# Patient Record
Sex: FEMALE | Race: WHITE | Employment: OTHER | URBAN - METROPOLITAN AREA
[De-identification: names, ages, dates, MRNs, and addresses within clinical notes are randomized per-mention and may not be internally consistent; named-entity substitution may affect disease eponyms.]

---

## 2017-11-30 PROBLEM — Z78.0 MENOPAUSE: Status: ACTIVE | Noted: 2017-11-30

## 2017-11-30 PROBLEM — E66.09 CLASS 1 OBESITY DUE TO EXCESS CALORIES WITH SERIOUS COMORBIDITY AND BODY MASS INDEX (BMI) OF 32.0 TO 32.9 IN ADULT: Status: ACTIVE | Noted: 2017-11-30

## 2017-11-30 PROBLEM — E66.811 CLASS 1 OBESITY DUE TO EXCESS CALORIES WITH SERIOUS COMORBIDITY AND BODY MASS INDEX (BMI) OF 32.0 TO 32.9 IN ADULT: Status: ACTIVE | Noted: 2017-11-30

## 2017-11-30 PROBLEM — E11.42 TYPE 2 DIABETES MELLITUS WITH DIABETIC POLYNEUROPATHY, WITHOUT LONG-TERM CURRENT USE OF INSULIN (HCC): Status: ACTIVE | Noted: 2017-11-30

## 2017-11-30 PROCEDURE — 82043 UR ALBUMIN QUANTITATIVE: CPT | Performed by: FAMILY MEDICINE

## 2017-11-30 PROCEDURE — 82607 VITAMIN B-12: CPT | Performed by: FAMILY MEDICINE

## 2017-11-30 PROCEDURE — 82570 ASSAY OF URINE CREATININE: CPT | Performed by: FAMILY MEDICINE

## 2018-03-30 PROBLEM — E11.65 UNCONTROLLED TYPE 2 DIABETES MELLITUS WITH DIABETIC POLYNEUROPATHY, WITHOUT LONG-TERM CURRENT USE OF INSULIN (HCC): Status: ACTIVE | Noted: 2017-11-30

## 2018-03-30 PROBLEM — E66.09 CLASS 1 OBESITY DUE TO EXCESS CALORIES WITH SERIOUS COMORBIDITY AND BODY MASS INDEX (BMI) OF 34.0 TO 34.9 IN ADULT: Status: ACTIVE | Noted: 2017-11-30

## 2018-03-30 PROBLEM — E11.42 UNCONTROLLED TYPE 2 DIABETES MELLITUS WITH DIABETIC POLYNEUROPATHY, WITHOUT LONG-TERM CURRENT USE OF INSULIN (HCC): Status: ACTIVE | Noted: 2017-11-30

## 2018-03-30 PROBLEM — E66.811 CLASS 1 OBESITY DUE TO EXCESS CALORIES WITH SERIOUS COMORBIDITY AND BODY MASS INDEX (BMI) OF 34.0 TO 34.9 IN ADULT: Status: ACTIVE | Noted: 2017-11-30

## 2018-03-30 PROBLEM — IMO0002 UNCONTROLLED TYPE 2 DIABETES MELLITUS WITH DIABETIC POLYNEUROPATHY, WITHOUT LONG-TERM CURRENT USE OF INSULIN: Status: ACTIVE | Noted: 2017-11-30

## 2018-03-30 PROBLEM — E11.65 UNCONTROLLED TYPE 2 DIABETES MELLITUS WITH HYPERGLYCEMIA, WITHOUT LONG-TERM CURRENT USE OF INSULIN (HCC): Status: ACTIVE | Noted: 2018-03-30

## 2018-04-06 PROBLEM — E78.00 PURE HYPERCHOLESTEROLEMIA: Status: ACTIVE | Noted: 2018-04-06

## 2018-04-30 PROBLEM — H35.3122 INTERMEDIATE STAGE NONEXUDATIVE AGE-RELATED MACULAR DEGENERATION OF LEFT EYE: Status: ACTIVE | Noted: 2018-04-30

## 2018-04-30 PROBLEM — E11.9 DIABETES MELLITUS TYPE 2 WITHOUT RETINOPATHY (HCC): Status: ACTIVE | Noted: 2018-04-30

## 2018-04-30 PROBLEM — H35.3212 EXUDATIVE AGE-RELATED MACULAR DEGENERATION OF RIGHT EYE WITH INACTIVE CHOROIDAL NEOVASCULARIZATION (HCC): Status: ACTIVE | Noted: 2018-04-30

## 2018-04-30 PROBLEM — H53.002 AMBLYOPIA OF LEFT EYE: Status: ACTIVE | Noted: 2018-04-30

## 2018-04-30 PROBLEM — Z96.1 PSEUDOPHAKIA: Status: ACTIVE | Noted: 2018-04-30

## 2018-07-27 PROBLEM — H35.3211 EXUDATIVE AGE-RELATED MACULAR DEGENERATION OF RIGHT EYE WITH ACTIVE CHOROIDAL NEOVASCULARIZATION (HCC): Status: ACTIVE | Noted: 2018-04-30

## 2018-09-12 PROBLEM — Z12.31 VISIT FOR SCREENING MAMMOGRAM: Status: ACTIVE | Noted: 2018-09-12

## 2018-09-12 PROBLEM — R60.0 BILATERAL LEG EDEMA: Status: ACTIVE | Noted: 2018-09-12

## 2018-09-25 PROBLEM — D56.3 THALASSEMIA MINOR: Status: ACTIVE | Noted: 2018-09-25

## 2018-12-04 PROCEDURE — 82570 ASSAY OF URINE CREATININE: CPT | Performed by: FAMILY MEDICINE

## 2018-12-04 PROCEDURE — 82043 UR ALBUMIN QUANTITATIVE: CPT | Performed by: FAMILY MEDICINE

## 2019-04-08 PROBLEM — R53.83 FATIGUE, UNSPECIFIED TYPE: Status: ACTIVE | Noted: 2019-04-08

## 2019-04-08 PROBLEM — M25.561 CHRONIC PAIN OF RIGHT KNEE: Status: ACTIVE | Noted: 2019-04-08

## 2019-04-08 PROBLEM — G89.29 CHRONIC PAIN OF RIGHT KNEE: Status: ACTIVE | Noted: 2019-04-08

## 2019-05-07 PROBLEM — D56.3 THALASSEMIA MINOR: Status: RESOLVED | Noted: 2018-09-25 | Resolved: 2019-05-07

## 2019-05-07 PROCEDURE — 83020 HEMOGLOBIN ELECTROPHORESIS: CPT | Performed by: INTERNAL MEDICINE

## 2019-05-07 PROCEDURE — 81364 HBB FULL GENE SEQUENCE: CPT | Performed by: INTERNAL MEDICINE

## 2019-05-07 PROCEDURE — 83021 HEMOGLOBIN CHROMOTOGRAPHY: CPT | Performed by: INTERNAL MEDICINE

## 2019-05-07 PROCEDURE — 83883 ASSAY NEPHELOMETRY NOT SPEC: CPT | Performed by: INTERNAL MEDICINE

## 2019-05-07 PROCEDURE — 84165 PROTEIN E-PHORESIS SERUM: CPT | Performed by: INTERNAL MEDICINE

## 2019-05-07 PROCEDURE — 86334 IMMUNOFIX E-PHORESIS SERUM: CPT | Performed by: INTERNAL MEDICINE

## 2019-05-07 PROCEDURE — 82784 ASSAY IGA/IGD/IGG/IGM EACH: CPT | Performed by: INTERNAL MEDICINE

## 2019-10-04 PROBLEM — Z23 NEED FOR 23-POLYVALENT PNEUMOCOCCAL POLYSACCHARIDE VACCINE: Status: ACTIVE | Noted: 2019-10-04

## 2019-10-04 PROBLEM — Z23 NEED FOR INFLUENZA VACCINATION: Status: ACTIVE | Noted: 2019-10-04

## 2019-10-04 PROBLEM — Z23 INFLUENZA VACCINE NEEDED: Status: ACTIVE | Noted: 2019-10-04

## 2019-11-07 PROBLEM — D56.3 BETA THALASSEMIA TRAIT: Status: ACTIVE | Noted: 2019-11-07

## 2020-03-13 PROBLEM — E11.65 UNCONTROLLED TYPE 2 DIABETES MELLITUS WITH HYPERGLYCEMIA (HCC): Status: ACTIVE | Noted: 2020-03-13

## 2020-03-13 PROBLEM — Z12.31 VISIT FOR SCREENING MAMMOGRAM: Status: RESOLVED | Noted: 2018-09-12 | Resolved: 2020-03-13

## 2020-03-13 PROBLEM — E11.65 UNCONTROLLED TYPE 2 DIABETES MELLITUS WITH HYPERGLYCEMIA, WITH LONG-TERM CURRENT USE OF INSULIN (HCC): Status: ACTIVE | Noted: 2020-03-13

## 2020-03-13 PROBLEM — Z79.4 UNCONTROLLED TYPE 2 DIABETES MELLITUS WITH HYPERGLYCEMIA, WITH LONG-TERM CURRENT USE OF INSULIN (HCC): Status: ACTIVE | Noted: 2020-03-13

## 2020-03-13 PROBLEM — Z23 NEED FOR INFLUENZA VACCINATION: Status: RESOLVED | Noted: 2019-10-04 | Resolved: 2020-03-13

## 2020-03-31 PROBLEM — Z79.4 UNCONTROLLED TYPE 2 DIABETES MELLITUS WITH HYPERGLYCEMIA, WITH LONG-TERM CURRENT USE OF INSULIN (HCC): Status: RESOLVED | Noted: 2020-03-13 | Resolved: 2020-03-31

## 2020-03-31 PROBLEM — E11.42 TYPE 2 DIABETES MELLITUS WITH DIABETIC POLYNEUROPATHY, WITH LONG-TERM CURRENT USE OF INSULIN (HCC): Status: ACTIVE | Noted: 2020-03-31

## 2020-03-31 PROBLEM — Z79.4 TYPE 2 DIABETES MELLITUS WITH DIABETIC POLYNEUROPATHY, WITH LONG-TERM CURRENT USE OF INSULIN (HCC): Status: ACTIVE | Noted: 2020-03-31

## 2020-03-31 PROBLEM — E11.65 UNCONTROLLED TYPE 2 DIABETES MELLITUS WITH DIABETIC POLYNEUROPATHY, WITHOUT LONG-TERM CURRENT USE OF INSULIN (HCC): Status: RESOLVED | Noted: 2017-11-30 | Resolved: 2020-03-31

## 2020-03-31 PROBLEM — E11.42 UNCONTROLLED TYPE 2 DIABETES MELLITUS WITH DIABETIC POLYNEUROPATHY, WITHOUT LONG-TERM CURRENT USE OF INSULIN (HCC): Status: RESOLVED | Noted: 2017-11-30 | Resolved: 2020-03-31

## 2020-03-31 PROBLEM — E11.65 UNCONTROLLED TYPE 2 DIABETES MELLITUS WITH HYPERGLYCEMIA, WITH LONG-TERM CURRENT USE OF INSULIN (HCC): Status: RESOLVED | Noted: 2020-03-13 | Resolved: 2020-03-31

## 2020-03-31 PROBLEM — IMO0002 UNCONTROLLED TYPE 2 DIABETES MELLITUS WITH DIABETIC POLYNEUROPATHY, WITHOUT LONG-TERM CURRENT USE OF INSULIN: Status: RESOLVED | Noted: 2017-11-30 | Resolved: 2020-03-31

## 2020-04-07 PROBLEM — E11.42 UNCONTROLLED TYPE 2 DIABETES MELLITUS WITH DIABETIC POLYNEUROPATHY, WITHOUT LONG-TERM CURRENT USE OF INSULIN (HCC): Status: ACTIVE | Noted: 2020-04-07

## 2020-04-07 PROBLEM — D56.8 BETA 0 THALASSEMIA (HCC): Status: ACTIVE | Noted: 2020-04-07

## 2020-04-07 PROBLEM — E11.65 UNCONTROLLED TYPE 2 DIABETES MELLITUS WITH DIABETIC POLYNEUROPATHY, WITHOUT LONG-TERM CURRENT USE OF INSULIN (HCC): Status: ACTIVE | Noted: 2020-04-07

## 2020-04-07 PROBLEM — IMO0002 UNCONTROLLED TYPE 2 DIABETES MELLITUS WITH DIABETIC POLYNEUROPATHY, WITHOUT LONG-TERM CURRENT USE OF INSULIN: Status: ACTIVE | Noted: 2020-04-07

## 2020-04-07 PROBLEM — D56.8: Status: ACTIVE | Noted: 2020-04-07

## 2020-06-30 PROBLEM — J02.9 SORE THROAT: Status: ACTIVE | Noted: 2020-06-30

## 2020-07-01 PROBLEM — E11.42 TYPE 2 DIABETES MELLITUS WITH DIABETIC POLYNEUROPATHY, WITH LONG-TERM CURRENT USE OF INSULIN (HCC): Status: RESOLVED | Noted: 2020-03-31 | Resolved: 2020-07-01

## 2020-07-01 PROBLEM — Z79.4 TYPE 2 DIABETES MELLITUS WITH DIABETIC POLYNEUROPATHY, WITH LONG-TERM CURRENT USE OF INSULIN (HCC): Status: RESOLVED | Noted: 2020-03-31 | Resolved: 2020-07-01

## 2020-10-29 PROBLEM — E11.42 UNCONTROLLED TYPE 2 DIABETES MELLITUS WITH DIABETIC POLYNEUROPATHY, WITHOUT LONG-TERM CURRENT USE OF INSULIN (HCC): Status: RESOLVED | Noted: 2020-04-07 | Resolved: 2020-10-29

## 2020-10-29 PROBLEM — E11.65 UNCONTROLLED TYPE 2 DIABETES MELLITUS WITH DIABETIC POLYNEUROPATHY, WITHOUT LONG-TERM CURRENT USE OF INSULIN (HCC): Status: RESOLVED | Noted: 2020-04-07 | Resolved: 2020-10-29

## 2020-10-29 PROBLEM — IMO0002 UNCONTROLLED TYPE 2 DIABETES MELLITUS WITH DIABETIC POLYNEUROPATHY, WITHOUT LONG-TERM CURRENT USE OF INSULIN: Status: RESOLVED | Noted: 2020-04-07 | Resolved: 2020-10-29

## 2020-11-02 PROBLEM — F33.0 MILD EPISODE OF RECURRENT MAJOR DEPRESSIVE DISORDER: Status: ACTIVE | Noted: 2020-11-02

## 2020-11-02 PROBLEM — F33.0 MILD EPISODE OF RECURRENT MAJOR DEPRESSIVE DISORDER (HCC): Status: ACTIVE | Noted: 2020-11-02

## 2020-12-17 PROBLEM — G31.9 BRAIN ATROPHY: Status: ACTIVE | Noted: 2020-12-17

## 2020-12-17 PROBLEM — G31.9 BRAIN ATROPHY (HCC): Status: ACTIVE | Noted: 2020-12-17

## 2020-12-18 PROBLEM — E11.42 TYPE 2 DIABETES MELLITUS WITH DIABETIC POLYNEUROPATHY (HCC): Status: ACTIVE | Noted: 2020-10-01

## 2020-12-18 PROBLEM — E66.09 CLASS 1 OBESITY DUE TO EXCESS CALORIES WITH SERIOUS COMORBIDITY AND BODY MASS INDEX (BMI) OF 34.0 TO 34.9 IN ADULT: Status: ACTIVE | Noted: 2017-05-02

## 2020-12-18 PROBLEM — R53.83 FATIGUE: Status: ACTIVE | Noted: 2019-04-08

## 2020-12-18 PROBLEM — H35.3190 NONEXUDATIVE AGE-RELATED MACULAR DEGENERATION: Status: ACTIVE | Noted: 2018-04-30

## 2020-12-18 PROBLEM — E11.42 TYPE 2 DIABETES MELLITUS WITH DIABETIC POLYNEUROPATHY (HCC): Status: RESOLVED | Noted: 2020-10-01 | Resolved: 2020-12-18

## 2020-12-18 PROBLEM — E66.811 CLASS 1 OBESITY DUE TO EXCESS CALORIES WITH SERIOUS COMORBIDITY AND BODY MASS INDEX (BMI) OF 34.0 TO 34.9 IN ADULT: Status: ACTIVE | Noted: 2017-05-02

## 2021-01-04 PROBLEM — G89.29 CHRONIC PAIN OF RIGHT KNEE: Status: RESOLVED | Noted: 2019-04-08 | Resolved: 2021-01-04

## 2021-01-04 PROBLEM — J02.9 SORE THROAT: Status: RESOLVED | Noted: 2020-06-30 | Resolved: 2021-01-04

## 2021-01-04 PROBLEM — R60.0 BILATERAL LEG EDEMA: Status: RESOLVED | Noted: 2018-09-12 | Resolved: 2021-01-04

## 2021-01-04 PROBLEM — R53.83 FATIGUE: Status: RESOLVED | Noted: 2019-04-08 | Resolved: 2021-01-04

## 2021-01-04 PROBLEM — Z23 INFLUENZA VACCINE NEEDED: Status: RESOLVED | Noted: 2019-10-04 | Resolved: 2021-01-04

## 2021-01-04 PROBLEM — E11.65 UNCONTROLLED TYPE 2 DIABETES MELLITUS WITH HYPERGLYCEMIA, WITH LONG-TERM CURRENT USE OF INSULIN (HCC): Status: RESOLVED | Noted: 2020-03-13 | Resolved: 2021-01-04

## 2021-01-04 PROBLEM — D56.8 BETA 0 THALASSEMIA (HCC): Status: RESOLVED | Noted: 2020-04-07 | Resolved: 2021-01-04

## 2021-01-04 PROBLEM — Z23 NEED FOR 23-POLYVALENT PNEUMOCOCCAL POLYSACCHARIDE VACCINE: Status: RESOLVED | Noted: 2019-10-04 | Resolved: 2021-01-04

## 2021-01-04 PROBLEM — M25.561 CHRONIC PAIN OF RIGHT KNEE: Status: RESOLVED | Noted: 2019-04-08 | Resolved: 2021-01-04

## 2021-01-04 PROBLEM — H35.3211 EXUDATIVE AGE-RELATED MACULAR DEGENERATION OF RIGHT EYE WITH ACTIVE CHOROIDAL NEOVASCULARIZATION (HCC): Status: ACTIVE | Noted: 2021-01-04

## 2021-01-04 PROBLEM — Z79.4 UNCONTROLLED TYPE 2 DIABETES MELLITUS WITH HYPERGLYCEMIA, WITH LONG-TERM CURRENT USE OF INSULIN (HCC): Status: RESOLVED | Noted: 2020-03-13 | Resolved: 2021-01-04

## 2021-01-04 PROBLEM — E78.00 PURE HYPERCHOLESTEROLEMIA: Status: RESOLVED | Noted: 2018-04-06 | Resolved: 2021-01-04

## 2021-01-04 PROBLEM — D56.8: Status: RESOLVED | Noted: 2020-04-07 | Resolved: 2021-01-04

## 2021-02-04 PROBLEM — H35.3190 NONEXUDATIVE AGE-RELATED MACULAR DEGENERATION: Status: RESOLVED | Noted: 2018-04-30 | Resolved: 2021-02-04

## 2021-02-04 PROBLEM — E11.65 UNCONTROLLED TYPE 2 DIABETES MELLITUS WITH HYPERGLYCEMIA (HCC): Status: ACTIVE | Noted: 2021-02-04

## 2021-02-04 PROBLEM — I70.0 THORACIC AORTA ATHEROSCLEROSIS (HCC): Status: ACTIVE | Noted: 2021-02-04

## 2021-02-04 PROBLEM — I70.0 THORACIC AORTA ATHEROSCLEROSIS: Status: ACTIVE | Noted: 2021-02-04

## 2021-03-15 PROBLEM — R41.3 MEMORY DIFFICULTIES: Status: ACTIVE | Noted: 2021-03-15

## 2021-09-21 PROBLEM — G47.33 OSA ON CPAP: Status: ACTIVE | Noted: 2021-09-21

## 2021-09-21 PROBLEM — Z99.89 OSA ON CPAP: Status: ACTIVE | Noted: 2021-09-21

## 2021-11-09 ENCOUNTER — HOSPITAL ENCOUNTER (EMERGENCY)
Age: 76
Discharge: HOME | End: 2021-11-09
Attending: EMERGENCY MEDICINE
Payer: MEDICARE

## 2021-11-09 ENCOUNTER — APPOINTMENT (OUTPATIENT)
Dept: MRI IMAGING | Age: 76
End: 2021-11-09
Attending: EMERGENCY MEDICINE
Payer: MEDICARE

## 2021-11-09 ENCOUNTER — OFFICE VISIT (OUTPATIENT)
Dept: FAMILY MEDICINE CLINIC | Facility: CLINIC | Age: 76
End: 2021-11-09

## 2021-11-09 VITALS
OXYGEN SATURATION: 97 % | HEART RATE: 67 BPM | TEMPERATURE: 98 F | DIASTOLIC BLOOD PRESSURE: 62 MMHG | RESPIRATION RATE: 18 BRPM | WEIGHT: 200 LBS | SYSTOLIC BLOOD PRESSURE: 154 MMHG | HEIGHT: 62 IN | BODY MASS INDEX: 36.8 KG/M2

## 2021-11-09 DIAGNOSIS — Z02.9 ENCOUNTER FOR ADMINISTRATIVE EXAMINATIONS: ICD-10-CM

## 2021-11-09 DIAGNOSIS — R42 DIZZINESS, NONSPECIFIC: Primary | ICD-10-CM

## 2021-11-09 DIAGNOSIS — R42 DIZZINESS: Primary | ICD-10-CM

## 2021-11-09 PROCEDURE — 85025 COMPLETE CBC W/AUTO DIFF WBC: CPT | Performed by: EMERGENCY MEDICINE

## 2021-11-09 PROCEDURE — 99285 EMERGENCY DEPT VISIT HI MDM: CPT

## 2021-11-09 PROCEDURE — 80053 COMPREHEN METABOLIC PANEL: CPT | Performed by: EMERGENCY MEDICINE

## 2021-11-09 PROCEDURE — 96374 THER/PROPH/DIAG INJ IV PUSH: CPT

## 2021-11-09 PROCEDURE — 99284 EMERGENCY DEPT VISIT MOD MDM: CPT

## 2021-11-09 PROCEDURE — 93010 ELECTROCARDIOGRAM REPORT: CPT

## 2021-11-09 PROCEDURE — 81003 URINALYSIS AUTO W/O SCOPE: CPT | Performed by: EMERGENCY MEDICINE

## 2021-11-09 PROCEDURE — 93005 ELECTROCARDIOGRAM TRACING: CPT

## 2021-11-09 PROCEDURE — 82962 GLUCOSE BLOOD TEST: CPT

## 2021-11-09 PROCEDURE — 70551 MRI BRAIN STEM W/O DYE: CPT | Performed by: EMERGENCY MEDICINE

## 2021-11-09 RX ORDER — MECLIZINE HYDROCHLORIDE 25 MG/1
25 TABLET ORAL ONCE
Status: COMPLETED | OUTPATIENT
Start: 2021-11-09 | End: 2021-11-09

## 2021-11-09 RX ORDER — MECLIZINE HYDROCHLORIDE 25 MG/1
25 TABLET ORAL 3 TIMES DAILY PRN
Qty: 15 TABLET | Refills: 0 | Status: SHIPPED | OUTPATIENT
Start: 2021-11-09 | End: 2022-01-07

## 2021-11-09 RX ORDER — LORAZEPAM 2 MG/ML
0.5 INJECTION INTRAMUSCULAR ONCE
Status: COMPLETED | OUTPATIENT
Start: 2021-11-09 | End: 2021-11-09

## 2021-11-09 NOTE — ED PROVIDER NOTES
Patient Seen in: THE [facility] Emergency Department In [place]      History   Patient presents with:  Dizziness  Nausea/Vomiting/Diarrhea    Stated Complaint: room spinning dizziness and nausea    Subjective:   HPI    This is a 62-year-old female who was sen systems reviewed and negative except as noted above.     Physical Exam     ED Triage Vitals   BP 11/09/21 1112 157/62   Pulse 11/09/21 1110 62   Resp 11/09/21 1110 18   Temp 11/09/21 1110 97.6 °F (36.4 °C)   Temp src --    SpO2 11/09/21 1110 94 %   O2 Devic Platelet.   Procedure                               Abnormality         Status                     ---------                               -----------         ------                     CBC W/ DIFFERENTIAL[[id number]]          Abnormal            Final resul on 11/09/2021 at 2:07 PM       MDM       Patient placed on cardiac monitor, continuous pulse oximetry and IV line was established of normal saline. Basic labs were obtained. CBC: White blood cell count 7.1. Hemoglobin 9.8. Platelet 150. CMP: BUN 31.

## 2021-11-09 NOTE — PROGRESS NOTES
Patient presents to walk in clinic with onset of dizziness and nausea yesterday. Patient states all day yesterday upon awakening she has had vertigo, dizziness and some nausea.  Symptoms improved slightly after 5 pm but patient had symptoms again upon awake 0  Vitamin B-12 1000 MCG Oral Tab, Take 1,000 mcg by mouth daily. , Disp: , Rfl:   aspirin 81 MG Oral Tab, Take 81 mg by mouth daily. , Disp: , Rfl:   Calcium Carbonate-Vitamin D 600-125 MG-UNIT Oral Tab, Take by mouth., Disp: , Rfl:     No current facility-

## 2022-01-07 PROBLEM — J96.01 ACUTE RESPIRATORY FAILURE WITH HYPOXIA (HCC): Status: ACTIVE | Noted: 2022-01-07

## 2022-03-21 PROBLEM — N18.31 STAGE 3A CHRONIC KIDNEY DISEASE (HCC): Status: ACTIVE | Noted: 2022-03-21

## 2022-03-21 PROBLEM — H35.3212 EXUDATIVE AGE-RELATED MACULAR DEGENERATION OF RIGHT EYE WITH INACTIVE CHOROIDAL NEOVASCULARIZATION (HCC): Status: ACTIVE | Noted: 2021-01-04

## 2022-03-21 PROBLEM — R41.3 MEMORY DIFFICULTIES: Status: RESOLVED | Noted: 2021-03-15 | Resolved: 2022-03-21

## 2022-03-21 PROBLEM — J96.01 ACUTE RESPIRATORY FAILURE WITH HYPOXIA (HCC): Status: RESOLVED | Noted: 2022-01-07 | Resolved: 2022-03-21

## 2022-03-21 PROBLEM — J96.11 CHRONIC RESPIRATORY FAILURE WITH HYPOXIA (HCC): Status: ACTIVE | Noted: 2022-03-21

## 2024-04-21 ENCOUNTER — APPOINTMENT (OUTPATIENT)
Dept: GENERAL RADIOLOGY | Facility: HOSPITAL | Age: 79
End: 2024-04-21
Attending: EMERGENCY MEDICINE
Payer: MEDICARE

## 2024-04-21 ENCOUNTER — HOSPITAL ENCOUNTER (INPATIENT)
Facility: HOSPITAL | Age: 79
LOS: 9 days | Discharge: HOME HEALTH | End: 2024-04-30
Attending: EMERGENCY MEDICINE | Admitting: INTERNAL MEDICINE
Payer: MEDICARE

## 2024-04-21 DIAGNOSIS — J18.9 COMMUNITY ACQUIRED PNEUMONIA, UNSPECIFIED LATERALITY: Primary | ICD-10-CM

## 2024-04-21 DIAGNOSIS — I50.9 ACUTE ON CHRONIC CONGESTIVE HEART FAILURE, UNSPECIFIED HEART FAILURE TYPE (HCC): ICD-10-CM

## 2024-04-21 DIAGNOSIS — R09.02 HYPOXIA: ICD-10-CM

## 2024-04-21 LAB
ALBUMIN SERPL-MCNC: 3.2 G/DL (ref 3.4–5)
ALBUMIN/GLOB SERPL: 0.9 {RATIO} (ref 1–2)
ALP LIVER SERPL-CCNC: 110 U/L
ALT SERPL-CCNC: 15 U/L
ANION GAP SERPL CALC-SCNC: 6 MMOL/L (ref 0–18)
APTT PPP: 30.1 SECONDS (ref 23.3–35.6)
AST SERPL-CCNC: 18 U/L (ref 15–37)
BASOPHILS # BLD AUTO: 0.03 X10(3) UL (ref 0–0.2)
BASOPHILS NFR BLD AUTO: 0.3 %
BILIRUB SERPL-MCNC: 0.6 MG/DL (ref 0.1–2)
BUN BLD-MCNC: 26 MG/DL (ref 9–23)
CALCIUM BLD-MCNC: 8.9 MG/DL (ref 8.5–10.1)
CHLORIDE SERPL-SCNC: 108 MMOL/L (ref 98–112)
CO2 SERPL-SCNC: 28 MMOL/L (ref 21–32)
CREAT BLD-MCNC: 1.68 MG/DL
EGFRCR SERPLBLD CKD-EPI 2021: 31 ML/MIN/1.73M2 (ref 60–?)
EOSINOPHIL # BLD AUTO: 0.4 X10(3) UL (ref 0–0.7)
EOSINOPHIL NFR BLD AUTO: 4.6 %
ERYTHROCYTE [DISTWIDTH] IN BLOOD BY AUTOMATED COUNT: 21.4 %
FLUAV + FLUBV RNA SPEC NAA+PROBE: NEGATIVE
FLUAV + FLUBV RNA SPEC NAA+PROBE: NEGATIVE
GLOBULIN PLAS-MCNC: 3.6 G/DL (ref 2.8–4.4)
GLUCOSE BLD-MCNC: 237 MG/DL (ref 70–99)
GLUCOSE BLD-MCNC: 320 MG/DL (ref 70–99)
HCT VFR BLD AUTO: 34.3 %
HGB BLD-MCNC: 9.9 G/DL
IMM GRANULOCYTES # BLD AUTO: 0.04 X10(3) UL (ref 0–1)
IMM GRANULOCYTES NFR BLD: 0.5 %
INR BLD: 1.08 (ref 0.8–1.2)
LYMPHOCYTES # BLD AUTO: 1.82 X10(3) UL (ref 1–4)
LYMPHOCYTES NFR BLD AUTO: 20.8 %
MCH RBC QN AUTO: 16.9 PG (ref 26–34)
MCHC RBC AUTO-ENTMCNC: 28.9 G/DL (ref 31–37)
MCV RBC AUTO: 58.5 FL
MONOCYTES # BLD AUTO: 0.58 X10(3) UL (ref 0.1–1)
MONOCYTES NFR BLD AUTO: 6.6 %
NEUTROPHILS # BLD AUTO: 5.88 X10 (3) UL (ref 1.5–7.7)
NEUTROPHILS # BLD AUTO: 5.88 X10(3) UL (ref 1.5–7.7)
NEUTROPHILS NFR BLD AUTO: 67.2 %
NT-PROBNP SERPL-MCNC: 695 PG/ML (ref ?–450)
OSMOLALITY SERPL CALC.SUM OF ELEC: 311 MOSM/KG (ref 275–295)
PLATELET # BLD AUTO: 199 10(3)UL (ref 150–450)
PLATELETS.RETICULATED NFR BLD AUTO: 3.8 % (ref 0–7)
POTASSIUM SERPL-SCNC: 4.1 MMOL/L (ref 3.5–5.1)
PROT SERPL-MCNC: 6.8 G/DL (ref 6.4–8.2)
PROTHROMBIN TIME: 14 SECONDS (ref 11.6–14.8)
RBC # BLD AUTO: 5.86 X10(6)UL
RSV RNA SPEC NAA+PROBE: NEGATIVE
SARS-COV-2 RNA RESP QL NAA+PROBE: NOT DETECTED
SODIUM SERPL-SCNC: 142 MMOL/L (ref 136–145)
TROPONIN I SERPL HS-MCNC: 33 NG/L
WBC # BLD AUTO: 8.8 X10(3) UL (ref 4–11)

## 2024-04-21 PROCEDURE — 71045 X-RAY EXAM CHEST 1 VIEW: CPT | Performed by: EMERGENCY MEDICINE

## 2024-04-21 PROCEDURE — 93010 ELECTROCARDIOGRAM REPORT: CPT

## 2024-04-21 PROCEDURE — 93005 ELECTROCARDIOGRAM TRACING: CPT

## 2024-04-21 PROCEDURE — 96365 THER/PROPH/DIAG IV INF INIT: CPT

## 2024-04-21 PROCEDURE — 83880 ASSAY OF NATRIURETIC PEPTIDE: CPT | Performed by: EMERGENCY MEDICINE

## 2024-04-21 PROCEDURE — 82962 GLUCOSE BLOOD TEST: CPT

## 2024-04-21 PROCEDURE — 84484 ASSAY OF TROPONIN QUANT: CPT | Performed by: EMERGENCY MEDICINE

## 2024-04-21 PROCEDURE — 96375 TX/PRO/DX INJ NEW DRUG ADDON: CPT

## 2024-04-21 PROCEDURE — 85730 THROMBOPLASTIN TIME PARTIAL: CPT | Performed by: EMERGENCY MEDICINE

## 2024-04-21 PROCEDURE — 0241U SARS-COV-2/FLU A AND B/RSV BY PCR (GENEXPERT): CPT | Performed by: EMERGENCY MEDICINE

## 2024-04-21 PROCEDURE — 99285 EMERGENCY DEPT VISIT HI MDM: CPT

## 2024-04-21 PROCEDURE — 85610 PROTHROMBIN TIME: CPT | Performed by: EMERGENCY MEDICINE

## 2024-04-21 PROCEDURE — 85025 COMPLETE CBC W/AUTO DIFF WBC: CPT | Performed by: EMERGENCY MEDICINE

## 2024-04-21 PROCEDURE — 80053 COMPREHEN METABOLIC PANEL: CPT | Performed by: EMERGENCY MEDICINE

## 2024-04-21 RX ORDER — TIZANIDINE 4 MG/1
4 TABLET ORAL EVERY 6 HOURS PRN
Status: ON HOLD | COMMUNITY
End: 2024-04-21

## 2024-04-21 RX ORDER — ACETAMINOPHEN 500 MG
500 TABLET ORAL EVERY 4 HOURS PRN
Status: DISCONTINUED | OUTPATIENT
Start: 2024-04-21 | End: 2024-04-24

## 2024-04-21 RX ORDER — TRAMADOL HYDROCHLORIDE 50 MG/1
50 TABLET ORAL 2 TIMES DAILY PRN
Status: DISCONTINUED | OUTPATIENT
Start: 2024-04-21 | End: 2024-04-28

## 2024-04-21 RX ORDER — FLUTICASONE PROPIONATE 50 MCG
2 SPRAY, SUSPENSION (ML) NASAL DAILY
COMMUNITY

## 2024-04-21 RX ORDER — ENOXAPARIN SODIUM 100 MG/ML
30 INJECTION SUBCUTANEOUS DAILY
Status: DISCONTINUED | OUTPATIENT
Start: 2024-04-22 | End: 2024-04-30

## 2024-04-21 RX ORDER — FUROSEMIDE 10 MG/ML
40 INJECTION INTRAMUSCULAR; INTRAVENOUS ONCE
Status: COMPLETED | OUTPATIENT
Start: 2024-04-21 | End: 2024-04-21

## 2024-04-21 RX ORDER — MONTELUKAST SODIUM 10 MG/1
10 TABLET ORAL NIGHTLY
COMMUNITY

## 2024-04-21 RX ORDER — DEXAMETHASONE SODIUM PHOSPHATE 4 MG/ML
1 INJECTION, SOLUTION INTRAMUSCULAR; INTRAVENOUS DAILY
COMMUNITY

## 2024-04-21 RX ORDER — DONEPEZIL HYDROCHLORIDE 10 MG/1
10 TABLET, FILM COATED ORAL NIGHTLY
Status: DISCONTINUED | OUTPATIENT
Start: 2024-04-21 | End: 2024-04-30

## 2024-04-21 RX ORDER — DULOXETIN HYDROCHLORIDE 30 MG/1
60 CAPSULE, DELAYED RELEASE ORAL DAILY
Status: DISCONTINUED | OUTPATIENT
Start: 2024-04-21 | End: 2024-04-30

## 2024-04-21 RX ORDER — FLUTICASONE PROPIONATE 50 MCG
1 SPRAY, SUSPENSION (ML) NASAL DAILY
Status: DISCONTINUED | OUTPATIENT
Start: 2024-04-22 | End: 2024-04-30

## 2024-04-21 RX ORDER — DEXTROSE MONOHYDRATE 25 G/50ML
50 INJECTION, SOLUTION INTRAVENOUS
Status: DISCONTINUED | OUTPATIENT
Start: 2024-04-21 | End: 2024-04-30

## 2024-04-21 RX ORDER — NICOTINE POLACRILEX 4 MG
30 LOZENGE BUCCAL
Status: DISCONTINUED | OUTPATIENT
Start: 2024-04-21 | End: 2024-04-30

## 2024-04-21 RX ORDER — FUROSEMIDE 20 MG/1
20 TABLET ORAL 2 TIMES DAILY
Status: DISCONTINUED | OUTPATIENT
Start: 2024-04-21 | End: 2024-04-21

## 2024-04-21 RX ORDER — PREGABALIN 300 MG/1
600 CAPSULE ORAL NIGHTLY
COMMUNITY

## 2024-04-21 RX ORDER — ATORVASTATIN CALCIUM 10 MG/1
10 TABLET, FILM COATED ORAL NIGHTLY
Status: DISCONTINUED | OUTPATIENT
Start: 2024-04-22 | End: 2024-04-30

## 2024-04-21 RX ORDER — FUROSEMIDE 10 MG/ML
40 INJECTION INTRAMUSCULAR; INTRAVENOUS DAILY
Status: DISCONTINUED | OUTPATIENT
Start: 2024-04-22 | End: 2024-04-25

## 2024-04-21 RX ORDER — NICOTINE POLACRILEX 4 MG
15 LOZENGE BUCCAL
Status: DISCONTINUED | OUTPATIENT
Start: 2024-04-21 | End: 2024-04-30

## 2024-04-21 RX ORDER — MONTELUKAST SODIUM 10 MG/1
10 TABLET ORAL NIGHTLY
Status: DISCONTINUED | OUTPATIENT
Start: 2024-04-21 | End: 2024-04-30

## 2024-04-21 RX ORDER — ASPIRIN 81 MG/1
81 TABLET, CHEWABLE ORAL DAILY
Status: DISCONTINUED | OUTPATIENT
Start: 2024-04-22 | End: 2024-04-30

## 2024-04-21 RX ORDER — PREGABALIN 75 MG/1
300 CAPSULE ORAL DAILY
Status: DISCONTINUED | OUTPATIENT
Start: 2024-04-22 | End: 2024-04-22

## 2024-04-21 RX ORDER — TIZANIDINE 4 MG/1
4 TABLET ORAL EVERY 6 HOURS PRN
Status: DISCONTINUED | OUTPATIENT
Start: 2024-04-21 | End: 2024-04-22

## 2024-04-21 RX ORDER — INSULIN DEGLUDEC 100 U/ML
30 INJECTION, SOLUTION SUBCUTANEOUS DAILY
Status: DISCONTINUED | OUTPATIENT
Start: 2024-04-21 | End: 2024-04-22

## 2024-04-21 RX ORDER — AZITHROMYCIN 250 MG/1
500 TABLET, FILM COATED ORAL
Qty: 6 TABLET | Refills: 0 | Status: COMPLETED | OUTPATIENT
Start: 2024-04-21 | End: 2024-04-23

## 2024-04-21 RX ORDER — DONEPEZIL HYDROCHLORIDE 10 MG/1
10 TABLET, FILM COATED ORAL NIGHTLY
COMMUNITY

## 2024-04-22 ENCOUNTER — APPOINTMENT (OUTPATIENT)
Dept: CV DIAGNOSTICS | Facility: HOSPITAL | Age: 79
End: 2024-04-22
Attending: INTERNAL MEDICINE
Payer: MEDICARE

## 2024-04-22 LAB
ADENOVIRUS PCR:: NOT DETECTED
ANION GAP SERPL CALC-SCNC: 3 MMOL/L (ref 0–18)
ATRIAL RATE: 65 BPM
B PARAPERT DNA SPEC QL NAA+PROBE: NOT DETECTED
B PERT DNA SPEC QL NAA+PROBE: NOT DETECTED
BASOPHILS # BLD AUTO: 0.06 X10(3) UL (ref 0–0.2)
BASOPHILS NFR BLD AUTO: 0.7 %
BUN BLD-MCNC: 22 MG/DL (ref 9–23)
C PNEUM DNA SPEC QL NAA+PROBE: NOT DETECTED
CALCIUM BLD-MCNC: 8.7 MG/DL (ref 8.5–10.1)
CHLORIDE SERPL-SCNC: 110 MMOL/L (ref 98–112)
CO2 SERPL-SCNC: 31 MMOL/L (ref 21–32)
CORONAVIRUS 229E PCR:: NOT DETECTED
CORONAVIRUS HKU1 PCR:: NOT DETECTED
CORONAVIRUS NL63 PCR:: NOT DETECTED
CORONAVIRUS OC43 PCR:: NOT DETECTED
CREAT BLD-MCNC: 1.55 MG/DL
EGFRCR SERPLBLD CKD-EPI 2021: 35 ML/MIN/1.73M2 (ref 60–?)
EOSINOPHIL # BLD AUTO: 0.5 X10(3) UL (ref 0–0.7)
EOSINOPHIL NFR BLD AUTO: 5.8 %
ERYTHROCYTE [DISTWIDTH] IN BLOOD BY AUTOMATED COUNT: 21.2 %
FLUAV RNA SPEC QL NAA+PROBE: NOT DETECTED
FLUBV RNA SPEC QL NAA+PROBE: NOT DETECTED
GLUCOSE BLD-MCNC: 144 MG/DL (ref 70–99)
GLUCOSE BLD-MCNC: 158 MG/DL (ref 70–99)
GLUCOSE BLD-MCNC: 175 MG/DL (ref 70–99)
GLUCOSE BLD-MCNC: 187 MG/DL (ref 70–99)
GLUCOSE BLD-MCNC: 232 MG/DL (ref 70–99)
GLUCOSE BLD-MCNC: 61 MG/DL (ref 70–99)
GLUCOSE BLD-MCNC: 69 MG/DL (ref 70–99)
GLUCOSE BLD-MCNC: 89 MG/DL (ref 70–99)
HCT VFR BLD AUTO: 35 %
HGB BLD-MCNC: 10 G/DL
IMM GRANULOCYTES # BLD AUTO: 0.04 X10(3) UL (ref 0–1)
IMM GRANULOCYTES NFR BLD: 0.5 %
LYMPHOCYTES # BLD AUTO: 2.25 X10(3) UL (ref 1–4)
LYMPHOCYTES NFR BLD AUTO: 26.2 %
MCH RBC QN AUTO: 16.9 PG (ref 26–34)
MCHC RBC AUTO-ENTMCNC: 28.6 G/DL (ref 31–37)
MCV RBC AUTO: 59.3 FL
METAPNEUMOVIRUS PCR:: NOT DETECTED
MONOCYTES # BLD AUTO: 0.87 X10(3) UL (ref 0.1–1)
MONOCYTES NFR BLD AUTO: 10.1 %
MYCOPLASMA PNEUMONIA PCR:: NOT DETECTED
NEUTROPHILS # BLD AUTO: 4.88 X10 (3) UL (ref 1.5–7.7)
NEUTROPHILS # BLD AUTO: 4.88 X10(3) UL (ref 1.5–7.7)
NEUTROPHILS NFR BLD AUTO: 56.7 %
OSMOLALITY SERPL CALC.SUM OF ELEC: 299 MOSM/KG (ref 275–295)
P AXIS: 49 DEGREES
P-R INTERVAL: 146 MS
PARAINFLUENZA 1 PCR:: NOT DETECTED
PARAINFLUENZA 2 PCR:: NOT DETECTED
PARAINFLUENZA 3 PCR:: NOT DETECTED
PARAINFLUENZA 4 PCR:: NOT DETECTED
PLATELET # BLD AUTO: 202 10(3)UL (ref 150–450)
PLATELETS.RETICULATED NFR BLD AUTO: 5 % (ref 0–7)
POTASSIUM SERPL-SCNC: 3.9 MMOL/L (ref 3.5–5.1)
PROCALCITONIN SERPL-MCNC: 0.06 NG/ML (ref ?–0.16)
Q-T INTERVAL: 446 MS
QRS DURATION: 84 MS
QTC CALCULATION (BEZET): 463 MS
R AXIS: -15 DEGREES
RBC # BLD AUTO: 5.9 X10(6)UL
RHINOVIRUS/ENTERO PCR:: NOT DETECTED
RSV RNA SPEC QL NAA+PROBE: NOT DETECTED
SARS-COV-2 RNA NPH QL NAA+NON-PROBE: NOT DETECTED
SODIUM SERPL-SCNC: 144 MMOL/L (ref 136–145)
T AXIS: 64 DEGREES
VENTRICULAR RATE: 65 BPM
WBC # BLD AUTO: 8.6 X10(3) UL (ref 4–11)

## 2024-04-22 PROCEDURE — 84145 PROCALCITONIN (PCT): CPT | Performed by: STUDENT IN AN ORGANIZED HEALTH CARE EDUCATION/TRAINING PROGRAM

## 2024-04-22 PROCEDURE — 0202U NFCT DS 22 TRGT SARS-COV-2: CPT | Performed by: INTERNAL MEDICINE

## 2024-04-22 PROCEDURE — 93306 TTE W/DOPPLER COMPLETE: CPT | Performed by: INTERNAL MEDICINE

## 2024-04-22 PROCEDURE — 82962 GLUCOSE BLOOD TEST: CPT

## 2024-04-22 PROCEDURE — 85025 COMPLETE CBC W/AUTO DIFF WBC: CPT | Performed by: INTERNAL MEDICINE

## 2024-04-22 PROCEDURE — 94660 CPAP INITIATION&MGMT: CPT

## 2024-04-22 PROCEDURE — 80048 BASIC METABOLIC PNL TOTAL CA: CPT | Performed by: INTERNAL MEDICINE

## 2024-04-22 RX ORDER — PREGABALIN 100 MG/1
600 CAPSULE ORAL NIGHTLY
Status: DISCONTINUED | OUTPATIENT
Start: 2024-04-22 | End: 2024-04-28

## 2024-04-22 RX ORDER — INSULIN DEGLUDEC 100 U/ML
20 INJECTION, SOLUTION SUBCUTANEOUS DAILY
Status: DISCONTINUED | OUTPATIENT
Start: 2024-04-22 | End: 2024-04-22

## 2024-04-22 RX ORDER — TROLAMINE SALICYLATE 10 G/100G
CREAM TOPICAL 3 TIMES DAILY PRN
Status: DISCONTINUED | OUTPATIENT
Start: 2024-04-22 | End: 2024-04-30

## 2024-04-22 RX ORDER — INSULIN DEGLUDEC 100 U/ML
20 INJECTION, SOLUTION SUBCUTANEOUS DAILY
Status: DISCONTINUED | OUTPATIENT
Start: 2024-04-22 | End: 2024-04-23

## 2024-04-22 RX ORDER — INSULIN DEGLUDEC 100 U/ML
20 INJECTION, SOLUTION SUBCUTANEOUS NIGHTLY
Status: DISCONTINUED | OUTPATIENT
Start: 2024-04-22 | End: 2024-04-25

## 2024-04-22 NOTE — CONSULTS
[facility]    Nina Donnelly Patient Status:  Inpatient    10/9/1945 MRN [id number]   Location [facility] 2NE-A Attending Jamar Arora MD   Hosp Day # 1 PCP Chiki Caldwell MD     Date of Admission: 2024  8:19 PM  Admission Diagnosis: Hypoxia [R09.02]  Community acquired pneumonia, unspecified laterality [J18.9]  Acute on chronic congestive heart failure, unspecified heart failure type (HCC) [I50.9]  Reason for Consult: dyspnea     History of Present Illness: 79 y/o with h/o COPD, WYATT on CPAP with O2 entrained, presented to ED with c/o worsening cough and SOB/TAM for two weeks.  Only wears home O2 prn but recently requiring more of it.    Also notes ongoing LE swelling.  Had recent COVID exposure.  Denies sputum production. No f/c/n/v/d/dysuria/hemoptysis.  - currently feels better after diuresis but not quite back to baseline.     Past Medical History:    Hyperlipidemia    Mild episode of recurrent major depressive disorder (HCC)    Neuropathy    Obstructive apnea    DMG PSG AHI 11    WYATT on CPAP      Past Surgical History:   Procedure Laterality Date          Cabg      Foot surgery      Hysterectomy      Knee surgery      Tonsillectomy        Allergies   Allergen Reactions    Pioglitazone UNKNOWN     Weight Gain        Social History:   reports that she quit smoking about 9 years ago. Her smoking use included cigarettes. She started smoking about 64 years ago. She has a 55 pack-year smoking history. She has never used smokeless tobacco. She reports that she does not drink alcohol and does not use drugs.      Family History:  Family History   Problem Relation Age of Onset    Breast Cancer Paternal Aunt         dx age unknown          Home Medications:  Outpatient Medications Marked as Taking for the 24 encounter (Hospital Encounter)   Medication Sig Dispense Refill    insulin glargine 100 UNIT/ML Subcutaneous Solution Pen-injector Inject 20 Units into the skin 2 (two) times  daily.      donepezil 10 MG Oral Tab Take 1 tablet (10 mg total) by mouth nightly.      montelukast 10 MG Oral Tab Take 1 tablet (10 mg total) by mouth nightly.      fluticasone propionate 50 MCG/ACT Nasal Suspension 2 sprays by Each Nare route daily.      insulin aspart 100 Units/mL Subcutaneous Solution Pen-injector Inject 6 Units into the skin 2 (two) times daily before meals. With breakfast and lunch      pregabalin 300 MG Oral Cap Take 2 capsules (600 mg total) by mouth nightly.      insulin aspart 100 Units/mL Subcutaneous Solution Pen-injector Inject 8 Units into the skin daily with dinner.      insulin aspart 100 Units/mL Subcutaneous Solution Pen-injector Inject 1-10 Units into the skin 3 (three) times daily before meals. Sliding scale      calcium carb-cholecalciferol 500-10 MG-MCG Oral Chew Tab Chew 1 tablet by mouth daily.      FUROSEMIDE 20 MG Oral Tab TAKE 1 TABLET TWICE DAILY 180 tablet 0    TURMERIC OR Take by mouth.      Cholecalciferol (VITAMIN D-3 OR) Take by mouth.      traMADol 50 MG Oral Tab Take 1 tablet (50 mg total) by mouth 2 (two) times daily as needed for Pain. 180 tablet 2    Glucose Blood (TRUE METRIX BLOOD GLUCOSE TEST) In Vitro Strip Check sugars  strip 3    Blood Glucose Monitoring Suppl (TRUE METRIX METER) w/Device Does not apply Kit Check sugars TID1 1 kit 0    TRUEplus Lancets 33G Does not apply Misc Check sugars  each 3    Blood Glucose Calibration (TRUE METRIX LEVEL 1) Low In Vitro Solution 1 Application by In Vitro route daily. 3 each 3    atorvastatin 10 MG Oral Tab Take 1 tablet (10 mg total) by mouth nightly. 90 tablet 1    Insulin Pen Needle (EASY COMFORT PEN NEEDLES) 31G X 8 MM Does not apply Misc Use daily for victoza shots subcut. 90 each 3    DULoxetine 60 MG Oral Cap DR Particles TAKE 1 CAPSULE EVERY DAY 90 capsule 1    Glucose Blood (ACCU-CHEK SMARTVIEW) In Vitro Strip 3 (three) times daily.      Vitamin B-12 1000 MCG Oral Tab Take 1 tablet (1,000 mcg  total) by mouth daily.      aspirin 81 MG Oral Tab Take 1 tablet (81 mg total) by mouth daily.          Current Medications:    Current Facility-Administered Medications:     pregabalin (Lyrica) cap 600 mg, 600 mg, Oral, Nightly    insulin degludec 100 units/mL flextouch 20 Units, 20 Units, Subcutaneous, Nightly    insulin aspart (NovoLOG) 100 Units/mL FlexPen 6 Units, 6 Units, Subcutaneous, BID AC    insulin aspart (NovoLOG) 100 Units/mL FlexPen 8 Units, 8 Units, Subcutaneous, Daily with dinner    insulin degludec 100 units/mL flextouch 20 Units, 20 Units, Subcutaneous, Daily    trolamine salicyliate 10 % cream, , Topical, TID PRN    aspirin chewable tab 81 mg, 81 mg, Oral, Daily    atorvastatin (Lipitor) tab 10 mg, 10 mg, Oral, Nightly    donepezil (Aricept) tab 10 mg, 10 mg, Oral, Nightly    DULoxetine (Cymbalta) DR cap 60 mg, 60 mg, Oral, Daily    fluticasone propionate (Flonase) 50 MCG/ACT nasal suspension 1 spray, 1 spray, Each Nare, Daily    montelukast (Singulair) tab 10 mg, 10 mg, Oral, Nightly    traMADol (Ultram) tab 50 mg, 50 mg, Oral, BID PRN    cefTRIAXone (Rocephin) 1 g in D5W 100 mL IVPB-ADD, 1 g, Intravenous, Q24H    glucose (Dex4) 15 GM/59ML oral liquid 15 g, 15 g, Oral, Q15 Min PRN **OR** glucose (Glutose) 40% oral gel 15 g, 15 g, Oral, Q15 Min PRN **OR** glucose-vitamin C (Dex-4) chewable tab 4 tablet, 4 tablet, Oral, Q15 Min PRN **OR** dextrose 50% injection 50 mL, 50 mL, Intravenous, Q15 Min PRN **OR** glucose (Dex4) 15 GM/59ML oral liquid 30 g, 30 g, Oral, Q15 Min PRN **OR** glucose (Glutose) 40% oral gel 30 g, 30 g, Oral, Q15 Min PRN **OR** glucose-vitamin C (Dex-4) chewable tab 8 tablet, 8 tablet, Oral, Q15 Min PRN    enoxaparin (Lovenox) 30 MG/0.3ML SUBQ injection 30 mg, 30 mg, Subcutaneous, Daily    acetaminophen (Tylenol Extra Strength) tab 500 mg, 500 mg, Oral, Q4H PRN    insulin aspart (NovoLOG) 100 Units/mL FlexPen 2-10 Units, 2-10 Units, Subcutaneous, TID AC and HS    azithromycin  (Zithromax) tab 500 mg, 500 mg, Oral, Daily    furosemide (Lasix) 10 mg/mL injection 40 mg, 40 mg, Intravenous, Daily     REVIEW OF SYSTEMS:   As listed in HPI, otherwise ten point ROS is negative.     OBJECTIVE:  Patient Vitals for the past 24 hrs:   BP Temp Temp src Pulse Resp SpO2 Height Weight   04/22/24 1001 -- -- -- -- 16 -- -- --   04/22/24 0819 136/55 98.1 °F (36.7 °C) Oral 54 15 91 % -- --   04/22/24 0612 -- -- -- 65 -- (!) 88 % -- 200 lb 13.4 oz (91.1 kg)   04/22/24 0310 (!) 163/58 98.7 °F (37.1 °C) Oral 61 16 92 % -- --   04/21/24 2330 -- -- -- 64 -- 96 % -- --   04/21/24 2315 (!) 171/61 -- -- 61 18 95 % 154.9 cm (5' 0.98\") --   04/21/24 2313 (!) 168/59 -- -- 59 18 96 % -- --   04/21/24 2309 (!) 164/65 98.9 °F (37.2 °C) Oral 62 18 97 % -- --   04/21/24 2300 (!) 176/60 -- -- 78 -- 95 % -- 201 lb 15.1 oz (91.6 kg)   04/21/24 2200 -- -- -- 64 13 100 % -- --   04/21/24 2045 -- -- -- 63 15 99 % -- --   04/21/24 2010 (!) 173/56 98.9 °F (37.2 °C) Oral 70 24 95 % -- --   04/21/24 2006 -- -- -- -- -- (!) 88 % -- --     O2 requirement: on room air at rest    Wt Readings from Last 3 Encounters:   04/22/24 200 lb 13.4 oz (91.1 kg)   03/30/22 199 lb (90.3 kg)   03/21/22 193 lb (87.5 kg)        I/O last 3 completed shifts:  In: 480 [P.O.:480]  Out: 550 [Urine:550]  I/O this shift:  In: 240 [P.O.:240]  Out: -     General appearance: alert, appears stated age, cooperative, and moderately obese  Head: Normocephalic, without obvious abnormality, atraumatic  Neck: no adenopathy, no carotid bruit, and supple, symmetrical, trachea midline  Back: symmetric, no curvature. ROM normal. No CVA tenderness.  Lungs:  faint insp crackles, no wheezing  Heart: regular rate and rhythm  Abdomen: soft, non-tender; bowel sounds normal; no masses,  no organomegaly  Extremities: edema trace  Pulses: 2+ and symmetric  Skin: Skin color, texture, turgor normal. No rashes or lesions     Lab Results   Component Value Date    WBC 8.6 04/22/2024     RBC 5.90 04/22/2024    HGB 10.0 04/22/2024    HCT 35.0 04/22/2024    MCV 59.3 04/22/2024    MCH 16.9 04/22/2024    MCHC 28.6 04/22/2024    RDW 21.2 04/22/2024    .0 04/22/2024     Lab Results   Component Value Date     04/22/2024    K 3.9 04/22/2024     04/22/2024    CO2 31.0 04/22/2024    BUN 22 04/22/2024    CREATSERUM 1.55 04/22/2024    GLU 61 04/22/2024    CA 8.7 04/22/2024     Lab Results   Component Value Date     04/22/2024    K 3.9 04/22/2024     04/22/2024    CO2 31.0 04/22/2024    BUN 22 04/22/2024    CREATSERUM 1.55 04/22/2024    GLU 61 04/22/2024    CA 8.7 04/22/2024    ALKPHO 110 04/21/2024    ALT 15 04/21/2024    AST 18 04/21/2024    BILT 0.6 04/21/2024    ALB 3.2 04/21/2024    TP 6.8 04/21/2024     Lab Results   Component Value Date    INR 1.08 04/21/2024        Imaging: CXR: small B effusion, pulm edema, CMG     ASSESSMENT/PLAN:  Dyspnea/hypoxia- due to CHF exacerbation.  +/- viral bronchitis or early PNA  - weaned to room air after diuresis  - IS/BDs prn. Not actively wheezing on exam  ID - COVID negative  - CXR more c/w pulmonary edema, PCT negative.    - check RVP  - repeat CXR after diuresis and if better consider stopping abx  WYATT- cont with CPAP with O2 entrained as OP  H/o COPD- not on inhalers as OP  - BDs prn  Proph- LMWH  Dispo- Full code  - will follow    Jose Luis Fan MD  4/22/2024  12:02 PM

## 2024-04-22 NOTE — ED PROVIDER NOTES
Patient Seen in: [facility] Emergency Department      History     Chief Complaint   Patient presents with    Difficulty Breathing     Stated Complaint: SOB w/ cough x 2 wks. O2 2LNC from home = 88% on ED arival    Subjective:   HPI    Patient is 78-year-old female presents emergency room with a history of increasing coughing which has been ongoing for the last couple of weeks she has felt short of breath with exertion at home.  The patient typically is supposed to be wearing oxygen as per patient's family at the bedside but typically only wears oxygen \"when she feels like it\".  The patient reportedly had low oxygen saturations in the 80s rate in the 70s today as per patient's family.  The patient has had some increasing ankle swelling which has been ongoing recently.  Patient had some similar symptoms once before when she had pneumonia as per patient.  Patient was also exposed to a family member who had COVID recently.  Patient denies history of any chest pain.  The patient denies abdominal pain.  The patient denies vomiting or diarrhea.  The patient denies history of any other somatic complaints or discomfort at this time.    Objective:   Past Medical History:    Hyperlipidemia    Mild episode of recurrent major depressive disorder (HCC)    Neuropathy    Obstructive apnea    DMG PSG AHI 11    WYATT on CPAP              Past Surgical History:   Procedure Laterality Date          Cabg      Foot surgery      Hysterectomy      Knee surgery      Tonsillectomy                  No pertinent social history.            Review of Systems    Positive for stated complaint: SOB w/ cough x 2 wks. O2 2LNC from home = 88% on ED arival  Other systems are as noted in HPI.  Constitutional and vital signs reviewed.      All other systems reviewed and negative except as noted above.    Physical Exam     ED Triage Vitals   BP 24 (!) 173/56   Pulse 24 70   Resp 24 24   Temp 24  98.9 °F (37.2 °C)   Temp src 04/21/24 2010 Oral   SpO2 04/21/24 2006 (!) 88 %   O2 Device 04/21/24 2006 Nasal cannula       Current:BP (!) 173/56   Pulse 64   Temp 98.9 °F (37.2 °C) (Oral)   Resp 13   SpO2 100%         Physical Exam  GENERAL: Well-developed, well-nourished female sitting up breathing easily in no apparent distress.  Patient is nontoxic in appearance.  HEENT: Head is normocephalic, atraumatic. Pupils are 4 mm equally round and reactive to light. Oropharynx is clear. Mucous membranes are moist.  NECK: No stridor.  LUNGS: Clear at the apices with diminished breath sounds at both bases. There is good equal air entry bilaterally.  HEART: Regular rate and rhythm. Normal S1, S2 no S3, or S4. No murmur.  ABDOMEN: There is no focal tenderness to palpation appreciated anywhere throughout the abdomen. There is no guarding, no rebound, no mass, and no organomegaly appreciated. There is normoactive bowel sounds. There is no hernia.  EXTREMITIES: There is no cyanosis, clubbing, however there is 1+ edema appreciated in both lower extremities. Pulses are 2+ and equal in all 4 extremities.  NEURO: Patient is awake, alert and oriented to time place and person. Motor strength is 5 over 5 in all 4 extremities. There are no gross motor or sensory deficits appreciated.  Patient answering all questions appropriately            ED Course     Labs Reviewed   COMP METABOLIC PANEL (14) - Abnormal; Notable for the following components:       Result Value    Glucose 320 (*)     BUN 26 (*)     Creatinine 1.68 (*)     Calculated Osmolality 311 (*)     eGFR-Cr 31 (*)     Albumin 3.2 (*)     A/G Ratio 0.9 (*)     All other components within normal limits   PRO BETA NATRIURETIC PEPTIDE - Abnormal; Notable for the following components:    Pro-Beta Natriuretic Peptide 695 (*)     All other components within normal limits   CBC W/ DIFFERENTIAL - Abnormal; Notable for the following components:    RBC 5.86 (*)     HGB 9.9 (*)     HCT  34.3 (*)     MCV 58.5 (*)     MCH 16.9 (*)     MCHC 28.9 (*)     All other components within normal limits   TROPONIN I HIGH SENSITIVITY - Normal   PROTHROMBIN TIME (PT) - Normal   PTT, ACTIVATED - Normal   SARS-COV-2/FLU A AND B/RSV BY PCR (GENEXPERT) - Normal    Narrative:     This test is intended for the qualitative detection and differentiation of SARS-CoV-2, influenza A, influenza B, and respiratory syncytial virus (RSV) viral RNA in nasopharyngeal or nares swabs from individuals suspected of respiratory viral infection consistent with COVID-19 by their healthcare provider. Signs and symptoms of respiratory viral infection due to SARS-CoV-2, influenza, and RSV can be similar.    Test performed using the Xpert Xpress SARS-CoV-2/FLU/RSV (real time RT-PCR)  assay on the [employer] instrument, [employer], [place] [address].   This test is being used under the Food and Drug Administration's Emergency Use Authorization.    The authorized Fact Sheet for Healthcare Providers for this assay is available upon request from the laboratory.   CBC WITH DIFFERENTIAL WITH PLATELET    Narrative:     The following orders were created for panel order CBC With Differential With Platelet.  Procedure                               Abnormality         Status                     ---------                               -----------         ------                     CBC W/ DIFFERENTIAL[[id number]]          Abnormal            Final result                 Please view results for these tests on the individual orders.   PATH COMMENT CBC   RAINBOW DRAW LAVENDER   RAINBOW DRAW LIGHT GREEN   RAINBOW DRAW BLUE     EKG    Rate, intervals and axes as noted on EKG Report.  Rate: 65  Rhythm: Sinus Rhythm  Reading: No acute ischemic change noted         I personally reviewed the patient's chest x-ray images and my individual interpretation shows no evidence of any acute pneumothorax there is evidence of increasing markings in both lower lung fields  and evidence of cardiomegaly.  I also reviewed the official radiology report which showed results as noted below.        XR CHEST AP PORTABLE  (CPT=71045)    Result Date: 4/21/2024  CONCLUSION:  Bilateral lower lobe predominant interstitial opacities and suspected small left pleural effusion and cardiomegaly.  Findings may be related to edema, though infectious/inflammatory process is not excluded.   LOCATION:  [facility]      Dictated by (CST): Jonathon Larson MD on 4/21/2024 at 8:59 PM     Finalized by (CST): Jonathon Larson MD on 4/21/2024 at 9:00 PM               MDM      Patient an IV line established blood work drawn including a CBC, chemistries, BUN/creatinine, and blood sugar did show evidence of some baseline anemia and also elevated blood sugar.  Liver function test and troponin found be negative.  BNP is elevated at 695.  Coags are unremarkable.  RSV, COVID, and flu swabs are all negative.  Differential diagnosis considered by myself includes acute pneumonia, acute CHF, acute COVID infection.  There is evidence of bilateral lower lobe interstitial opacities which may be suspicious for pneumonia but also cardiomegaly and possibly superimposed edema.  Patient was given IV antibiotics and IV diuretics here in the ER.  The patient has been saturating well on oxygen via nasal cannula here in the ER.  Patient will be admitted to the hospital for further treatment at this time.  Patient's case discussed with duly hospitalist as well as desi cardiology.  The patient was admitted for further care at this time  Admission disposition: 4/21/2024  9:47 PM                                        Medical Decision Making      Disposition and Plan     Clinical Impression:  1. Community acquired pneumonia, unspecified laterality    2. Acute on chronic congestive heart failure, unspecified heart failure type (HCC)    3. Hypoxia         Disposition:  Admit  4/21/2024  9:47 pm    Follow-up:  No follow-up provider  specified.        Medications Prescribed:  Current Discharge Medication List                            Hospital Problems       Present on Admission  Date Reviewed: 3/30/2022            ICD-10-CM Noted POA    * (Principal) Community acquired pneumonia, unspecified laterality J18.9 4/21/2024 Unknown

## 2024-04-22 NOTE — PLAN OF CARE
NURSING ADMISSION NOTE      Patient admitted via Cart  Oriented to room.  Safety precautions initiated.  Bed in low position.  Call light in reach.    Assumed care at 2300. Pt is A&Ox4. Pt is on 4L O2 via NC, O2 sats WNL. NSR on tele, VSS. Continent of B&B, purewick in place for urinary frequency/strict I/o due to lasix. C/o pain in LLE relived w/ PRN medications. Up w/ SBA, tolerating well. Plan of care reviewed with patient, verbalizes understanding, all needs addressed at this time, pt seems to be resting comfortably. Call light within reach.    Skin check completed with CARLOS Pina.    POC: IV lasix daily, cards to see    Addendum: in addition to POC IV rocephin, PO azithromycin    Problem: Diabetes/Glucose Control  Goal: Glucose maintained within prescribed range  Description: INTERVENTIONS:  - Monitor Blood Glucose as ordered  - Assess for signs and symptoms of hyperglycemia and hypoglycemia  - Administer ordered medications to maintain glucose within target range  - Assess barriers to adequate nutritional intake and initiate nutrition consult as needed  - Instruct patient on self management of diabetes  4/22/2024 0101 by Jenifer Pitts, RN  Outcome: Progressing  4/21/2024 2322 by Jenifer Pitts RN  Outcome: Progressing     Problem: Patient/Family Goals  Goal: Patient/Family Long Term Goal  Description: Patient's Long Term Goal: to go home    Interventions:  - comply to care plan  - Mds to see  - See additional Care Plan goals for specific interventions  4/22/2024 0101 by Jenifer Pitts, RN  Outcome: Progressing  4/21/2024 2322 by Jenifer Pitts, RN  Outcome: Progressing  Goal: Patient/Family Short Term Goal  Description: Patient's Short Term Goal: to feel better    Interventions:   - IV lasix  - IV atbx  - comply to care plan  - Mds to see  - See additional Care Plan goals for specific interventions  4/22/2024 0101 by Jenifer Pitts, RN  Outcome: Progressing  4/21/2024 2322 by Hong  STEPH Burgess  Outcome: Progressing     Problem: CARDIOVASCULAR - ADULT  Goal: Maintains optimal cardiac output and hemodynamic stability  Description: INTERVENTIONS:  - Monitor vital signs, rhythm, and trends  - Monitor for bleeding, hypotension and signs of decreased cardiac output  - Evaluate effectiveness of vasoactive medications to optimize hemodynamic stability  - Monitor arterial and/or venous puncture sites for bleeding and/or hematoma  - Assess quality of pulses, skin color and temperature  - Assess for signs of decreased coronary artery perfusion - ex. Angina  - Evaluate fluid balance, assess for edema, trend weights  4/22/2024 0101 by Jenifer Pitts RN  Outcome: Progressing  4/21/2024 2322 by Jenifer Pitts RN  Outcome: Progressing     Problem: RESPIRATORY - ADULT  Goal: Achieves optimal ventilation and oxygenation  Description: INTERVENTIONS:  - Assess for changes in respiratory status  - Assess for changes in mentation and behavior  - Position to facilitate oxygenation and minimize respiratory effort  - Oxygen supplementation based on oxygen saturation or ABGs  - Provide Smoking Cessation handout, if applicable  - Encourage broncho-pulmonary hygiene including cough, deep breathe, Incentive Spirometry  - Assess the need for suctioning and perform as needed  - Assess and instruct to report SOB or any respiratory difficulty  - Respiratory Therapy support as indicated  - Manage/alleviate anxiety  - Monitor for signs/symptoms of CO2 retention  4/22/2024 0101 by Jenifer Pitts RN  Outcome: Progressing  4/21/2024 2322 by Jenifer Pitts RN  Outcome: Progressing     Problem: METABOLIC/FLUID AND ELECTROLYTES - ADULT  Goal: Glucose maintained within prescribed range  Description: INTERVENTIONS:  - Monitor Blood Glucose as ordered  - Assess for signs and symptoms of hyperglycemia and hypoglycemia  - Administer ordered medications to maintain glucose within target range  - Assess barriers to adequate  nutritional intake and initiate nutrition consult as needed  - Instruct patient on self management of diabetes  4/22/2024 0101 by Jenifer Pitts RN  Outcome: Progressing  4/21/2024 2322 by Jenifer Pitts RN  Outcome: Progressing  Goal: Electrolytes maintained within normal limits  Description: INTERVENTIONS:  - Monitor labs and rhythm and assess patient for signs and symptoms of electrolyte imbalances  - Administer electrolyte replacement as ordered  - Monitor response to electrolyte replacements, including rhythm and repeat lab results as appropriate  - Fluid restriction as ordered  - Instruct patient on fluid and nutrition restrictions as appropriate  4/22/2024 0101 by Jenifer Pitts RN  Outcome: Progressing  4/21/2024 2322 by Jenifer Pitts RN  Outcome: Progressing  Goal: Hemodynamic stability and optimal renal function maintained  Description: INTERVENTIONS:  - Monitor labs and assess for signs and symptoms of volume excess or deficit  - Monitor intake, output and patient weight  - Monitor urine specific gravity, serum osmolarity and serum sodium as indicated or ordered  - Monitor response to interventions for patient's volume status, including labs, urine output, blood pressure (other measures as available)  - Encourage oral intake as appropriate  - Instruct patient on fluid and nutrition restrictions as appropriate  4/22/2024 0101 by Jenifer Pitts RN  Outcome: Progressing  4/21/2024 2322 by Jenifer Pitts RN  Outcome: Progressing

## 2024-04-22 NOTE — SPIRITUAL CARE NOTE
Spiritual Care Visit Note    Patient Name: Nina Donnelly Date of Spiritual Care Visit: 24   : 10/9/1945 Primary Dx: Community acquired pneumonia, unspecified laterality       Referred By: Referral From: Nurse    Spiritual Care Taxonomy:         Methods: Collaborate with care team member         Visit Type/Summary:     - PoA: New PoAH Created:  remains available for follow up.    Spiritual Care support can be requested via an [facility] consult. For urgent/immediate needs, please contact the On Call  at: [facility]: ext [phone]    Nadine Cat

## 2024-04-22 NOTE — RESPIRATORY THERAPY NOTE
PATIENT HAS HISTORY OF WYATT. PATIENT REFUSES TO USE CPAP DURING HOSPITAL STAY. WYATT PROTOCOL IN CHART.

## 2024-04-22 NOTE — H&P
[facility]   Hospitalist Team  [facility]   part of [facility]     History and Physical    Nina Donnelly Patient Status:  Inpatient    10/9/1945 MRN [id number]   Location [facility] 2NE-A Attending Jamar Arora MD   Hosp Day # 1 PCP Chiki Caldwell MD     Admit Date:  2024    Is this a shared or split note between Advanced Practice Provider and Physician? Yes    ASSESSMENT / PLAN:   78 year old female with a PMHx sig for DM2, GERD, HLD, HTN, MDD, CKD3, neuropathy, WYATT and COPD and dementia presents to the hospital with shortness of breath and bilateral ankle swelling.     CAP  Chronic respiratory failure w hypoxia  COPD  Acute on chronic CHF  -short of breath x 2 weeks w hypoxia, uses 02 at home PRN  -CXR, bilateral lower lobe opacities, edema vs PNA  -no leukocytosis, afebrile  -RVP negative  -IV rocephin and po zithromax started  -on 4 L 02, uses 2L at home PRN  -consult pulmonary  -proBNP elevated 695  -cardiology consulted, plan for ECHO  -IV lasix    BIBIANA on CKD3  -cr 1.68 >> 1.55, follow  -po lasix at home    OAS  Chronic RF w hypoxia  -cpap  -singulair    DM2  -hold orals  -ADA diet  -accucheck ac/hs  -ssi, cont home regime as able    MDD  -cymbalta    Neurapathy  -lyrica    Dementia  -donepezil    GOC: Full code    MA/ACO Reach  -Re- Entry: no  -Consults:cards  -Discharge Needs: TBD  -Appointments: PCP      CARLOS fam in am  -diet-cc    Prophy  -SCD  -lovenox    Dispo  -pending clinical course  PCP: Chiki Caldwell MD       Outpatient records or previous hospital records reviewed.   Further recommendations pending patient's clinical course.  [facility] hospitalist  team to continue to follow patient while in house  Concerns regarding plan of care were discussed with patient. Patient agrees with plan as detailed above. Discussed plan of care with Dr. Schneider    Note: This chart was prepared using voice recognition software and may contain unintended word substitution  errors.     Lakhwinder CARPIO  [facility]   Available via Perfect Serve or Bubble Chat (check Availability)    2024               HISTORY:   CC:   Chief Complaint   Patient presents with    Difficulty Breathing        PCP: Chiki Caldwell MD    History of Present Illness:78 year old female with a PMHx sig for DM2, GERD, HLD, HTN, MDD, CKD3, neuropathy, WYATT and dementia presents to the hospital with shortness of breath and bilateral ankle swelling. She uses PRN 02 at home.  Her family noticed increased sob and bilateral ankle swelling.  She had a recent exposure to COVID and has a hx of PNA when her symptoms were similar to this event.  She was brought to ED for evaluation and cardiology was consulted.      Review of Systems  12 point systems reviewed, please see HPI for pertinent positives, otherwise negative    OBJECTIVE:  /55 (BP Location: Right arm)   Pulse 54   Temp 98.1 °F (36.7 °C) (Oral)   Resp 15   Ht 5' 0.98\" (1.549 m)   Wt 200 lb 13.4 oz (91.1 kg)   SpO2 91%   BMI 37.97 kg/m²     GENERAL: no apparent distress  NEUROLOGIC: A/A; Ox3: strength normal; sensations intact  RESPIRATORY: normal expansion; non labored, diminished at bases   CARDIOVASCULAR: regular,nl S1 S2  ABDOMEN:  Soft, BS+; non distended, non tender    EXTREMITIES: + mild LE edema, all pulses palpable    PMH  Past Medical History:    Hyperlipidemia    Mild episode of recurrent major depressive disorder (HCC)    Neuropathy    Obstructive apnea    [facility] PSG AHI 11    WYATT on CPAP        PSH  Past Surgical History:   Procedure Laterality Date          Cabg      Foot surgery      Hysterectomy      Knee surgery      Tonsillectomy          ALL:  Allergies   Allergen Reactions    Pioglitazone UNKNOWN     Weight Gain        Home Medications:  Outpatient Medications Marked as Taking for the 24 encounter (Hospital Encounter)   Medication Sig Dispense Refill    insulin glargine 100 UNIT/ML  Subcutaneous Solution Pen-injector Inject 20 Units into the skin 2 (two) times daily.      donepezil 10 MG Oral Tab Take 1 tablet (10 mg total) by mouth nightly.      montelukast 10 MG Oral Tab Take 1 tablet (10 mg total) by mouth nightly.      fluticasone propionate 50 MCG/ACT Nasal Suspension 2 sprays by Each Nare route daily.      insulin aspart 100 Units/mL Subcutaneous Solution Pen-injector Inject 6 Units into the skin 2 (two) times daily before meals. With breakfast and lunch      pregabalin 300 MG Oral Cap Take 2 capsules (600 mg total) by mouth nightly.      insulin aspart 100 Units/mL Subcutaneous Solution Pen-injector Inject 8 Units into the skin daily with dinner.      insulin aspart 100 Units/mL Subcutaneous Solution Pen-injector Inject 1-10 Units into the skin 3 (three) times daily before meals. Sliding scale      calcium carb-cholecalciferol 500-10 MG-MCG Oral Chew Tab Chew 1 tablet by mouth daily.      FUROSEMIDE 20 MG Oral Tab TAKE 1 TABLET TWICE DAILY 180 tablet 0    TURMERIC OR Take by mouth.      Cholecalciferol (VITAMIN D-3 OR) Take by mouth.      traMADol 50 MG Oral Tab Take 1 tablet (50 mg total) by mouth 2 (two) times daily as needed for Pain. 180 tablet 2    Glucose Blood (TRUE METRIX BLOOD GLUCOSE TEST) In Vitro Strip Check sugars  strip 3    Blood Glucose Monitoring Suppl (TRUE METRIX METER) w/Device Does not apply Kit Check sugars TID1 1 kit 0    TRUEplus Lancets 33G Does not apply Misc Check sugars  each 3    Blood Glucose Calibration (TRUE METRIX LEVEL 1) Low In Vitro Solution 1 Application by In Vitro route daily. 3 each 3    atorvastatin 10 MG Oral Tab Take 1 tablet (10 mg total) by mouth nightly. 90 tablet 1    Insulin Pen Needle (EASY COMFORT PEN NEEDLES) 31G X 8 MM Does not apply Misc Use daily for victoza shots subcut. 90 each 3    DULoxetine 60 MG Oral Cap DR Particles TAKE 1 CAPSULE EVERY DAY 90 capsule 1    Glucose Blood (ACCU-CHEK SMARTVIEW) In Vitro Strip 3  (three) times daily.      Vitamin B-12 1000 MCG Oral Tab Take 1 tablet (1,000 mcg total) by mouth daily.      aspirin 81 MG Oral Tab Take 1 tablet (81 mg total) by mouth daily.         Soc Hx  Social History     Tobacco Use    Smoking status: Former     Current packs/day: 0.00     Average packs/day: 1 pack/day for 55.0 years (55.0 ttl pk-yrs)     Types: Cigarettes     Start date: 1960     Quit date: 2015     Years since quittin.2    Smokeless tobacco: Never   Substance Use Topics    Alcohol use: No        Fam Hx  Family History   Problem Relation Age of Onset    Breast Cancer Paternal Aunt         dx age unknown                   DIAGNOSTIC DATA:   CBC/Chem  Recent Labs   Lab 24  0514   WBC 8.8 8.6   HGB 9.9* 10.0*   MCV 58.5* 59.3*   .0 202.0   INR 1.08  --        Recent Labs   Lab 24  0514    144   K 4.1 3.9    110   CO2 28.0 31.0   BUN 26* 22   CREATSERUM 1.68* 1.55*   * 61*   CA 8.9 8.7       Recent Labs   Lab 24   ALT 15   AST 18   ALB 3.2*       No results for input(s): \"TROP\" in the last 168 hours.    Additional Diagnostics: ECG: normal sinus rhythm, no blocks or conduction defects, no ischemic changes    CXR: image personally reviewed     Radiology: XR CHEST AP PORTABLE  (CPT=71045)    Result Date: 2024  CONCLUSION:  Bilateral lower lobe predominant interstitial opacities and suspected small left pleural effusion and cardiomegaly.  Findings may be related to edema, though infectious/inflammatory process is not excluded.   LOCATION:  [facility]      Dictated by (CST): Jonathon Larson MD on 2024 at 8:59 PM     Finalized by (CST): Jonathon Larson MD on 2024 at 9:00 PM          SEE ATTENDING NOTE BELOW      Patient seen and examined independently.  Discussed with APN and agree with note above.      S: Seen and examined at bedside.  Sitting at the edge of the bed stating that she has \"cramps in her legs\".  She  has had cramps for many years.  Daughter at bedside.    objective  BP (!) 173/57 (BP Location: Right arm)   Pulse 70   Temp 97.9 °F (36.6 °C) (Oral)   Resp 22   Ht 5' 0.98\" (1.549 m)   Wt 200 lb 13.4 oz (91.1 kg)   SpO2 94%   BMI 37.97 kg/m²     Gen: No acute distress, alert and oriented x3  Neck Supple, no JVD  Pulm: Lungs clear, normal respiratory effort, No wheezing or crackles  CV: Heart with regular rate and rhythm, No murmurs, rubs, gallops  Abd: Abdomen soft, nontender, nondistended, no organomegaly, bowel sounds present  MSK:  no clubbing, no cyanosis. LLE edema, around ankle, no calf tenderness b/l  Skin: no rashes or lesions, well perfused  Psych: mood stable, cooperative  Neuro: no focal deficit      Assessment and Plan    78 year old female with a PMHx sig for DM2, GERD, HLD, HTN, MDD, CKD3, neuropathy, WYATT and COPD and dementia presents to the hospital with shortness of breath and ankle swelling.     Acute on chronic respiratory failure with hypoxia  In the setting of COPD  Acute on chronic diastolic heart failure?  BIBIANA  Type 2 diabetes with hyperglycemia  MDD  Neuropathy    Plan:  - proBNP 695, procalcitonin negative  - Empirically started on antibiotics however can possibly stop tomorrow if workup negative  - RVP negative  - Pulmonology on consult, recommendations reviewed  - Cardiology on consult recommendations reviewed  - 2D echo  -Venous Dopplers of left lower extremity  - IV Lasix  - Follow BMP    Rest as above    Татьяна Schneider DO  Hospitalist  [facility]

## 2024-04-22 NOTE — ED QUICK NOTES
Orders for admission, patient is aware of plan and ready to go upstairs. Any questions, please call ED RN Megan at extension [phone].     Patient Covid vaccination status: Fully vaccinated     COVID Test Ordered in ED: SARS-CoV-2/Flu A and B/RSV by PCR (GeneXpert)    COVID Suspicion at Admission: N/A    Running Infusions:  None    Mental Status/LOC at time of transport: A&Ox4    Other pertinent information:   CIWA score: N/A   NIH score:  N/A

## 2024-04-22 NOTE — CONSULTS
[facility] Cardiology Consultation    Nina Donnelly Patient Status:  Inpatient    10/9/1945 MRN [id number]   [facility] 2NE-A Attending Jamar Arora MD   Hosp Day # 1 PCP Chiki Caldwell MD     Reason for Consultation:  CHF      History of Present Illness:  Nina Donnelly is a a(n) 78 year old female. She has COPD, WYATT, Diabetes, Dyslipidemia .  Uses CPAP and O2 at 2 l/min at home.  Still able to do some walking.  Reports 2 weeks of dry cough, worsened dyspnea.  Daughter notes worse hypoxia (O2 sats down to 70's instead of high 80's ).  LE edema was reported as well. Feels weak and tired.  No fever. Chills.  Previous bouts of pneumonia.      History:  Past Medical History:    Hyperlipidemia    Mild episode of recurrent major depressive disorder (HCC)    Neuropathy    Obstructive apnea    DMG PSG AHI 11    WYATT on CPAP     Past Surgical History:   Procedure Laterality Date          Cabg      Foot surgery      Hysterectomy      Knee surgery      Tonsillectomy       Family History   Problem Relation Age of Onset    Breast Cancer Paternal Aunt         dx age unknown          Allergies:  Allergies   Allergen Reactions    Pioglitazone UNKNOWN     Weight Gain       Medications:   pregabalin  600 mg Oral Nightly    insulin degludec  20 Units Subcutaneous Nightly    insulin aspart  6 Units Subcutaneous BID AC    insulin aspart  8 Units Subcutaneous Daily with dinner    insulin degludec  20 Units Subcutaneous Daily    aspirin  81 mg Oral Daily    atorvastatin  10 mg Oral Nightly    donepezil  10 mg Oral Nightly    DULoxetine  60 mg Oral Daily    fluticasone propionate  1 spray Each Nare Daily    montelukast  10 mg Oral Nightly    cefTRIAXone  1 g Intravenous Q24H    enoxaparin  30 mg Subcutaneous Daily    insulin aspart  2-10 Units Subcutaneous TID AC and HS    azithromycin  500 mg Oral Daily    furosemide  40 mg Intravenous Daily       Continuous Infusions:      Social History:   reports that she  quit smoking about 9 years ago. Her smoking use included cigarettes. She started smoking about 64 years ago. She has a 55 pack-year smoking history. She has never used smokeless tobacco. She reports that she does not drink alcohol and does not use drugs.    Review of Systems:  All systems were reviewed and are negative except as described above in HPI.    Physical Exam:      Wt Readings from Last 3 Encounters:   04/22/24 200 lb 13.4 oz (91.1 kg)   03/30/22 199 lb (90.3 kg)   03/21/22 193 lb (87.5 kg)       Vitals:    04/21/24 2330 04/22/24 0310 04/22/24 0612 04/22/24 0819   BP:  (!) 163/58  136/55   BP Location:  Right arm  Right arm   Pulse: 64 61 65 54   Resp:  16  15   Temp:  98.7 °F (37.1 °C)  98.1 °F (36.7 °C)   TempSrc:  Oral  Oral   SpO2: 96% 92% (!) 88% 91%   Weight:   200 lb 13.4 oz (91.1 kg)    Height:           Temp:  [98.1 °F (36.7 °C)-98.9 °F (37.2 °C)] 98.1 °F (36.7 °C)  Pulse:  [54-78] 54  Resp:  [13-24] 15  BP: (136-176)/(55-65) 136/55  SpO2:  [88 %-100 %] 91 %    Temp: 98.1 °F (36.7 °C)  Pulse: 54  Resp: 15  BP: 136/55      General:  Appears comfortable  HEENT: No focal deficits.  Neck: No JVD, carotids 2+ no bruits.  Cardiac: Regular S1S2.  No S3, S4, rub, click.  No murmur.  Lungs: diminished   Abdomen: Soft, non-tender.   Extremities: trace bilateral  LE edema.  No clubbing or cyanosis  Neurologic: Alert and oriented, normal affect.  Skin: Warm and dry.     Labs:      HEM:  Recent Labs   Lab 04/21/24 2033 04/22/24 0514   WBC 8.8 8.6   HGB 9.9* 10.0*   HCT 34.3* 35.0   .0 202.0       Chem:  Recent Labs   Lab 04/21/24 2033 04/22/24  0514    144   K 4.1 3.9    110   CO2 28.0 31.0   BUN 26* 22   CREATSERUM 1.68* 1.55*   ALT 15  --    AST 18  --    ALB 3.2*  --        Recent Labs   Lab 04/21/24 2033   INR 1.08                     No results found for: \"TROP\", \"CKMB\"      Invalid input(s): \"PBNPML\"                 Telemetry:     Laboratories and Data:  Diagnostics:    EKG,  4/21/2024:  NSR, NSSTTW changes    CXR, 4/22/2024:      CONCLUSION:  Bilateral lower lobe predominant interstitial opacities and suspected small left pleural effusion and cardiomegaly.  Findings may be related to edema, though infectious/inflammatory process is not excluded.       Echo, 10/2023:        Summary:     1. Left ventricle: The cavity size is normal. There is mild concentric      hypertrophy. Systolic function is normal. The estimated ejection fraction      is 55-60%. Grade I diastolic dysfunction.   2. Aortic valve: Trileaflet. The leaflets are mildly thickened and mildly      calcified. The peak systolic velocity is 1.5m/sec. The mean systolic      gradient is 4mm Hg. The peak systolic gradient is 9mm Hg. The valve area      is 2.2cm^2. The valve area index is 1.15cm^2/m^2.   3. Mitral valve: The annulus is calcified, fibrotic, and thickened. The      leaflets are mildly thickened and mildly calcified. There is mild      regurgitation.   4. Left atrium: The atrium is mildly to moderately dilated.   5. Right ventricle: The cavity size is normal. Wall thickness is normal.      Systolic function is normal. Estimation of the right ventricular systolic      pressure is within the normal range. The RV pressure during systole is      31mm Hg.   6. Tricuspid valve: There is mild regurgitation.   7. Pericardium, extracardiac: A probable, trace pericardial effusion is      identified. There is no evidence of hemodynamic compromise.       Impression:    1.  Dry cough, worsened dyspnea and hypoxia.  COPD exacerbation, pneumonia.  Some element of fluid overload is evident as well    2. C OPD    3. WYATT    4. Diabetes    5. Dyslipidemia     6. CKD, Cr = 1.4-1.6    7. Microcytic anemia, chronic      Recommend:    1.  Telemetry  2. Echo  3. Trial of diuresis  4. Treatment of COPD/PNA per IM/pulm          Dago Beach MD  4/22/2024  9:56 AM

## 2024-04-22 NOTE — ED INITIAL ASSESSMENT (HPI)
Coughing x 2 weeks, allan worse today, patient wears prn o2 at home; patient on 2L NC sating 95%. Family also reports bilateral ankle swelling. Patient in non resp distress.

## 2024-04-23 ENCOUNTER — APPOINTMENT (OUTPATIENT)
Dept: ULTRASOUND IMAGING | Facility: HOSPITAL | Age: 79
End: 2024-04-23
Attending: STUDENT IN AN ORGANIZED HEALTH CARE EDUCATION/TRAINING PROGRAM
Payer: MEDICARE

## 2024-04-23 LAB
ANION GAP SERPL CALC-SCNC: 3 MMOL/L (ref 0–18)
BASOPHILS # BLD AUTO: 0.04 X10(3) UL (ref 0–0.2)
BASOPHILS NFR BLD AUTO: 0.6 %
BUN BLD-MCNC: 26 MG/DL (ref 9–23)
CALCIUM BLD-MCNC: 9 MG/DL (ref 8.5–10.1)
CHLORIDE SERPL-SCNC: 105 MMOL/L (ref 98–112)
CO2 SERPL-SCNC: 33 MMOL/L (ref 21–32)
CREAT BLD-MCNC: 1.68 MG/DL
EGFRCR SERPLBLD CKD-EPI 2021: 31 ML/MIN/1.73M2 (ref 60–?)
EOSINOPHIL # BLD AUTO: 0.43 X10(3) UL (ref 0–0.7)
EOSINOPHIL NFR BLD AUTO: 6.6 %
ERYTHROCYTE [DISTWIDTH] IN BLOOD BY AUTOMATED COUNT: 21.3 %
GLUCOSE BLD-MCNC: 106 MG/DL (ref 70–99)
GLUCOSE BLD-MCNC: 111 MG/DL (ref 70–99)
GLUCOSE BLD-MCNC: 115 MG/DL (ref 70–99)
GLUCOSE BLD-MCNC: 136 MG/DL (ref 70–99)
GLUCOSE BLD-MCNC: 72 MG/DL (ref 70–99)
HCT VFR BLD AUTO: 35.8 %
HGB BLD-MCNC: 9.9 G/DL
IMM GRANULOCYTES # BLD AUTO: 0.03 X10(3) UL (ref 0–1)
IMM GRANULOCYTES NFR BLD: 0.5 %
LYMPHOCYTES # BLD AUTO: 1.6 X10(3) UL (ref 1–4)
LYMPHOCYTES NFR BLD AUTO: 24.7 %
MAGNESIUM SERPL-MCNC: 2 MG/DL (ref 1.6–2.6)
MCH RBC QN AUTO: 16.7 PG (ref 26–34)
MCHC RBC AUTO-ENTMCNC: 27.7 G/DL (ref 31–37)
MCV RBC AUTO: 60.3 FL
MONOCYTES # BLD AUTO: 0.58 X10(3) UL (ref 0.1–1)
MONOCYTES NFR BLD AUTO: 9 %
NEUTROPHILS # BLD AUTO: 3.8 X10 (3) UL (ref 1.5–7.7)
NEUTROPHILS # BLD AUTO: 3.8 X10(3) UL (ref 1.5–7.7)
NEUTROPHILS NFR BLD AUTO: 58.6 %
OSMOLALITY SERPL CALC.SUM OF ELEC: 297 MOSM/KG (ref 275–295)
PLATELET # BLD AUTO: 197 10(3)UL (ref 150–450)
PLATELETS.RETICULATED NFR BLD AUTO: 5.6 % (ref 0–7)
POTASSIUM SERPL-SCNC: 4.5 MMOL/L (ref 3.5–5.1)
RBC # BLD AUTO: 5.94 X10(6)UL
SODIUM SERPL-SCNC: 141 MMOL/L (ref 136–145)
WBC # BLD AUTO: 6.5 X10(3) UL (ref 4–11)

## 2024-04-23 PROCEDURE — 85025 COMPLETE CBC W/AUTO DIFF WBC: CPT

## 2024-04-23 PROCEDURE — 80048 BASIC METABOLIC PNL TOTAL CA: CPT

## 2024-04-23 PROCEDURE — 94799 UNLISTED PULMONARY SVC/PX: CPT

## 2024-04-23 PROCEDURE — 83735 ASSAY OF MAGNESIUM: CPT

## 2024-04-23 PROCEDURE — 97116 GAIT TRAINING THERAPY: CPT

## 2024-04-23 PROCEDURE — 97165 OT EVAL LOW COMPLEX 30 MIN: CPT

## 2024-04-23 PROCEDURE — 97161 PT EVAL LOW COMPLEX 20 MIN: CPT

## 2024-04-23 PROCEDURE — 97535 SELF CARE MNGMENT TRAINING: CPT

## 2024-04-23 PROCEDURE — 82962 GLUCOSE BLOOD TEST: CPT

## 2024-04-23 PROCEDURE — 93971 EXTREMITY STUDY: CPT | Performed by: STUDENT IN AN ORGANIZED HEALTH CARE EDUCATION/TRAINING PROGRAM

## 2024-04-23 RX ORDER — HYDRALAZINE HYDROCHLORIDE 25 MG/1
25 TABLET, FILM COATED ORAL EVERY 8 HOURS SCHEDULED
Status: DISCONTINUED | OUTPATIENT
Start: 2024-04-23 | End: 2024-04-24

## 2024-04-23 RX ORDER — HYDRALAZINE HYDROCHLORIDE 20 MG/ML
10 INJECTION INTRAMUSCULAR; INTRAVENOUS EVERY 4 HOURS PRN
Status: DISCONTINUED | OUTPATIENT
Start: 2024-04-23 | End: 2024-04-25

## 2024-04-23 NOTE — PROGRESS NOTES
[facility]    Nina Donnelly Patient Status:  Inpatient    10/9/1945 MRN [id number]   Location [facility] 2NE-A Attending Татьяна Schneider,    Hosp Day # 2 PCP Chiki Caldwell MD     SUBJECTIVE:overall feels much better. Cough pretty much resolved.     OBJECTIVE:  /64 (BP Location: Left arm)   Pulse 55   Temp 98 °F (36.7 °C) (Oral)   Resp 18   Ht 154.9 cm (5' 0.98\")   Wt 203 lb 4.2 oz (92.2 kg)   SpO2 96%   BMI 38.43 kg/m²   O2 requirement: on 2-3L nc     I/O last 3 completed shifts:  In: 1200 [P.O.:1200]  Out: 2300 [Urine:2300]  I/O this shift:  In: 1390 [P.O.:1390]  Out: -      Current Medications:   Current Facility-Administered Medications:     hydrALAzine (Apresoline) 20 mg/mL injection 10 mg, 10 mg, Intravenous, Q4H PRN    hydrALAZINE (Apresoline) tab 25 mg, 25 mg, Oral, Q8H CHICO    pregabalin (Lyrica) cap 600 mg, 600 mg, Oral, Nightly    insulin degludec 100 units/mL flextouch 20 Units, 20 Units, Subcutaneous, Nightly    insulin aspart (NovoLOG) 100 Units/mL FlexPen 6 Units, 6 Units, Subcutaneous, BID AC    insulin aspart (NovoLOG) 100 Units/mL FlexPen 8 Units, 8 Units, Subcutaneous, Daily with dinner    trolamine salicyliate 10 % cream, , Topical, TID PRN    aspirin chewable tab 81 mg, 81 mg, Oral, Daily    atorvastatin (Lipitor) tab 10 mg, 10 mg, Oral, Nightly    donepezil (Aricept) tab 10 mg, 10 mg, Oral, Nightly    DULoxetine (Cymbalta) DR cap 60 mg, 60 mg, Oral, Daily    fluticasone propionate (Flonase) 50 MCG/ACT nasal suspension 1 spray, 1 spray, Each Nare, Daily    montelukast (Singulair) tab 10 mg, 10 mg, Oral, Nightly    traMADol (Ultram) tab 50 mg, 50 mg, Oral, BID PRN    cefTRIAXone (Rocephin) 1 g in D5W 100 mL IVPB-ADD, 1 g, Intravenous, Q24H    glucose (Dex4) 15 GM/59ML oral liquid 15 g, 15 g, Oral, Q15 Min PRN **OR** glucose (Glutose) 40% oral gel 15 g, 15 g, Oral, Q15 Min PRN **OR** glucose-vitamin C (Dex-4) chewable tab 4 tablet, 4 tablet, Oral, Q15 Min PRN **OR**  dextrose 50% injection 50 mL, 50 mL, Intravenous, Q15 Min PRN **OR** glucose (Dex4) 15 GM/59ML oral liquid 30 g, 30 g, Oral, Q15 Min PRN **OR** glucose (Glutose) 40% oral gel 30 g, 30 g, Oral, Q15 Min PRN **OR** glucose-vitamin C (Dex-4) chewable tab 8 tablet, 8 tablet, Oral, Q15 Min PRN    enoxaparin (Lovenox) 30 MG/0.3ML SUBQ injection 30 mg, 30 mg, Subcutaneous, Daily    acetaminophen (Tylenol Extra Strength) tab 500 mg, 500 mg, Oral, Q4H PRN    insulin aspart (NovoLOG) 100 Units/mL FlexPen 2-10 Units, 2-10 Units, Subcutaneous, TID AC and HS    azithromycin (Zithromax) tab 500 mg, 500 mg, Oral, Daily    furosemide (Lasix) 10 mg/mL injection 40 mg, 40 mg, Intravenous, Daily     General appearance: alert, appears stated age, and cooperative  Lungs:  faint bibasilar crackles, no wheezing, good air mvt  Heart: S1, S2 normal, no murmur, click, rub or gallop, regular rate and rhythm  Abdomen: soft, non-tender; bowel sounds normal; no masses,  no organomegaly  Extremities: edema trace     Lab Results   Component Value Date    WBC 6.5 04/23/2024    RBC 5.94 04/23/2024    HGB 9.9 04/23/2024    HCT 35.8 04/23/2024    MCV 60.3 04/23/2024    MCH 16.7 04/23/2024    MCHC 27.7 04/23/2024    RDW 21.3 04/23/2024    .0 04/23/2024     Lab Results   Component Value Date     04/23/2024    K 4.5 04/23/2024     04/23/2024    CO2 33.0 04/23/2024    BUN 26 04/23/2024    CREATSERUM 1.68 04/23/2024     04/23/2024    CA 9.0 04/23/2024     Lab Results   Component Value Date    INR 1.08 04/21/2024           Imaging: CXR: small B effusion, pulm edema, CMG     ASSESSMENT/PLAN:  Dyspnea/hypoxia- due to CHF exacerbation.  PNA less likely  - wean O2 as tolerated. Down to room air while awake, needs O2 with exertion and sleep  - already has home O2; follows with Dr. Amador.  - IS/BDs prn. Not actively wheezing on exam  - diuresis per cards  ID - COVID negative  - CXR more c/w pulmonary edema, PCT negative.    - RVP  negative, PCT negative, no infectious symptoms other than cough which  resolved with diuresis.  - repeat CXR to demonstrate interval improvement in pulm edema  - stop abx after five days  WYATT- cont with CPAP with O2 entrained as OP  H/o COPD- not on inhalers as OP- no signs of exac  - BDs prn  Proph- LMWH  Dispo- Full code  - will follow  - dc planning  - d/w daughter    Jose Luis Fan MD  4/23/2024  1:05 PM

## 2024-04-23 NOTE — PLAN OF CARE
[facility] Consultation    Nina Donnelly Patient Status:  Inpatient    10/9/1945 MRN [id number]   [facility] 2NE-A Attending Jamar Arora MD   Hosp Day # 2 PCP Chiki Caldwell MD     Reason for Consultation:  CHF      History of Present Illness:  Nina Donnelly is a a(n) 78 year old female. She has COPD, WYATT, Diabetes, Dyslipidemia .  Uses CPAP and O2 at 2 l/min at home.  Still able to do some walking.  Reports 2 weeks of dry cough, worsened dyspnea.  Daughter notes worse hypoxia (O2 sats down to 70's instead of high 80's ).  LE edema was reported as well. Feels weak and tired.  No fever. Chills.  Previous bouts of pneumonia.      History:  Past Medical History:    Hyperlipidemia    Mild episode of recurrent major depressive disorder (HCC)    Neuropathy    Obstructive apnea    [facility] PSG AHI 11    WYATT on CPAP     Past Surgical History:   Procedure Laterality Date          Cabg      Foot surgery      Hysterectomy      Knee surgery      Tonsillectomy       Family History   Problem Relation Age of Onset    Breast Cancer Paternal Aunt         dx age unknown          Allergies:  Allergies   Allergen Reactions    Pioglitazone UNKNOWN     Weight Gain       Medications:   pregabalin  600 mg Oral Nightly    insulin degludec  20 Units Subcutaneous Nightly    insulin aspart  6 Units Subcutaneous BID AC    insulin aspart  8 Units Subcutaneous Daily with dinner    aspirin  81 mg Oral Daily    atorvastatin  10 mg Oral Nightly    donepezil  10 mg Oral Nightly    DULoxetine  60 mg Oral Daily    fluticasone propionate  1 spray Each Nare Daily    montelukast  10 mg Oral Nightly    cefTRIAXone  1 g Intravenous Q24H    enoxaparin  30 mg Subcutaneous Daily    insulin aspart  2-10 Units Subcutaneous TID AC and HS    azithromycin  500 mg Oral Daily    furosemide  40 mg Intravenous Daily       Continuous Infusions:      Social History:   reports that she quit smoking about 9 years ago. Her smoking use  included cigarettes. She started smoking about 64 years ago. She has a 55 pack-year smoking history. She has never used smokeless tobacco. She reports that she does not drink alcohol and does not use drugs.    Review of Systems:  All systems were reviewed and are negative except as described above in HPI.    Physical Exam:      Wt Readings from Last 3 Encounters:   04/23/24 203 lb 4.2 oz (92.2 kg)   03/30/22 199 lb (90.3 kg)   03/21/22 193 lb (87.5 kg)       Vitals:    04/22/24 2340 04/22/24 2345 04/22/24 2350 04/23/24 0505   BP:  (!) 169/61 (!) 173/57    BP Location:  Left arm Right arm    Pulse: 67 72 70    Resp:  22  24   Temp:  97.9 °F (36.6 °C)  98.2 °F (36.8 °C)   TempSrc:  Oral  Oral   SpO2: 95% 96% 94%    Weight:    203 lb 4.2 oz (92.2 kg)   Height:           Temp:  [97.9 °F (36.6 °C)-98.7 °F (37.1 °C)] 98.2 °F (36.8 °C)  Pulse:  [53-82] 70  Resp:  [16-24] 24  BP: (148-173)/(49-61) 173/57  SpO2:  [85 %-98 %] 94 %    Temp: 98.2 °F (36.8 °C)  Pulse: 70  Resp: 24  BP: 173/57      General:  Appears comfortable  HEENT: No focal deficits.  Neck: No JVD, carotids 2+ no bruits.  Cardiac: Regular S1S2.  No S3, S4, rub, click.  No murmur.  Lungs: diminished   Abdomen: Soft, non-tender.   Extremities: +1 LLE edema.  No clubbing or cyanosis  Neurologic: Alert and oriented, normal affect.  Skin: Warm and dry.     Labs:      HEM:  Recent Labs   Lab 04/21/24 2033 04/22/24  0514 04/23/24  0534   WBC 8.8 8.6 6.5   HGB 9.9* 10.0* 9.9*   HCT 34.3* 35.0 35.8   .0 202.0 197.0       Chem:  Recent Labs   Lab 04/21/24 2033 04/22/24 0514 04/23/24  0534    144 141   K 4.1 3.9 4.5    110 105   CO2 28.0 31.0 33.0*   BUN 26* 22 26*   CREATSERUM 1.68* 1.55* 1.68*   MG  --   --  2.0   ALT 15  --   --    AST 18  --   --    ALB 3.2*  --   --        Recent Labs   Lab 04/21/24 2033   INR 1.08       No results found for: \"TROP\", \"CKMB\"      Invalid input(s): \"PBNPML\"         Telemetry:     Laboratories and  Data:  Diagnostics:    EKG, 4/21/2024:  NSR, NSSTTW changes    CXR, 4/22/2024:      CONCLUSION:  Bilateral lower lobe predominant interstitial opacities and suspected small left pleural effusion and cardiomegaly.  Findings may be related to edema, though infectious/inflammatory process is not excluded.       Echo, 10/2023:        Summary:     1. Left ventricle: The cavity size is normal. There is mild concentric      hypertrophy. Systolic function is normal. The estimated ejection fraction      is 55-60%. Grade I diastolic dysfunction.   2. Aortic valve: Trileaflet. The leaflets are mildly thickened and mildly      calcified. The peak systolic velocity is 1.5m/sec. The mean systolic      gradient is 4mm Hg. The peak systolic gradient is 9mm Hg. The valve area      is 2.2cm^2. The valve area index is 1.15cm^2/m^2.   3. Mitral valve: The annulus is calcified, fibrotic, and thickened. The      leaflets are mildly thickened and mildly calcified. There is mild      regurgitation.   4. Left atrium: The atrium is mildly to moderately dilated.   5. Right ventricle: The cavity size is normal. Wall thickness is normal.      Systolic function is normal. Estimation of the right ventricular systolic      pressure is within the normal range. The RV pressure during systole is      31mm Hg.   6. Tricuspid valve: There is mild regurgitation.   7. Pericardium, extracardiac: A probable, trace pericardial effusion is      identified. There is no evidence of hemodynamic compromise.       Impression:    1.  Dry cough, worsened dyspnea and hypoxia.  COPD exacerbation, pneumonia.  Some element of fluid overload is evident as well    2. COPD    3. WYATT    4. Diabetes    5. Dyslipidemia     6. CKD, Cr = 1.4-1.6    7. Microcytic anemia, chronic     8. Lower extremity pain (popliteal) - no DVT.       Recommend:    1. Telemetry  2. Echo - Ef 70-75%, mild LVH  3. Continue IV diuretics X 1 more day - monitor renal function.   - Likely will  discharge tomorrow with PRN  PO lasix   4. Treatment of COPD/PNA per IM/pulm  5. PRN hydralazine for BP over 160    - Pending response will change to PO this afternoon.    - BP meds limited by renal function /  hx lower extremity swelling/pain   6. Plan for discharge tomorrow pending BP response  7. Consider outpatient arterial lower extremity ultrasounds given reported pain and cramping ( at rest  not worsened with activity).           JENNYFER Fuentes, 04/23/24, 8:29 AM

## 2024-04-23 NOTE — PLAN OF CARE
Assumed care at 1930. Pt is A&Ox4. Pt is on 4L O2 via NC, O2 sats WNL. Refused CPAP at noc. NSR on tele, VSS. Continent of B&B. Denies pain at this time. Up w/ SBA, tolerating well. Plan of care reviewed with patient, verbalizes understanding, all needs addressed at this time, pt seems to be resting comfortably. Call light within reach.     POC: IV lasix daily, IV rocephin, PO azithromycin    Problem: Diabetes/Glucose Control  Goal: Glucose maintained within prescribed range  Description: INTERVENTIONS:  - Monitor Blood Glucose as ordered  - Assess for signs and symptoms of hyperglycemia and hypoglycemia  - Administer ordered medications to maintain glucose within target range  - Assess barriers to adequate nutritional intake and initiate nutrition consult as needed  - Instruct patient on self management of diabetes  Outcome: Progressing     Problem: Patient/Family Goals  Goal: Patient/Family Long Term Goal  Description: Patient's Long Term Goal: to go home    Interventions:  - comply to care plan  - Mds to see  - See additional Care Plan goals for specific interventions  Outcome: Progressing  Goal: Patient/Family Short Term Goal  Description: Patient's Short Term Goal: to feel better    Interventions:   - IV lasix  - IV atbx  - comply to care plan  - Mds to see  - See additional Care Plan goals for specific interventions  Outcome: Progressing     Problem: CARDIOVASCULAR - ADULT  Goal: Maintains optimal cardiac output and hemodynamic stability  Description: INTERVENTIONS:  - Monitor vital signs, rhythm, and trends  - Monitor for bleeding, hypotension and signs of decreased cardiac output  - Evaluate effectiveness of vasoactive medications to optimize hemodynamic stability  - Monitor arterial and/or venous puncture sites for bleeding and/or hematoma  - Assess quality of pulses, skin color and temperature  - Assess for signs of decreased coronary artery perfusion - ex. Angina  - Evaluate fluid balance, assess for  edema, trend weights  Outcome: Progressing     Problem: RESPIRATORY - ADULT  Goal: Achieves optimal ventilation and oxygenation  Description: INTERVENTIONS:  - Assess for changes in respiratory status  - Assess for changes in mentation and behavior  - Position to facilitate oxygenation and minimize respiratory effort  - Oxygen supplementation based on oxygen saturation or ABGs  - Provide Smoking Cessation handout, if applicable  - Encourage broncho-pulmonary hygiene including cough, deep breathe, Incentive Spirometry  - Assess the need for suctioning and perform as needed  - Assess and instruct to report SOB or any respiratory difficulty  - Respiratory Therapy support as indicated  - Manage/alleviate anxiety  - Monitor for signs/symptoms of CO2 retention  Outcome: Progressing     Problem: METABOLIC/FLUID AND ELECTROLYTES - ADULT  Goal: Glucose maintained within prescribed range  Description: INTERVENTIONS:  - Monitor Blood Glucose as ordered  - Assess for signs and symptoms of hyperglycemia and hypoglycemia  - Administer ordered medications to maintain glucose within target range  - Assess barriers to adequate nutritional intake and initiate nutrition consult as needed  - Instruct patient on self management of diabetes  Outcome: Progressing  Goal: Electrolytes maintained within normal limits  Description: INTERVENTIONS:  - Monitor labs and rhythm and assess patient for signs and symptoms of electrolyte imbalances  - Administer electrolyte replacement as ordered  - Monitor response to electrolyte replacements, including rhythm and repeat lab results as appropriate  - Fluid restriction as ordered  - Instruct patient on fluid and nutrition restrictions as appropriate  Outcome: Progressing  Goal: Hemodynamic stability and optimal renal function maintained  Description: INTERVENTIONS:  - Monitor labs and assess for signs and symptoms of volume excess or deficit  - Monitor intake, output and patient weight  - Monitor  urine specific gravity, serum osmolarity and serum sodium as indicated or ordered  - Monitor response to interventions for patient's volume status, including labs, urine output, blood pressure (other measures as available)  - Encourage oral intake as appropriate  - Instruct patient on fluid and nutrition restrictions as appropriate  Outcome: Progressing

## 2024-04-23 NOTE — PLAN OF CARE
Assumed care of patient at 0700; alert and oriented. Bilateral lung sounds diminished; on 2L nc. NSR on monitor. Patient denies chest pain, shortness of breath, dizziness. Weakness during ambulation; requires O2 and walker. Active bowel sounds; continent of bowel and bladder. Patient updated on plan of care for continued diuresis and BP control; questions answered. Bed in lowest position and call light within reach.     -----------------------------------------------------------

## 2024-04-23 NOTE — PAYOR COMM NOTE
--------------  ADMISSION REVIEW     Payor: EVANGELISTA PARKER [facility] [place]  Subscriber #:  [id number]  Authorization Number: [id number]    Admit date: 4/21/24  Admit time: 10:54 PM       History   HPI  Patient is 78-year-old female presents emergency room with a history of increasing coughing which has been ongoing for the last couple of weeks she has felt short of breath with exertion at home.  The patient typically is supposed to be wearing oxygen as per patient's family at the bedside but typically only wears oxygen \"when she feels like it\".  The patient reportedly had low oxygen saturations in the 80s rate in the 70s today as per patient's family.  The patient has had some increasing ankle swelling which has been ongoing recently.  Patient had some similar symptoms once before when she had pneumonia as per patient.  Patient was also exposed to a family member who had COVID recently.  Patient denies history of any chest pain.  The patient denies abdominal pain.  The patient denies vomiting or diarrhea.  The patient denies history of any other somatic complaints or discomfort at this time.    Positive for stated complaint: SOB w/ cough x 2 wks. [facility]    [facility] Triage Vitals   BP 04/21/24 2010 (!) 173/56   Pulse 04/21/24 2010 70   Resp 04/21/24 2010 24   Temp 04/21/24 2010 98.9 °F (37.2 °C)   Temp src 04/21/24 2010 Oral   SpO2 04/21/24 2006 (!) 88 %   O2 Device 04/21/24 2006 Nasal cannula     Physical Exam  HEENT: Head is normocephalic, atraumatic. Pupils are 4 mm equally round and reactive to light. Oropharynx is clear. Mucous membranes are moist.  NECK: No stridor.  LUNGS: Clear at the apices with diminished breath sounds at both bases. There is good equal air entry bilaterally.  HEART: Regular rate and rhythm. Normal S1, S2 no S3, or S4. No murmur.  ABDOMEN: There is no focal tenderness to palpation appreciated anywhere throughout the abdomen. There is no guarding, no rebound, no mass, and no  organomegaly appreciated. There is normoactive bowel sounds. There is no hernia.  EXTREMITIES: There is no cyanosis, clubbing, however there is 1+ edema appreciated in both lower extremities. Pulses are 2+ and equal in all 4 extremities.  NEURO: Patient is awake, alert and oriented to time place and person. Motor strength is 5 over 5 in all 4 extremities. There are no gross motor or sensory deficits appreciated.  Patient answering all questions appropriately     Labs Reviewed   COMP METABOLIC PANEL (14) - Abnormal; Notable for the following components:       Result Value    Glucose 320 (*)     BUN 26 (*)     Creatinine 1.68 (*)     Calculated Osmolality 311 (*)     eGFR-Cr 31 (*)     Albumin 3.2 (*)     A/G Ratio 0.9 (*)     All other components within normal limits   PRO BETA NATRIURETIC PEPTIDE - Abnormal; Notable for the following components:    Pro-Beta Natriuretic Peptide 695 (*)     All other components within normal limits   CBC W/ DIFFERENTIAL - Abnormal; Notable for the following components:    RBC 5.86 (*)     HGB 9.9 (*)     HCT 34.3 (*)     MCV 58.5 (*)     MCH 16.9 (*)     MCHC 28.9 (*)      XR CHEST AP PORTABLE  (CPT=71045)  Result Date: 4/21/2024  CONCLUSION:  Bilateral lower lobe predominant interstitial opacities and suspected small left pleural effusion and cardiomegaly.  Findings may be related to edema, though infectious/inflammatory process is not excluded.   LOCATION:  [facility]      Dictated by (CST): Jonathon Larson MD on 4/21/2024 at 8:59 PM     Finalized by (CST): Jonathon Larson MD on 4/21/2024 at 9:00 PM        Disposition and Plan   Clinical Impression:  1. Community acquired pneumonia, unspecified laterality    2. Acute on chronic congestive heart failure, unspecified heart failure type (HCC)    3. Hypoxia       Disposition:  Admit  4/21/2024  9:47 pm        4/21/24     History and Physical   History of Present Illness:78 year old female with a PMHx sig for DM2, GERD, HLD, HTN, MDD, CKD3,  neuropathy, WYATT and dementia presents to the hospital with shortness of breath and bilateral ankle swelling. She uses PRN 02 at home.  Her family noticed increased sob and bilateral ankle swelling.  She had a recent exposure to COVID and has a hx of PNA when her symptoms were similar to this event.  She was brought to ED for evaluation and cardiology was consulted.     /55 (BP Location: Right arm)   Pulse 54   Temp 98.1 °F (36.7 °C) (Oral)   Resp 15   Ht 5' 0.98\" (1.549 m)   Wt 200 lb 13.4 oz (91.1 kg)   SpO2 91%   BMI 37.97 kg/m²     NEUROLOGIC: A/A; Ox3: strength normal; sensations intact  RESPIRATORY: normal expansion; non labored, diminished at bases   CARDIOVASCULAR: regular,nl S1 S2  ABDOMEN:  Soft, BS+; non distended, non tender    EXTREMITIES: + mild LE edema, all pulses palpable  ab 04/21/24 2033 04/22/24  0514   WBC 8.8 8.6   HGB 9.9* 10.0*   MCV 58.5* 59.3*   .0 202.0   INR 1.08  --       Lab 04/21/24 2033 04/22/24  0514    144   K 4.1 3.9    110   CO2 28.0 31.0   BUN 26* 22   CREATSERUM 1.68* 1.55*   * 61*   CA 8.9 8.7      Lab 04/21/24 2033   ALT 15   AST 18   ALB 3.2*     ASSESSMENT / PLAN:   78 year old female with a PMHx sig for DM2, GERD, HLD, HTN, MDD, CKD3, neuropathy, WYATT and COPD and dementia presents to the hospital with shortness of breath and bilateral ankle swelling.      CAP  Chronic respiratory failure w hypoxia  COPD  Acute on chronic CHF  -short of breath x 2 weeks w hypoxia, uses 02 at home PRN  -CXR, bilateral lower lobe opacities, edema vs PNA  -no leukocytosis, afebrile  -RVP negative  -IV rocephin and po zithromax started  -on 4 L 02, uses 2L at home PRN  -consult pulmonary  -proBNP elevated 695  -cardiology consulted, plan for ECHO  -IV lasix     BIBIANA on CKD3  -cr 1.68 >> 1.55, follow  -po lasix at home     OAS  Chronic RF w hypoxia  -cpap  -singulair     DM2  -hold orals  -ADA diet  -accucheck ac/hs  -ssi, cont home regime as able      MDD  -cymbalta     Neurapathy  -lyrica     Dementia  -donepezil       4/22/24  PULMONOLOGY  History of Present Illness: 79 y/o with h/o COPD, WYATT on CPAP with O2 entrained, presented to ED with c/o worsening cough and SOB/TAM for two weeks.  Only wears home O2 prn but recently requiring more of it.    Also notes ongoing LE swelling.  Had recent COVID exposure.    - currently feels better after diuresis but not quite back to baseline.    BP Temp Temp src Pulse Resp SpO2 Height Weight    04/22/24 1001 -- -- -- -- 16 -- -- --   04/22/24 0819 136/55 98.1 °F (36.7 °C) Oral 54 15 91 % -- --   04/22/24 0612 -- -- -- 65 -- (!) 88 % -- 200 lb 13.4 oz (91.1 kg)   04/22/24 0310 (!) 163/58 98.7 °F (37.1 °C) Oral 61 16 92 % -- --   Lungs:  faint insp crackles, no wheezing  Heart: regular rate and rhythm  Abdomen: soft, non-tender; bowel sounds normal; no masses,  no organomegaly  Extremities: edema trace  Pulses: 2+ and symmetric  Skin: Skin color, texture, turgor normal. No rashes or lesions  Lab Results   Component Value Date     WBC 8.6 04/22/2024     RBC 5.90 04/22/2024     HGB 10.0 04/22/2024     HCT 35.0 04/22/2024     MCV 59.3 04/22/2024     MCH 16.9 04/22/2024     MCHC 28.6 04/22/2024     RDW 21.2 04/22/2024     .0 04/22/2024      Lab Results   Component Value Date      04/22/2024     K 3.9 04/22/2024      04/22/2024     CO2 31.0 04/22/2024     BUN 22 04/22/2024     CREATSERUM 1.55 04/22/2024     GLU 61 04/22/2024     CA 8.7 04/22/2024      Lab Results   Component Value Date      04/22/2024     K 3.9 04/22/2024      04/22/2024     CO2 31.0 04/22/2024     BUN 22 04/22/2024     CREATSERUM 1.55 04/22/2024     GLU 61 04/22/2024     CA 8.7 04/22/2024     ALKPHO 110 04/21/2024     ALT 15 04/21/2024     AST 18 04/21/2024     BILT 0.6 04/21/2024     ALB 3.2 04/21/2024     TP 6.8 04/21/2024      Lab Results   Component Value Date     INR 1.08 04/21/2024    Imaging: CXR: small B effusion, pulm  edema, CMG   ASSESSMENT/PLAN:  Dyspnea/hypoxia- due to CHF exacerbation.  +/- viral bronchitis or early PNA  - weaned to room air after diuresis  - IS/BDs prn. Not actively wheezing on exam  ID - COVID negative  - CXR more c/w pulmonary edema, PCT negative.    - check RVP  - repeat CXR after diuresis and if better consider stopping abx  WYATT- cont with CPAP with O2 entrained as OP  H/o COPD- not on inhalers as OP  - BDs prn  Proph- LMWH    CARDIOLOGY  History of Present Illness:  Nina Donnelly is a a(n) 78 year old female. She has COPD, WYATT, Diabetes, Dyslipidemia .  Uses CPAP and O2 at 2 l/min at home.  Still able to do some walking.  Reports 2 weeks of dry cough, worsened dyspnea.  Daughter notes worse hypoxia (O2 sats down to 70's instead of high 80's ).  LE edema was reported as well. Feels weak and tired.  No fever. Chills.  Previous bouts of pneumonia.      Impression:     1.  Dry cough, worsened dyspnea and hypoxia.  COPD exacerbation, pneumonia.  Some element of fluid overload is evident as well  2. C OPD  3. WYATT  4. Diabetes  5. Dyslipidemia   6. CKD, Cr = 1.4-1.6  7. Microcytic anemia, chronic      Recommend:  1.  Telemetry  2. Echo  3. Trial of diuresis  4. Treatment of COPD/PNA per IM/pulm    MEDICATIONS ADMINISTERED IN LAST 1 DAY:  acetaminophen (Tylenol Extra Strength) tab 500 mg       Date Action Dose Route User    4/23/2024 0835 Given 500 mg Oral Tasneem Limon RN    4/23/2024 0538 Given 500 mg Oral Areli Escamilla RN    4/22/2024 2342 Given 500 mg Oral Jenifer Pitts RN    4/22/2024 1645 Given 500 mg Oral AsyaolIrina RN          aspirin chewable tab 81 mg       Date Action Dose Route User    4/23/2024 0835 Given 81 mg Oral Tasneem Limon RN          atorvastatin (Lipitor) tab 10 mg       Date Action Dose Route User    4/22/2024 2222 Given 10 mg Oral Areli Escamilla RN          azithromycin (Zithromax) tab 500 mg       Date Action Dose Route User    4/22/2024 2223 Given 500 mg Oral Areli Escamilla RN           cefTRIAXone (Rocephin) 1 g in D5W 100 mL IVPB-ADD       Date Action Dose Route User    4/22/2024 2222 New Bag 1 g Intravenous Areli Escamilla RN          donepezil (Aricept) tab 10 mg       Date Action Dose Route User    4/22/2024 2222 Given 10 mg Oral Areli Escamilla RN          DULoxetine (Cymbalta) DR cap 60 mg       Date Action Dose Route User    4/23/2024 0834 Given 60 mg Oral Tasneem Limon RN          enoxaparin (Lovenox) 30 MG/0.3ML SUBQ injection 30 mg       Date Action Dose Route User    4/23/2024 0836 Given 30 mg Subcutaneous (Left Lower Abdomen) Tasneem Limon RN          furosemide (Lasix) 10 mg/mL injection 40 mg       Date Action Dose Route User    4/23/2024 0835 Given 40 mg Intravenous Tasneem Limon RN          insulin aspart (NovoLOG) 100 Units/mL FlexPen 6 Units       Date Action Dose Route User    4/23/2024 0835 Given 6 Units Subcutaneous (Left Upper Arm) Tasneem Limon RN          insulin aspart (NovoLOG) 100 Units/mL FlexPen 8 Units       Date Action Dose Route User    4/22/2024 1910 Given 7 Units Subcutaneous (Left Upper Arm) Irina Khan RN          insulin degludec 100 units/mL flextouch 20 Units       Date Action Dose Route User    4/22/2024 2223 Given 20 Units Subcutaneous (Left Upper Abdomen) Areli Escamilla RN          montelukast (Singulair) tab 10 mg       Date Action Dose Route User    4/22/2024 2222 Given 10 mg Oral Areli Escamilla RN          pregabalin (Lyrica) cap 600 mg       Date Action Dose Route User    4/22/2024 2223 Given 600 mg Oral Areli Escamilla RN            Vitals (last day)       Date/Time Temp Pulse Resp BP SpO2 Weight O2 Device O2 Flow Rate (L/min) Who    04/23/24 1000 98 °F (36.7 °C) 59 18 141/61 95 % -- Nasal cannula 4 L/min NC    04/23/24 0505 98.2 °F (36.8 °C) -- 24 -- -- 203 lb 4.2 oz Nasal cannula 4 L/min SS    04/22/24 2350 -- 70 -- 173/57 94 % -- -- -- MD    04/22/24 2345 97.9 °F (36.6 °C) 72 22 169/61 96 % -- None (Room air) 4 L/min MD    04/22/24 2240 --  82 -- -- 86 % -- -- -- MD    04/22/24 1930 98.4 °F (36.9 °C) -- 24 -- -- -- None (Room air) -- SS    04/22/24 1930 -- 60 -- 149/57 89 % -- -- -- MD    04/22/24 1631 97.9 °F (36.6 °C) 64 18 152/49 96 % -- Nasal cannula 2 L/min NC    04/22/24 0819 98.1 °F (36.7 °C) 54 15 136/55 91 % -- Nasal cannula 4 L/min MICHAEL    04/22/24 0612 -- 65 -- -- 88 % 200 lb 13.4 oz -- -- PM    04/22/24 0310 98.7 °F (37.1 °C) 61 16 163/58 92 % -- -- -- RS

## 2024-04-23 NOTE — PROGRESS NOTES
[facility]   part of [facility]     Progress Note  Nina Donnelly Patient Status:  Inpatient    10/9/1945 MRN [id number]   Location [facility] 2NE-A Attending Татьяна Schneider,    Hosp Day # 2 PCP Chiki Caldwell MD       SEE ATTENDING NOTE AT BOTTOM OF PAGE    Is this a shared or split note between Advanced Practice Provider and Physician? Yes    Assessment and Plan:    78 year old female with a PMHx sig for DM2, GERD, HLD, HTN, MDD, CKD3, neuropathy, WYATT and COPD and dementia presents to the hospital with shortness of breath and bilateral ankle swelling.      CAP  Chronic respiratory failure w hypoxia  COPD  Acute on chronic CHF  -short of breath x 2 weeks w hypoxia, uses 02 at home PRN  -CXR, bilateral lower lobe opacities, edema vs PNA  -no leukocytosis, afebrile  -3 panel PCR and RVP negative  -PCT neg  -IV rocephin and po zithromax  -on 4 L 02, uses 2L at home PRN  -pulmonary consulted >> ordered RVP, plan to repeat CXR once diuresed to potentially stop abx  -proBNP elevated 695  -cardiology consulted,  ECHO EF 70-75  -change IV lasix to PO per cards either today or tomorrow     BIBIANA on CKD3  -cr 1.68,follow  -po lasix at home     OAS  Chronic RF w hypoxia  -cpap  -singulair     DM2  -hold orals  -ADA diet  -accucheck ac/hs  -ssi, cont home regime as able     MDD  -cymbalta     Neurapathy  LLE pain and swelling  -lyrica  -venous doppler neg for DVT in LLE  -consider outpatient arterial study     Dementia  -donepezil     GOC: Full code     MA/ACO Reach  -Re- Entry: no  -Consults:cards  -Discharge Needs: TBD  -Appointments: PCP       FEN  -lytes in am  -diet-cc     Prophy  -SCD  -lovenox     Dispo  -pending clinical course    PCP: Chiki Caldwell MD    Concerns regarding plan of care were discussed with patient. Patient agrees with plan as detailed above. Discussed plan of care with Dr. Schneider    Note: This chart was prepared using voice recognition software and may  contain unintended word substitution errors.          Lakhwinder CARPIO  [facility] Hospitalist Team  Contact via [employer] (Check Availability)  4/23/2024             SUBJECTIVE:   In bed, daughter at bedside. ON 02 for comfort, Denies cp/sob f/c,n/v             OBJECTIVE:   Blood pressure 141/61, pulse 59, temperature 98 °F (36.7 °C), temperature source Oral, resp. rate 18, height 5' 0.98\" (1.549 m), weight 203 lb 4.2 oz (92.2 kg), SpO2 95%.    GENERAL: no apparent distress  NEURO: A/A Ox3  RESP: non labored, diminished  CARDIO: Regular, no murmur  ABD: soft, NT, ND, BS+  EXTREMITIES: no edema, no calf tenderness    DIAGNOSTIC DATA:   Labs:     Recent Labs   Lab 04/21/24 2033 04/22/24  0514 04/23/24  0534   WBC 8.8 8.6 6.5   HGB 9.9* 10.0* 9.9*   MCV 58.5* 59.3* 60.3*   .0 202.0 197.0   INR 1.08  --   --        Recent Labs   Lab 04/21/24 2033 04/22/24  0514 04/23/24  0534    144 141   K 4.1 3.9 4.5    110 105   CO2 28.0 31.0 33.0*   BUN 26* 22 26*   CREATSERUM 1.68* 1.55* 1.68*   CA 8.9 8.7 9.0   MG  --   --  2.0   * 61* 111*       Recent Labs   Lab 04/21/24 2033   ALT 15   AST 18   ALB 3.2*       Recent Labs   Lab 04/22/24  1013 04/22/24  1244 04/22/24  1620 04/22/24  2111 04/23/24  0505   PGLU 175* 158* 187* 144* 106*       No results for input(s): \"TROP\" in the last 168 hours.      MEDICATIONS      Current Facility-Administered Medications   Medication Dose Route Frequency    hydrALAzine (Apresoline) 20 mg/mL injection 10 mg  10 mg Intravenous Q4H PRN    pregabalin (Lyrica) cap 600 mg  600 mg Oral Nightly    insulin degludec 100 units/mL flextouch 20 Units  20 Units Subcutaneous Nightly    insulin aspart (NovoLOG) 100 Units/mL FlexPen 6 Units  6 Units Subcutaneous BID AC    insulin aspart (NovoLOG) 100 Units/mL FlexPen 8 Units  8 Units Subcutaneous Daily with dinner    trolamine salicyliate 10 % cream   Topical TID PRN    aspirin chewable tab 81 mg  81 mg  Oral Daily    atorvastatin (Lipitor) tab 10 mg  10 mg Oral Nightly    donepezil (Aricept) tab 10 mg  10 mg Oral Nightly    DULoxetine (Cymbalta) DR cap 60 mg  60 mg Oral Daily    fluticasone propionate (Flonase) 50 MCG/ACT nasal suspension 1 spray  1 spray Each Nare Daily    montelukast (Singulair) tab 10 mg  10 mg Oral Nightly    traMADol (Ultram) tab 50 mg  50 mg Oral BID PRN    cefTRIAXone (Rocephin) 1 g in D5W 100 mL IVPB-ADD  1 g Intravenous Q24H    glucose (Dex4) 15 GM/59ML oral liquid 15 g  15 g Oral Q15 Min PRN    Or    glucose (Glutose) 40% oral gel 15 g  15 g Oral Q15 Min PRN    Or    glucose-vitamin C (Dex-4) chewable tab 4 tablet  4 tablet Oral Q15 Min PRN    Or    dextrose 50% injection 50 mL  50 mL Intravenous Q15 Min PRN    Or    glucose (Dex4) 15 GM/59ML oral liquid 30 g  30 g Oral Q15 Min PRN    Or    glucose (Glutose) 40% oral gel 30 g  30 g Oral Q15 Min PRN    Or    glucose-vitamin C (Dex-4) chewable tab 8 tablet  8 tablet Oral Q15 Min PRN    enoxaparin (Lovenox) 30 MG/0.3ML SUBQ injection 30 mg  30 mg Subcutaneous Daily    acetaminophen (Tylenol Extra Strength) tab 500 mg  500 mg Oral Q4H PRN    insulin aspart (NovoLOG) 100 Units/mL FlexPen 2-10 Units  2-10 Units Subcutaneous TID AC and HS    azithromycin (Zithromax) tab 500 mg  500 mg Oral Daily    furosemide (Lasix) 10 mg/mL injection 40 mg  40 mg Intravenous Daily                  IMAGING     US VENOUS DOPPLER LEG LEFT - DIAG IMG (CPT=93971)    Result Date: 4/23/2024  CONCLUSION:  Negative for DVT left leg   LOCATION:  [facility]    Dictated by (CST): Aaron Diop MD on 4/23/2024 at 7:56 AM     Finalized by (CST): Aaron Diop MD on 4/23/2024 at 7:56 AM       XR CHEST AP PORTABLE  (CPT=71045)    Result Date: 4/21/2024  CONCLUSION:  Bilateral lower lobe predominant interstitial opacities and suspected small left pleural effusion and cardiomegaly.  Findings may be related to edema, though infectious/inflammatory process is not excluded.    LOCATION:  [facility]      Dictated by (CST): Jonathon Larson MD on 4/21/2024 at 8:59 PM     Finalized by (CST): Jonathon Larson MD on 4/21/2024 at 9:00 PM          SEE ATTENDING NOTE BELOW:     .Patient seen and examined independently.  Discussed with APN and agree with note above.      S: Examined at bedside.  States that she is feeling better.  Leg swelling has improved.  Still remains on oxygen.  Had a walk test today.    objective  /56 (BP Location: Left arm)   Pulse 66   Temp 97.9 °F (36.6 °C) (Oral)   Resp 18   Ht 5' 0.98\" (1.549 m)   Wt 203 lb 4.2 oz (92.2 kg)   SpO2 96%   BMI 38.43 kg/m²     Gen: No acute distress, alert and oriented x3  Neck Supple, no JVD  Pulm: Lungs clear, normal respiratory effort, No wheezing or crackles, supplemental oxygen  CV: Heart with regular rate and rhythm, No murmurs, rubs, gallops  Abd: Abdomen soft, nontender, nondistended, no organomegaly, bowel sounds present  MSK:  no clubbing, no cyanosis.  No Lower extremity edema  Skin: no rashes or lesions, well perfused  Psych: mood stable, cooperative  Neuro: no focal deficits    Assessment and Plan  78 year old female with a PMHx sig for DM2, GERD, HLD, HTN, MDD, CKD3, neuropathy, WYATT and COPD and dementia presents to the hospital with shortness of breath and ankle swelling.      Acute on chronic respiratory failure with hypoxia  In the setting of COPD  Acute on chronic diastolic heart failure?  CKD  Type 2 diabetes with hyperglycemia  MDD  Neuropathy     Plan:  - proBNP 695, procalcitonin negative  - Empirically started on antibiotics, complete a 5-day course per pulmonology- RVP negative  - Pulmonology on consult, recommendations reviewed  - Cardiology on consult recommendations reviewed  - 2D echo-normal EF, no wall motion abnormalities, reviewed  - Venous Dopplers of lower extremities negative  - IV Lasix, switch to p.o.  - Adjusted insulin  - Follow BMP  - Recommend arterial Dopplers for leg cramping-today patient  states that she already has some scheduled     Rest as above     Татьяна Schneider, DO  Hospitalist  [facility] and [facility]

## 2024-04-23 NOTE — PLAN OF CARE
Received bedside report on this Pt at 0715. Pt awake, A&Ox4, forgetful at times, and stated she was, \"very tired\".  Pt allowed to rest this afternoon and she took a nap, RT saw her and set up CPAP.  Pt is in SR on Tele monitor, sats greater than 92% on 2L Oxygen per NC while awake. Pt up with SBA, steady gait noted.  Pt had 2D Echo done.  Pt's daughter Ryan visited. POC discussed with Pt and her daughter and their questions answered.  Ryan went home this afternoon, requested a call this evening, this writer called and updated her.  Plan for US Venous Doppler left leg.  Pt is resting in bed, call light is in reach, bed alarm is on for safety. Bedside report given to night nurse at this time.   Problem: Diabetes/Glucose Control  Goal: Glucose maintained within prescribed range  Description: INTERVENTIONS:  - Monitor Blood Glucose as ordered  - Assess for signs and symptoms of hyperglycemia and hypoglycemia  - Administer ordered medications to maintain glucose within target range  - Assess barriers to adequate nutritional intake and initiate nutrition consult as needed  - Instruct patient on self management of diabetes  Outcome: Progressing     Problem: Patient/Family Goals  Goal: Patient/Family Long Term Goal  Description: Patient's Long Term Goal: to go home    Interventions:  - comply to care plan  - Mds to see  - See additional Care Plan goals for specific interventions  Outcome: Progressing  Goal: Patient/Family Short Term Goal  Description: Patient's Short Term Goal: to feel better    Interventions:   - IV lasix  - IV atbx  - comply to care plan  - Mds to see  - See additional Care Plan goals for specific interventions  Outcome: Progressing     Problem: CARDIOVASCULAR - ADULT  Goal: Maintains optimal cardiac output and hemodynamic stability  Description: INTERVENTIONS:  - Monitor vital signs, rhythm, and trends  - Monitor for bleeding, hypotension and signs of decreased cardiac output  - Evaluate effectiveness  of vasoactive medications to optimize hemodynamic stability  - Monitor arterial and/or venous puncture sites for bleeding and/or hematoma  - Assess quality of pulses, skin color and temperature  - Assess for signs of decreased coronary artery perfusion - ex. Angina  - Evaluate fluid balance, assess for edema, trend weights  Outcome: Progressing     Problem: RESPIRATORY - ADULT  Goal: Achieves optimal ventilation and oxygenation  Description: INTERVENTIONS:  - Assess for changes in respiratory status  - Assess for changes in mentation and behavior  - Position to facilitate oxygenation and minimize respiratory effort  - Oxygen supplementation based on oxygen saturation or ABGs  - Provide Smoking Cessation handout, if applicable  - Encourage broncho-pulmonary hygiene including cough, deep breathe, Incentive Spirometry  - Assess the need for suctioning and perform as needed  - Assess and instruct to report SOB or any respiratory difficulty  - Respiratory Therapy support as indicated  - Manage/alleviate anxiety  - Monitor for signs/symptoms of CO2 retention  Outcome: Progressing     Problem: METABOLIC/FLUID AND ELECTROLYTES - ADULT  Goal: Glucose maintained within prescribed range  Description: INTERVENTIONS:  - Monitor Blood Glucose as ordered  - Assess for signs and symptoms of hyperglycemia and hypoglycemia  - Administer ordered medications to maintain glucose within target range  - Assess barriers to adequate nutritional intake and initiate nutrition consult as needed  - Instruct patient on self management of diabetes  Outcome: Progressing  Goal: Electrolytes maintained within normal limits  Description: INTERVENTIONS:  - Monitor labs and rhythm and assess patient for signs and symptoms of electrolyte imbalances  - Administer electrolyte replacement as ordered  - Monitor response to electrolyte replacements, including rhythm and repeat lab results as appropriate  - Fluid restriction as ordered  - Instruct patient on  fluid and nutrition restrictions as appropriate  Outcome: Progressing  Goal: Hemodynamic stability and optimal renal function maintained  Description: INTERVENTIONS:  - Monitor labs and assess for signs and symptoms of volume excess or deficit  - Monitor intake, output and patient weight  - Monitor urine specific gravity, serum osmolarity and serum sodium as indicated or ordered  - Monitor response to interventions for patient's volume status, including labs, urine output, blood pressure (other measures as available)  - Encourage oral intake as appropriate  - Instruct patient on fluid and nutrition restrictions as appropriate  Outcome: Progressing

## 2024-04-23 NOTE — PHYSICAL THERAPY NOTE
PHYSICAL THERAPY EVALUATION - INPATIENT     Room Number: 2626/2626-A  Evaluation Date: 4/23/2024  Type of Evaluation: Initial  Physician Order: PT Eval and Treat    Presenting Problem: PNA  Co-Morbidities : DM2, GERD, HLD, HTN, MDD, CKD3, neuropathy, WYATT and COPD and dementia  Reason for Therapy: Mobility Dysfunction and Discharge Planning    PHYSICAL THERAPY ASSESSMENT   Patient is currently functioning near baseline with bed mobility, transfers, and gait.  Prior to admission, patient's baseline is indep however demonstrating balance impairments and would likely benefit from use of RW - pt declined at this time .  Patient is requiring minimal assist as a result of the following impairments impaired dynamic balance.  Physical Therapy will continue to follow for duration of hospitalization.    Patient will benefit from continued skilled PT Services at discharge to promote functional independence in home.  Anticipate patient will return home with home health PT. (Pt agreeable)     PLAN  PT Treatment Plan: Bed mobility;Body mechanics;Coordination;Endurance;Energy conservation;Patient education;Family education;Gait training;Neuromuscular re-educate;Range of motion;Strengthening;Stoop training;Stair training;Transfer training;Balance training  Rehab Potential : Good  Frequency (Obs): 3-5x/week  Number of Visits to Meet Established Goals: 5      CURRENT GOALS    Goal #1 Patient is able to participate in emery balance assessment      Goal #2 Patient is able to demonstrate transfers EOB to/from Chair/Wheelchair at assistance level: modified independent     Goal #3 Patient is able to ambulate 150 feet with assist device: walker - rolling at assistance level: modified independent     Goal #4    Goal #5    Goal #6    Goal Comments: Goals established on 4/23/2024      PHYSICAL THERAPY MEDICAL/SOCIAL HISTORY  History related to current admission: Patient is a 78 year old female admitted on 4/21/2024: Presents with SOB and  BLE edema      HOME SITUATION  Type of Home: Independent living facility ([place])   Home Layout: One level                Lives With: Alone  Drives: No  Patient Owned Equipment: Rolling walker  Patient Regularly Uses: Home O2 (reports only wears at night)    Prior Level of [place]: Pt lives in ILF at [facility] reports able to ambulate community distances and down to the dining marvin, denies any falls in the past 6 months, has a pulse ox at home     SUBJECTIVE  \" I feel a little dizzy\" - pt educated on pursed lip breathing, EC and pacing techniques       OBJECTIVE  Precautions: Bed/chair alarm  Fall Risk: High fall risk (pt declines using RW)    WEIGHT BEARING RESTRICTION  Weight Bearing Restriction: None                PAIN ASSESSMENT  Ratin          COGNITION  Safety Judgement:  decreased awareness of need for safety    RANGE OF MOTION AND STRENGTH ASSESSMENT  Upper extremity ROM and strength are within functional limits     Lower extremity ROM is within functional limits     Lower extremity strength is within functional limits       BALANCE  Static Sitting: Good  Dynamic Sitting: Good  Static Standing: Good  Dynamic Standing: Poor +    ADDITIONAL TESTS                                    ACTIVITY TOLERANCE                         O2 WALK  Oxygen Therapy  SPO2% on Oxygen at Rest: 96  At rest oxygen flow (liters per minute): 2  SPO2% Ambulation on Room Air: 78 (within 1 minute of being on RA)  SPO2% Ambulation on Oxygen: 92  Ambulation oxygen flow (liters per minute): 2    NEUROLOGICAL FINDINGS                        AM-PAC '6-Clicks' INPATIENT SHORT FORM - BASIC MOBILITY  How much difficulty does the patient currently have...  Patient Difficulty: Turning over in bed (including adjusting bedclothes, sheets and blankets)?: None   Patient Difficulty: Sitting down on and standing up from a chair with arms (e.g., wheelchair, bedside commode, etc.): A Little   Patient Difficulty: Moving from lying on back to  sitting on the side of the bed?: None   How much help from another person does the patient currently need...   Help from Another: Moving to and from a bed to a chair (including a wheelchair)?: A Little   Help from Another: Need to walk in hospital room?: A Little   Help from Another: Climbing 3-5 steps with a railing?: A Lot       AM-PAC Score:  Raw Score: 19   Approx Degree of Impairment: 41.77%   Standardized Score (AM-PAC Scale): 45.44   CMS Modifier (G-Code): CK    FUNCTIONAL ABILITY STATUS  Gait Assessment   Functional Mobility/Gait Assessment  Gait Assistance: Minimum assistance;Contact guard assist  Distance (ft): 300  Assistive Device: None  Pattern: Shuffle (x2 LOB requiring min A to correct)    Skilled Therapy Provided     Bed Mobility:  Rolling: indep  Supine to sit: indep      Transfer Mobility:  Sit to stand: CGA   Stand to sit: CGA  Gait = CGA requiring intermittent min A for x2 LOB, discussed with dtr and pt recommendation for use of RW for falls prevention given dynamic balance deficits, encouraged use with RN/staff while in house with pt adamantly refusing at beginning of session but tearful at end of session, occurred while turning head laterally - educated on recommendation for [facility] for further therapy with pt agreeable     Therapist's Comments: very quickly desat to as low as 79% on pulse ox on RA within 1 minute of walking, cued to stand, placed back on 2L required 2-3 minutes to recover to 90%, educated on pacing, EC(how RW can assist as well) and pursed lip breathing techniques    Exercise/Education Provided:  Energy conservation  Functional activity tolerated  Gait training    Patient End of Session: Up in chair;Needs met;Call light within reach;RN aware of session/findings;All patient questions and concerns addressed;Family present      Patient Evaluation Complexity Level:  History Low - no personal factors and/or co-morbidities   Examination of body systems Low - addressing 1-2 elements    Clinical Presentation Low - Stable   Clinical Decision Making Low - Stable       PT Session Time: 24 minutes  Gait Training: 10 minutes  Therapeutic Activity:  minutes  Neuromuscular Re-education:  minutes  Therapeutic Exercise:  minutes

## 2024-04-23 NOTE — OCCUPATIONAL THERAPY NOTE
OCCUPATIONAL THERAPY EVALUATION - INPATIENT     Room Number: 2626/2626-A  Evaluation Date: 4/23/2024  Type of Evaluation: Initial  Presenting Problem: pneumonia    Physician Order: IP Consult to Occupational Therapy  Reason for Therapy: ADL/IADL Dysfunction and Discharge Planning    OCCUPATIONAL THERAPY ASSESSMENT   Patient is currently functioning near baseline with toileting, lower body dressing, transfers, and energy conservation strategies. Prior to admission, patient's baseline is independent.  Patient is requiring stand-by assist and contact guard assist as a result of the following impairments: increased O2 needs from baseline. Occupational Therapy will continue to follow for duration of hospitalization.    Patient will benefit from continued skilled OT Services at discharge to promote functional independence and safety with additional support and return home with home health OT      History Related to Current Admission: Patient is a 78 year old female admitted on 4/21/2024 with Presenting Problem: pneumonia. Co-Morbidities : DM2, GERD, HLD, HTN, MDD, CKD3, neuropathy, WYATT and COPD and dementia    WEIGHT BEARING RESTRICTION  Weight Bearing Restriction: None                Recommendations for nursing staff:   Transfers: cag  Toileting location: toilet    EVALUATION SESSION:  Patient Start of Session: supine  FUNCTIONAL TRANSFER ASSESSMENT  Sit to Stand: Edge of Bed  Edge of Bed: Contact Guard Assist    BED MOBILITY  Supine to Sit : Independent    O2 SATURATIONS  Oxygen Therapy  SPO2% on Oxygen at Rest: 96  At rest oxygen flow (liters per minute): 2  SPO2% Ambulation on Room Air: 78  SPO2% Ambulation on Oxygen: 92  Ambulation oxygen flow (liters per minute): 2    COGNITION  Safety Judgement:  decreased awareness of need for assistance and decreased awareness of need for safety  Awareness of Deficits:  decreased awareness of deficits    Upper Extremity   ROM: within functional limits   Strength: within  functional limits   Coordination  Gross motor:   Fine motor: intact  Sensation:     EDUCATION PROVIDED  Patient: Role of Occupational Therapy; Plan of Care; Functional Transfer Techniques; Energy Conservation  Patient's Response to Education: Requires Further Education; Verbalized Understanding    Equipment used: rw     Therapist comments: daughter present. 2 liters, 96% at rest.  Independent supine to sit.   CGA to stand. 2 liters, 92%-94% while ambulating, but with room air, drops to 78% within a minute, back to 2 liters, 92%.  RN aware.  OT initiated pt education about energy conservation principles.  Pt declined to use RW, even after PT recommended using it. Pt lost balance twice with PT.  Refer to PT note.       Patient End of Session: Up in chair;Needs met;Call light within reach;RN aware of session/findings;All patient questions and concerns addressed;Family present    OCCUPATIONAL PROFILE    HOME SITUATION  Type of Home: Independent living facility ([place])  Home Layout: One level  Lives With: Alone    Toilet and Equipment: Comfort height toilet;Grab bar  Shower/Tub and Equipment: Walk-in shower;Grab bar;Shower chair  Other Equipment:  (has rw, but does not use it)          Drives: No  Patient Regularly Uses: Home O2 (2 liters at night)    Prior Level of Function: lives at [facility]. Independent with all ADL, including medication management.  Does not use device. Has home oxygen, uses 2 liters at night.  Pt enjoys going to outings and doing crafts at the facility.   Low vision because of macular degeneration.      PAIN ASSESSMENT  Ratin          OBJECTIVE  Precautions: Bed/chair alarm;Low vision  Fall Risk: High fall risk (pt declines using RW)      ASSESSMENTS    AM-PAC ‘6-Clicks’ Inpatient Daily Activity Short Form  -   Putting on and taking off regular lower body clothing?: A Little  -   Bathing (including washing, rinsing, drying)?: A Little  -   Toileting, which includes using  toilet, bedpan or urinal? : A Little  -   Putting on and taking off regular upper body clothing?: None  -   Taking care of personal grooming such as brushing teeth?: None  -   Eating meals?: None    AM-PAC Score:  Score: 21  Approx Degree of Impairment: 32.79%  Standardized Score (AM-PAC Scale): 44.27    ADDITIONAL TESTS     NEUROLOGICAL FINDINGS      COGNITION ASSESSMENTS       PLAN  OT Treatment Plan: Balance activities;Energy conservation/work simplification techniques;ADL training;Functional transfer training;Patient/Family education;Equipment eval/education;Compensatory technique education  Rehab Potential : Fair  Frequency: 3x/week  Number of Visits to Meet Established Goals: 3    ADL Goals   Patient will perform grooming: with supervision and while standing at sink  Patient will perform lower body dressing:  with supervision  Patient will perform toileting: with supervision    Functional Transfer Goals  Patient will transfer to toilet:  with supervision      Additional Goals  Perform 2 out of 2 therapeutic exercises for respiratory functions  Pt will incorporate 2 energy conservation techniques into ADL.      Patient Evaluation Complexity Level:   Occupational Profile/Medical History LOW - Brief history including review of medical or therapy records    Specific performance deficits impacting engagement in ADL/IADL LOW  1 - 3 performance deficits    Client Assessment/Performance Deficits MODERATE - Comorbidities and min to mod modifications of tasks    Clinical Decision Making LOW - Analysis of occupational profile, problem-focused assessments, limited treatment options    Overall Complexity LOW     OT Session Time: 23minutes  Self-Care Home Management: 8 minutes  Therapeutic Activity: 6 minutes

## 2024-04-23 NOTE — CDS QUERY
DOCUMENTATION CLARIFICATION QUERY  Dear Dr. Schneider,  Please further clarify the uncertain diagnosis of “Acute on Chronic CHF”  you are treating:   [   ] Acute on Chronic Diastolic heart failure   [   ] Other  (please specify) _________________      CLINICAL INDICATORS:   -4/21/24 Pro-Beta Natriuretic Peptide 695 (*)  -4/21 ED: Clinical Impression: Acute on chronic congestive heart failure, unspecified heart failure type    -4/22 ECHO: The estimated ejection fraction was 70-75%, by visual assessment…Left ventricular diastolic function parameters were normal for the patient's age.    -4/22 H&P: Acute on chronic diastolic heart failure?  -4/23 Hospitalist: Acute on chronic CHF    -4/23 Pulmonologist: Dyspnea/hypoxia- due to CHF exacerbation.  cough which resolved with diuresis.  - repeat CXR to demonstrate interval improvement in pulm edema  -4/23 Cardiologist: Impression: COPD exac, pneumonia. some element of fluid overload is evident as well   Recommend: Echo - Ef 70-75%, mild LVH  Continue IV diuretics X 1 more day - monitor renal function.  - Likely will discharge tomorrow with PRN  PO lasix       RISK FACTORS: CHF - COPD - Chronic hypoxic respiratory failure - CKD 3     TREATMENT :  IV Lasix - Serial BMP labs - ECHO - Cardiology consult           Use of terms such as suspected, possible, or probable (associated with a specific diagnosis that is being evaluated, monitored, or treated as if it exists) are acceptable and can be coded in the inpatient setting, when documented at the time of discharge.     Please add any additional documentation to your progress note and continue to document this through discharge.  Klaudia Jimenez RN, BSN, CWOCN [facility]   [phone]                                                                                    THIS FORM IS A PART OF THE PERMANENT MEDICAL RECORD

## 2024-04-24 ENCOUNTER — APPOINTMENT (OUTPATIENT)
Dept: GENERAL RADIOLOGY | Facility: HOSPITAL | Age: 79
End: 2024-04-24
Attending: INTERNAL MEDICINE
Payer: MEDICARE

## 2024-04-24 LAB
ANION GAP SERPL CALC-SCNC: 2 MMOL/L (ref 0–18)
BASOPHILS # BLD AUTO: 0.04 X10(3) UL (ref 0–0.2)
BASOPHILS NFR BLD AUTO: 0.6 %
BUN BLD-MCNC: 25 MG/DL (ref 9–23)
CALCIUM BLD-MCNC: 8.7 MG/DL (ref 8.5–10.1)
CHLORIDE SERPL-SCNC: 105 MMOL/L (ref 98–112)
CO2 SERPL-SCNC: 32 MMOL/L (ref 21–32)
CREAT BLD-MCNC: 1.52 MG/DL
EGFRCR SERPLBLD CKD-EPI 2021: 35 ML/MIN/1.73M2 (ref 60–?)
EOSINOPHIL # BLD AUTO: 0.33 X10(3) UL (ref 0–0.7)
EOSINOPHIL NFR BLD AUTO: 5.3 %
ERYTHROCYTE [DISTWIDTH] IN BLOOD BY AUTOMATED COUNT: 20.8 %
GLUCOSE BLD-MCNC: 104 MG/DL (ref 70–99)
GLUCOSE BLD-MCNC: 105 MG/DL (ref 70–99)
GLUCOSE BLD-MCNC: 125 MG/DL (ref 70–99)
GLUCOSE BLD-MCNC: 73 MG/DL (ref 70–99)
GLUCOSE BLD-MCNC: 88 MG/DL (ref 70–99)
HCT VFR BLD AUTO: 34.4 %
HGB BLD-MCNC: 9.7 G/DL
IMM GRANULOCYTES # BLD AUTO: 0.02 X10(3) UL (ref 0–1)
IMM GRANULOCYTES NFR BLD: 0.3 %
LYMPHOCYTES # BLD AUTO: 1.24 X10(3) UL (ref 1–4)
LYMPHOCYTES NFR BLD AUTO: 20 %
MAGNESIUM SERPL-MCNC: 2.3 MG/DL (ref 1.6–2.6)
MCH RBC QN AUTO: 16.8 PG (ref 26–34)
MCHC RBC AUTO-ENTMCNC: 28.2 G/DL (ref 31–37)
MCV RBC AUTO: 59.7 FL
MONOCYTES # BLD AUTO: 0.54 X10(3) UL (ref 0.1–1)
MONOCYTES NFR BLD AUTO: 8.7 %
NEUTROPHILS # BLD AUTO: 4.04 X10 (3) UL (ref 1.5–7.7)
NEUTROPHILS # BLD AUTO: 4.04 X10(3) UL (ref 1.5–7.7)
NEUTROPHILS NFR BLD AUTO: 65.1 %
OSMOLALITY SERPL CALC.SUM OF ELEC: 293 MOSM/KG (ref 275–295)
PLATELET # BLD AUTO: 202 10(3)UL (ref 150–450)
PLATELETS.RETICULATED NFR BLD AUTO: 4.2 % (ref 0–7)
POTASSIUM SERPL-SCNC: 4.5 MMOL/L (ref 3.5–5.1)
RBC # BLD AUTO: 5.76 X10(6)UL
SODIUM SERPL-SCNC: 139 MMOL/L (ref 136–145)
WBC # BLD AUTO: 6.2 X10(3) UL (ref 4–11)

## 2024-04-24 PROCEDURE — 80048 BASIC METABOLIC PNL TOTAL CA: CPT

## 2024-04-24 PROCEDURE — 82962 GLUCOSE BLOOD TEST: CPT

## 2024-04-24 PROCEDURE — 85025 COMPLETE CBC W/AUTO DIFF WBC: CPT

## 2024-04-24 PROCEDURE — 83735 ASSAY OF MAGNESIUM: CPT

## 2024-04-24 PROCEDURE — 71045 X-RAY EXAM CHEST 1 VIEW: CPT | Performed by: INTERNAL MEDICINE

## 2024-04-24 RX ORDER — HYDRALAZINE HYDROCHLORIDE 50 MG/1
50 TABLET, FILM COATED ORAL EVERY 8 HOURS SCHEDULED
Status: DISCONTINUED | OUTPATIENT
Start: 2024-04-24 | End: 2024-04-25

## 2024-04-24 RX ORDER — ACETAMINOPHEN 500 MG
1000 TABLET ORAL EVERY 6 HOURS PRN
Status: DISCONTINUED | OUTPATIENT
Start: 2024-04-24 | End: 2024-04-29

## 2024-04-24 NOTE — PROGRESS NOTES
04/24/24 1144   Mobility   O2 walk? Yes   SPO2% on Room Air at Rest 90   SPO2% on Oxygen at Rest 95   At rest oxygen flow (liters per minute) 2   SPO2% Ambulation on Room Air 80   SPO2% Ambulation on Oxygen 95   Ambulation oxygen flow (liters per minute) 4        Resulted

## 2024-04-24 NOTE — DIETARY NOTE
[facility]   part of [facility]   CLINICAL NUTRITION    Nina Donnelly     Admitting diagnosis:  Hypoxia [R09.02]  Community acquired pneumonia, unspecified laterality [J18.9]  Acute on chronic congestive heart failure, unspecified heart failure type (HCC) [I50.9]    Ht: 154.9 cm (5' 0.98\")  Wt: 91.5 kg (201 lb 11.5 oz).   Body mass index is 38.13 kg/m².  IBW: 47.7 kg    Wt Readings from Last 6 Encounters:   04/24/24 91.5 kg (201 lb 11.5 oz)   03/30/22 90.3 kg (199 lb)   03/21/22 87.5 kg (193 lb)   03/11/22 88.5 kg (195 lb)   01/13/22 88.9 kg (196 lb)   01/07/22 89 kg (196 lb 4 oz)      Labs/Meds reviewed  -Glu:105, K+:4.5, BUN:25, Cr:1.52, GFR:35, elevated BNP  -Lasix, Insulin, IV abx    Diet:       Procedures    Carbohydrate controlled diet 1800 kcal/60 grams; Is Patient on Accuchecks? No; Misc Restriction: Cardiac     Percent Meals Eaten (last 3 days)       Date/Time Percent Meals Eaten (%)    04/22/24 0820 75 %    04/22/24 1912 100 %    04/23/24 0800 100 %    04/23/24 1000 100 %    04/23/24 1500 100 %    04/24/24 0800 100 %    04/24/24 1300 100 %          Pt chart reviewed d/t nutrition consult for renal diet education. K+ is WNL. No need for potassium restriction at this time.  Provided and discussed handout on Low Sodium Nutrition Therapy with list of foods recommended, foods to avoid/limit, sample menus, and list of salt free seasoning alternatives. All pt's nutrition questions answered at this time. Pt's son present at bedside, pt's daughter on speaker phone.  Placed contact information for outpatient Dietitian in pt instructions for discharge.    Patient reports good appetite at this time.  Nursing notes reports Percent Meals Eaten (%): 100 % intake for last meal.  Tolerating po diet without diarrhea, emesis, or constipation. Last BM:4/23.  Skin intact. Edema to B/L LE.    No significant weight changes noted per EMR hx. Pt denied any unintentional wt changes.    PMH:former tobacco use, COPD, WYATT,  HTN, HLD, CHF, DM2, CKD3, GERD, dementia.    Patient is at low nutrition risk at this time.    Please consult if patient status changes or nutrition issues arise.    Maryjane De La Cruz MS, RD, LDN  Clinical Dietitian  Ext:[phone]

## 2024-04-24 NOTE — PROGRESS NOTES
Nina Donnelly Patient Status:  Inpatient    10/9/1945 MRN [id number]   [facility] 2NE-A Attending Jamar Arora MD   Hosp Day # 2 PCP Chiki Caldwell MD      Reason for Consultation:  CHF        History of Present Illness:  Nina Donnelly is a a(n) 78 year old female. She has COPD, WYATT, Diabetes, Dyslipidemia .  Uses CPAP and O2 at 2 l/min at home.  Still able to do some walking.  Reports 2 weeks of dry cough, worsened dyspnea.  Daughter notes worse hypoxia (O2 sats down to 70's instead of high 80's ).  LE edema was reported as well. Feels weak and tired.  No fever. Chills.  Previous bouts of pneumonia.     Subjective:   -Breathing somewhat improved.  -Ambulating with PT.  -Still with desats with activity (down to 80s).  -Doesn't feel baseline yet.            Medications:  Scheduled Medications    pregabalin  600 mg Oral Nightly    insulin degludec  20 Units Subcutaneous Nightly    insulin aspart  6 Units Subcutaneous BID AC    insulin aspart  8 Units Subcutaneous Daily with dinner    aspirin  81 mg Oral Daily    atorvastatin  10 mg Oral Nightly    donepezil  10 mg Oral Nightly    DULoxetine  60 mg Oral Daily    fluticasone propionate  1 spray Each Nare Daily    montelukast  10 mg Oral Nightly    cefTRIAXone  1 g Intravenous Q24H    enoxaparin  30 mg Subcutaneous Daily    insulin aspart  2-10 Units Subcutaneous TID AC and HS    azithromycin  500 mg Oral Daily    furosemide  40 mg Intravenous Daily            Continuous Infusions:  Medication Infusions            Social History:   reports that she quit smoking about 9 years ago. Her smoking use included cigarettes. She started smoking about 64 years ago. She has a 55 pack-year smoking history. She has never used smokeless tobacco. She reports that she does not drink alcohol and does not use drugs.     Review of Systems:  All systems were reviewed and are negative except as described above in HPI.     Physical Exam:            Wt  Readings from Last 3 Encounters:   04/23/24 203 lb 4.2 oz (92.2 kg)   03/30/22 199 lb (90.3 kg)   03/21/22 193 lb (87.5 kg)         Vitals          Vitals:     04/22/24 2340 04/22/24 2345 04/22/24 2350 04/23/24 0505   BP:   (!) 169/61 (!) 173/57     BP Location:   Left arm Right arm     Pulse: 67 72 70     Resp:   22   24   Temp:   97.9 °F (36.6 °C)   98.2 °F (36.8 °C)   TempSrc:   Oral   Oral   SpO2: 95% 96% 94%     Weight:       203 lb 4.2 oz (92.2 kg)   Height:                   Temp:  [97.9 °F (36.6 °C)-98.7 °F (37.1 °C)] 98.2 °F (36.8 °C)  Pulse:  [53-82] 70  Resp:  [16-24] 24  BP: (148-173)/(49-61) 173/57  SpO2:  [85 %-98 %] 94 %     Temp: 98.2 °F (36.8 °C)  Pulse: 70  Resp: 24  BP: 173/57       General:  Appears comfortable  HEENT: No focal deficits.  Neck: No JVD, carotids 2+ no bruits.  Cardiac: Regular S1S2.  No S3, S4, rub, click.  No murmur.  Lungs: diminished   Abdomen: Soft, non-tender.   Extremities: +1 LLE edema.  No clubbing or cyanosis  Neurologic: Alert and oriented, normal affect.  Skin: Warm and dry.      Labs:        HEM:        Recent Labs   Lab 04/21/24 2033 04/22/24  0514 04/23/24  0534   WBC 8.8 8.6 6.5   HGB 9.9* 10.0* 9.9*   HCT 34.3* 35.0 35.8   .0 202.0 197.0         Chem:        Recent Labs   Lab 04/21/24 2033 04/22/24  0514 04/23/24  0534    144 141   K 4.1 3.9 4.5    110 105   CO2 28.0 31.0 33.0*   BUN 26* 22 26*   CREATSERUM 1.68* 1.55* 1.68*   MG  --   --  2.0   ALT 15  --   --    AST 18  --   --    ALB 3.2*  --   --              Recent Labs   Lab 04/21/24 2033   INR 1.08         No results found for: \"TROP\", \"CKMB\"        Invalid input(s): \"PBNPML\"        Telemetry: SR     Laboratories and Data:  Diagnostics:     EKG, 4/21/2024:  NSR, NSSTTW changes     CXR, 4/22/2024:       CONCLUSION:  Bilateral lower lobe predominant interstitial opacities and suspected small left pleural effusion and cardiomegaly.  Findings may be related to edema, though  infectious/inflammatory process is not excluded.       Echo, 10/2023:         Summary:     1. Left ventricle: The cavity size is normal. There is mild concentric      hypertrophy. Systolic function is normal. The estimated ejection fraction      is 55-60%. Grade I diastolic dysfunction.   2. Aortic valve: Trileaflet. The leaflets are mildly thickened and mildly      calcified. The peak systolic velocity is 1.5m/sec. The mean systolic      gradient is 4mm Hg. The peak systolic gradient is 9mm Hg. The valve area      is 2.2cm^2. The valve area index is 1.15cm^2/m^2.   3. Mitral valve: The annulus is calcified, fibrotic, and thickened. The      leaflets are mildly thickened and mildly calcified. There is mild      regurgitation.   4. Left atrium: The atrium is mildly to moderately dilated.   5. Right ventricle: The cavity size is normal. Wall thickness is normal.      Systolic function is normal. Estimation of the right ventricular systolic      pressure is within the normal range. The RV pressure during systole is      31mm Hg.   6. Tricuspid valve: There is mild regurgitation.   7. Pericardium, extracardiac: A probable, trace pericardial effusion is      identified. There is no evidence of hemodynamic compromise.         Impression:  1.  HFpEF       2. COPD     3. WYATT     4. Diabetes     5. Dyslipidemia      6. CKD, Cr = 1.4-1.6     7. Microcytic anemia, chronic     8. Lower extremity pain (popliteal) - no DVT.         Recommend:  1. Telemetry  2. Echo - Ef 70-75%, mild LVH, mild valvulopathy.  3. Continue IV diuretics for now              - IV diuresis given continued hypoxia and symptomatic SOB/TAM.   - Likely DC on increased dose PO diuretics (on BID regimen).  4. Treatment of COPD/PNA per IM/pulm  5. Increase PO hydralizine.  PRN IV hydralazine for BP over 160               - BP meds limited by renal function /  COPD and LE edema.  7. Consider outpatient ABIs for leg pains.

## 2024-04-24 NOTE — PLAN OF CARE
Assumed pt care at 0730. A&O x 4. On 2L O2 via NC. Reports headache, pain medications given. Increased BP, NSR on tele. Up with x1 assist and walker.     Plan of care: IV lasix, IV abx, BP control.    Plan of care updated with patient and daughter. Questions answered, pt verbalized understanding. Call light is within reach. All needs met at this time.    Problem: Diabetes/Glucose Control  Goal: Glucose maintained within prescribed range  Description: INTERVENTIONS:  - Monitor Blood Glucose as ordered  - Assess for signs and symptoms of hyperglycemia and hypoglycemia  - Administer ordered medications to maintain glucose within target range  - Assess barriers to adequate nutritional intake and initiate nutrition consult as needed  - Instruct patient on self management of diabetes  Outcome: Progressing     Problem: Patient/Family Goals  Goal: Patient/Family Long Term Goal  Description: Patient's Long Term Goal: to go home    Interventions:  - comply to care plan  - Mds to see  - See additional Care Plan goals for specific interventions  Outcome: Progressing  Goal: Patient/Family Short Term Goal  Description: Patient's Short Term Goal: to feel better    Interventions:   - IV lasix  - IV atbx  - comply to care plan  - Mds to see  - See additional Care Plan goals for specific interventions  Outcome: Progressing     Problem: CARDIOVASCULAR - ADULT  Goal: Maintains optimal cardiac output and hemodynamic stability  Description: INTERVENTIONS:  - Monitor vital signs, rhythm, and trends  - Monitor for bleeding, hypotension and signs of decreased cardiac output  - Evaluate effectiveness of vasoactive medications to optimize hemodynamic stability  - Monitor arterial and/or venous puncture sites for bleeding and/or hematoma  - Assess quality of pulses, skin color and temperature  - Assess for signs of decreased coronary artery perfusion - ex. Angina  - Evaluate fluid balance, assess for edema, trend weights  Outcome:  Progressing     Problem: RESPIRATORY - ADULT  Goal: Achieves optimal ventilation and oxygenation  Description: INTERVENTIONS:  - Assess for changes in respiratory status  - Assess for changes in mentation and behavior  - Position to facilitate oxygenation and minimize respiratory effort  - Oxygen supplementation based on oxygen saturation or ABGs  - Provide Smoking Cessation handout, if applicable  - Encourage broncho-pulmonary hygiene including cough, deep breathe, Incentive Spirometry  - Assess the need for suctioning and perform as needed  - Assess and instruct to report SOB or any respiratory difficulty  - Respiratory Therapy support as indicated  - Manage/alleviate anxiety  - Monitor for signs/symptoms of CO2 retention  Outcome: Progressing     Problem: METABOLIC/FLUID AND ELECTROLYTES - ADULT  Goal: Glucose maintained within prescribed range  Description: INTERVENTIONS:  - Monitor Blood Glucose as ordered  - Assess for signs and symptoms of hyperglycemia and hypoglycemia  - Administer ordered medications to maintain glucose within target range  - Assess barriers to adequate nutritional intake and initiate nutrition consult as needed  - Instruct patient on self management of diabetes  Outcome: Progressing  Goal: Electrolytes maintained within normal limits  Description: INTERVENTIONS:  - Monitor labs and rhythm and assess patient for signs and symptoms of electrolyte imbalances  - Administer electrolyte replacement as ordered  - Monitor response to electrolyte replacements, including rhythm and repeat lab results as appropriate  - Fluid restriction as ordered  - Instruct patient on fluid and nutrition restrictions as appropriate  Outcome: Progressing  Goal: Hemodynamic stability and optimal renal function maintained  Description: INTERVENTIONS:  - Monitor labs and assess for signs and symptoms of volume excess or deficit  - Monitor intake, output and patient weight  - Monitor urine specific gravity, serum  osmolarity and serum sodium as indicated or ordered  - Monitor response to interventions for patient's volume status, including labs, urine output, blood pressure (other measures as available)  - Encourage oral intake as appropriate  - Instruct patient on fluid and nutrition restrictions as appropriate  Outcome: Progressing

## 2024-04-24 NOTE — PROGRESS NOTES
[facility] AND CARE   [facility]   part of [facility]     Progress Note  Nina Donnelly Patient Status:  Inpatient    10/9/1945 MRN [id number]   Location [facility] 2NE-A Attending Татьяна Schneider,    Hosp Day # 3 PCP Chiki Caldwell MD       SEE ATTENDING NOTE AT BOTTOM OF PAGE    Is this a shared or split note between Advanced Practice Provider and Physician? Yes    Assessment and Plan:    78 year old female with a PMHx sig for DM2, GERD, HLD, HTN, MDD, CKD3, neuropathy, WYATT and COPD and dementia presents to the hospital with shortness of breath and bilateral ankle swelling.     HFpEF  -proBNP elevated 695  -cardiology consulted, plan to continue IV lasix for today given hypoxia and symptomatic dyspnea, will likely need increased dose of po diuretic when discharged     CAP  Chronic respiratory failure w hypoxia  COPD  Acute on chronic CHF  -short of breath x 2 weeks w hypoxia, uses 02 at home PRN  -CXR, bilateral lower lobe opacities, edema vs PNA  -no leukocytosis, afebrile  -3 panel PCR negative  -PCT neg  -IV rocephin   -s/p po zithromax  -on 4 L 02, uses 2L at home PRN  -pulmonary consulted >> RVP neg  -  repeat CXR reviewed    HTN  -po hydralazine started, still receiving IV PRN hydralazine as well, will discuss with cards     BIBIANA on CKD3-improving  -cr 1.52, follow  -po lasix at home     OAS  Chronic RF w hypoxia  -cpap  -singulair  -02 walk test ordered     DM2  -hold orals  -ADA diet  -accucheck ac/hs  -ssi, cont home regime as able  -insulin adjusted     MDD  -cymbalta     Neurapathy  LLE pain and swelling  -lyrica  -venous doppler neg for DVT in LLE  -outpatient arterial study     Dementia  -donepezil     GOC: Full code     MA/ACO Reach  -Re- Entry: no  -Consults:cards  -Discharge Needs: TBD  -Appointments: PCP       FEN  -lytes in am  -diet-cc     Prophy  -SCD  -lovenox     Dispo  -pending clinical course    PCP: Chiki Caldwell MD    Concerns regarding plan of care were  discussed with patient. Patient agrees with plan as detailed above. Discussed plan of care with Dr. Schneider    Note: This chart was prepared using voice recognition software and may contain unintended word substitution errors.          Lakhwinder Fuentes [facility] Hospitalist Team  Contact via [employer] (Check Availability)  4/24/2024             SUBJECTIVE:   On the edge of bed, eating breakfast  Denies cp, still having SOB w exertion  On 02               OBJECTIVE:   Blood pressure 132/38, pulse 58, temperature 98.1 °F (36.7 °C), temperature source Oral, resp. rate 16, height 5' 0.98\" (1.549 m), weight 201 lb 11.5 oz (91.5 kg), SpO2 91%.    GENERAL: no apparent distress  NEURO: A/A Ox3  RESP: non labored, diminished at bases  CARDIO: Regular, no murmur  ABD: soft, NT, ND, BS+  EXTREMITIES: trace edema, no calf tenderness    DIAGNOSTIC DATA:   Labs:     Recent Labs   Lab 04/21/24 2033 04/22/24  0514 04/23/24  0534 04/24/24  0655   WBC 8.8 8.6 6.5 6.2   HGB 9.9* 10.0* 9.9* 9.7*   MCV 58.5* 59.3* 60.3* 59.7*   .0 202.0 197.0 202.0   INR 1.08  --   --   --        Recent Labs   Lab 04/21/24 2033 04/22/24  0514 04/23/24  0534 04/24/24  0655    144 141 139   K 4.1 3.9 4.5 4.5    110 105 105   CO2 28.0 31.0 33.0* 32.0   BUN 26* 22 26* 25*   CREATSERUM 1.68* 1.55* 1.68* 1.52*   CA 8.9 8.7 9.0 8.7   MG  --   --  2.0 2.3   * 61* 111* 105*       Recent Labs   Lab 04/21/24 2033   ALT 15   AST 18   ALB 3.2*       Recent Labs   Lab 04/23/24  0505 04/23/24  1224 04/23/24  1655 04/23/24  2104 04/24/24  0533   PGLU 106* 72 136* 115* 104*       No results for input(s): \"TROP\" in the last 168 hours.      MEDICATIONS      Current Facility-Administered Medications   Medication Dose Route Frequency    hydrALAzine (Apresoline) 20 mg/mL injection 10 mg  10 mg Intravenous Q4H PRN    hydrALAZINE (Apresoline) tab 25 mg  25 mg Oral Q8H CHICO    pregabalin (Lyrica) cap 600 mg  600 mg Oral  Nightly    insulin degludec 100 units/mL flextouch 20 Units  20 Units Subcutaneous Nightly    insulin aspart (NovoLOG) 100 Units/mL FlexPen 6 Units  6 Units Subcutaneous BID AC    insulin aspart (NovoLOG) 100 Units/mL FlexPen 8 Units  8 Units Subcutaneous Daily with dinner    trolamine salicyliate 10 % cream   Topical TID PRN    aspirin chewable tab 81 mg  81 mg Oral Daily    atorvastatin (Lipitor) tab 10 mg  10 mg Oral Nightly    donepezil (Aricept) tab 10 mg  10 mg Oral Nightly    DULoxetine (Cymbalta) DR cap 60 mg  60 mg Oral Daily    fluticasone propionate (Flonase) 50 MCG/ACT nasal suspension 1 spray  1 spray Each Nare Daily    montelukast (Singulair) tab 10 mg  10 mg Oral Nightly    traMADol (Ultram) tab 50 mg  50 mg Oral BID PRN    cefTRIAXone (Rocephin) 1 g in D5W 100 mL IVPB-ADD  1 g Intravenous Q24H    glucose (Dex4) 15 GM/59ML oral liquid 15 g  15 g Oral Q15 Min PRN    Or    glucose (Glutose) 40% oral gel 15 g  15 g Oral Q15 Min PRN    Or    glucose-vitamin C (Dex-4) chewable tab 4 tablet  4 tablet Oral Q15 Min PRN    Or    dextrose 50% injection 50 mL  50 mL Intravenous Q15 Min PRN    Or    glucose (Dex4) 15 GM/59ML oral liquid 30 g  30 g Oral Q15 Min PRN    Or    glucose (Glutose) 40% oral gel 30 g  30 g Oral Q15 Min PRN    Or    glucose-vitamin C (Dex-4) chewable tab 8 tablet  8 tablet Oral Q15 Min PRN    enoxaparin (Lovenox) 30 MG/0.3ML SUBQ injection 30 mg  30 mg Subcutaneous Daily    acetaminophen (Tylenol Extra Strength) tab 500 mg  500 mg Oral Q4H PRN    insulin aspart (NovoLOG) 100 Units/mL FlexPen 2-10 Units  2-10 Units Subcutaneous TID AC and HS    furosemide (Lasix) 10 mg/mL injection 40 mg  40 mg Intravenous Daily                  IMAGING     XR CHEST AP PORTABLE  (CPT=71045)    Result Date: 4/24/2024  CONCLUSION:  1. Increasing opacities at the lung bases.  These may reflect basilar pneumonitis/pneumonia.  Given pulmonary vascular congestion and cardiomegaly, cardiogenic pulmonary edema is  also considered. 2. Details as above.  Continued clinical correlation and follow-up recommended.    LOCATION:  [facility]      Dictated by (CST): Davi Remy MD on 4/24/2024 at 7:33 AM     Finalized by (CST): Davi Remy MD on 4/24/2024 at 7:35 AM       US VENOUS DOPPLER LEG LEFT - DIAG IMG (CPT=93971)    Result Date: 4/23/2024  CONCLUSION:  Negative for DVT left leg   LOCATION:  [facility]    Dictated by (CST): Aaron Diop MD on 4/23/2024 at 7:56 AM     Finalized by (CST): Aaron Diop MD on 4/23/2024 at 7:56 AM       XR CHEST AP PORTABLE  (CPT=71045)    Result Date: 4/21/2024  CONCLUSION:  Bilateral lower lobe predominant interstitial opacities and suspected small left pleural effusion and cardiomegaly.  Findings may be related to edema, though infectious/inflammatory process is not excluded.   LOCATION:  [facility]      Dictated by (CST): Jonathon Lasron MD on 4/21/2024 at 8:59 PM     Finalized by (CST): Jonathon Larson MD on 4/21/2024 at 9:00 PM          SEE ATTENDING NOTE BELOW:     Patient seen and examined independently.  Discussed with APN and agree with note above.      S: Seen and examined at bedside.  Today she is complaining of headache.  BP is elevated.  Daughter at bedside.    objective  /52 (BP Location: Left arm)   Pulse 60   Temp 97.9 °F (36.6 °C) (Oral)   Resp 18   Ht 5' 0.98\" (1.549 m)   Wt 201 lb 11.5 oz (91.5 kg)   SpO2 90%   BMI 38.13 kg/m²     Gen: No acute distress, alert and oriented x3  Neck Supple, no JVD  Pulm: Lungs clear, normal respiratory effort, No wheezing or crackles, supplemental oxygen  CV: Heart with regular rate and rhythm, No murmurs, rubs, gallops  Abd: Abdomen soft, nontender, nondistended, no organomegaly, bowel sounds present  MSK:  no clubbing, no cyanosis.  Lower extremity edema has resolved  Skin: no rashes or lesions, well perfused  Psych: mood stable, cooperative  Neuro: no focal deficits          Assessment and Plan    78 year old female with a PMHx sig for  DM2, GERD, HLD, HTN, MDD, CKD3, neuropathy, WYATT and COPD and dementia presents to the hospital with shortness of breath and ankle swelling.      Acute on chronic respiratory failure with hypoxia  In the setting of COPD  Acute on chronic diastolic heart failure?  CKD  Type 2 diabetes with hyperglycemia  MDD  Neuropathy  Hypertension    Plan:  - proBNP 695, procalcitonin negative  - Empirically started on antibiotics, complete a 5-day course per pulmonology- RVP negative  - Pulmonology on consult, recommendations reviewed  - Cardiology on consult recommendations reviewed  - 2D echo-normal EF, no wall motion abnormalities, mild EF reviewed  - Venous Dopplers of lower extremities negative  - IV Lasix, switch to p.o. at discharge  - Adjusted insulin  -BP medications adjusted-cardiology recommendations reviewed  - Follow BMP  - Recommend arterial Dopplers for leg cramping-today patient states that she already has some scheduled  - Social work to assist with oxygen at home     Rest as above     Татьяна Schneider DO  Hospitalist  [facility]       Fall Risk

## 2024-04-24 NOTE — DISCHARGE INSTRUCTIONS
Going Home Instructions    In this section you will find the tools which will guide you through the first few days after you leave the hospital. Continued use of these tools will help you develop the skills necessary to keep your heart failure under control.       Home Care Instructions Following Heart Failure - the most important things to do every day include:     Weigh yourself  Take your medicines as prescribed  Limit your sodium (salt) and fluid intake  Know when to call your cardiologist, primary doctor, or nurse  Know when to seek emergency care    Things for You to Remember:   1. See your doctor or healthcare provider.  It is important that you attend this appointment to make sure your symptoms are under control.     2. Your recommended sodium intake is 2883-5752 mg daily    3. Limit your fluid intake to no more than 2 liters or 64 ounces per day    4. Some exercise and activity is important to help keep your heart functioning and strong. Unless instructed not to exercise, you may walk at a slow to moderate pace for 10-15 minutes 2-3 days per week to start. Pace your activity to prevent shortness of breath or fatigue. Stop exercise if you develop chest pain, lightheadedness, or significant shortness of breath.       Call Your Cardiologist If:   You gain 2 pounds overnight or 3-4 pounds in 3-5 days  You have more difficulty breathing  You are getting more tired with normal activity  You are more short of breath lying down, or awaken at night short of breath  You have swelling of your feet or legs  You urinate less often during the day and more often at night  You have cramps in your legs  You have blurred vision or see yellowish-green halos around objects of lights    Go to the Emergency Room If:   You have pain or tightness in your chest  You are extremely short of breath  You are coughing up pink-frothy mucus  You are traveling and develop symptoms of worsening heart failure      To follow up with an  outpatient Registered Dietitian:    At [facility] we offer nutrition counseling for outpatients designed to cater to individual needs, lifestyle and goals. Consultations are provided on a variety of nutrition-related diseases and concerns including, but not limited to, heart health, weight management, GI, renal and healthy eating.   To make an appointment contact central scheduling  at [phone]. Further questions? Contact the outpatient RD [phone].     Oxygen provided by [facility] @ discharge  Phone  [phone]    [facility] @ HOME [facility]ALTH @ discharge  Phone  [phone]  Fax  [phone]

## 2024-04-24 NOTE — PROGRESS NOTES
[facility] Pulmonary, Critical Care and Sleep    [facility] Patient Status:  Inpatient    10/9/1945 MRN [id number]   [facility] 2NE-A Attending Татьяна Schneider,    Hosp Day # 3 PCP Chiki Caldwell MD     Date of Admission: 2024  8:19 PM    Admission Diagnosis: Hypoxia [R09.02]  Community acquired pneumonia, unspecified laterality [J18.9]  Acute on chronic congestive heart failure, unspecified heart failure type (HCC) [I50.9]    S: Has headache. Still requiring O2.     Scheduled Medications:     hydrALAZINE  25 mg Oral Q8H CHICO    pregabalin  600 mg Oral Nightly    insulin degludec  20 Units Subcutaneous Nightly    insulin aspart  6 Units Subcutaneous BID AC    insulin aspart  8 Units Subcutaneous Daily with dinner    aspirin  81 mg Oral Daily    atorvastatin  10 mg Oral Nightly    donepezil  10 mg Oral Nightly    DULoxetine  60 mg Oral Daily    fluticasone propionate  1 spray Each Nare Daily    montelukast  10 mg Oral Nightly    cefTRIAXone  1 g Intravenous Q24H    enoxaparin  30 mg Subcutaneous Daily    insulin aspart  2-10 Units Subcutaneous TID AC and HS    furosemide  40 mg Intravenous Daily       Infusing Medications:      PRN Medications:    hydrALAzine    trolamine salicyliate    traMADol    glucose **OR** glucose **OR** glucose-vitamin C **OR** dextrose **OR** glucose **OR** glucose **OR** glucose-vitamin C    acetaminophen    OBJECTIVE:  /54 (BP Location: Left arm)   Pulse 66   Temp 98.1 °F (36.7 °C) (Oral)   Resp 16   Ht 154.9 cm (5' 0.98\")   Wt 201 lb 11.5 oz (91.5 kg)   SpO2 96%   BMI 38.13 kg/m²    Temp (24hrs), Av.1 °F (36.7 °C), Min:97.9 °F (36.6 °C), Max:98.2 °F (36.8 °C)             Wt Readings from Last 3 Encounters:   24 201 lb 11.5 oz (91.5 kg)   22 199 lb (90.3 kg)   22 193 lb (87.5 kg)       I/O last 3 completed shifts:  In: 2540 [P.O.:2440; IV PIGGYBACK:100]  Out:  [Urine:]  I/O this shift:  In: 240 [P.O.:240]  Out: -      O2:  2 LNC  General: NAD.   Neuro: Alert, no focal deficits.    HEENT: PERRL  Neck : No LAD  CV: RRR, nl S1, S2, no S4, S3 or murmur.   Lungs: Clear but decreased bilaterally   Abd: Nontender, non distended.   Ext: No edema.   Skin: No rashes.     Recent Labs   Lab 04/22/24  0514 04/23/24  0534 04/24/24  0655   WBC 8.6 6.5 6.2   HGB 10.0* 9.9* 9.7*   HCT 35.0 35.8 34.4*   .0 197.0 202.0     Recent Labs   Lab 04/21/24 2033 04/22/24  0514 04/23/24  0534 04/24/24  0655   * 61* 111* 105*   BUN 26* 22 26* 25*   CREATSERUM 1.68* 1.55* 1.68* 1.52*   CA 8.9 8.7 9.0 8.7    144 141 139   K 4.1 3.9 4.5 4.5    110 105 105   CO2 28.0 31.0 33.0* 32.0   AST 18  --   --   --    ALT 15  --   --   --    ALB 3.2*  --   --   --      Recent Labs   Lab 04/21/24 2033   INR 1.08   PTT 30.1     No results for input(s): \"ABGPHT\", \"GGTSVW8F\", \"YNRWP4L\", \"ABGHCO3\", \"SITE\", \"DEV\", \"THGB\" in the last 168 hours.    COVID-19 Lab Results    COVID-19  Lab Results   Component Value Date    COVID19 Not Detected 04/22/2024    COVID19 Not Detected 04/21/2024    COVID19 Not Detected 11/09/2021       Pro-Calcitonin  Recent Labs   Lab 04/22/24  0514   PCT 0.06       Cardiac  Recent Labs   Lab 04/21/24 2033   PBNP 695*       Creatinine Kinase  No results for input(s): \"CK\" in the last 168 hours.    Inflammatory Markers  No results for input(s): \"CRP\", \"LELA\", \"LDH\", \"DDIMER\" in the last 168 hours.    Imaging:   CXR 4/24/2024  FINDINGS:  Lung volumes are satisfactory.  Increased markings are again seen at both lung bases, left greater than right.  Mild increase, especially on the left.  Heart and pulmonary vessels appear stable, with mild cardiomegaly and pulmonary vascular   congestion.  Smooth mediastinal contours.  Blunting of the left CP angle may reflect a small pleural effusion, unchanged.     4/24 4/21  Chest images personally reviewed.     Assessment/Plan   Dyspnea/hypoxia- due to CHF exacerbation.  PNA less likely  - wean  O2 as tolerated. Down to room air while awake, needs O2 with exertion and sleep  - already has home O2; follows with Dr. Amador.  - IS/BDs prn. Not actively wheezing on exam  - diuresis per cards  ID - COVID negative  - CXR more c/w pulmonary edema, PCT negative.    - RVP negative, PCT negative, no infectious symptoms other than cough which  resolved with diuresis.  - repeat CXR I would say some improvement in pulm edema  - stop abx after five days 4/22-4/26 start rocephin/azithro.   WYATT- cont with CPAP with O2 entrained as OP  H/o COPD- not on inhalers as OP- no signs of exac  - BDs prn  Proph- LMWH  Dispo- Full code  - will follow  - dc planning  - d/w daughter    My best regards,         Paulo Lares MD  [facility] Pulmonary, Critical Care and Sleep Medicine

## 2024-04-24 NOTE — CM/SW NOTE
Pt is a 77 yo female admitted for pneumonia.  Pt lives alone at [facility].  She has Home 02 through E - 2-4L 02 is her baseline.  PT saw pt:  Anticipated therapy need: Home with Home Healthcare.  However, pt wants to go to outpt PT/OT at [facility].  Outpt PT/OT order written and needs MD signature.  Will give pt outpt PT/OT order once MD signs.  SW following.     04/24/24 1000   CM/SW Referral Data   Referral Source Social Work (self-referral)   Reason for Referral Discharge planning   Informant Patient   Patient Info   Patient's Home Environment Independent Living   Patient lives with Alone   Discharge Needs   Anticipated D/C needs Outpatient therapy

## 2024-04-24 NOTE — PLAN OF CARE
Assumed care at 1930. Pt is A&Ox4. Pt is on 4L O2 via NC, O2 sats WNL. Refused CPAP tonight. NSR on tele, VSS. 's , PRN IV Hydralazine given. Continent of B&B. Denies pain at this time. Up w/ SBA, tolerating well. Plan of care reviewed with patient, verbalizes understanding, all needs addressed at this time, pt seems to be resting comfortably. Call light within reach. Pt. C/o constant  HA  ; PRN Tylenol and Ultram given.     POC: IV lasix daily, IV rocephin, PO azithromycin            Monitor BP  Problem: CARDIOVASCULAR - ADULT  Goal: Maintains optimal cardiac output and hemodynamic stability  Description: INTERVENTIONS:  - Monitor vital signs, rhythm, and trends  - Monitor for bleeding, hypotension and signs of decreased cardiac output  - Evaluate effectiveness of vasoactive medications to optimize hemodynamic stability  - Monitor arterial and/or venous puncture sites for bleeding and/or hematoma  - Assess quality of pulses, skin color and temperature  - Assess for signs of decreased coronary artery perfusion - ex. Angina  - Evaluate fluid balance, assess for edema, trend weights  Outcome: Progressing     Problem: RESPIRATORY - ADULT  Goal: Achieves optimal ventilation and oxygenation  Description: INTERVENTIONS:  - Assess for changes in respiratory status  - Assess for changes in mentation and behavior  - Position to facilitate oxygenation and minimize respiratory effort  - Oxygen supplementation based on oxygen saturation or ABGs  - Provide Smoking Cessation handout, if applicable  - Encourage broncho-pulmonary hygiene including cough, deep breathe, Incentive Spirometry  - Assess the need for suctioning and perform as needed  - Assess and instruct to report SOB or any respiratory difficulty  - Respiratory Therapy support as indicated  - Manage/alleviate anxiety  - Monitor for signs/symptoms of CO2 retention  Outcome: Progressing     Problem: METABOLIC/FLUID AND ELECTROLYTES - ADULT  Goal: Glucose  maintained within prescribed range  Description: INTERVENTIONS:  - Monitor Blood Glucose as ordered  - Assess for signs and symptoms of hyperglycemia and hypoglycemia  - Administer ordered medications to maintain glucose within target range  - Assess barriers to adequate nutritional intake and initiate nutrition consult as needed  - Instruct patient on self management of diabetes  Outcome: Progressing  Goal: Electrolytes maintained within normal limits  Description: INTERVENTIONS:  - Monitor labs and rhythm and assess patient for signs and symptoms of electrolyte imbalances  - Administer electrolyte replacement as ordered  - Monitor response to electrolyte replacements, including rhythm and repeat lab results as appropriate  - Fluid restriction as ordered  - Instruct patient on fluid and nutrition restrictions as appropriate  Outcome: Progressing  Goal: Hemodynamic stability and optimal renal function maintained  Description: INTERVENTIONS:  - Monitor labs and assess for signs and symptoms of volume excess or deficit  - Monitor intake, output and patient weight  - Monitor urine specific gravity, serum osmolarity and serum sodium as indicated or ordered  - Monitor response to interventions for patient's volume status, including labs, urine output, blood pressure (other measures as available)  - Encourage oral intake as appropriate  - Instruct patient on fluid and nutrition restrictions as appropriate  Outcome: Progressing

## 2024-04-25 ENCOUNTER — APPOINTMENT (OUTPATIENT)
Dept: GENERAL RADIOLOGY | Facility: HOSPITAL | Age: 79
End: 2024-04-25
Attending: INTERNAL MEDICINE
Payer: MEDICARE

## 2024-04-25 LAB
ANION GAP SERPL CALC-SCNC: 2 MMOL/L (ref 0–18)
ARTERIAL PATENCY WRIST A: POSITIVE
ARTERIAL PATENCY WRIST A: POSITIVE
BASE EXCESS BLDA CALC-SCNC: 8.9 MMOL/L (ref ?–2)
BASE EXCESS BLDA CALC-SCNC: 9.9 MMOL/L (ref ?–2)
BASOPHILS # BLD AUTO: 0.03 X10(3) UL (ref 0–0.2)
BASOPHILS NFR BLD AUTO: 0.4 %
BILIRUB UR QL STRIP.AUTO: NEGATIVE
BODY TEMPERATURE: 98.6 F
BODY TEMPERATURE: 98.6 F
BUN BLD-MCNC: 30 MG/DL (ref 9–23)
CA-I BLD-SCNC: 1.21 MMOL/L (ref 0.95–1.32)
CALCIUM BLD-MCNC: 9.2 MG/DL (ref 8.5–10.1)
CHLORIDE SERPL-SCNC: 102 MMOL/L (ref 98–112)
CLARITY UR REFRACT.AUTO: CLEAR
CO2 SERPL-SCNC: 32 MMOL/L (ref 21–32)
COHGB MFR BLD: 1.9 % SAT (ref 0–3)
COHGB MFR BLD: 2.2 % SAT (ref 0–3)
CREAT BLD-MCNC: 1.51 MG/DL
D DIMER PPP FEU-MCNC: 0.66 UG/ML FEU (ref ?–0.76)
EGFRCR SERPLBLD CKD-EPI 2021: 36 ML/MIN/1.73M2 (ref 60–?)
EOSINOPHIL # BLD AUTO: 0.19 X10(3) UL (ref 0–0.7)
EOSINOPHIL NFR BLD AUTO: 2.5 %
ERYTHROCYTE [DISTWIDTH] IN BLOOD BY AUTOMATED COUNT: 21.1 %
EXPIRATORY PRESSURE: 8 CM H2O
FIO2: 35 %
GLUCOSE BLD-MCNC: 118 MG/DL (ref 70–99)
GLUCOSE BLD-MCNC: 141 MG/DL (ref 70–99)
GLUCOSE BLD-MCNC: 150 MG/DL (ref 70–99)
GLUCOSE BLD-MCNC: 151 MG/DL (ref 70–99)
GLUCOSE BLD-MCNC: 154 MG/DL (ref 70–99)
GLUCOSE BLD-MCNC: 166 MG/DL (ref 70–99)
GLUCOSE BLD-MCNC: 79 MG/DL (ref 70–99)
GLUCOSE UR STRIP.AUTO-MCNC: NORMAL MG/DL
HCO3 BLDA-SCNC: 31.9 MEQ/L (ref 21–27)
HCO3 BLDA-SCNC: 32.6 MEQ/L (ref 21–27)
HCT VFR BLD AUTO: 37.1 %
HGB BLD-MCNC: 10.3 G/DL
HGB BLD-MCNC: 10.9 G/DL
HGB BLD-MCNC: 11 G/DL
IMM GRANULOCYTES # BLD AUTO: 0.03 X10(3) UL (ref 0–1)
IMM GRANULOCYTES NFR BLD: 0.4 %
INSP PRESSURE: 14 CM H2O
KETONES UR STRIP.AUTO-MCNC: NEGATIVE MG/DL
L/M: 8 L/MIN
LACTATE BLD-SCNC: 0.5 MMOL/L (ref 0.5–2)
LEUKOCYTE ESTERASE UR QL STRIP.AUTO: NEGATIVE
LYMPHOCYTES # BLD AUTO: 1.29 X10(3) UL (ref 1–4)
LYMPHOCYTES NFR BLD AUTO: 17.2 %
MAGNESIUM SERPL-MCNC: 2.4 MG/DL (ref 1.6–2.6)
MCH RBC QN AUTO: 17.1 PG (ref 26–34)
MCHC RBC AUTO-ENTMCNC: 29.6 G/DL (ref 31–37)
MCV RBC AUTO: 57.7 FL
METHGB MFR BLD: 0.9 % SAT (ref 0.4–1.5)
METHGB MFR BLD: 0.9 % SAT (ref 0.4–1.5)
MONOCYTES # BLD AUTO: 0.48 X10(3) UL (ref 0.1–1)
MONOCYTES NFR BLD AUTO: 6.4 %
NEUTROPHILS # BLD AUTO: 5.49 X10 (3) UL (ref 1.5–7.7)
NEUTROPHILS # BLD AUTO: 5.49 X10(3) UL (ref 1.5–7.7)
NEUTROPHILS NFR BLD AUTO: 73.1 %
NITRITE UR QL STRIP.AUTO: NEGATIVE
NT-PROBNP SERPL-MCNC: 482 PG/ML (ref ?–450)
OSMOLALITY SERPL CALC.SUM OF ELEC: 291 MOSM/KG (ref 275–295)
OXYHGB MFR BLDA: 95.1 % (ref 92–100)
OXYHGB MFR BLDA: 96.6 % (ref 92–100)
PCO2 BLDA: 64 MM HG (ref 35–45)
PCO2 BLDA: 68 MM HG (ref 35–45)
PH BLDA: 7.34 [PH] (ref 7.35–7.45)
PH BLDA: 7.37 [PH] (ref 7.35–7.45)
PH UR STRIP.AUTO: 5 [PH] (ref 5–8)
PLATELET # BLD AUTO: 215 10(3)UL (ref 150–450)
PLATELETS.RETICULATED NFR BLD AUTO: 2.9 % (ref 0–7)
PO2 BLDA: 112 MM HG (ref 80–100)
PO2 BLDA: 80 MM HG (ref 80–100)
POTASSIUM BLD-SCNC: 5.2 MMOL/L (ref 3.6–5.1)
POTASSIUM SERPL-SCNC: 4.9 MMOL/L (ref 3.5–5.1)
PROCALCITONIN SERPL-MCNC: <0.05 NG/ML (ref ?–0.16)
PROT UR STRIP.AUTO-MCNC: NEGATIVE MG/DL
RBC # BLD AUTO: 6.43 X10(6)UL
RBC UR QL AUTO: NEGATIVE
SODIUM BLD-SCNC: 134 MMOL/L (ref 135–145)
SODIUM SERPL-SCNC: 136 MMOL/L (ref 136–145)
SP GR UR STRIP.AUTO: 1.01 (ref 1–1.03)
UROBILINOGEN UR STRIP.AUTO-MCNC: NORMAL MG/DL
VENT RATE: 20 /MIN
WBC # BLD AUTO: 7.5 X10(3) UL (ref 4–11)

## 2024-04-25 PROCEDURE — 82375 ASSAY CARBOXYHB QUANT: CPT | Performed by: STUDENT IN AN ORGANIZED HEALTH CARE EDUCATION/TRAINING PROGRAM

## 2024-04-25 PROCEDURE — 87040 BLOOD CULTURE FOR BACTERIA: CPT | Performed by: STUDENT IN AN ORGANIZED HEALTH CARE EDUCATION/TRAINING PROGRAM

## 2024-04-25 PROCEDURE — 83735 ASSAY OF MAGNESIUM: CPT

## 2024-04-25 PROCEDURE — 84145 PROCALCITONIN (PCT): CPT | Performed by: INTERNAL MEDICINE

## 2024-04-25 PROCEDURE — 71045 X-RAY EXAM CHEST 1 VIEW: CPT | Performed by: INTERNAL MEDICINE

## 2024-04-25 PROCEDURE — 84295 ASSAY OF SERUM SODIUM: CPT | Performed by: INTERNAL MEDICINE

## 2024-04-25 PROCEDURE — 94799 UNLISTED PULMONARY SVC/PX: CPT

## 2024-04-25 PROCEDURE — 82803 BLOOD GASES ANY COMBINATION: CPT | Performed by: STUDENT IN AN ORGANIZED HEALTH CARE EDUCATION/TRAINING PROGRAM

## 2024-04-25 PROCEDURE — 82375 ASSAY CARBOXYHB QUANT: CPT | Performed by: INTERNAL MEDICINE

## 2024-04-25 PROCEDURE — 85018 HEMOGLOBIN: CPT | Performed by: INTERNAL MEDICINE

## 2024-04-25 PROCEDURE — 83605 ASSAY OF LACTIC ACID: CPT | Performed by: INTERNAL MEDICINE

## 2024-04-25 PROCEDURE — 83050 HGB METHEMOGLOBIN QUAN: CPT | Performed by: INTERNAL MEDICINE

## 2024-04-25 PROCEDURE — 5A09457 ASSISTANCE WITH RESPIRATORY VENTILATION, 24-96 CONSECUTIVE HOURS, CONTINUOUS POSITIVE AIRWAY PRESSURE: ICD-10-PCS | Performed by: INTERNAL MEDICINE

## 2024-04-25 PROCEDURE — 80048 BASIC METABOLIC PNL TOTAL CA: CPT

## 2024-04-25 PROCEDURE — 85379 FIBRIN DEGRADATION QUANT: CPT | Performed by: STUDENT IN AN ORGANIZED HEALTH CARE EDUCATION/TRAINING PROGRAM

## 2024-04-25 PROCEDURE — 82803 BLOOD GASES ANY COMBINATION: CPT | Performed by: INTERNAL MEDICINE

## 2024-04-25 PROCEDURE — 36600 WITHDRAWAL OF ARTERIAL BLOOD: CPT | Performed by: STUDENT IN AN ORGANIZED HEALTH CARE EDUCATION/TRAINING PROGRAM

## 2024-04-25 PROCEDURE — 82962 GLUCOSE BLOOD TEST: CPT

## 2024-04-25 PROCEDURE — 82330 ASSAY OF CALCIUM: CPT | Performed by: INTERNAL MEDICINE

## 2024-04-25 PROCEDURE — 83050 HGB METHEMOGLOBIN QUAN: CPT | Performed by: STUDENT IN AN ORGANIZED HEALTH CARE EDUCATION/TRAINING PROGRAM

## 2024-04-25 PROCEDURE — 84132 ASSAY OF SERUM POTASSIUM: CPT | Performed by: INTERNAL MEDICINE

## 2024-04-25 PROCEDURE — 81003 URINALYSIS AUTO W/O SCOPE: CPT | Performed by: INTERNAL MEDICINE

## 2024-04-25 PROCEDURE — 36600 WITHDRAWAL OF ARTERIAL BLOOD: CPT | Performed by: INTERNAL MEDICINE

## 2024-04-25 PROCEDURE — 85018 HEMOGLOBIN: CPT | Performed by: STUDENT IN AN ORGANIZED HEALTH CARE EDUCATION/TRAINING PROGRAM

## 2024-04-25 PROCEDURE — 85025 COMPLETE CBC W/AUTO DIFF WBC: CPT

## 2024-04-25 PROCEDURE — 94660 CPAP INITIATION&MGMT: CPT

## 2024-04-25 PROCEDURE — 83880 ASSAY OF NATRIURETIC PEPTIDE: CPT | Performed by: INTERNAL MEDICINE

## 2024-04-25 RX ORDER — VANCOMYCIN HYDROCHLORIDE
1250
Status: DISCONTINUED | OUTPATIENT
Start: 2024-04-25 | End: 2024-04-25

## 2024-04-25 RX ORDER — FUROSEMIDE 10 MG/ML
40 INJECTION INTRAMUSCULAR; INTRAVENOUS
Status: DISCONTINUED | OUTPATIENT
Start: 2024-04-25 | End: 2024-04-26

## 2024-04-25 RX ORDER — INSULIN DEGLUDEC 100 U/ML
26 INJECTION, SOLUTION SUBCUTANEOUS NIGHTLY
Status: DISCONTINUED | OUTPATIENT
Start: 2024-04-25 | End: 2024-04-30

## 2024-04-25 RX ORDER — HYDRALAZINE HYDROCHLORIDE 20 MG/ML
10 INJECTION INTRAMUSCULAR; INTRAVENOUS EVERY 4 HOURS PRN
Status: DISCONTINUED | OUTPATIENT
Start: 2024-04-25 | End: 2024-04-30

## 2024-04-25 RX ORDER — DIPHENHYDRAMINE HYDROCHLORIDE, ZINC ACETATE 2; .1 G/100G; G/100G
1 CREAM TOPICAL 3 TIMES DAILY PRN
Status: DISCONTINUED | OUTPATIENT
Start: 2024-04-25 | End: 2024-04-30

## 2024-04-25 RX ORDER — HYDRALAZINE HYDROCHLORIDE 20 MG/ML
10 INJECTION INTRAMUSCULAR; INTRAVENOUS EVERY 4 HOURS
Status: DISCONTINUED | OUTPATIENT
Start: 2024-04-25 | End: 2024-04-25

## 2024-04-25 NOTE — DIETARY NOTE
[facility]   part of [facility]   CLINICAL NUTRITION    Nina Donnelly     Admitting diagnosis:  Hypoxia [R09.02]  Community acquired pneumonia, unspecified laterality [J18.9]  Acute on chronic congestive heart failure, unspecified heart failure type (HCC) [I50.9]    Ht: 154.9 cm (5' 0.98\")  Wt: 90.3 kg (199 lb 1.2 oz).   Body mass index is 37.63 kg/m².  IBW: 47.7 kg    Wt Readings from Last 6 Encounters:   04/25/24 90.3 kg (199 lb 1.2 oz)   03/30/22 90.3 kg (199 lb)   03/21/22 87.5 kg (193 lb)   03/11/22 88.5 kg (195 lb)   01/13/22 88.9 kg (196 lb)   01/07/22 89 kg (196 lb 4 oz)      Labs/Meds reviewed      Diet:       Procedures    NPO     Percent Meals Eaten (last 3 days)       Date/Time Percent Meals Eaten (%)    04/22/24 0820 75 %    04/22/24 1912 100 %    04/23/24 0800 100 %    04/23/24 1000 100 %    04/23/24 1500 100 %    04/24/24 0800 100 %    04/24/24 1300 100 %    04/24/24 1800 100 %    04/25/24 0900 80 %          4/25- Dropped off additional heart healthy-low sodium diet information per pt request. Yesterday, pt stated she has macular degeneration and would like information in audio form. Gave handouts to pt's daughter present at bedside: Del[employer] Meets Nutritious Classes-2024, DASH diet-audio book, [employer] Healthy Eating videos, and AHA Cookbooks.  Pt began to desat and have increased O2 needs overnight. Made NPO. Currently on BIPAP.      4/24-Pt chart reviewed d/t nutrition consult for renal diet education. K+ is WNL. No need for potassium restriction at this time.  Provided and discussed handout on Low Sodium Nutrition Therapy with list of foods recommended, foods to avoid/limit, sample menus, and list of salt free seasoning alternatives. All pt's nutrition questions answered at this time. Pt's son present at bedside, pt's daughter on speaker phone.  Placed contact information for outpatient Dietitian in pt instructions for discharge.    Patient reports good appetite at this time.  Nursing notes  reports Percent Meals Eaten (%): 80 % intake for last meal.  Tolerating po diet without diarrhea, emesis, or constipation. Last BM:4/23.  Skin intact. Edema to B/L LE.    No significant weight changes noted per EMR hx. Pt denied any unintentional wt changes.    PMH:former tobacco use, COPD, WYATT, HTN, HLD, CHF, DM2, CKD3, GERD, dementia.    Patient is at low nutrition risk at this time.    Please consult if patient status changes or nutrition issues arise.    Maryjane De La Cruz MS, RD, LDN  Clinical Dietitian  Ext:[phone]

## 2024-04-25 NOTE — PROGRESS NOTES
[facility]    Nina Donnelly Patient Status:  Inpatient    10/9/1945 MRN [id number]   [facility] 2NE-A Attending Татьяна Schneider,    Hosp Day # 4 PCP Chiki Caldwell MD     Critical Care Progress Note     Date of Admission: 2024  8:19 PM  Admission Diagnosis: Hypoxia [R09.02]  Community acquired pneumonia, unspecified laterality [J18.9]  Acute on chronic congestive heart failure, unspecified heart failure type (HCC) [I50.9]     S: overnight, became confused/obtunded and tremulous.  No N/V, signs of aspiration.      Current Medications:    Current Facility-Administered Medications:     insulin degludec 100 units/mL flextouch 26 Units, 26 Units, Subcutaneous, Nightly    hydrALAZINE (Apresoline) tab 50 mg, 50 mg, Oral, Q8H CHICO    acetaminophen (Tylenol Extra Strength) tab 1,000 mg, 1,000 mg, Oral, Q6H PRN    insulin aspart (NovoLOG) 100 Units/mL FlexPen 7 Units, 7 Units, Subcutaneous, Daily with dinner    hydrALAzine (Apresoline) 20 mg/mL injection 10 mg, 10 mg, Intravenous, Q4H PRN    [Held by provider] pregabalin (Lyrica) cap 600 mg, 600 mg, Oral, Nightly    insulin aspart (NovoLOG) 100 Units/mL FlexPen 6 Units, 6 Units, Subcutaneous, BID AC    trolamine salicyliate 10 % cream, , Topical, TID PRN    aspirin chewable tab 81 mg, 81 mg, Oral, Daily    atorvastatin (Lipitor) tab 10 mg, 10 mg, Oral, Nightly    donepezil (Aricept) tab 10 mg, 10 mg, Oral, Nightly    DULoxetine (Cymbalta) DR cap 60 mg, 60 mg, Oral, Daily    fluticasone propionate (Flonase) 50 MCG/ACT nasal suspension 1 spray, 1 spray, Each Nare, Daily    montelukast (Singulair) tab 10 mg, 10 mg, Oral, Nightly    traMADol (Ultram) tab 50 mg, 50 mg, Oral, BID PRN    cefTRIAXone (Rocephin) 1 g in D5W 100 mL IVPB-ADD, 1 g, Intravenous, Q24H    glucose (Dex4) 15 GM/59ML oral liquid 15 g, 15 g, Oral, Q15 Min PRN **OR** glucose (Glutose) 40% oral gel 15 g, 15 g, Oral, Q15 Min PRN **OR** glucose-vitamin C (Dex-4) chewable tab 4 tablet, 4  tablet, Oral, Q15 Min PRN **OR** dextrose 50% injection 50 mL, 50 mL, Intravenous, Q15 Min PRN **OR** glucose (Dex4) 15 GM/59ML oral liquid 30 g, 30 g, Oral, Q15 Min PRN **OR** glucose (Glutose) 40% oral gel 30 g, 30 g, Oral, Q15 Min PRN **OR** glucose-vitamin C (Dex-4) chewable tab 8 tablet, 8 tablet, Oral, Q15 Min PRN    enoxaparin (Lovenox) 30 MG/0.3ML SUBQ injection 30 mg, 30 mg, Subcutaneous, Daily    insulin aspart (NovoLOG) 100 Units/mL FlexPen 2-10 Units, 2-10 Units, Subcutaneous, TID AC and HS    furosemide (Lasix) 10 mg/mL injection 40 mg, 40 mg, Intravenous, Daily     OBJECTIVE:  BP (!) 188/67 (BP Location: Right arm)   Pulse 77   Temp 98.2 °F (36.8 °C) (Axillary)   Resp 24   Ht 154.9 cm (5' 0.98\")   Wt 199 lb 1.2 oz (90.3 kg)   SpO2 (!) 85%   BMI 37.63 kg/m²      7L HFNC      Wt Readings from Last 3 Encounters:   04/25/24 199 lb 1.2 oz (90.3 kg)   03/30/22 199 lb (90.3 kg)   03/21/22 193 lb (87.5 kg)        I/O last 3 completed shifts:  In: 580 [P.O.:480; IV PIGGYBACK:100]  Out: 2400 [Urine:2400]  I/O this shift:  In: -   Out: 600 [Urine:600]     General appearance: alert, appears stated age, moderate distress, and slowed mentation  Lungs: diminished breath sounds bilaterally  Heart: regular rate and rhythm  Abdomen: soft, non-tender; bowel sounds normal; no masses,  no organomegaly  Extremities: extremities normal, atraumatic, no cyanosis or edema     Lab Results   Component Value Date    WBC 7.5 04/25/2024    RBC 6.43 04/25/2024    HGB 11.0 04/25/2024    HCT 37.1 04/25/2024    MCV 57.7 04/25/2024    MCH 17.1 04/25/2024    MCHC 29.6 04/25/2024    RDW 21.1 04/25/2024    .0 04/25/2024     Lab Results   Component Value Date     04/25/2024    K 4.9 04/25/2024     04/25/2024    CO2 32.0 04/25/2024    BUN 30 04/25/2024    CREATSERUM 1.51 04/25/2024     04/25/2024    CA 9.2 04/25/2024     Lab Results   Component Value Date    INR 1.08 04/21/2024        Recent Labs   Lab  04/25/24  0959   ABGPHT 7.34*   VWEGWQ5J 68*   CUXSV5P 112*   ABGHCO3 31.9*   ABGBE 8.9*   TEMP 98.6   ZOILA Positive   SITE Right Radial   DEV High flow nasal cannula   THGB 10.9*       Imaging: CXR: bibasilar atelectasis, RLL infiltrate     ASSESSMENT/PLAN:  Acute on chronic hypercapnic and hypoxic resp failure- due to CHF exacerbation, atelectasis, obesity, COPD and now possible developing PNA.  Has been declining CPAP while here bc she doesn't like mask  - ABG now with developing acute on chronic CO2 retention.  Also appears obtunded  - start BIPAP 14/8 with O2 and repeat ABG in 2 hours. If no better, transfer to ICU  - IS/BDs prn. Not actively wheezing on exam  - diuresis per cards  ID - developing possible PNA  - on admission, COVID/RVP negative, PCT negative and no infectious symptoms.  However, currently seems like she could be getting septic and CXR worsened.  - check urine/blood cx, repeat PCT  - change abx to zosyn/vanco in place of ceftriaxone and de-escalate based on cultures  - repeat CXR   WYATT- cont with CPAP with O2 entrained as OP  H/o COPD- not on inhalers as OP- no signs of exac  - BDs prn  Proph- LMWH  Dispo- Full code  - pt critically ill.  Low threshold for transfer to ICU if she deteriorates further  - d/w daughter    Critical Care Time: 35 minutes    Jose Luis Fan MD  4/25/2024  11:02 AM

## 2024-04-25 NOTE — PHYSICAL THERAPY NOTE
Attempted to see Pt this AM - 2x - RN aware of attempt.  Pt was eating breakfast, asked writer to return 30min later.  Writer returned - Pt is now sleeping and daughter asking for her to rest.  Witnessed patient walking to<>from bathroom at SBA level with use of RW.     Will continue to follow.

## 2024-04-25 NOTE — PLAN OF CARE
Assumed care at 1930. Pt is A&Ox4. Pt is on 4L O2 via NC, O2 sats WNL. Refused CPAP tonight. NSR on tele, VSS. . Continent of B&B. Up w/ SBA, tolerating well. Plan of care reviewed with patient, verbalizes understanding, all needs addressed at this time, pt seems to be resting comfortably. Call light within reach.   POC: IV lasix daily, IV rocephin            Monitor BP  Problem: CARDIOVASCULAR - ADULT  Goal: Maintains optimal cardiac output and hemodynamic stability  Description: INTERVENTIONS:  - Monitor vital signs, rhythm, and trends  - Monitor for bleeding, hypotension and signs of decreased cardiac output  - Evaluate effectiveness of vasoactive medications to optimize hemodynamic stability  - Monitor arterial and/or venous puncture sites for bleeding and/or hematoma  - Assess quality of pulses, skin color and temperature  - Assess for signs of decreased coronary artery perfusion - ex. Angina  - Evaluate fluid balance, assess for edema, trend weights  Outcome: Progressing     Problem: Diabetes/Glucose Control  Goal: Glucose maintained within prescribed range  Description: INTERVENTIONS:  - Monitor Blood Glucose as ordered  - Assess for signs and symptoms of hyperglycemia and hypoglycemia  - Administer ordered medications to maintain glucose within target range  - Assess barriers to adequate nutritional intake and initiate nutrition consult as needed  - Instruct patient on self management of diabetes  Outcome: Progressing     Problem: METABOLIC/FLUID AND ELECTROLYTES - ADULT  Goal: Glucose maintained within prescribed range  Description: INTERVENTIONS:  - Monitor Blood Glucose as ordered  - Assess for signs and symptoms of hyperglycemia and hypoglycemia  - Administer ordered medications to maintain glucose within target range  - Assess barriers to adequate nutritional intake and initiate nutrition consult as needed  - Instruct patient on self management of diabetes  Outcome: Progressing  Goal: Electrolytes  maintained within normal limits  Description: INTERVENTIONS:  - Monitor labs and rhythm and assess patient for signs and symptoms of electrolyte imbalances  - Administer electrolyte replacement as ordered  - Monitor response to electrolyte replacements, including rhythm and repeat lab results as appropriate  - Fluid restriction as ordered  - Instruct patient on fluid and nutrition restrictions as appropriate  Outcome: Progressing  Goal: Hemodynamic stability and optimal renal function maintained  Description: INTERVENTIONS:  - Monitor labs and assess for signs and symptoms of volume excess or deficit  - Monitor intake, output and patient weight  - Monitor urine specific gravity, serum osmolarity and serum sodium as indicated or ordered  - Monitor response to interventions for patient's volume status, including labs, urine output, blood pressure (other measures as available)  - Encourage oral intake as appropriate  - Instruct patient on fluid and nutrition restrictions as appropriate  Outcome: Progressing     Problem: Patient/Family Goals  Goal: Patient/Family Long Term Goal  Description: Patient's Long Term Goal: to go home    Interventions:  - comply to care plan  - Mds to see  - See additional Care Plan goals for specific interventions  Outcome: Progressing  Goal: Patient/Family Short Term Goal  Description: Patient's Short Term Goal: to feel better    Interventions:   - IV lasix  - IV atbx  - comply to care plan  - Mds to see  - See additional Care Plan goals for specific interventions  Outcome: Progressing

## 2024-04-25 NOTE — OCCUPATIONAL THERAPY NOTE
Attempted x2 this AM, on first attempt pt eating breakfast and asking for more time, on second attempt pt sleeping and dtr asking for therapy to hold off at this time.  OT will follow up as schedule permits.

## 2024-04-25 NOTE — PROGRESS NOTES
04/25/24 1037   BiPAP   $ RT Standby Charge (per 15 min) 1   $ BiPAP Initial day & set up Yes   Device V60   BiPAP bacteria filter Yes   BiPAP Pre-use check ok? Yes   BIPAP plugged into main power? Yes   Mode Spontaneous/Timed   Interface Full face mask   Mask Size Medium   Control Settings   Set Rate 20 breaths/min   Set IPAP 14   Set EPAP 8   Oxygen Percent 35 %   Inspiratory time 0.9   Insp rise time 3   BiPAP/CPAP Alarm Settings   Hi Rate 40   Low Rate 5   Hi VT 1200   Low    Hi Pressure 40   Low Pressure 5   Low Pressure Delay 20   Low MV 2.0   BiPAP/CPAP Monitored Parameters   PIP 14   Total Rate 22 breaths/min   Minute Volume 7   Tidal Volume 335   Patient Leak 17   Trigger % 25   Ti/Ttot % 38   IPAP 14   EPAP 8   Toleration Well

## 2024-04-25 NOTE — PROGRESS NOTES
[facility] Dosing Service      Initial Pharmacokinetic Consult for Vancomycin Dosing     Nina Donnelly is a 78 year old female who is being initiated on vancomycin therapy for fever of unknown origin.  Pharmacy has been asked to dose vancomycin by Jose Luis Whitfield.  The initial treatment and monitoring approach will be non-AUC strategy.        Weight and Temperature:    Wt Readings from Last 1 Encounters:   24 90.3 kg (199 lb 1.2 oz)        Temp Readings from Last 1 Encounters:   24 98.2 °F (36.8 °C) (Axillary)      Labs:   Recent Labs   Lab 24  0534 24  0655 24  0630   CREATSERUM 1.68* 1.52* 1.51*      Estimated Creatinine Clearance: 23.9 mL/min (A) (based on SCr of 1.51 mg/dL (H)).     Recent Labs   Lab 24  0534 24  0655 24  0630   WBC 6.5 6.2 7.5          The Pharmacokinetic Target is:    Trough/random 10-15 mg/L    Renal Dosing Considerations:    BIBIANA/ARF     Assessment/Plan:   Initial/Loading dose: Vancomycin 1250 mg      Maintenance dose: Pharmacy will dose vancomycin 1250 mg every 48 hours    Monitorin) Plan for vancomycin trough to be obtained before the 3rd dose    2) Pharmacy will order SCr as clinically indicated to assess renal function.    3) Pharmacy will monitor for toxicity and efficacy, adjust vancomycin dose and/or frequency, and order vancomycin levels as appropriate per the Pharmacy and Therapeutics Committee approved protocol until discontinuation of the medication.       We appreciate the opportunity to assist in the care of this patient.     Tyra Chinchilla TOYA  2024  11:52 AM  [facility] Extension: [phone]

## 2024-04-25 NOTE — PLAN OF CARE
Assumed pt care at 0730. A&O x 4, lethargic. Continuous BIPAP. Pt reports headache, pain medications given. Increased BP, NSR on tele. Up with x1 assist and walker.     Plan of care: IV lasix BID, IV abx, continuous BIPAP.    Plan of care updated with patient and daughter. Questions answered, pt verbalized understanding. Call light is within reach. All needs met at this time.    1415: Pt reports itching after starting IV vancomycin. Infusion stopped, MD aware.    Problem: Diabetes/Glucose Control  Goal: Glucose maintained within prescribed range  Description: INTERVENTIONS:  - Monitor Blood Glucose as ordered  - Assess for signs and symptoms of hyperglycemia and hypoglycemia  - Administer ordered medications to maintain glucose within target range  - Assess barriers to adequate nutritional intake and initiate nutrition consult as needed  - Instruct patient on self management of diabetes  Outcome: Progressing     Problem: Patient/Family Goals  Goal: Patient/Family Long Term Goal  Description: Patient's Long Term Goal: to go home    Interventions:  - comply to care plan  - Mds to see  - See additional Care Plan goals for specific interventions  Outcome: Progressing  Goal: Patient/Family Short Term Goal  Description: Patient's Short Term Goal: to feel better    Interventions:   - IV lasix  - IV atbx  - comply to care plan  - Mds to see  - See additional Care Plan goals for specific interventions  Outcome: Progressing     Problem: CARDIOVASCULAR - ADULT  Goal: Maintains optimal cardiac output and hemodynamic stability  Description: INTERVENTIONS:  - Monitor vital signs, rhythm, and trends  - Monitor for bleeding, hypotension and signs of decreased cardiac output  - Evaluate effectiveness of vasoactive medications to optimize hemodynamic stability  - Monitor arterial and/or venous puncture sites for bleeding and/or hematoma  - Assess quality of pulses, skin color and temperature  - Assess for signs of decreased coronary  artery perfusion - ex. Angina  - Evaluate fluid balance, assess for edema, trend weights  Outcome: Progressing     Problem: RESPIRATORY - ADULT  Goal: Achieves optimal ventilation and oxygenation  Description: INTERVENTIONS:  - Assess for changes in respiratory status  - Assess for changes in mentation and behavior  - Position to facilitate oxygenation and minimize respiratory effort  - Oxygen supplementation based on oxygen saturation or ABGs  - Provide Smoking Cessation handout, if applicable  - Encourage broncho-pulmonary hygiene including cough, deep breathe, Incentive Spirometry  - Assess the need for suctioning and perform as needed  - Assess and instruct to report SOB or any respiratory difficulty  - Respiratory Therapy support as indicated  - Manage/alleviate anxiety  - Monitor for signs/symptoms of CO2 retention  Outcome: Progressing     Problem: METABOLIC/FLUID AND ELECTROLYTES - ADULT  Goal: Glucose maintained within prescribed range  Description: INTERVENTIONS:  - Monitor Blood Glucose as ordered  - Assess for signs and symptoms of hyperglycemia and hypoglycemia  - Administer ordered medications to maintain glucose within target range  - Assess barriers to adequate nutritional intake and initiate nutrition consult as needed  - Instruct patient on self management of diabetes  Outcome: Progressing  Goal: Electrolytes maintained within normal limits  Description: INTERVENTIONS:  - Monitor labs and rhythm and assess patient for signs and symptoms of electrolyte imbalances  - Administer electrolyte replacement as ordered  - Monitor response to electrolyte replacements, including rhythm and repeat lab results as appropriate  - Fluid restriction as ordered  - Instruct patient on fluid and nutrition restrictions as appropriate  Outcome: Progressing  Goal: Hemodynamic stability and optimal renal function maintained  Description: INTERVENTIONS:  - Monitor labs and assess for signs and symptoms of volume excess or  deficit  - Monitor intake, output and patient weight  - Monitor urine specific gravity, serum osmolarity and serum sodium as indicated or ordered  - Monitor response to interventions for patient's volume status, including labs, urine output, blood pressure (other measures as available)  - Encourage oral intake as appropriate  - Instruct patient on fluid and nutrition restrictions as appropriate  Outcome: Progressing

## 2024-04-25 NOTE — PROGRESS NOTES
Nina Donnelly Patient Status:  Inpatient    10/9/1945 MRN [id number]   [facility] 2NE-A Attending Jamar Arora MD   Hosp Day # 2 PCP Chiki Caldwell MD      Reason for Consultation:  CHF        History of Present Illness:  Nina Donnelly is a a(n) 78 year old female. She has COPD, WYATT, Diabetes, Dyslipidemia .  Uses CPAP and O2 at 2 l/min at home.  Still able to do some walking.  Reports 2 weeks of dry cough, worsened dyspnea.  Daughter notes worse hypoxia (O2 sats down to 70's instead of high 80's ).  LE edema was reported as well. Feels weak and tired.  No fever. Chills.  Previous bouts of pneumonia.     Subjective:   -On bipap  -Had mental status changes this AM, CO2 was elevated  -CXR felt to reflect worsening PNA, continued pulm edema  -BP remains elevated, up to 180s this AM           Medications:  Scheduled Medications    pregabalin  600 mg Oral Nightly    insulin degludec  20 Units Subcutaneous Nightly    insulin aspart  6 Units Subcutaneous BID AC    insulin aspart  8 Units Subcutaneous Daily with dinner    aspirin  81 mg Oral Daily    atorvastatin  10 mg Oral Nightly    donepezil  10 mg Oral Nightly    DULoxetine  60 mg Oral Daily    fluticasone propionate  1 spray Each Nare Daily    montelukast  10 mg Oral Nightly    cefTRIAXone  1 g Intravenous Q24H    enoxaparin  30 mg Subcutaneous Daily    insulin aspart  2-10 Units Subcutaneous TID AC and HS    azithromycin  500 mg Oral Daily    furosemide  40 mg Intravenous Daily            Continuous Infusions:  Medication Infusions            Social History:   reports that she quit smoking about 9 years ago. Her smoking use included cigarettes. She started smoking about 64 years ago. She has a 55 pack-year smoking history. She has never used smokeless tobacco. She reports that she does not drink alcohol and does not use drugs.     Review of Systems:  All systems were reviewed and are negative except as described above in HPI.    S/w  patient to discuss Lab  results per Mariluz Weldon NP.      Patient stated she IS still taking her K supplement and denied any GI bleeding.      Added iron studies to existing lab specimen.      Pt stated that her pain provider told her that her blood sugar are high.      Expl'd that she is on a small dose of steroid and that could cause that.  If patient has concerns she can contact her PCP to discuss further.      Pt verbalized understanding.       Physical Exam:            Wt Readings from Last 3 Encounters:   04/23/24 203 lb 4.2 oz (92.2 kg)   03/30/22 199 lb (90.3 kg)   03/21/22 193 lb (87.5 kg)         Vitals          Vitals:     04/22/24 2340 04/22/24 2345 04/22/24 2350 04/23/24 0505   BP:   (!) 169/61 (!) 173/57     BP Location:   Left arm Right arm     Pulse: 67 72 70     Resp:   22   24   Temp:   97.9 °F (36.6 °C)   98.2 °F (36.8 °C)   TempSrc:   Oral   Oral   SpO2: 95% 96% 94%     Weight:       203 lb 4.2 oz (92.2 kg)   Height:                   Temp:  [97.9 °F (36.6 °C)-98.7 °F (37.1 °C)] 98.2 °F (36.8 °C)  Pulse:  [53-82] 70  Resp:  [16-24] 24  BP: (148-173)/(49-61) 173/57  SpO2:  [85 %-98 %] 94 %     Temp: 98.2 °F (36.8 °C)  Pulse: 70  Resp: 24  BP: 173/57       General:  Appears comfortable  HEENT: No focal deficits.  Neck: No JVD, carotids 2+ no bruits.  Cardiac: Regular S1S2.  No S3, S4, rub, click.  No murmur.  Lungs: diminished   Abdomen: Soft, non-tender.   Extremities: +1 LLE edema.  No clubbing or cyanosis  Neurologic: Alert and oriented, normal affect.  Skin: Warm and dry.      Labs:        HEM:        Recent Labs   Lab 04/21/24 2033 04/22/24  0514 04/23/24  0534   WBC 8.8 8.6 6.5   HGB 9.9* 10.0* 9.9*   HCT 34.3* 35.0 35.8   .0 202.0 197.0         Chem:        Recent Labs   Lab 04/21/24 2033 04/22/24  0514 04/23/24  0534    144 141   K 4.1 3.9 4.5    110 105   CO2 28.0 31.0 33.0*   BUN 26* 22 26*   CREATSERUM 1.68* 1.55* 1.68*   MG  --   --  2.0   ALT 15  --   --    AST 18  --   --    ALB 3.2*  --   --              Recent Labs   Lab 04/21/24 2033   INR 1.08         No results found for: \"TROP\", \"CKMB\"        Invalid input(s): \"PBNPML\"        Telemetry: SR     Laboratories and Data:  Diagnostics:     EKG, 4/21/2024:  NSR, NSSTTW changes     CXR, 4/22/2024:       CONCLUSION:  Bilateral lower lobe predominant interstitial opacities and suspected small left pleural effusion and cardiomegaly.  Findings may be related to  edema, though infectious/inflammatory process is not excluded.       Echo, 10/2023:         Summary:     1. Left ventricle: The cavity size is normal. There is mild concentric      hypertrophy. Systolic function is normal. The estimated ejection fraction      is 55-60%. Grade I diastolic dysfunction.   2. Aortic valve: Trileaflet. The leaflets are mildly thickened and mildly      calcified. The peak systolic velocity is 1.5m/sec. The mean systolic      gradient is 4mm Hg. The peak systolic gradient is 9mm Hg. The valve area      is 2.2cm^2. The valve area index is 1.15cm^2/m^2.   3. Mitral valve: The annulus is calcified, fibrotic, and thickened. The      leaflets are mildly thickened and mildly calcified. There is mild      regurgitation.   4. Left atrium: The atrium is mildly to moderately dilated.   5. Right ventricle: The cavity size is normal. Wall thickness is normal.      Systolic function is normal. Estimation of the right ventricular systolic      pressure is within the normal range. The RV pressure during systole is      31mm Hg.   6. Tricuspid valve: There is mild regurgitation.   7. Pericardium, extracardiac: A probable, trace pericardial effusion is      identified. There is no evidence of hemodynamic compromise.         Impression:  1.  HFpEF       2. COPD/PNA     3. WYATT     4. Diabetes     5. Dyslipidemia      6. CKD, Cr = 1.4-1.6     7. Microcytic anemia, chronic     8. Lower extremity pain (popliteal) - no DVT.         Recommend:  1. Telemetry  2. Echo - Ef 70-75%, mild LVH, mild valvulopathy.  3. Increase IV diuretics to 40 IV BID.  4. Treatment of COPD/PNA per IM/pulm; escalate antibiotics and suspicion for possible sepsis.  5. Increase PO hydralizine to 75mg TID.  PRN IV hydralazine for BP over 160               - BP meds limited by renal function /  COPD and LE edema.  7. Consider outpatient ABIs for leg pains.

## 2024-04-25 NOTE — PLAN OF CARE
Assumed care of pt @2330. Pt aoxo4. C/O headache-ice pack and pain meds given see mar. Denies chest pain. Sinus rhythm on tele. Lungs clear/diminished. Pt SOBOE, occ non productive cough. -750. Pt desats with activity. Pt refusing CPAP tonight. O2 increased to 5lnc as pt desatting with sleep. O2 sats now 88-92%. Abodmen soft, bs +4. Pt voiding without difficulty. Up with assistance for safety.  Plan  -continue to monitor pt on tele  -IV antibiotics  -IV lasix daily  -assess and medicate for pain prn  -daily weights  -labs in am  -fall precautions  -wean O2 as able  -updated pt on poc for the night and am. Instructed pt to call for any pain or needs. Pt verbalized understanding. Denies needs at this time. Call light within reach. Bed alarm on. Will continue to monitor pt.    Problem: Diabetes/Glucose Control  Goal: Glucose maintained within prescribed range  Description: INTERVENTIONS:  - Monitor Blood Glucose as ordered  - Assess for signs and symptoms of hyperglycemia and hypoglycemia  - Administer ordered medications to maintain glucose within target range  - Assess barriers to adequate nutritional intake and initiate nutrition consult as needed  - Instruct patient on self management of diabetes  Outcome: Progressing     Problem: CARDIOVASCULAR - ADULT  Goal: Maintains optimal cardiac output and hemodynamic stability  Description: INTERVENTIONS:  - Monitor vital signs, rhythm, and trends  - Monitor for bleeding, hypotension and signs of decreased cardiac output  - Evaluate effectiveness of vasoactive medications to optimize hemodynamic stability  - Monitor arterial and/or venous puncture sites for bleeding and/or hematoma  - Assess quality of pulses, skin color and temperature  - Assess for signs of decreased coronary artery perfusion - ex. Angina  - Evaluate fluid balance, assess for edema, trend weights  Outcome: Progressing     Problem: RESPIRATORY - ADULT  Goal: Achieves optimal ventilation and  oxygenation  Description: INTERVENTIONS:  - Assess for changes in respiratory status  - Assess for changes in mentation and behavior  - Position to facilitate oxygenation and minimize respiratory effort  - Oxygen supplementation based on oxygen saturation or ABGs  - Provide Smoking Cessation handout, if applicable  - Encourage broncho-pulmonary hygiene including cough, deep breathe, Incentive Spirometry  - Assess the need for suctioning and perform as needed  - Assess and instruct to report SOB or any respiratory difficulty  - Respiratory Therapy support as indicated  - Manage/alleviate anxiety  - Monitor for signs/symptoms of CO2 retention  Outcome: Progressing     Problem: METABOLIC/FLUID AND ELECTROLYTES - ADULT  Goal: Glucose maintained within prescribed range  Description: INTERVENTIONS:  - Monitor Blood Glucose as ordered  - Assess for signs and symptoms of hyperglycemia and hypoglycemia  - Administer ordered medications to maintain glucose within target range  - Assess barriers to adequate nutritional intake and initiate nutrition consult as needed  - Instruct patient on self management of diabetes  Outcome: Progressing  Goal: Electrolytes maintained within normal limits  Description: INTERVENTIONS:  - Monitor labs and rhythm and assess patient for signs and symptoms of electrolyte imbalances  - Administer electrolyte replacement as ordered  - Monitor response to electrolyte replacements, including rhythm and repeat lab results as appropriate  - Fluid restriction as ordered  - Instruct patient on fluid and nutrition restrictions as appropriate  Outcome: Progressing  Goal: Hemodynamic stability and optimal renal function maintained  Description: INTERVENTIONS:  - Monitor labs and assess for signs and symptoms of volume excess or deficit  - Monitor intake, output and patient weight  - Monitor urine specific gravity, serum osmolarity and serum sodium as indicated or ordered  - Monitor response to interventions  for patient's volume status, including labs, urine output, blood pressure (other measures as available)  - Encourage oral intake as appropriate  - Instruct patient on fluid and nutrition restrictions as appropriate  Outcome: Progressing

## 2024-04-26 LAB
ANION GAP SERPL CALC-SCNC: 1 MMOL/L (ref 0–18)
BASOPHILS # BLD AUTO: 0.03 X10(3) UL (ref 0–0.2)
BASOPHILS NFR BLD AUTO: 0.4 %
BUN BLD-MCNC: 33 MG/DL (ref 9–23)
CALCIUM BLD-MCNC: 8.8 MG/DL (ref 8.5–10.1)
CHLORIDE SERPL-SCNC: 102 MMOL/L (ref 98–112)
CO2 SERPL-SCNC: 36 MMOL/L (ref 21–32)
CREAT BLD-MCNC: 1.74 MG/DL
CREAT BLD-MCNC: 1.74 MG/DL
EGFRCR SERPLBLD CKD-EPI 2021: 30 ML/MIN/1.73M2 (ref 60–?)
EGFRCR SERPLBLD CKD-EPI 2021: 30 ML/MIN/1.73M2 (ref 60–?)
EOSINOPHIL # BLD AUTO: 0.28 X10(3) UL (ref 0–0.7)
EOSINOPHIL NFR BLD AUTO: 4 %
ERYTHROCYTE [DISTWIDTH] IN BLOOD BY AUTOMATED COUNT: 20.6 %
GLUCOSE BLD-MCNC: 121 MG/DL (ref 70–99)
GLUCOSE BLD-MCNC: 140 MG/DL (ref 70–99)
GLUCOSE BLD-MCNC: 173 MG/DL (ref 70–99)
GLUCOSE BLD-MCNC: 67 MG/DL (ref 70–99)
GLUCOSE BLD-MCNC: 96 MG/DL (ref 70–99)
GLUCOSE BLD-MCNC: 99 MG/DL (ref 70–99)
HCT VFR BLD AUTO: 36.2 %
HGB BLD-MCNC: 10.6 G/DL
IMM GRANULOCYTES # BLD AUTO: 0.02 X10(3) UL (ref 0–1)
IMM GRANULOCYTES NFR BLD: 0.3 %
LYMPHOCYTES # BLD AUTO: 0.87 X10(3) UL (ref 1–4)
LYMPHOCYTES NFR BLD AUTO: 12.5 %
MCH RBC QN AUTO: 17 PG (ref 26–34)
MCHC RBC AUTO-ENTMCNC: 29.3 G/DL (ref 31–37)
MCV RBC AUTO: 58 FL
MONOCYTES # BLD AUTO: 0.58 X10(3) UL (ref 0.1–1)
MONOCYTES NFR BLD AUTO: 8.3 %
NEUTROPHILS # BLD AUTO: 5.19 X10 (3) UL (ref 1.5–7.7)
NEUTROPHILS # BLD AUTO: 5.19 X10(3) UL (ref 1.5–7.7)
NEUTROPHILS NFR BLD AUTO: 74.5 %
OSMOLALITY SERPL CALC.SUM OF ELEC: 298 MOSM/KG (ref 275–295)
PLATELET # BLD AUTO: 202 10(3)UL (ref 150–450)
PLATELETS.RETICULATED NFR BLD AUTO: 2.6 % (ref 0–7)
POTASSIUM SERPL-SCNC: 4.4 MMOL/L (ref 3.5–5.1)
RBC # BLD AUTO: 6.24 X10(6)UL
SODIUM SERPL-SCNC: 139 MMOL/L (ref 136–145)
WBC # BLD AUTO: 7 X10(3) UL (ref 4–11)

## 2024-04-26 PROCEDURE — 82962 GLUCOSE BLOOD TEST: CPT

## 2024-04-26 PROCEDURE — 97530 THERAPEUTIC ACTIVITIES: CPT

## 2024-04-26 PROCEDURE — 94799 UNLISTED PULMONARY SVC/PX: CPT

## 2024-04-26 PROCEDURE — 85025 COMPLETE CBC W/AUTO DIFF WBC: CPT | Performed by: STUDENT IN AN ORGANIZED HEALTH CARE EDUCATION/TRAINING PROGRAM

## 2024-04-26 PROCEDURE — 82565 ASSAY OF CREATININE: CPT | Performed by: INTERNAL MEDICINE

## 2024-04-26 PROCEDURE — 97116 GAIT TRAINING THERAPY: CPT

## 2024-04-26 PROCEDURE — 80048 BASIC METABOLIC PNL TOTAL CA: CPT | Performed by: STUDENT IN AN ORGANIZED HEALTH CARE EDUCATION/TRAINING PROGRAM

## 2024-04-26 RX ORDER — FUROSEMIDE 10 MG/ML
20 INJECTION INTRAMUSCULAR; INTRAVENOUS
Status: DISCONTINUED | OUTPATIENT
Start: 2024-04-26 | End: 2024-04-27

## 2024-04-26 NOTE — CM/SW NOTE
Sw met with pt  and  son to discuss dc planning.  Discussed need for HH at dc and son agrees.  Referral for HH in [place].  Son would like  to  use [facility] if they are in-network.  Son states pt has Home O2 concentrator and has portable concentrator( through [facility]) but had only been using O2 with her cpap  at night.    Sw to follow up with pt and family re: accepting HHC once responses received.    Shae Cuevas, JILLIANW  /Discharge Planner

## 2024-04-26 NOTE — PLAN OF CARE
Pt alert and oriented x4.  Up sba with assistive devices .  On bipap at night and 3l of o2 during day.  nsr on tele.  continent of bowel and  bladder.  No complaints of pain, sob, or chest pain/ discomfort.  Plan of care discussed with pt and family..  Falls precautions in place.  Call light within reach.    Problem: Diabetes/Glucose Control  Goal: Glucose maintained within prescribed range  Description: INTERVENTIONS:  - Monitor Blood Glucose as ordered  - Assess for signs and symptoms of hyperglycemia and hypoglycemia  - Administer ordered medications to maintain glucose within target range  - Assess barriers to adequate nutritional intake and initiate nutrition consult as needed  - Instruct patient on self management of diabetes  Outcome: Progressing     Problem: Patient/Family Goals  Goal: Patient/Family Long Term Goal  Description: Patient's Long Term Goal: to go home    Interventions:  - comply to care plan  - Mds to see  - See additional Care Plan goals for specific interventions  Outcome: Progressing  Goal: Patient/Family Short Term Goal  Description: Patient's Short Term Goal: to feel better    Interventions:   - IV lasix  - IV atbx  - comply to care plan  - Mds to see  - See additional Care Plan goals for specific interventions  Outcome: Progressing     Problem: CARDIOVASCULAR - ADULT  Goal: Maintains optimal cardiac output and hemodynamic stability  Description: INTERVENTIONS:  - Monitor vital signs, rhythm, and trends  - Monitor for bleeding, hypotension and signs of decreased cardiac output  - Evaluate effectiveness of vasoactive medications to optimize hemodynamic stability  - Monitor arterial and/or venous puncture sites for bleeding and/or hematoma  - Assess quality of pulses, skin color and temperature  - Assess for signs of decreased coronary artery perfusion - ex. Angina  - Evaluate fluid balance, assess for edema, trend weights  Outcome: Progressing     Problem: RESPIRATORY - ADULT  Goal:  Achieves optimal ventilation and oxygenation  Description: INTERVENTIONS:  - Assess for changes in respiratory status  - Assess for changes in mentation and behavior  - Position to facilitate oxygenation and minimize respiratory effort  - Oxygen supplementation based on oxygen saturation or ABGs  - Provide Smoking Cessation handout, if applicable  - Encourage broncho-pulmonary hygiene including cough, deep breathe, Incentive Spirometry  - Assess the need for suctioning and perform as needed  - Assess and instruct to report SOB or any respiratory difficulty  - Respiratory Therapy support as indicated  - Manage/alleviate anxiety  - Monitor for signs/symptoms of CO2 retention  Outcome: Progressing     Problem: METABOLIC/FLUID AND ELECTROLYTES - ADULT  Goal: Glucose maintained within prescribed range  Description: INTERVENTIONS:  - Monitor Blood Glucose as ordered  - Assess for signs and symptoms of hyperglycemia and hypoglycemia  - Administer ordered medications to maintain glucose within target range  - Assess barriers to adequate nutritional intake and initiate nutrition consult as needed  - Instruct patient on self management of diabetes  Outcome: Progressing  Goal: Electrolytes maintained within normal limits  Description: INTERVENTIONS:  - Monitor Blood Glucose as ordered  - Assess for signs and symptoms of hyperglycemia and hypoglycemia  - Administer ordered medications to maintain glucose within target range  - Assess barriers to adequate nutritional intake and initiate nutrition consult as needed  - Instruct patient on self management of diabetes  Outcome: Progressing  Goal: Hemodynamic stability and optimal renal function maintained  Description: INTERVENTIONS:  - Monitor labs and rhythm and assess patient for signs and symptoms of electrolyte imbalances  - Administer electrolyte replacement as ordered  - Monitor response to electrolyte replacements, including rhythm and repeat lab results as appropriate  -  Fluid restriction as ordered  - Instruct patient on fluid and nutrition restrictions as appropriate  Outcome: Progressing

## 2024-04-26 NOTE — PHYSICAL THERAPY NOTE
PHYSICAL THERAPY TREATMENT NOTE - INPATIENT    Room Number: 2626/2626-A     Session: 1     Number of Visits to Meet Established Goals: 5    Presenting Problem: PNA  Co-Morbidities : DM2, GERD, HLD, HTN, MDD, CKD3, neuropathy, WYATT and COPD and dementia    Reason for Therapy: Mobility Dysfunction and Discharge Planning     ASSESSMENT   Patient demonstrates fair progress this session, goals  remain in progress.    Patient continues to function below baseline with transfers, gait, stair negotiation, and standing prolonged periods.  Contributing factors to remaining limitations include decreased functional strength, decreased endurance/aerobic capacity, impaired standing balance, decreased muscular endurance, and increased O2 needs from baseline.  Next session anticipate patient to progress transfers, gait, stair negotiation, and standing prolonged periods.  Physical Therapy will continue to follow patient for duration of hospitalization.    Patient continues to benefit from continued skilled PT services: at discharge to promote functional independence and safety with additional support and return home with home health PT.    PLAN  PT Treatment Plan: Bed mobility;Endurance;Energy conservation;Patient education;Gait training;Family education;Strengthening;Stair training;Transfer training;Balance training  Rehab Potential : Good  Frequency (Obs): 3-5x/week    CURRENT GOALS     Goal #1 Patient is able to participate in emery balance assessment       Goal #2 Patient is able to demonstrate transfers EOB to/from Chair/Wheelchair at assistance level: modified independent      Goal #3 Patient is able to ambulate 150 feet with assist device: walker - rolling at assistance level: modified independent      Goal #4     Goal #5     Goal #6     Goal Comments: Goals established on 4/23/2024 4/26/2024 all goals ongoing    SUBJECTIVE  \"Why do I have to get up?\"    OBJECTIVE  Precautions: Bed/chair alarm;Low vision    WEIGHT BEARING  RESTRICTION  Weight Bearing Restriction: None                PAIN ASSESSMENT   Ratin  Location: patient denies       BALANCE                                                                                                                       Static Sitting: Good  Dynamic Sitting: Good           Static Standing: Good  Dynamic Standing: Fair +    ACTIVITY TOLERANCE                         O2 WALK  Oxygen Therapy  SPO2% on Oxygen at Rest: 94  At rest oxygen flow (liters per minute): 6  Ambulation oxygen flow (liters per minute): 6      AM-PAC '6-Clicks' INPATIENT SHORT FORM - BASIC MOBILITY  How much difficulty does the patient currently have...  Patient Difficulty: Turning over in bed (including adjusting bedclothes, sheets and blankets)?: None   Patient Difficulty: Sitting down on and standing up from a chair with arms (e.g., wheelchair, bedside commode, etc.): A Little   Patient Difficulty: Moving from lying on back to sitting on the side of the bed?: None   How much help from another person does the patient currently need...   Help from Another: Moving to and from a bed to a chair (including a wheelchair)?: A Little   Help from Another: Need to walk in hospital room?: A Little   Help from Another: Climbing 3-5 steps with a railing?: A Lot       AM-PAC Score:  Raw Score: 19   Approx Degree of Impairment: 41.77%   Standardized Score (AM-PAC Scale): 45.44   CMS Modifier (G-Code): CK    FUNCTIONAL ABILITY STATUS  Gait Assessment   Functional Mobility/Gait Assessment  Gait Assistance: Contact guard assist  Distance (ft): 200  Assistive Device: Rolling walker  Pattern: Shuffle    Skilled Therapy Provided    Bed Mobility:  Rolling: independent   Supine<>Sit: supervision   Sit<>Supine: NT     Transfer Mobility:  Sit<>Stand: CGA, RW   Stand<>Sit: CGA, RW   Gait: x200 ft., RW supervision/CGA - 6L O2 via NC    Therapist's Comments: Patient presents to PT semi-supine in bed, currently on 6L O2 via high-flow NC and  daughter providing encouragement for patient to work with therapy. Patient progressed well with PT performing supine > sit with supervision, CGA for sit <> stand transfers and ambulation using a RW within the halls. Patient tolerated 6L O2 via NC during activity, SpO2 94% upon return to her room and seated in the bedside chair. Encouraged patient to sit in the chair for all meals and to participate in a walking program 3x/day using her RW and nursing staff assistance - patient and daughter verbalized understanding. Patient left sitting up in the chair with BLE's propped, on 6L O2 via high-flow NC, daughter remaining supportively and all needs placed within reach. RN updated.    Patient End of Session: Up in chair;Needs met;Call light within reach;RN aware of session/findings;All patient questions and concerns addressed;Family present    PT Session Time: 25 minutes  Gait Trainin minutes  Therapeutic Activity: 13 minutes  Therapeutic Exercise: 0 minutes   Neuromuscular Re-education: 0 minutes

## 2024-04-26 NOTE — PROGRESS NOTES
[facility] AND [facility]   part of [facility]     Progress Note  Nina Donnelly Patient Status:  Inpatient    10/9/1945 MRN [id number]   Location [facility] 2NE-A Attending Татьяна Schneider, DO   Hosp Day # 5 PCP Chiki Caldwell MD       SEE ATTENDING NOTE AT BOTTOM OF PAGE    Is this a shared or split note between Advanced Practice Provider and Physician? Yes    Assessment and Plan:    78 year old female with a PMHx sig for DM2, GERD, HLD, HTN, MDD, CKD3, neuropathy, WYATT and COPD and dementia presents to the hospital with shortness of breath and bilateral ankle swelling.      Acute on chronic respiratory failure with hypoxia  Respiratory acidosis  ?Sepsis  -she began to desat overnight and had increasing 02 requirements, up to 8L.  She became confused per the daughter  -pulmonary on consult, she has been refusing to wear her cpap mask in the hospital 2/2 discomfort  -abg w resp acid, bipap applied, repeat abg @ 1 pm improved  -currently on 3L, she did wear the BiPap overnight   -dimer normal, cxr reviewed  -UA clean  -IV zosyn  -blood cxs in process     Chronic respiratory failure w hypoxia  COPD  CAP  Acute on chronic CHF  -short of breath x 2 weeks w hypoxia, uses 02 at home PRN  -CXR, bilateral lower lobe opacities, edema vs PNA  -no leukocytosis, afebrile  -3 panel PCR negative  -PCT neg x 2  -abx escalated to IV vanco, patient couldn't tolerate (became itchy w red skin) IV zosyn started  -s/p po zithromax  -on 3 L 02, uses 2L at home PRN  -pulmonary consulted >> RVP neg  -  repeat CXR reviewed     HFpEF  -proBNP elevated 695  -cardiology consulted, escalate IV lasix to BID given hypoxia and symptomatic dyspnea     HTN  -po hydralazine started, still receiving IV PRN hydralazine as well  -IV lasix escalated to BID     BIBIANA on CKD3- bumped up today  -cr 1.74, IV lasix escalated to BID yesterday, follow  -po lasix at home     OAS  Chronic RF w hypoxia  -cpap  -singulair  -02 walk test  ordered     DM2  -hold orals  -ADA diet  -accucheck ac/hs  -ssi, cont home regime as able  -insulin adjusted     MDD  -cymbalta     Neurapathy  LLE pain and swelling  -lyrica  -venous doppler neg for DVT in LLE  -outpatient arterial study     Dementia  -donepezil     GOC: Full code     MA/ACO Reach  -Re- Entry: no  -Consults:cards  -Discharge Needs: TBD  -Appointments: PCP       FEN  -lytes in am  -diet-cc     Prophy  -SCD  -lovenox     Dispo  -pending clinical course    PCP: Chiki Caldwell MD    Concerns regarding plan of care were discussed with patient. Patient agrees with plan as detailed above. Discussed plan of care with Dr. Schneider    Note: This chart was prepared using voice recognition software and may contain unintended word substitution errors.          Lakhwinder CARPIO  [facility] Hospitalist Team  Contact via [employer] and B[employer] (Check Availability)  4/26/2024             SUBJECTIVE:   In bed, on 02.  More awake today.  Answers questions appropriately.  Daughter at bedside.  Denies cp, n/v,f/c               OBJECTIVE:   Blood pressure 136/35, pulse 67, temperature 98.9 °F (37.2 °C), resp. rate 16, height 5' 0.98\" (1.549 m), weight 199 lb 1.2 oz (90.3 kg), SpO2 94%.    GENERAL: no apparent distress  NEURO: A/A Ox2-3, able to be reorientated  RESP: non labored, diminished bilaterally  CARDIO: Regular, no murmur  ABD: soft, NT, ND, BS+  EXTREMITIES: no edema, no calf tenderness    DIAGNOSTIC DATA:   Labs:     Recent Labs   Lab 04/21/24 2033 04/22/24  0514 04/23/24  0534 04/24/24  0655 04/25/24  0630 04/26/24  0659   WBC 8.8 8.6 6.5 6.2 7.5 7.0   HGB 9.9* 10.0* 9.9* 9.7* 11.0* 10.6*   MCV 58.5* 59.3* 60.3* 59.7* 57.7* 58.0*   .0 202.0 197.0 202.0 215.0 202.0   INR 1.08  --   --   --   --   --        Recent Labs   Lab 04/22/24  0514 04/23/24  0534 04/24/24  0655 04/25/24  0630 04/26/24  0700    141 139 136 139   K 3.9 4.5 4.5 4.9 4.4    105 105 102 102   CO2 31.0  33.0* 32.0 32.0 36.0*   BUN 22 26* 25* 30* 33*   CREATSERUM 1.55* 1.68* 1.52* 1.51* 1.74*  1.74*   CA 8.7 9.0 8.7 9.2 8.8   MG  --  2.0 2.3 2.4  --    GLU 61* 111* 105* 150* 140*       Recent Labs   Lab 04/21/24 2033   ALT 15   AST 18   ALB 3.2*       Recent Labs   Lab 04/25/24  0900 04/25/24  1219 04/25/24  1640 04/25/24  2132 04/26/24  0553   PGLU 141* 118* 79 166* 121*       No results for input(s): \"TROP\" in the last 168 hours.      MEDICATIONS      Current Facility-Administered Medications   Medication Dose Route Frequency    insulin degludec 100 units/mL flextouch 26 Units  26 Units Subcutaneous Nightly    furosemide (Lasix) 10 mg/mL injection 40 mg  40 mg Intravenous BID (Diuretic)    hydrALAZINE (Apresoline) tab 75 mg  75 mg Oral Q8H CHICO    piperacillin-tazobactam (Zosyn) 3.375 g in dextrose 5% 100 mL IVPB-ADDV  3.375 g Intravenous Q8H    diphenhydrAMINE-zinc (Benadryl-Zinc) 2-0.1 % cream 1 Application  1 Application Topical TID PRN    hydrALAzine (Apresoline) 20 mg/mL injection 10 mg  10 mg Intravenous Q4H PRN    acetaminophen (Tylenol Extra Strength) tab 1,000 mg  1,000 mg Oral Q6H PRN    insulin aspart (NovoLOG) 100 Units/mL FlexPen 7 Units  7 Units Subcutaneous Daily with dinner    [Held by provider] pregabalin (Lyrica) cap 600 mg  600 mg Oral Nightly    insulin aspart (NovoLOG) 100 Units/mL FlexPen 6 Units  6 Units Subcutaneous BID AC    trolamine salicyliate 10 % cream   Topical TID PRN    aspirin chewable tab 81 mg  81 mg Oral Daily    atorvastatin (Lipitor) tab 10 mg  10 mg Oral Nightly    donepezil (Aricept) tab 10 mg  10 mg Oral Nightly    DULoxetine (Cymbalta) DR cap 60 mg  60 mg Oral Daily    fluticasone propionate (Flonase) 50 MCG/ACT nasal suspension 1 spray  1 spray Each Nare Daily    montelukast (Singulair) tab 10 mg  10 mg Oral Nightly    traMADol (Ultram) tab 50 mg  50 mg Oral BID PRN    glucose (Dex4) 15 GM/59ML oral liquid 15 g  15 g Oral Q15 Min PRN    Or    glucose (Glutose) 40% oral  gel 15 g  15 g Oral Q15 Min PRN    Or    glucose-vitamin C (Dex-4) chewable tab 4 tablet  4 tablet Oral Q15 Min PRN    Or    dextrose 50% injection 50 mL  50 mL Intravenous Q15 Min PRN    Or    glucose (Dex4) 15 GM/59ML oral liquid 30 g  30 g Oral Q15 Min PRN    Or    glucose (Glutose) 40% oral gel 30 g  30 g Oral Q15 Min PRN    Or    glucose-vitamin C (Dex-4) chewable tab 8 tablet  8 tablet Oral Q15 Min PRN    enoxaparin (Lovenox) 30 MG/0.3ML SUBQ injection 30 mg  30 mg Subcutaneous Daily    insulin aspart (NovoLOG) 100 Units/mL FlexPen 2-10 Units  2-10 Units Subcutaneous TID AC and HS                  IMAGING     XR CHEST AP PORTABLE  (CPT=71045)    Result Date: 4/25/2024  CONCLUSION:  1. Stable cardiomegaly and pulmonary vascular congestion. 2. Interval increase in bibasilar opacities.  Correlation for pneumonitis/pneumonia recommended.  Cardiogenic pulmonary edema is considered less likely, but should be correlated clinically.    LOCATION:  [facility]      Dictated by (CST): Davi Remy MD on 4/25/2024 at 9:47 AM     Finalized by (CST): Davi Remy MD on 4/25/2024 at 9:49 AM       XR CHEST AP PORTABLE  (CPT=71045)    Result Date: 4/24/2024  CONCLUSION:  1. Increasing opacities at the lung bases.  These may reflect basilar pneumonitis/pneumonia.  Given pulmonary vascular congestion and cardiomegaly, cardiogenic pulmonary edema is also considered. 2. Details as above.  Continued clinical correlation and follow-up recommended.    LOCATION:  [facility]      Dictated by (CST): Davi Remy MD on 4/24/2024 at 7:33 AM     Finalized by (CST): Davi Remy MD on 4/24/2024 at 7:35 AM          SEE ATTENDING NOTE BELOW:     Patient seen and examined independently.  Discussed with APN and agree with note above.      S: Seen and examined at bedside.  Tried to use BiPAP last night however was on and off throughout the night.  Somewhat confused this morning but much better than yesterday.  When I evaluated the patient she was on nasal  cannula.  Daughter at bedside.  She admits to some confusion but able to answer questions appropriately, responds and participates during exam    objective  /52 (BP Location: Left arm)   Pulse 63   Temp 98.9 °F (37.2 °C) (Oral)   Resp 18   Ht 5' 0.98\" (1.549 m)   Wt 199 lb 1.2 oz (90.3 kg)   SpO2 93%   BMI 37.63 kg/m²     Gen: No acute distress, alert and oriented x3, drowsy but arousable, engaged  Neck Supple, no JVD  Pulm: Supplemental oxygen, diminished at the bases   CV: Heart with regular rate and rhythm, No murmurs, rubs, gallops  Abd: Abdomen soft, nontender, nondistended, no organomegaly, bowel sounds present  MSK:  no clubbing, no cyanosis.  No Lower extremity edema  Skin: no rashes or lesions, well perfused  Psych: mood stable, cooperative  Neuro: no focal deficits    Assessment and Plan    Gen: No acute distress, alert and oriented x3  Neck Supple, no JVD  Pulm: Lungs clear, normal respiratory effort, No wheezing or crackles, supplemental oxygen, off BiPAP  CV: Heart with regular rate and rhythm, No murmurs, rubs, gallops  Abd: Abdomen soft, nontender, nondistended, no organomegaly, bowel sounds present  MSK:  no clubbing, no cyanosis.  Lower extremity edema has resolved  Skin: no rashes or lesions, well perfused  Psych: mood stable, cooperative  Neuro: no focal deficits     78 year old female with a PMHx sig for DM2, GERD, HLD, HTN, MDD, CKD3, neuropathy, WYATT and COPD and dementia presents to the hospital with shortness of breath and ankle swelling.      Acute on chronic respiratory failure with hypoxia  Acute on chronic respiratory failure with hypercapnia  WYATT  In the setting of COPD  Acute on chronic diastolic heart failure?  CKD  Type 2 diabetes with hyperglycemia  MDD  Neuropathy  Hypertension     Plan:  - proBNP 695, procalcitonin negative  - Was initially started on antibiotics, given worsening hypoxia, broadened coverage- RVP negative  - Blood cultures x 2-pending, UA, dimer  negative  - ABG reviewed  - BiPAP as needed  - Pulmonology on consult, recommendations reviewed  - Cardiology on consult recommendations reviewed  - 2D echo-normal EF, no wall motion abnormalities, mild EF reviewed  - Venous Dopplers of lower extremities negative  - Decrease Lasix to 20 mg IV twice daily, repeat proBNP tomorrow a.m., follow BMP  -BP medications adjusted-cardiology recommendations reviewed  -Hold Lyrica and tramadol given altered mental status for now  - Recommend arterial Dopplers for leg cramping-today patient states that she already has scheduled  - Social work to assist with oxygen at home     Rest as above.  Discussed with RN, patient and daughter at bedside     Татьяна Schneider DO  [facility]

## 2024-04-26 NOTE — PROGRESS NOTES
[facility]    Nina Donnelly Patient Status:  Inpatient    10/9/1945 MRN [id number]   Location [facility] 2NE-A Attending Татьяна Schneider,    Hosp Day # 5 PCP Chiki Caldwell MD     SUBJECTIVE: no new events. Feels better today.  Main c/o is fatigue.     OBJECTIVE:  /35 (BP Location: Right arm)   Pulse 67   Temp 98.9 °F (37.2 °C)   Resp 16   Ht 154.9 cm (5' 0.98\")   Wt 199 lb 1.2 oz (90.3 kg)   SpO2 94%   BMI 37.63 kg/m²   O2 requirement: 3-4L nc     I/O last 3 completed shifts:  In: 480 [P.O.:480]  Out:  [Urine:2000]  I/O this shift:  In: -   Out: 600 [Urine:600]     Current Medications:   Current Facility-Administered Medications:     insulin degludec 100 units/mL flextouch 26 Units, 26 Units, Subcutaneous, Nightly    furosemide (Lasix) 10 mg/mL injection 40 mg, 40 mg, Intravenous, BID (Diuretic)    hydrALAZINE (Apresoline) tab 75 mg, 75 mg, Oral, Q8H CHICO    piperacillin-tazobactam (Zosyn) 3.375 g in dextrose 5% 100 mL IVPB-ADDV, 3.375 g, Intravenous, Q8H    diphenhydrAMINE-zinc (Benadryl-Zinc) 2-0.1 % cream 1 Application, 1 Application, Topical, TID PRN    hydrALAzine (Apresoline) 20 mg/mL injection 10 mg, 10 mg, Intravenous, Q4H PRN    acetaminophen (Tylenol Extra Strength) tab 1,000 mg, 1,000 mg, Oral, Q6H PRN    insulin aspart (NovoLOG) 100 Units/mL FlexPen 7 Units, 7 Units, Subcutaneous, Daily with dinner    [Held by provider] pregabalin (Lyrica) cap 600 mg, 600 mg, Oral, Nightly    insulin aspart (NovoLOG) 100 Units/mL FlexPen 6 Units, 6 Units, Subcutaneous, BID AC    trolamine salicyliate 10 % cream, , Topical, TID PRN    aspirin chewable tab 81 mg, 81 mg, Oral, Daily    atorvastatin (Lipitor) tab 10 mg, 10 mg, Oral, Nightly    donepezil (Aricept) tab 10 mg, 10 mg, Oral, Nightly    DULoxetine (Cymbalta) DR cap 60 mg, 60 mg, Oral, Daily    fluticasone propionate (Flonase) 50 MCG/ACT nasal suspension 1 spray, 1 spray, Each Nare, Daily    montelukast (Singulair) tab 10 mg, 10  mg, Oral, Nightly    traMADol (Ultram) tab 50 mg, 50 mg, Oral, BID PRN    glucose (Dex4) 15 GM/59ML oral liquid 15 g, 15 g, Oral, Q15 Min PRN **OR** glucose (Glutose) 40% oral gel 15 g, 15 g, Oral, Q15 Min PRN **OR** glucose-vitamin C (Dex-4) chewable tab 4 tablet, 4 tablet, Oral, Q15 Min PRN **OR** dextrose 50% injection 50 mL, 50 mL, Intravenous, Q15 Min PRN **OR** glucose (Dex4) 15 GM/59ML oral liquid 30 g, 30 g, Oral, Q15 Min PRN **OR** glucose (Glutose) 40% oral gel 30 g, 30 g, Oral, Q15 Min PRN **OR** glucose-vitamin C (Dex-4) chewable tab 8 tablet, 8 tablet, Oral, Q15 Min PRN    enoxaparin (Lovenox) 30 MG/0.3ML SUBQ injection 30 mg, 30 mg, Subcutaneous, Daily    insulin aspart (NovoLOG) 100 Units/mL FlexPen 2-10 Units, 2-10 Units, Subcutaneous, TID AC and HS     General appearance: alert, appears stated age, cooperative, and moderately obese  Lungs:  slight insp crackles, no wheezing  Heart: S1, S2 normal, no murmur, click, rub or gallop, regular rate and rhythm  Abdomen: soft, non-tender; bowel sounds normal; no masses,  no organomegaly  Extremities: extremities normal, atraumatic, no cyanosis or edema     Lab Results   Component Value Date    WBC 7.0 04/26/2024    RBC 6.24 04/26/2024    HGB 10.6 04/26/2024    HCT 36.2 04/26/2024    MCV 58.0 04/26/2024    MCH 17.0 04/26/2024    MCHC 29.3 04/26/2024    RDW 20.6 04/26/2024    .0 04/26/2024     Lab Results   Component Value Date     04/26/2024    K 4.4 04/26/2024     04/26/2024    CO2 36.0 04/26/2024    BUN 33 04/26/2024    CREATSERUM 1.74 04/26/2024    CREATSERUM 1.74 04/26/2024     04/26/2024    CA 8.8 04/26/2024     Lab Results   Component Value Date    INR 1.08 04/21/2024        Recent Labs   Lab 04/25/24  1205   ABGPHT 7.37   ABIREB0C 64*   XBMHZ6F 80   ABGHCO3 32.6*   ABGBE 9.9*   TEMP 98.6   ZOILA Positive   SITE Right Radial   DEV Bi-PAP   THGB 10.3*       Imaging: CXR: bibasilar consolidations/effusions/edema      ASSESSMENT/PLAN:  Acute on chronic hypercapnic and hypoxic resp failure- due to CHF exacerbation, atelectasis, obesity, COPD and possible developing PNA.  initially declining CPAP while here bc she doesn't like mask; now using via BIPAP prn and with sleep.  Pain meds may have also contributed to altered MS yesterday- now resolved.  - ABG with compensated resp acidosis  - cont with BIPAP with sleep and prn  - IS/BDs prn. Not actively wheezing on exam  - diuresis per cards  ID - developing possible PNA  - on admission, COVID/RVP negative, PCT negative and no infectious symptoms.  Antibiotics started empirically yesterday bc of interval deterioration  - urine/blood cx pending, repeat PCT negative  - change abx to zosyn/vanco in place of ceftriaxone and de-escalate based on cultures; MRSA neg so stopped vanco  WYATT- cont with CPAP/BIPAP with O2 entrained as OP  H/o COPD- not on inhalers as OP- no signs of exac  - BDs prn  Proph- LMWH  Dispo- Full code  - will follow  - d/w daughter    Jose Luis Fan MD  4/26/2024  12:07 PM

## 2024-04-26 NOTE — PROGRESS NOTES
Nina Donnelly Patient Status:  Inpatient    10/9/1945 MRN [id number]   [facility] 2NE-A Attending Jamar Arora MD   Hosp Day # 2 PCP Chiki Caldwell MD      Reason for Consultation:  CHF        History of Present Illness:  Nina Donnelly is a a(n) 78 year old female. She has COPD, WYATT, Diabetes, Dyslipidemia .  Uses CPAP and O2 at 2 l/min at home.  Still able to do some walking.  Reports 2 weeks of dry cough, worsened dyspnea.  Daughter notes worse hypoxia (O2 sats down to 70's instead of high 80's ).  LE edema was reported as well. Feels weak and tired.  No fever. Chills.  Previous bouts of pneumonia.     Subjective:   -On bipap  -Had mental status changes this AM, CO2 was elevated  -CXR felt to reflect worsening PNA, continued pulm edema  -BP remains elevated, up to 180s this AM           Medications:  Scheduled Medications    pregabalin  600 mg Oral Nightly    insulin degludec  20 Units Subcutaneous Nightly    insulin aspart  6 Units Subcutaneous BID AC    insulin aspart  8 Units Subcutaneous Daily with dinner    aspirin  81 mg Oral Daily    atorvastatin  10 mg Oral Nightly    donepezil  10 mg Oral Nightly    DULoxetine  60 mg Oral Daily    fluticasone propionate  1 spray Each Nare Daily    montelukast  10 mg Oral Nightly    cefTRIAXone  1 g Intravenous Q24H    enoxaparin  30 mg Subcutaneous Daily    insulin aspart  2-10 Units Subcutaneous TID AC and HS    azithromycin  500 mg Oral Daily    furosemide  40 mg Intravenous Daily            Continuous Infusions:  Medication Infusions            Social History:   reports that she quit smoking about 9 years ago. Her smoking use included cigarettes. She started smoking about 64 years ago. She has a 55 pack-year smoking history. She has never used smokeless tobacco. She reports that she does not drink alcohol and does not use drugs.     Review of Systems:  All systems were reviewed and are negative except as described above in HPI.      Physical Exam:            Wt Readings from Last 3 Encounters:   04/23/24 203 lb 4.2 oz (92.2 kg)   03/30/22 199 lb (90.3 kg)   03/21/22 193 lb (87.5 kg)         Vitals          Vitals:     04/22/24 2340 04/22/24 2345 04/22/24 2350 04/23/24 0505   BP:   (!) 169/61 (!) 173/57     BP Location:   Left arm Right arm     Pulse: 67 72 70     Resp:   22   24   Temp:   97.9 °F (36.6 °C)   98.2 °F (36.8 °C)   TempSrc:   Oral   Oral   SpO2: 95% 96% 94%     Weight:       203 lb 4.2 oz (92.2 kg)   Height:                   Temp:  [97.9 °F (36.6 °C)-98.7 °F (37.1 °C)] 98.2 °F (36.8 °C)  Pulse:  [53-82] 70  Resp:  [16-24] 24  BP: 131/48  SpO2:  [85 %-98 %] 94 %     Temp: 98.2 °F (36.8 °C)  Pulse: 70  Resp: 24  BP: 173/57       General:  Appears comfortable  HEENT: No focal deficits.  Neck: No JVD, carotids 2+ no bruits.  Cardiac: Regular S1S2.  No S3, S4, rub, click.  No murmur.  Lungs: diminished   Abdomen: Soft, non-tender.   Extremities: +1 LLE edema.  No clubbing or cyanosis  Neurologic: Alert and oriented, normal affect.  Skin: Warm and dry.      Labs:        HEM:        Recent Labs   Lab 04/21/24 2033 04/22/24  0514 04/23/24  0534   WBC 8.8 8.6 6.5   HGB 9.9* 10.0* 9.9*   HCT 34.3* 35.0 35.8   .0 202.0 197.0         Chem:        Recent Labs   Lab 04/21/24 2033 04/22/24  0514 04/23/24  0534    144 141   K 4.1 3.9 4.5    110 105   CO2 28.0 31.0 33.0*   BUN 26* 22 26*   CREATSERUM 1.68* 1.55* 1.68*   MG  --   --  2.0   ALT 15  --   --    AST 18  --   --    ALB 3.2*  --   --              Recent Labs   Lab 04/21/24 2033   INR 1.08         No results found for: \"TROP\", \"CKMB\"        Invalid input(s): \"PBNPML\"        Telemetry: SR     Laboratories and Data:  Diagnostics:     EKG, 4/21/2024:  NSR, NSSTTW changes     CXR, 4/22/2024:       CONCLUSION:  Bilateral lower lobe predominant interstitial opacities and suspected small left pleural effusion and cardiomegaly.  Findings may be related to edema, though  infectious/inflammatory process is not excluded.       Echo, 10/2023:         Summary:     1. Left ventricle: The cavity size is normal. There is mild concentric      hypertrophy. Systolic function is normal. The estimated ejection fraction      is 55-60%. Grade I diastolic dysfunction.   2. Aortic valve: Trileaflet. The leaflets are mildly thickened and mildly      calcified. The peak systolic velocity is 1.5m/sec. The mean systolic      gradient is 4mm Hg. The peak systolic gradient is 9mm Hg. The valve area      is 2.2cm^2. The valve area index is 1.15cm^2/m^2.   3. Mitral valve: The annulus is calcified, fibrotic, and thickened. The      leaflets are mildly thickened and mildly calcified. There is mild      regurgitation.   4. Left atrium: The atrium is mildly to moderately dilated.   5. Right ventricle: The cavity size is normal. Wall thickness is normal.      Systolic function is normal. Estimation of the right ventricular systolic      pressure is within the normal range. The RV pressure during systole is      31mm Hg.   6. Tricuspid valve: There is mild regurgitation.   7. Pericardium, extracardiac: A probable, trace pericardial effusion is      identified. There is no evidence of hemodynamic compromise.         Impression:  1.  HFpEF       2. COPD/PNA     3. WYATT     4. Diabetes     5. Dyslipidemia      6. CKD, Cr = 1.4-1.6     7. Microcytic anemia, chronic     8. Lower extremity pain (popliteal) - no DVT.         Recommend:  1. Telemetry  2. Echo - Ef 70-75%, mild LVH, mild valvulopathy.  3. Decrease IV diuretics to 20 IV BID (Cr slightly higher than admission Cr today but not significantly, but BNP near normal yesterday; few rales by exam today).  BNP tomorrow AM.  4. Treatment of COPD/PNA per IM/pulm; deescalate antibiotics as cultures direct.  5. Continue PO hydralizine to 75mg TID.  PRN IV hydralazine for BP over 160               - BP meds limited by renal function /  COPD and LE edema, numbers  improved over last 24hrs.

## 2024-04-26 NOTE — PLAN OF CARE
Received patient at 0730. Patient alert and oriented x1-2, fatigued but alert this shift. Tele Rhythm NSR. O2 sats on 3-4L NC, Bipap NOC. Lungs diminished. Bed is locked and low position. Call light & personal belongings within reach. C/O HA pain at this time - per MD hold narcotics. Patient voiding WNL. Patient tolerating ambulation w/ 1 and walker. Skin dry and intact. Reviewed plan of care with patient and verbalized understanding.     POC: Cards & Pulm following: IV Lasix, IV Zosyn, PT/ OT    Problem: Diabetes/Glucose Control  Goal: Glucose maintained within prescribed range  Description: INTERVENTIONS:  - Monitor Blood Glucose as ordered  - Assess for signs and symptoms of hyperglycemia and hypoglycemia  - Administer ordered medications to maintain glucose within target range  - Assess barriers to adequate nutritional intake and initiate nutrition consult as needed  - Instruct patient on self management of diabetes  Outcome: Progressing     Problem: Patient/Family Goals  Goal: Patient/Family Long Term Goal  Description: Patient's Long Term Goal: to go home    Interventions:  - comply to care plan  - Mds to see  - See additional Care Plan goals for specific interventions  Outcome: Progressing  Goal: Patient/Family Short Term Goal  Description: Patient's Short Term Goal: to feel better    Interventions:   - IV lasix  - IV atbx  - comply to care plan  - Mds to see  - See additional Care Plan goals for specific interventions  Outcome: Progressing

## 2024-04-27 ENCOUNTER — APPOINTMENT (OUTPATIENT)
Dept: CT IMAGING | Facility: HOSPITAL | Age: 79
End: 2024-04-27
Attending: INTERNAL MEDICINE
Payer: MEDICARE

## 2024-04-27 LAB
ANION GAP SERPL CALC-SCNC: 4 MMOL/L (ref 0–18)
ARTERIAL PATENCY WRIST A: POSITIVE
BASE EXCESS BLDA CALC-SCNC: 12 MMOL/L (ref ?–2)
BASOPHILS # BLD AUTO: 0.04 X10(3) UL (ref 0–0.2)
BASOPHILS NFR BLD AUTO: 0.6 %
BODY TEMPERATURE: 98.6 F
BUN BLD-MCNC: 30 MG/DL (ref 9–23)
CALCIUM BLD-MCNC: 9.1 MG/DL (ref 8.5–10.1)
CHLORIDE SERPL-SCNC: 101 MMOL/L (ref 98–112)
CO2 SERPL-SCNC: 36 MMOL/L (ref 21–32)
COHGB MFR BLD: 1.6 % SAT (ref 0–3)
CREAT BLD-MCNC: 1.79 MG/DL
CREAT BLD-MCNC: 1.79 MG/DL
EGFRCR SERPLBLD CKD-EPI 2021: 29 ML/MIN/1.73M2 (ref 60–?)
EGFRCR SERPLBLD CKD-EPI 2021: 29 ML/MIN/1.73M2 (ref 60–?)
EOSINOPHIL # BLD AUTO: 0.33 X10(3) UL (ref 0–0.7)
EOSINOPHIL NFR BLD AUTO: 4.8 %
ERYTHROCYTE [DISTWIDTH] IN BLOOD BY AUTOMATED COUNT: 20.5 %
GLUCOSE BLD-MCNC: 128 MG/DL (ref 70–99)
GLUCOSE BLD-MCNC: 135 MG/DL (ref 70–99)
GLUCOSE BLD-MCNC: 137 MG/DL (ref 70–99)
GLUCOSE BLD-MCNC: 148 MG/DL (ref 70–99)
GLUCOSE BLD-MCNC: 159 MG/DL (ref 70–99)
HCO3 BLDA-SCNC: 34.2 MEQ/L (ref 21–27)
HCT VFR BLD AUTO: 36 %
HGB BLD-MCNC: 10.6 G/DL
HGB BLD-MCNC: 10.7 G/DL
IMM GRANULOCYTES # BLD AUTO: 0.01 X10(3) UL (ref 0–1)
IMM GRANULOCYTES NFR BLD: 0.1 %
L/M: 6 L/MIN
LYMPHOCYTES # BLD AUTO: 0.93 X10(3) UL (ref 1–4)
LYMPHOCYTES NFR BLD AUTO: 13.5 %
MCH RBC QN AUTO: 17 PG (ref 26–34)
MCHC RBC AUTO-ENTMCNC: 29.4 G/DL (ref 31–37)
MCV RBC AUTO: 57.9 FL
METHGB MFR BLD: 1 % SAT (ref 0.4–1.5)
MONOCYTES # BLD AUTO: 0.56 X10(3) UL (ref 0.1–1)
MONOCYTES NFR BLD AUTO: 8.1 %
NEUTROPHILS # BLD AUTO: 5.04 X10 (3) UL (ref 1.5–7.7)
NEUTROPHILS # BLD AUTO: 5.04 X10(3) UL (ref 1.5–7.7)
NEUTROPHILS NFR BLD AUTO: 72.9 %
NT-PROBNP SERPL-MCNC: 217 PG/ML (ref ?–450)
OSMOLALITY SERPL CALC.SUM OF ELEC: 300 MOSM/KG (ref 275–295)
OXYHGB MFR BLDA: 91 % (ref 92–100)
PCO2 BLDA: 56 MM HG (ref 35–45)
PH BLDA: 7.44 [PH] (ref 7.35–7.45)
PLATELET # BLD AUTO: 170 10(3)UL (ref 150–450)
PLATELETS.RETICULATED NFR BLD AUTO: 3.6 % (ref 0–7)
PO2 BLDA: 59 MM HG (ref 80–100)
POTASSIUM SERPL-SCNC: 4.1 MMOL/L (ref 3.5–5.1)
RBC # BLD AUTO: 6.22 X10(6)UL
SODIUM SERPL-SCNC: 141 MMOL/L (ref 136–145)
WBC # BLD AUTO: 6.9 X10(3) UL (ref 4–11)

## 2024-04-27 PROCEDURE — 85025 COMPLETE CBC W/AUTO DIFF WBC: CPT | Performed by: STUDENT IN AN ORGANIZED HEALTH CARE EDUCATION/TRAINING PROGRAM

## 2024-04-27 PROCEDURE — 71250 CT THORAX DX C-: CPT | Performed by: INTERNAL MEDICINE

## 2024-04-27 PROCEDURE — 85018 HEMOGLOBIN: CPT | Performed by: INTERNAL MEDICINE

## 2024-04-27 PROCEDURE — 82962 GLUCOSE BLOOD TEST: CPT

## 2024-04-27 PROCEDURE — 82803 BLOOD GASES ANY COMBINATION: CPT | Performed by: INTERNAL MEDICINE

## 2024-04-27 PROCEDURE — 36600 WITHDRAWAL OF ARTERIAL BLOOD: CPT | Performed by: INTERNAL MEDICINE

## 2024-04-27 PROCEDURE — 82565 ASSAY OF CREATININE: CPT | Performed by: INTERNAL MEDICINE

## 2024-04-27 PROCEDURE — 82375 ASSAY CARBOXYHB QUANT: CPT | Performed by: INTERNAL MEDICINE

## 2024-04-27 PROCEDURE — 83880 ASSAY OF NATRIURETIC PEPTIDE: CPT | Performed by: INTERNAL MEDICINE

## 2024-04-27 PROCEDURE — 83050 HGB METHEMOGLOBIN QUAN: CPT | Performed by: INTERNAL MEDICINE

## 2024-04-27 PROCEDURE — 80048 BASIC METABOLIC PNL TOTAL CA: CPT | Performed by: STUDENT IN AN ORGANIZED HEALTH CARE EDUCATION/TRAINING PROGRAM

## 2024-04-27 PROCEDURE — 94799 UNLISTED PULMONARY SVC/PX: CPT

## 2024-04-27 RX ORDER — FUROSEMIDE 20 MG/1
20 TABLET ORAL
Status: DISCONTINUED | OUTPATIENT
Start: 2024-04-27 | End: 2024-04-27

## 2024-04-27 RX ORDER — FUROSEMIDE 20 MG/1
20 TABLET ORAL
Status: DISCONTINUED | OUTPATIENT
Start: 2024-04-27 | End: 2024-04-30

## 2024-04-27 NOTE — PROGRESS NOTES
Nina Donnelly Patient Status:  Inpatient    10/9/1945 MRN [id number]   [facility] 2NE-A Attending Jamar Arora MD   Hosp Day # 2 PCP Chiki Caldwell MD        Reason for Consultation:  CHF        History of Present Illness:  Nina Donnelly is a a(n) 78 year old female. She has COPD, WYATT, Diabetes, Dyslipidemia .  Uses CPAP and O2 at 2 l/min at home.  Still able to do some walking.  Reports 2 weeks of dry cough, worsened dyspnea.  Daughter notes worse hypoxia (O2 sats down to 70's instead of high 80's ).  LE edema was reported as well. Feels weak and tired.  No fever. Chills.  Previous bouts of pneumonia.     Subjective:   -On NC  -No CV complaints.            Medications:  Scheduled Medications    pregabalin  600 mg Oral Nightly    insulin degludec  20 Units Subcutaneous Nightly    insulin aspart  6 Units Subcutaneous BID AC    insulin aspart  8 Units Subcutaneous Daily with dinner    aspirin  81 mg Oral Daily    atorvastatin  10 mg Oral Nightly    donepezil  10 mg Oral Nightly    DULoxetine  60 mg Oral Daily    fluticasone propionate  1 spray Each Nare Daily    montelukast  10 mg Oral Nightly    cefTRIAXone  1 g Intravenous Q24H    enoxaparin  30 mg Subcutaneous Daily    insulin aspart  2-10 Units Subcutaneous TID AC and HS    azithromycin  500 mg Oral Daily    furosemide  40 mg Intravenous Daily            Continuous Infusions:  Medication Infusions            Social History:   reports that she quit smoking about 9 years ago. Her smoking use included cigarettes. She started smoking about 64 years ago. She has a 55 pack-year smoking history. She has never used smokeless tobacco. She reports that she does not drink alcohol and does not use drugs.     Review of Systems:  All systems were reviewed and are negative except as described above in HPI.     Physical Exam:            Wt Readings from Last 3 Encounters:   24 203 lb 4.2 oz (92.2 kg)   22 199 lb (90.3 kg)   22  193 lb (87.5 kg)         Vitals          Vitals:     04/22/24 2340 04/22/24 2345 04/22/24 2350 04/23/24 0505   BP:   (!) 169/61 (!) 173/57     BP Location:   Left arm Right arm     Pulse: 67 72 70     Resp:   22   24   Temp:   97.9 °F (36.6 °C)   98.2 °F (36.8 °C)   TempSrc:   Oral   Oral   SpO2: 95% 96% 94%     Weight:       203 lb 4.2 oz (92.2 kg)   Height:                   Temp:  [97.9 °F (36.6 °C)-98.7 °F (37.1 °C)] 98.2 °F (36.8 °C)  Pulse:  [53-82] 70  Resp:  [16-24] 24  BP: 131/48  SpO2:  [85 %-98 %] 94 %     Temp: 98.2 °F (36.8 °C)  Pulse: 70  Resp: 24  BP: 173/57       General:  Appears comfortable  HEENT: No focal deficits.  Neck: No JVD, carotids 2+ no bruits.  Cardiac: Regular S1S2.  No S3, S4, rub, click.  No murmur.  Lungs: diminished   Abdomen: Soft, non-tender.   Extremities: +1 LLE edema.  No clubbing or cyanosis  Neurologic: Alert and oriented, normal affect.  Skin: Warm and dry.      Labs:        HEM:        Recent Labs   Lab 04/21/24 2033 04/22/24  0514 04/23/24  0534   WBC 8.8 8.6 6.5   HGB 9.9* 10.0* 9.9*   HCT 34.3* 35.0 35.8   .0 202.0 197.0         Chem:        Recent Labs   Lab 04/21/24 2033 04/22/24  0514 04/23/24  0534    144 141   K 4.1 3.9 4.5    110 105   CO2 28.0 31.0 33.0*   BUN 26* 22 26*   CREATSERUM 1.68* 1.55* 1.68*   MG  --   --  2.0   ALT 15  --   --    AST 18  --   --    ALB 3.2*  --   --              Recent Labs   Lab 04/21/24 2033   INR 1.08         No results found for: \"TROP\", \"CKMB\"        Invalid input(s): \"PBNPML\"        Telemetry: SR     Laboratories and Data:  Diagnostics:     EKG, 4/21/2024:  NSR, NSSTTW changes     CXR, 4/22/2024:       CONCLUSION:  Bilateral lower lobe predominant interstitial opacities and suspected small left pleural effusion and cardiomegaly.  Findings may be related to edema, though infectious/inflammatory process is not excluded.       Echo, 10/2023:         Summary:     1. Left ventricle: The cavity size is normal.  There is mild concentric      hypertrophy. Systolic function is normal. The estimated ejection fraction      is 55-60%. Grade I diastolic dysfunction.   2. Aortic valve: Trileaflet. The leaflets are mildly thickened and mildly      calcified. The peak systolic velocity is 1.5m/sec. The mean systolic      gradient is 4mm Hg. The peak systolic gradient is 9mm Hg. The valve area      is 2.2cm^2. The valve area index is 1.15cm^2/m^2.   3. Mitral valve: The annulus is calcified, fibrotic, and thickened. The      leaflets are mildly thickened and mildly calcified. There is mild      regurgitation.   4. Left atrium: The atrium is mildly to moderately dilated.   5. Right ventricle: The cavity size is normal. Wall thickness is normal.      Systolic function is normal. Estimation of the right ventricular systolic      pressure is within the normal range. The RV pressure during systole is      31mm Hg.   6. Tricuspid valve: There is mild regurgitation.   7. Pericardium, extracardiac: A probable, trace pericardial effusion is      identified. There is no evidence of hemodynamic compromise.         Impression:  1.  HFpEF      Net negative 1.3L   Pro BNP normal this AM    Well compensated by exam    On 20mg IV lasix BID   2. HTN   Normotensive 120s/50-60s   On hydralazine 75mg TID   3. COPD/PNA     3. WYATT     4. Diabetes     5. Dyslipidemia     On ASA and statin   6. CKD, Cr = 1.4-1.6   Cr 1.79 yesterday   Labs this AM pending      7. Microcytic anemia, chronic     8. Lower extremity pain (popliteal) - no DVT.         Recommend:  1. Telemetry  2. Echo - Ef 70-75%, mild LVH, mild valvulopathy.  3. Transition to PO diuretics   4. Treatment of COPD/PNA per IM/pulm; deescalate antibiotics as cultures direct.  5. Continue PO hydralizine to 75mg TID.    6. PRN IV hydralazine for BP over 160               - BP meds limited by renal function /  COPD and LE edema, numbers improved over last 24hrs.

## 2024-04-27 NOTE — PROGRESS NOTES
On license of [facility] AND [facility]   part of [facility]     Progress Note  Nina Donnelly Patient Status:  Inpatient    10/9/1945 MRN [id number]   Location [facility] 2NE-A Attending Татьяна Schneider, DO   Hosp Day # 6 PCP Chiki Caldwell MD         Assessment and Plan:    78 year old female with a PMHx sig for DM2, GERD, HLD, HTN, MDD, CKD3, neuropathy, WYATT and COPD and dementia presents to the hospital with shortness of breath and bilateral ankle swelling.        Altered mental status improving  Toxic metabolic encephalopathy-improving  - In the setting of possible infection and WYATT  - BiPAP for naps and sleep  - Delirium precautions  - Hold pregabalin and tramadol  - Continue to monitor    Acute on chronic respiratory failure with hypoxia  Respiratory acidosis  -Wean O2 as able  - CPAP/BiPAP with all naps and sleep  - 2D echo with preserved EF, RVSP was not able to be estimated,  - Empiric antibiotics for pneumonia however Pro-Dougie negative  - Incentive spirometer, bronchodilators as needed  - Pulmonology following,-Case discussed with        COPD  - History of smoking in the past  - Outpatient PFTs with mild obstruction and moderate decrease in diffusion capacity  - Cannot tolerate inhalers in the outpatient setting  - Bronchodilators as needed  - Discussed with Dr. Amador    CAP  Acute on chronic diastolic CHF  -2D echo with preserved EF  -HFpEF  -proBNP elevated 695->200  -Previously on IV Lasix, now switched over to oral  - proBNP not significantly elevated, 2D echo with preserved EF, patient with known COPD and chronic respiratory failure and WYATT.  Possible concern for pulmonary hypertension?  Recommend outpatient evaluation  - Telemetry  -Cardiology on consult, recommendations reviewed     HTN  -po hydralazine started, still receiving IV PRN hydralazine as well       BIBIANA on CKD3-   -Follow BMP given diuretics on board    OAS  Chronic RF w hypoxia  -cpap  -singulair  -02 walk test  ordered     DM2 with hyperglycemia  -hold orals  -ADA diet  -accucheck ac/hs  -ssi, cont home regime as able  -insulin adjusted     MDD  -cymbalta     Neurapathy  LLE pain and swelling  -lyrica  -venous doppler neg for DVT in LLE  -outpatient arterial Doppler study     Dementia  -donepezil     GOC: Full code     MA/CLAUDIO Reach  -Re- Entry: no  -Consults:cards  -Discharge Needs: TBD  -Appointments: PCP       FEN  -lytes in am  -diet-cc     Prophy  -SCD  -lovenox     Dispo  -pending clinical course    PCP: Chiki Caldwell MD    Note: This chart was prepared using voice recognition software and may contain unintended word substitution errors.          Татьяна Schneider, DO  Hospitalist  [facility]               SUBJECTIVE:     No events overnight.  Wore her BiPAP overnight.  This morning.  She denies any confusion.  Answers \"no\" to every question.  She is brief with her answers.               OBJECTIVE:   Blood pressure 146/52, pulse 67, temperature 97.7 °F (36.5 °C), temperature source Oral, resp. rate 18, height 5' 0.98\" (1.549 m), weight 196 lb 3.4 oz (89 kg), SpO2 96%.    GENERAL: no apparent distress, flat affect  NEURO: A/A Ox 3, some confusion  RESP: non labored, diminished bilaterally  CARDIO: Regular, no murmur  ABD: soft, NT, ND, BS+  EXTREMITIES: no edema, no calf tenderness    DIAGNOSTIC DATA:   Labs:     Recent Labs   Lab 04/21/24  2033 04/22/24  0514 04/23/24  0534 04/24/24  0655 04/25/24  0630 04/26/24  0659 04/27/24  0631   WBC 8.8   < > 6.5 6.2 7.5 7.0 6.9   HGB 9.9*   < > 9.9* 9.7* 11.0* 10.6* 10.6*   MCV 58.5*   < > 60.3* 59.7* 57.7* 58.0* 57.9*   .0   < > 197.0 202.0 215.0 202.0 170.0   INR 1.08  --   --   --   --   --   --     < > = values in this interval not displayed.       Recent Labs   Lab 04/23/24  0534 04/24/24  0655 04/25/24  0630 04/26/24  0700 04/27/24  0631    139 136 139 141   K 4.5 4.5 4.9 4.4 4.1    105 102 102 101   CO2 33.0* 32.0 32.0 36.0* 36.0*   BUN 26*  25* 30* 33* 30*   CREATSERUM 1.68* 1.52* 1.51* 1.74*  1.74* 1.79*  1.79*   CA 9.0 8.7 9.2 8.8 9.1   MG 2.0 2.3 2.4  --   --    * 105* 150* 140* 135*       Recent Labs   Lab 04/21/24  2033   ALT 15   AST 18   ALB 3.2*       Recent Labs   Lab 04/26/24  1639 04/26/24  1853 04/27/24  0000 04/27/24  0517 04/27/24  1135   PGLU 96 99 128* 137* 159*       No results for input(s): \"TROP\" in the last 168 hours.      MEDICATIONS      Current Facility-Administered Medications   Medication Dose Route Frequency    furosemide (Lasix) tab 20 mg  20 mg Oral BID (Diuretic)    insulin degludec 100 units/mL flextouch 26 Units  26 Units Subcutaneous Nightly    hydrALAZINE (Apresoline) tab 75 mg  75 mg Oral Q8H CHICO    piperacillin-tazobactam (Zosyn) 3.375 g in dextrose 5% 100 mL IVPB-ADDV  3.375 g Intravenous Q8H    diphenhydrAMINE-zinc (Benadryl-Zinc) 2-0.1 % cream 1 Application  1 Application Topical TID PRN    hydrALAzine (Apresoline) 20 mg/mL injection 10 mg  10 mg Intravenous Q4H PRN    acetaminophen (Tylenol Extra Strength) tab 1,000 mg  1,000 mg Oral Q6H PRN    insulin aspart (NovoLOG) 100 Units/mL FlexPen 7 Units  7 Units Subcutaneous Daily with dinner    [Held by provider] pregabalin (Lyrica) cap 600 mg  600 mg Oral Nightly    insulin aspart (NovoLOG) 100 Units/mL FlexPen 6 Units  6 Units Subcutaneous BID AC    trolamine salicyliate 10 % cream   Topical TID PRN    aspirin chewable tab 81 mg  81 mg Oral Daily    atorvastatin (Lipitor) tab 10 mg  10 mg Oral Nightly    donepezil (Aricept) tab 10 mg  10 mg Oral Nightly    DULoxetine (Cymbalta) DR cap 60 mg  60 mg Oral Daily    fluticasone propionate (Flonase) 50 MCG/ACT nasal suspension 1 spray  1 spray Each Nare Daily    montelukast (Singulair) tab 10 mg  10 mg Oral Nightly    traMADol (Ultram) tab 50 mg  50 mg Oral BID PRN    glucose (Dex4) 15 GM/59ML oral liquid 15 g  15 g Oral Q15 Min PRN    Or    glucose (Glutose) 40% oral gel 15 g  15 g Oral Q15 Min PRN    Or     glucose-vitamin C (Dex-4) chewable tab 4 tablet  4 tablet Oral Q15 Min PRN    Or    dextrose 50% injection 50 mL  50 mL Intravenous Q15 Min PRN    Or    glucose (Dex4) 15 GM/59ML oral liquid 30 g  30 g Oral Q15 Min PRN    Or    glucose (Glutose) 40% oral gel 30 g  30 g Oral Q15 Min PRN    Or    glucose-vitamin C (Dex-4) chewable tab 8 tablet  8 tablet Oral Q15 Min PRN    enoxaparin (Lovenox) 30 MG/0.3ML SUBQ injection 30 mg  30 mg Subcutaneous Daily    insulin aspart (NovoLOG) 100 Units/mL FlexPen 2-10 Units  2-10 Units Subcutaneous TID AC and HS                  IMAGING     CT CHEST (CPT=71250)    Result Date: 4/27/2024  CONCLUSION:  1. Although recent radiographs suggested bibasilar airspace opacities, no acute airspace disease within the chest is identified. 2. Mild centrilobular emphysematous changes primarily within the upper lobes. 3. Cardiomegaly with atherosclerotic changes of the coronary vessels as described above. 4. Please see further details within the body of the report above.  LOCATION:  [facility]   Dictated by (CST): Ha Giordano DO on 4/27/2024 at 1:01 PM     Finalized by (CST): Ha Giordano DO on 4/27/2024 at 1:07 PM       XR CHEST AP PORTABLE  (CPT=71045)    Result Date: 4/25/2024  CONCLUSION:  1. Stable cardiomegaly and pulmonary vascular congestion. 2. Interval increase in bibasilar opacities.  Correlation for pneumonitis/pneumonia recommended.  Cardiogenic pulmonary edema is considered less likely, but should be correlated clinically.    LOCATION:  [facility]      Dictated by (CST): Davi Remy MD on 4/25/2024 at 9:47 AM     Finalized by (CST): Davi Remy MD on 4/25/2024 at 9:49 AM

## 2024-04-27 NOTE — CM/SW NOTE
SW completed chart review, and reviewed [facility] referral. Still no accepting HH, per previous SW/CM note prefer [facility]. Awaiting response from accepting HH.    SW/CM to remain available for dc planning, and/or additional need for support.    Lico Gil, SAROJ  Discharge Planner  [id number]

## 2024-04-27 NOTE — PROGRESS NOTES
[facility]    Nina Donnelly Patient Status:  Inpatient    10/9/1945 MRN [id number]   Location [facility] 2NE-A Attending Татьяна Schneider, DO   Hosp Day # 6 PCP Chiki Caldwell MD     SUBJECTIVE: Pt is confused this am, simply stating \"I'm fine\" with every question asked.    OBJECTIVE:  /36 (BP Location: Left arm)   Pulse 71   Temp 98 °F (36.7 °C) (Oral)   Resp 18   Ht 5' 0.98\" (1.549 m)   Wt 199 lb 1.2 oz (90.3 kg)   SpO2 (!) 86%   BMI 37.63 kg/m²   O2 requirement: 3L     I/O last 3 completed shifts:  In: 100 [P.O.:100]  Out: 600 [Urine:600]  No intake/output data recorded.     Current Medications:   Current Facility-Administered Medications:     furosemide (Lasix) tab 20 mg, 20 mg, Oral, BID (Diuretic)    insulin degludec 100 units/mL flextouch 26 Units, 26 Units, Subcutaneous, Nightly    hydrALAZINE (Apresoline) tab 75 mg, 75 mg, Oral, Q8H CHICO    piperacillin-tazobactam (Zosyn) 3.375 g in dextrose 5% 100 mL IVPB-ADDV, 3.375 g, Intravenous, Q8H    diphenhydrAMINE-zinc (Benadryl-Zinc) 2-0.1 % cream 1 Application, 1 Application, Topical, TID PRN    hydrALAzine (Apresoline) 20 mg/mL injection 10 mg, 10 mg, Intravenous, Q4H PRN    acetaminophen (Tylenol Extra Strength) tab 1,000 mg, 1,000 mg, Oral, Q6H PRN    insulin aspart (NovoLOG) 100 Units/mL FlexPen 7 Units, 7 Units, Subcutaneous, Daily with dinner    [Held by provider] pregabalin (Lyrica) cap 600 mg, 600 mg, Oral, Nightly    insulin aspart (NovoLOG) 100 Units/mL FlexPen 6 Units, 6 Units, Subcutaneous, BID AC    trolamine salicyliate 10 % cream, , Topical, TID PRN    aspirin chewable tab 81 mg, 81 mg, Oral, Daily    atorvastatin (Lipitor) tab 10 mg, 10 mg, Oral, Nightly    donepezil (Aricept) tab 10 mg, 10 mg, Oral, Nightly    DULoxetine (Cymbalta) DR cap 60 mg, 60 mg, Oral, Daily    fluticasone propionate (Flonase) 50 MCG/ACT nasal suspension 1 spray, 1 spray, Each Nare, Daily    montelukast (Singulair) tab 10 mg, 10 mg, Oral, Nightly     traMADol (Ultram) tab 50 mg, 50 mg, Oral, BID PRN    glucose (Dex4) 15 GM/59ML oral liquid 15 g, 15 g, Oral, Q15 Min PRN **OR** glucose (Glutose) 40% oral gel 15 g, 15 g, Oral, Q15 Min PRN **OR** glucose-vitamin C (Dex-4) chewable tab 4 tablet, 4 tablet, Oral, Q15 Min PRN **OR** dextrose 50% injection 50 mL, 50 mL, Intravenous, Q15 Min PRN **OR** glucose (Dex4) 15 GM/59ML oral liquid 30 g, 30 g, Oral, Q15 Min PRN **OR** glucose (Glutose) 40% oral gel 30 g, 30 g, Oral, Q15 Min PRN **OR** glucose-vitamin C (Dex-4) chewable tab 8 tablet, 8 tablet, Oral, Q15 Min PRN    enoxaparin (Lovenox) 30 MG/0.3ML SUBQ injection 30 mg, 30 mg, Subcutaneous, Daily    insulin aspart (NovoLOG) 100 Units/mL FlexPen 2-10 Units, 2-10 Units, Subcutaneous, TID AC and HS      Physical Exam:                          General: alert, confused but coperative, in NAD                          HEENT: oropharynx clear without erythema or exudates, moist mucous membranes                          Lungs: Clear to auscultation bilaterally, no wheezes                          Chest wall: No tenderness or deformity.                          Heart: Regular rate and rhythm, normal S1S2                          Abdomen: soft, non-tender, non-distended, positive BS.                          Extremity: No clubbing or cyanosis. no edema                          Skin: No rashes or lesions.       Lab Results   Component Value Date    WBC 6.9 04/27/2024    RBC 6.22 04/27/2024    HGB 10.6 04/27/2024    HCT 36.0 04/27/2024    MCV 57.9 04/27/2024    MCH 17.0 04/27/2024    MCHC 29.4 04/27/2024    RDW 20.5 04/27/2024    .0 04/27/2024     Lab Results   Component Value Date     04/27/2024    K 4.1 04/27/2024     04/27/2024    CO2 36.0 04/27/2024    BUN 30 04/27/2024    CREATSERUM 1.79 04/27/2024    CREATSERUM 1.79 04/27/2024     04/27/2024    CA 9.1 04/27/2024     Lab Results   Component Value Date    INR 1.08 04/21/2024          Imaging: I have  independently visualized all relevant chest imaging in PACS.  I agree with the radiology interpretation except where noted.    ASSESSMENT/PLAN:  Acute on chronic hypercapnic and hypoxic resp failure- due to CHF exacerbation, atelectasis, obesity, COPD and possible developing PNA.  initially declining CPAP while here bc she doesn't like mask; now using via BIPAP prn and with sleep.    - wean O2 as able,   - ABG with compensated resp acidosis  - echo with preserved EF, RV mildly increased in size, could not estimate PASP  - d-dimer negative, LLE doppler negative for DVT  - check CT chest to better eval lung parenchyma   - cont with BIPAP with sleep and prn  - IS/BDs prn. Not actively wheezing on exam  - diuresis per cards  ID - developing possible PNA  - on admission, COVID/RVP negative, PCT negative and no infectious symptoms.  Antibiotics started empirically 4/21, broadened to zosyn on 4/25 bc of interval deterioration  - urine/blood cx pending, repeat PCT negative.  RVP negative   WYATT- cont with CPAP with all sleep with O2 entrained as OP  H/o COPD- not on inhalers as OP due to intolerance- no signs of exac  - pt with mild obstruction with moderate decrease in diffusion capacity on most recent OP PFTs  - singulair   - BDs prn  Encephalopathy: suspect TME with hospital induced delirium  -frequent reorientation   Proph- LMWH  Dispo- Full code  - will follow  - d/w daughter over the phone     Olga Amador MD  4/27/2024  11:22 AM

## 2024-04-27 NOTE — PLAN OF CARE
Assumed pt care at 0730. A&O x 1. On 5L O2 via HFNC. Denies pain. Vital signs stable, NSR on tele. Up with x1 assist and walker.     Plan of care: IV abx, O2, CPAP.    Plan of care updated with patient and son. Questions answered, pt verbalized understanding. Call light is within reach. All needs met at this time.    Problem: Diabetes/Glucose Control  Goal: Glucose maintained within prescribed range  Description: INTERVENTIONS:  - Monitor Blood Glucose as ordered  - Assess for signs and symptoms of hyperglycemia and hypoglycemia  - Administer ordered medications to maintain glucose within target range  - Assess barriers to adequate nutritional intake and initiate nutrition consult as needed  - Instruct patient on self management of diabetes  Outcome: Progressing     Problem: Patient/Family Goals  Goal: Patient/Family Long Term Goal  Description: Patient's Long Term Goal: to go home    Interventions:  - comply to care plan  - Mds to see  - See additional Care Plan goals for specific interventions  Outcome: Progressing  Goal: Patient/Family Short Term Goal  Description: Patient's Short Term Goal: to feel better    Interventions:   - IV lasix  - IV atbx  - comply to care plan  - Mds to see  - See additional Care Plan goals for specific interventions  Outcome: Progressing     Problem: CARDIOVASCULAR - ADULT  Goal: Maintains optimal cardiac output and hemodynamic stability  Description: INTERVENTIONS:  - Monitor vital signs, rhythm, and trends  - Monitor for bleeding, hypotension and signs of decreased cardiac output  - Evaluate effectiveness of vasoactive medications to optimize hemodynamic stability  - Monitor arterial and/or venous puncture sites for bleeding and/or hematoma  - Assess quality of pulses, skin color and temperature  - Assess for signs of decreased coronary artery perfusion - ex. Angina  - Evaluate fluid balance, assess for edema, trend weights  Outcome: Progressing     Problem: RESPIRATORY -  ADULT  Goal: Achieves optimal ventilation and oxygenation  Description: INTERVENTIONS:  - Assess for changes in respiratory status  - Assess for changes in mentation and behavior  - Position to facilitate oxygenation and minimize respiratory effort  - Oxygen supplementation based on oxygen saturation or ABGs  - Provide Smoking Cessation handout, if applicable  - Encourage broncho-pulmonary hygiene including cough, deep breathe, Incentive Spirometry  - Assess the need for suctioning and perform as needed  - Assess and instruct to report SOB or any respiratory difficulty  - Respiratory Therapy support as indicated  - Manage/alleviate anxiety  - Monitor for signs/symptoms of CO2 retention  Outcome: Progressing     Problem: METABOLIC/FLUID AND ELECTROLYTES - ADULT  Goal: Glucose maintained within prescribed range  Description: INTERVENTIONS:  - Monitor Blood Glucose as ordered  - Assess for signs and symptoms of hyperglycemia and hypoglycemia  - Administer ordered medications to maintain glucose within target range  - Assess barriers to adequate nutritional intake and initiate nutrition consult as needed  - Instruct patient on self management of diabetes  Outcome: Progressing  Goal: Electrolytes maintained within normal limits  Description: INTERVENTIONS:  - Monitor labs and rhythm and assess patient for signs and symptoms of electrolyte imbalances  - Administer electrolyte replacement as ordered  - Monitor response to electrolyte replacements, including rhythm and repeat lab results as appropriate  - Fluid restriction as ordered  - Instruct patient on fluid and nutrition restrictions as appropriate  Outcome: Progressing  Goal: Hemodynamic stability and optimal renal function maintained  Description: INTERVENTIONS:  - Monitor labs and assess for signs and symptoms of volume excess or deficit  - Monitor intake, output and patient weight  - Monitor urine specific gravity, serum osmolarity and serum sodium as indicated or  ordered  - Monitor response to interventions for patient's volume status, including labs, urine output, blood pressure (other measures as available)  - Encourage oral intake as appropriate  - Instruct patient on fluid and nutrition restrictions as appropriate  Outcome: Progressing

## 2024-04-27 NOTE — PLAN OF CARE
Patient lethargic drifts off during conversation-Pulm aware see orders. On 3-4L during the day increased O2 demand with activity- Bipap at night. NSR on tele-denies any chest pain. SOB with exertion. Continent of bowel and bladder. Ambulated marvin-tolerated fair. Family at bedside. Updated on plan of care. Call light within reach.   POC: IV Lasix, IV Zosyn, BIPAP at night, ABG's to be done in AM.  0440: Patient was able to wear BIPAP from 2200- 0440.   Problem: Diabetes/Glucose Control  Goal: Glucose maintained within prescribed range  Description: INTERVENTIONS:  - Monitor Blood Glucose as ordered  - Assess for signs and symptoms of hyperglycemia and hypoglycemia  - Administer ordered medications to maintain glucose within target range  - Assess barriers to adequate nutritional intake and initiate nutrition consult as needed  - Instruct patient on self management of diabetes  Outcome: Progressing     Problem: Patient/Family Goals  Goal: Patient/Family Long Term Goal  Description: Patient's Long Term Goal: to go home    Interventions:  - comply to care plan  - Mds to see  - See additional Care Plan goals for specific interventions  Outcome: Progressing  Goal: Patient/Family Short Term Goal  Description: Patient's Short Term Goal: to feel better    Interventions:   - IV lasix  - IV atbx  - comply to care plan  - Mds to see  - See additional Care Plan goals for specific interventions  Outcome: Progressing     Problem: CARDIOVASCULAR - ADULT  Goal: Maintains optimal cardiac output and hemodynamic stability  Description: INTERVENTIONS:  - Monitor vital signs, rhythm, and trends  - Monitor for bleeding, hypotension and signs of decreased cardiac output  - Evaluate effectiveness of vasoactive medications to optimize hemodynamic stability  - Monitor arterial and/or venous puncture sites for bleeding and/or hematoma  - Assess quality of pulses, skin color and temperature  - Assess for signs of decreased coronary artery  perfusion - ex. Angina  - Evaluate fluid balance, assess for edema, trend weights  Outcome: Progressing     Problem: RESPIRATORY - ADULT  Goal: Achieves optimal ventilation and oxygenation  Description: INTERVENTIONS:  - Assess for changes in respiratory status  - Assess for changes in mentation and behavior  - Position to facilitate oxygenation and minimize respiratory effort  - Oxygen supplementation based on oxygen saturation or ABGs  - Provide Smoking Cessation handout, if applicable  - Encourage broncho-pulmonary hygiene including cough, deep breathe, Incentive Spirometry  - Assess the need for suctioning and perform as needed  - Assess and instruct to report SOB or any respiratory difficulty  - Respiratory Therapy support as indicated  - Manage/alleviate anxiety  - Monitor for signs/symptoms of CO2 retention  Outcome: Progressing     Problem: METABOLIC/FLUID AND ELECTROLYTES - ADULT  Goal: Glucose maintained within prescribed range  Description: INTERVENTIONS:  - Monitor Blood Glucose as ordered  - Assess for signs and symptoms of hyperglycemia and hypoglycemia  - Administer ordered medications to maintain glucose within target range  - Assess barriers to adequate nutritional intake and initiate nutrition consult as needed  - Instruct patient on self management of diabetes  Outcome: Progressing  Goal: Electrolytes maintained within normal limits  Description: INTERVENTIONS:  - Monitor labs and rhythm and assess patient for signs and symptoms of electrolyte imbalances  - Administer electrolyte replacement as ordered  - Monitor response to electrolyte replacements, including rhythm and repeat lab results as appropriate  - Fluid restriction as ordered  - Instruct patient on fluid and nutrition restrictions as appropriate  Outcome: Progressing  Goal: Hemodynamic stability and optimal renal function maintained  Description: INTERVENTIONS:  - Monitor labs and assess for signs and symptoms of volume excess or  deficit  - Monitor intake, output and patient weight  - Monitor urine specific gravity, serum osmolarity and serum sodium as indicated or ordered  - Monitor response to interventions for patient's volume status, including labs, urine output, blood pressure (other measures as available)  - Encourage oral intake as appropriate  - Instruct patient on fluid and nutrition restrictions as appropriate  Outcome: Progressing

## 2024-04-28 ENCOUNTER — APPOINTMENT (OUTPATIENT)
Dept: CT IMAGING | Facility: HOSPITAL | Age: 79
End: 2024-04-28
Attending: STUDENT IN AN ORGANIZED HEALTH CARE EDUCATION/TRAINING PROGRAM
Payer: MEDICARE

## 2024-04-28 ENCOUNTER — APPOINTMENT (OUTPATIENT)
Dept: CV DIAGNOSTICS | Facility: HOSPITAL | Age: 79
End: 2024-04-28
Attending: INTERNAL MEDICINE
Payer: MEDICARE

## 2024-04-28 LAB
ARTERIAL PATENCY WRIST A: POSITIVE
BASE EXCESS BLDA CALC-SCNC: 11.2 MMOL/L (ref ?–2)
BODY TEMPERATURE: 98.6 F
COHGB MFR BLD: 1.8 % SAT (ref 0–3)
CREAT BLD-MCNC: 1.7 MG/DL
EGFRCR SERPLBLD CKD-EPI 2021: 31 ML/MIN/1.73M2 (ref 60–?)
GLUCOSE BLD-MCNC: 126 MG/DL (ref 70–99)
GLUCOSE BLD-MCNC: 126 MG/DL (ref 70–99)
GLUCOSE BLD-MCNC: 129 MG/DL (ref 70–99)
GLUCOSE BLD-MCNC: 204 MG/DL (ref 70–99)
GLUCOSE BLD-MCNC: 204 MG/DL (ref 70–99)
GLUCOSE BLD-MCNC: 64 MG/DL (ref 70–99)
HCO3 BLDA-SCNC: 33.6 MEQ/L (ref 21–27)
HGB BLD-MCNC: 10 G/DL
L/M: 1 L/MIN
METHGB MFR BLD: 1 % SAT (ref 0.4–1.5)
OXYHGB MFR BLDA: 92 % (ref 92–100)
PCO2 BLDA: 56 MM HG (ref 35–45)
PH BLDA: 7.43 [PH] (ref 7.35–7.45)
PO2 BLDA: 65 MM HG (ref 80–100)

## 2024-04-28 PROCEDURE — 82803 BLOOD GASES ANY COMBINATION: CPT | Performed by: STUDENT IN AN ORGANIZED HEALTH CARE EDUCATION/TRAINING PROGRAM

## 2024-04-28 PROCEDURE — 93308 TTE F-UP OR LMTD: CPT | Performed by: INTERNAL MEDICINE

## 2024-04-28 PROCEDURE — 83050 HGB METHEMOGLOBIN QUAN: CPT | Performed by: STUDENT IN AN ORGANIZED HEALTH CARE EDUCATION/TRAINING PROGRAM

## 2024-04-28 PROCEDURE — 82962 GLUCOSE BLOOD TEST: CPT

## 2024-04-28 PROCEDURE — 36600 WITHDRAWAL OF ARTERIAL BLOOD: CPT | Performed by: STUDENT IN AN ORGANIZED HEALTH CARE EDUCATION/TRAINING PROGRAM

## 2024-04-28 PROCEDURE — 70450 CT HEAD/BRAIN W/O DYE: CPT | Performed by: STUDENT IN AN ORGANIZED HEALTH CARE EDUCATION/TRAINING PROGRAM

## 2024-04-28 PROCEDURE — 82565 ASSAY OF CREATININE: CPT | Performed by: INTERNAL MEDICINE

## 2024-04-28 PROCEDURE — 85018 HEMOGLOBIN: CPT | Performed by: STUDENT IN AN ORGANIZED HEALTH CARE EDUCATION/TRAINING PROGRAM

## 2024-04-28 PROCEDURE — 82375 ASSAY CARBOXYHB QUANT: CPT | Performed by: STUDENT IN AN ORGANIZED HEALTH CARE EDUCATION/TRAINING PROGRAM

## 2024-04-28 RX ORDER — PREGABALIN 75 MG/1
300 CAPSULE ORAL NIGHTLY
Status: DISCONTINUED | OUTPATIENT
Start: 2024-04-28 | End: 2024-04-30

## 2024-04-28 NOTE — PLAN OF CARE
Rec'd pt at 0730. A&O x 1-2, uncooperative unless family is at bedside. Tele shows NSR. O2 sats adequate on 1-2L NC. Pt continent, up w/ 1 and walker. C/O generalized pain, prn tylenol given. Skin dry and intact. Bed locked and in low position, call light and personal items within reach. Will continue to monitor. POC - PO lasix BID, IV zosyn, wean O2, CPAP at night. Refused to be weighed this morning.    Problem: Diabetes/Glucose Control  Goal: Glucose maintained within prescribed range  Description: INTERVENTIONS:  - Monitor Blood Glucose as ordered  - Assess for signs and symptoms of hyperglycemia and hypoglycemia  - Administer ordered medications to maintain glucose within target range  - Assess barriers to adequate nutritional intake and initiate nutrition consult as needed  - Instruct patient on self management of diabetes  Outcome: Progressing     Problem: Patient/Family Goals  Goal: Patient/Family Long Term Goal  Description: Patient's Long Term Goal: to go home    Interventions:  - comply to care plan  - Mds to see  - See additional Care Plan goals for specific interventions  Outcome: Progressing  Goal: Patient/Family Short Term Goal  Description: Patient's Short Term Goal: to feel better    Interventions:   - IV lasix  - IV atbx  - comply to care plan  - Mds to see  - See additional Care Plan goals for specific interventions  Outcome: Progressing     Problem: CARDIOVASCULAR - ADULT  Goal: Maintains optimal cardiac output and hemodynamic stability  Description: INTERVENTIONS:  - Monitor vital signs, rhythm, and trends  - Monitor for bleeding, hypotension and signs of decreased cardiac output  - Evaluate effectiveness of vasoactive medications to optimize hemodynamic stability  - Monitor arterial and/or venous puncture sites for bleeding and/or hematoma  - Assess quality of pulses, skin color and temperature  - Assess for signs of decreased coronary artery perfusion - ex. Angina  - Evaluate fluid balance,  assess for edema, trend weights  Outcome: Progressing     Problem: RESPIRATORY - ADULT  Goal: Achieves optimal ventilation and oxygenation  Description: INTERVENTIONS:  - Assess for changes in respiratory status  - Assess for changes in mentation and behavior  - Position to facilitate oxygenation and minimize respiratory effort  - Oxygen supplementation based on oxygen saturation or ABGs  - Provide Smoking Cessation handout, if applicable  - Encourage broncho-pulmonary hygiene including cough, deep breathe, Incentive Spirometry  - Assess the need for suctioning and perform as needed  - Assess and instruct to report SOB or any respiratory difficulty  - Respiratory Therapy support as indicated  - Manage/alleviate anxiety  - Monitor for signs/symptoms of CO2 retention  Outcome: Progressing     Problem: METABOLIC/FLUID AND ELECTROLYTES - ADULT  Goal: Glucose maintained within prescribed range  Description: INTERVENTIONS:  - Monitor Blood Glucose as ordered  - Assess for signs and symptoms of hyperglycemia and hypoglycemia  - Administer ordered medications to maintain glucose within target range  - Assess barriers to adequate nutritional intake and initiate nutrition consult as needed  - Instruct patient on self management of diabetes  Outcome: Progressing  Goal: Electrolytes maintained within normal limits  Description: INTERVENTIONS:  - Monitor labs and rhythm and assess patient for signs and symptoms of electrolyte imbalances  - Administer electrolyte replacement as ordered  - Monitor response to electrolyte replacements, including rhythm and repeat lab results as appropriate  - Fluid restriction as ordered  - Instruct patient on fluid and nutrition restrictions as appropriate  Outcome: Progressing  Goal: Hemodynamic stability and optimal renal function maintained  Description: INTERVENTIONS:  - Monitor labs and assess for signs and symptoms of volume excess or deficit  - Monitor intake, output and patient weight  -  Monitor urine specific gravity, serum osmolarity and serum sodium as indicated or ordered  - Monitor response to interventions for patient's volume status, including labs, urine output, blood pressure (other measures as available)  - Encourage oral intake as appropriate  - Instruct patient on fluid and nutrition restrictions as appropriate  Outcome: Progressing     Problem: Delirium  Goal: Minimize duration of delirium  Description: Interventions:  - Encourage use of hearing aids, eye glasses  - Promote highest level of mobility daily  - Provide frequent reorientation  - Promote wakefulness i.e. lights on, blinds open  - Promote sleep, encourage patient's normal rest cycle i.e. lights off, TV off, minimize noise and interruptions  - Encourage family to assist in orientation and promotion of home routines  Outcome: Progressing

## 2024-04-28 NOTE — PROGRESS NOTES
[facility]    Nina Donnelly Patient Status:  Inpatient    10/9/1945 MRN [id number]   Formerly [facility] 2NE-A Attending Татьяна Schneider,    Hosp Day # 7 PCP Chiki Caldwell MD     SUBJECTIVE: Pt less confused today, recognizes me.  Complains of some generalized weakness.      OBJECTIVE:  /55   Pulse 75   Temp 98.2 °F (36.8 °C) (Oral)   Resp 18   Ht 5' 0.98\" (1.549 m)   Wt 196 lb 3.4 oz (89 kg)   SpO2 93%   BMI 37.09 kg/m²   O2 requirement: 3L     I/O last 3 completed shifts:  In: 420 [P.O.:420]  Out: -   No intake/output data recorded.     Current Medications:   Current Facility-Administered Medications:     furosemide (Lasix) tab 20 mg, 20 mg, Oral, BID (Diuretic)    insulin degludec 100 units/mL flextouch 26 Units, 26 Units, Subcutaneous, Nightly    hydrALAZINE (Apresoline) tab 75 mg, 75 mg, Oral, Q8H CHICO    piperacillin-tazobactam (Zosyn) 3.375 g in dextrose 5% 100 mL IVPB-ADDV, 3.375 g, Intravenous, Q8H    diphenhydrAMINE-zinc (Benadryl-Zinc) 2-0.1 % cream 1 Application, 1 Application, Topical, TID PRN    hydrALAzine (Apresoline) 20 mg/mL injection 10 mg, 10 mg, Intravenous, Q4H PRN    acetaminophen (Tylenol Extra Strength) tab 1,000 mg, 1,000 mg, Oral, Q6H PRN    insulin aspart (NovoLOG) 100 Units/mL FlexPen 7 Units, 7 Units, Subcutaneous, Daily with dinner    [Held by provider] pregabalin (Lyrica) cap 600 mg, 600 mg, Oral, Nightly    insulin aspart (NovoLOG) 100 Units/mL FlexPen 6 Units, 6 Units, Subcutaneous, BID AC    trolamine salicyliate 10 % cream, , Topical, TID PRN    aspirin chewable tab 81 mg, 81 mg, Oral, Daily    atorvastatin (Lipitor) tab 10 mg, 10 mg, Oral, Nightly    donepezil (Aricept) tab 10 mg, 10 mg, Oral, Nightly    DULoxetine (Cymbalta)  cap 60 mg, 60 mg, Oral, Daily    fluticasone propionate (Flonase) 50 MCG/ACT nasal suspension 1 spray, 1 spray, Each Nare, Daily    montelukast (Singulair) tab 10 mg, 10 mg, Oral, Nightly    traMADol (Ultram) tab 50 mg, 50  mg, Oral, BID PRN    glucose (Dex4) 15 GM/59ML oral liquid 15 g, 15 g, Oral, Q15 Min PRN **OR** glucose (Glutose) 40% oral gel 15 g, 15 g, Oral, Q15 Min PRN **OR** glucose-vitamin C (Dex-4) chewable tab 4 tablet, 4 tablet, Oral, Q15 Min PRN **OR** dextrose 50% injection 50 mL, 50 mL, Intravenous, Q15 Min PRN **OR** glucose (Dex4) 15 GM/59ML oral liquid 30 g, 30 g, Oral, Q15 Min PRN **OR** glucose (Glutose) 40% oral gel 30 g, 30 g, Oral, Q15 Min PRN **OR** glucose-vitamin C (Dex-4) chewable tab 8 tablet, 8 tablet, Oral, Q15 Min PRN    enoxaparin (Lovenox) 30 MG/0.3ML SUBQ injection 30 mg, 30 mg, Subcutaneous, Daily    insulin aspart (NovoLOG) 100 Units/mL FlexPen 2-10 Units, 2-10 Units, Subcutaneous, TID AC and HS      Physical Exam:                          General: alert, coperative, in NAD                          HEENT: oropharynx clear without erythema or exudates, moist mucous membranes                          Lungs: Clear to auscultation bilaterally, no wheezes                          Chest wall: No tenderness or deformity.                          Heart: Regular rate and rhythm, normal S1S2                          Abdomen: soft, non-tender, non-distended, positive BS.                          Extremity: No clubbing or cyanosis. no edema                          Skin: No rashes or lesions.            Lab Results   Component Value Date    CREATSERUM 1.70 04/28/2024     Lab Results   Component Value Date    INR 1.08 04/21/2024          Imaging: I have independently visualized all relevant chest imaging in PACS.  I agree with the radiology interpretation except where noted.    ASSESSMENT/PLAN:  Acute on chronic hypercapnic and hypoxic resp failure- due to CHF exacerbation, atelectasis, obesity, COPD and possible developing PNA.  initially declining CPAP while here bc she doesn't like mask; now using via BIPAP prn and with sleep.    - wean O2 as able, now on 2-3L  - ABG with compensated resp acidosis  - echo  with preserved EF, RV mildly increased in size, could not estimate PASP.  Will check bubble study to rule out shunt.  IF this is negative and hypoxia persists would consider RHC to better evaluate pulmonary pressures   - d-dimer negative, LLE doppler negative for DVT  - CT chest without focal infiltrate   - cont with BIPAP with sleep and prn  - IS/BDs prn. Not actively wheezing on exam  - diuresis per cards  ID - developing possible PNA  - on admission, COVID/RVP negative, PCT negative and no infectious symptoms.  Antibiotics started empirically 4/21, broadened to zosyn on 4/25 bc of interval deterioration  - cultures NGTD, repeat PCT negative.  RVP negative   - CT chest without focal infiltrate, pt has now completed 8 days of Abx, will stop  WYATT- cont with CPAP with all sleep with O2 entrained as OP  H/o COPD- not on inhalers as OP due to intolerance- no signs of exac  - pt with mild obstruction with moderate decrease in diffusion capacity on most recent OP PFTs  - singulair   - BDs prn  Encephalopathy: suspect TME with hospital induced delirium  -frequent reorientation   Proph- LMWH  Dispo- Full code  - will follow  - d/w daughter at bedside    Olga Amador MD

## 2024-04-28 NOTE — PROGRESS NOTES
Nina Donnelly Patient Status:  Inpatient    10/9/1945 MRN [id number]   [facility] 2NE-A Attending Jamar Arora MD   Hosp Day # 2 PCP Chiki Caldwell MD        Reason for Consultation:  CHF        History of Present Illness:  Nina Donnelly is a a(n) 78 year old female. She has COPD, WYATT, Diabetes, Dyslipidemia .  Uses CPAP and O2 at 2 l/min at home.  Still able to do some walking.  Reports 2 weeks of dry cough, worsened dyspnea.  Daughter notes worse hypoxia (O2 sats down to 70's instead of high 80's ).  LE edema was reported as well. Feels weak and tired.  No fever. Chills.  Previous bouts of pneumonia.     Subjective:       Sleeping this morning. Had a lot of delirium overnight. No CV complaints.      Medications:  Scheduled Medications    pregabalin  600 mg Oral Nightly    insulin degludec  20 Units Subcutaneous Nightly    insulin aspart  6 Units Subcutaneous BID AC    insulin aspart  8 Units Subcutaneous Daily with dinner    aspirin  81 mg Oral Daily    atorvastatin  10 mg Oral Nightly    donepezil  10 mg Oral Nightly    DULoxetine  60 mg Oral Daily    fluticasone propionate  1 spray Each Nare Daily    montelukast  10 mg Oral Nightly    cefTRIAXone  1 g Intravenous Q24H    enoxaparin  30 mg Subcutaneous Daily    insulin aspart  2-10 Units Subcutaneous TID AC and HS    azithromycin  500 mg Oral Daily    furosemide  40 mg Intravenous Daily            Continuous Infusions:  Medication Infusions            Social History:   reports that she quit smoking about 9 years ago. Her smoking use included cigarettes. She started smoking about 64 years ago. She has a 55 pack-year smoking history. She has never used smokeless tobacco. She reports that she does not drink alcohol and does not use drugs.     Review of Systems:  All systems were reviewed and are negative except as described above in HPI.     Physical Exam:            Wt Readings from Last 3 Encounters:   24 203 lb 4.2 oz  (92.2 kg)   03/30/22 199 lb (90.3 kg)   03/21/22 193 lb (87.5 kg)         Vitals          Vitals:     04/22/24 2340 04/22/24 2345 04/22/24 2350 04/23/24 0505   BP:   (!) 169/61 (!) 173/57     BP Location:   Left arm Right arm     Pulse: 67 72 70     Resp:   22   24   Temp:   97.9 °F (36.6 °C)   98.2 °F (36.8 °C)   TempSrc:   Oral   Oral   SpO2: 95% 96% 94%     Weight:       203 lb 4.2 oz (92.2 kg)   Height:                   Temp:  [97.9 °F (36.6 °C)-98.7 °F (37.1 °C)] 98.2 °F (36.8 °C)  Pulse:  [53-82] 70  Resp:  [16-24] 24  BP: 131/48  SpO2:  [85 %-98 %] 94 %     Temp: 98.2 °F (36.8 °C)  Pulse: 70  Resp: 24  BP: 173/57       General:  Appears comfortable  HEENT: No focal deficits.  Neck: No JVD, carotids 2+ no bruits.  Cardiac: Regular S1S2.  No S3, S4, rub, click.  No murmur. No JVD  Lungs: diminished. No rales.  Abdomen: Soft, non-tender.   Extremities: No edema.  No clubbing or cyanosis  Neurologic: Alert and oriented, normal affect.  Skin: Warm and dry.      Labs:        HEM:        Recent Labs   Lab 04/21/24 2033 04/22/24  0514 04/23/24  0534   WBC 8.8 8.6 6.5   HGB 9.9* 10.0* 9.9*   HCT 34.3* 35.0 35.8   .0 202.0 197.0         Chem:        Recent Labs   Lab 04/21/24 2033 04/22/24  0514 04/23/24  0534    144 141   K 4.1 3.9 4.5    110 105   CO2 28.0 31.0 33.0*   BUN 26* 22 26*   CREATSERUM 1.68* 1.55* 1.68*   MG  --   --  2.0   ALT 15  --   --    AST 18  --   --    ALB 3.2*  --   --              Recent Labs   Lab 04/21/24 2033   INR 1.08         No results found for: \"TROP\", \"CKMB\"        Invalid input(s): \"PBNPML\"        Telemetry: SR     Laboratories and Data:  Diagnostics:     EKG, 4/21/2024:  NSR, NSSTTW changes     CXR, 4/22/2024:       CONCLUSION:  Bilateral lower lobe predominant interstitial opacities and suspected small left pleural effusion and cardiomegaly.  Findings may be related to edema, though infectious/inflammatory process is not excluded.       Echo, 10/2023:          Summary:     1. Left ventricle: The cavity size is normal. There is mild concentric      hypertrophy. Systolic function is normal. The estimated ejection fraction      is 55-60%. Grade I diastolic dysfunction.   2. Aortic valve: Trileaflet. The leaflets are mildly thickened and mildly      calcified. The peak systolic velocity is 1.5m/sec. The mean systolic      gradient is 4mm Hg. The peak systolic gradient is 9mm Hg. The valve area      is 2.2cm^2. The valve area index is 1.15cm^2/m^2.   3. Mitral valve: The annulus is calcified, fibrotic, and thickened. The      leaflets are mildly thickened and mildly calcified. There is mild      regurgitation.   4. Left atrium: The atrium is mildly to moderately dilated.   5. Right ventricle: The cavity size is normal. Wall thickness is normal.      Systolic function is normal. Estimation of the right ventricular systolic      pressure is within the normal range. The RV pressure during systole is      31mm Hg.   6. Tricuspid valve: There is mild regurgitation.   7. Pericardium, extracardiac: A probable, trace pericardial effusion is      identified. There is no evidence of hemodynamic compromise.         Impression:  1.  HFpEF      Net negative 1L   Pro BNP normal yesterday    Well compensated by exam    On 20mg PO lasix BID   2. HTN   Normotensive 120s/50-60s   On hydralazine 75mg TID   3. COPD/PNA     3. WYATT     4. Diabetes     5. Dyslipidemia     On ASA and statin   6. CKD, Cr = 1.4-1.6   Cr 1.79 yesterday   Labs this AM pending      7. Microcytic anemia, chronic     8. Lower extremity pain (popliteal) - no DVT.         Recommend:  1. Telemetry  2. Echo - Ef 70-75%, mild LVH, mild valvulopathy.  3. Continue PO diuretics as long as Cr stable (labs pending)  4. Treatment of COPD/PNA per IM/pulm; deescalate antibiotics as cultures direct.  5. Continue PO hydralizine to 75mg TID.    6. PRN IV hydralazine for BP over 160               - BP meds limited by renal function /   COPD and LE edema  7. Monitor renal functio with diuresis

## 2024-04-28 NOTE — PROGRESS NOTES
[facility]   part of [facility]     Progress Note  Nina Donnelly Patient Status:  Inpatient    10/9/1945 MRN [id number]   Location [facility] 2NE-A Attending Татьяна Schneider, DO   Hosp Day # 7 PCP Chiki Caldwell MD         Assessment and Plan:    78 year old female with a PMHx sig for DM2, GERD, HLD, HTN, MDD, CKD3, neuropathy, WYATT and COPD and dementia presents to the hospital with shortness of breath and bilateral ankle swelling.  Patient managed for acute on chronic hypoxic and hypercapnic respiratory failure as well as CHF exacerbation.  Course continues to be complicated by encephalopathy.       Altered mental status improving  Toxic metabolic encephalopathy-improving  - In the setting of possible infection and WYATT  - BiPAP/CPAP for naps and sleep  - Delirium precautions  - Pregabalin initially held now patient having neuropathy symptoms, will resume at half dose  - Discontinue tramadol  - Will repeat ABG  given encephalopathy this morning as well as failure to wear CPAP/BiPAP overnight  - Will obtain CT head.  If negative, can consider MRI.  However no focal deficit at this time  - If persistently encephalopathic, may consider neurology consult  - Continue to monitor    Acute on chronic respiratory failure with hypoxia  Respiratory acidosis  -Wean O2 as able  - CPAP/BiPAP with all naps and sleep  - 2D echo with preserved EF, RVSP was not able to be estimated,  - Empiric antibiotics for pneumonia however Pro-Dougie negative-antibiotics to complete tomorrow  - CT chest reviewed, central emphysema otherwise no consolidation or edema noted  - Plan for 2D echo with bubble study per pulmonology  - If no shunt is noted, may require right heart cath-discussed with pulm  - Incentive spirometer, bronchodilators as needed  - Pulmonology following,-Case discussed with      COPD  - History of smoking in the past  - Outpatient PFTs with mild obstruction and moderate  decrease in diffusion capacity  - Cannot tolerate inhalers in the outpatient setting  - Bronchodilators as needed  - Discussed with Dr. Perry ACOSTA  Acute on chronic diastolic CHF  -2D echo with preserved EF  -HFpEF  -proBNP elevated 695->200  -Previously on IV Lasix, now switched over to oral  - proBNP not significantly elevated, 2D echo with preserved EF, patient with known COPD and chronic respiratory failure and WYATT.  Possible concern for pulmonary hypertension?  Rule out pHTN  - Telemetry  -Cardiology on consult, recommendations reviewed     HTN  -po hydralazine started, still receiving IV PRN hydralazine as well       BIBIANA on CKD3-   -Follow BMP given diuretics on board    WYATT  Chronic RF w hypoxia  -cpap  -singulair  -02 walk test ordered     DM2 with hyperglycemia  -hold orals  -ADA diet  -accucheck ac/hs  -ssi, cont home regime as able  -insulin adjusted     Type 2 diabetes with neuropathy  - Takes pregabalin 600 mg nightly  - Was held for the past 3 doses given encephalopathy, patient now with neuropathy symptoms  - Will resume pregabalin 300 nightly    MDD  -cymbalta       Dementia  -donepezil     GOC: Full code     MA/ACO Reach  -Re- Entry: no  -Consults:cards  -Discharge Needs: TBD  -Appointments: PCP       CARLOS fam in am  -diet-cc     Prophy  -SCD  -lovenox     Dispo  -pending clinical course    PCP: Chiki Caldwell MD    Note: This chart was prepared using voice recognition software and may contain unintended word substitution errors.  I did not consent to being A/V recorded during this encounter.         Татьяна Schneider DO  Hospitalist  [facility]               SUBJECTIVE:     Patient did not wear CPAP at night.  Family had to come in to help her wear the oxygen.  This morning she is somewhat altered, states that \"there are chicken on the window\" family at bedside.                OBJECTIVE:   Blood pressure (!) 164/62, pulse 71, temperature 98.2 °F (36.8 °C), temperature source Oral,  resp. rate 17, height 5' 0.98\" (1.549 m), weight 196 lb 3.4 oz (89 kg), SpO2 92%.    GENERAL: no apparent distress, flat affect  NEURO: A/A Ox 2 (cannot tell me her date of birth), some confusion, no focal deficits  RESP: non labored, diminished bilaterally  CARDIO: Regular, no murmur  ABD: soft, NT, ND, BS+  EXTREMITIES: no edema, no calf tenderness    DIAGNOSTIC DATA:   Labs:     Recent Labs   Lab 04/21/24 2033 04/22/24  0514 04/23/24  0534 04/24/24  0655 04/25/24  0630 04/26/24  0659 04/27/24  0631   WBC 8.8   < > 6.5 6.2 7.5 7.0 6.9   HGB 9.9*   < > 9.9* 9.7* 11.0* 10.6* 10.6*   MCV 58.5*   < > 60.3* 59.7* 57.7* 58.0* 57.9*   .0   < > 197.0 202.0 215.0 202.0 170.0   INR 1.08  --   --   --   --   --   --     < > = values in this interval not displayed.       Recent Labs   Lab 04/23/24  0534 04/24/24  0655 04/25/24  0630 04/26/24  0700 04/27/24  0631 04/28/24  0751    139 136 139 141  --    K 4.5 4.5 4.9 4.4 4.1  --     105 102 102 101  --    CO2 33.0* 32.0 32.0 36.0* 36.0*  --    BUN 26* 25* 30* 33* 30*  --    CREATSERUM 1.68* 1.52* 1.51* 1.74*  1.74* 1.79*  1.79* 1.70*   CA 9.0 8.7 9.2 8.8 9.1  --    MG 2.0 2.3 2.4  --   --   --    * 105* 150* 140* 135*  --        Recent Labs   Lab 04/21/24 2033   ALT 15   AST 18   ALB 3.2*       Recent Labs   Lab 04/27/24  1638 04/27/24  2127 04/27/24  2151 04/28/24  0522 04/28/24  1220   PGLU 148* 64* 129* 126* 204*       No results for input(s): \"TROP\" in the last 168 hours.      MEDICATIONS      Current Facility-Administered Medications   Medication Dose Route Frequency    pregabalin (Lyrica) cap 300 mg  300 mg Oral Nightly    furosemide (Lasix) tab 20 mg  20 mg Oral BID (Diuretic)    insulin degludec 100 units/mL flextouch 26 Units  26 Units Subcutaneous Nightly    hydrALAZINE (Apresoline) tab 75 mg  75 mg Oral Q8H CHICO    piperacillin-tazobactam (Zosyn) 3.375 g in dextrose 5% 100 mL IVPB-ADDV  3.375 g Intravenous Q8H    diphenhydrAMINE-zinc  (Benadryl-Zinc) 2-0.1 % cream 1 Application  1 Application Topical TID PRN    hydrALAzine (Apresoline) 20 mg/mL injection 10 mg  10 mg Intravenous Q4H PRN    acetaminophen (Tylenol Extra Strength) tab 1,000 mg  1,000 mg Oral Q6H PRN    insulin aspart (NovoLOG) 100 Units/mL FlexPen 7 Units  7 Units Subcutaneous Daily with dinner    insulin aspart (NovoLOG) 100 Units/mL FlexPen 6 Units  6 Units Subcutaneous BID AC    trolamine salicyliate 10 % cream   Topical TID PRN    aspirin chewable tab 81 mg  81 mg Oral Daily    atorvastatin (Lipitor) tab 10 mg  10 mg Oral Nightly    donepezil (Aricept) tab 10 mg  10 mg Oral Nightly    DULoxetine (Cymbalta) DR cap 60 mg  60 mg Oral Daily    fluticasone propionate (Flonase) 50 MCG/ACT nasal suspension 1 spray  1 spray Each Nare Daily    montelukast (Singulair) tab 10 mg  10 mg Oral Nightly    glucose (Dex4) 15 GM/59ML oral liquid 15 g  15 g Oral Q15 Min PRN    Or    glucose (Glutose) 40% oral gel 15 g  15 g Oral Q15 Min PRN    Or    glucose-vitamin C (Dex-4) chewable tab 4 tablet  4 tablet Oral Q15 Min PRN    Or    dextrose 50% injection 50 mL  50 mL Intravenous Q15 Min PRN    Or    glucose (Dex4) 15 GM/59ML oral liquid 30 g  30 g Oral Q15 Min PRN    Or    glucose (Glutose) 40% oral gel 30 g  30 g Oral Q15 Min PRN    Or    glucose-vitamin C (Dex-4) chewable tab 8 tablet  8 tablet Oral Q15 Min PRN    enoxaparin (Lovenox) 30 MG/0.3ML SUBQ injection 30 mg  30 mg Subcutaneous Daily    insulin aspart (NovoLOG) 100 Units/mL FlexPen 2-10 Units  2-10 Units Subcutaneous TID AC and HS                  IMAGING     CT CHEST (CPT=71250)    Result Date: 4/27/2024  CONCLUSION:  1. Although recent radiographs suggested bibasilar airspace opacities, no acute airspace disease within the chest is identified. 2. Mild centrilobular emphysematous changes primarily within the upper lobes. 3. Cardiomegaly with atherosclerotic changes of the coronary vessels as described above. 4. Please see further  details within the body of the report above.  LOCATION:  [facility]   Dictated by (LOYDA): Ha Giordano DO on 4/27/2024 at 1:01 PM     Finalized by (LOYDA): Ha Giordano DO on 4/27/2024 at 1:07 PM

## 2024-04-28 NOTE — PLAN OF CARE
Patient A&Ox1-2- uncooperative and agitated with staff help. On 5L via HFNC with O2 saturation greater than 90%. NSR on tele. SOB with exertion. Continent of bowel and bladder. Ambulates x1 and walker. Patient updated on plan of care. Call light within reach.   POC: IV antibiotics, PO Lasix  2130 Patient  had hypoglycemic episode hospitalist aware protocol followed.  2200- Refusing CPAP education provided will continue to encourage use of CPAP.   2330: Patient pulled off O2 refusing to put it back on, have tried to assist patient with placing O2 back on she gets very agitated. O2 saturation is in low 80's. Hospitalist aware. Call and left voicemail with daughter to possibly have family member come sit with patient to possible relax her.   0030: Patient's son was able to come up and sit with her for the night. She is less agitated with him at bedside and is wearing O2.     Problem: Diabetes/Glucose Control  Goal: Glucose maintained within prescribed range  Description: INTERVENTIONS:  - Monitor Blood Glucose as ordered  - Assess for signs and symptoms of hyperglycemia and hypoglycemia  - Administer ordered medications to maintain glucose within target range  - Assess barriers to adequate nutritional intake and initiate nutrition consult as needed  - Instruct patient on self management of diabetes  Outcome: Progressing     Problem: Patient/Family Goals  Goal: Patient/Family Long Term Goal  Description: Patient's Long Term Goal: to go home    Interventions:  - comply to care plan  - Mds to see  - See additional Care Plan goals for specific interventions  Outcome: Progressing  Goal: Patient/Family Short Term Goal  Description: Patient's Short Term Goal: to feel better    Interventions:   - IV lasix  - IV atbx  - comply to care plan  - Mds to see  - See additional Care Plan goals for specific interventions  Outcome: Progressing     Problem: CARDIOVASCULAR - ADULT  Goal: Maintains optimal cardiac output and hemodynamic  stability  Description: INTERVENTIONS:  - Monitor vital signs, rhythm, and trends  - Monitor for bleeding, hypotension and signs of decreased cardiac output  - Evaluate effectiveness of vasoactive medications to optimize hemodynamic stability  - Monitor arterial and/or venous puncture sites for bleeding and/or hematoma  - Assess quality of pulses, skin color and temperature  - Assess for signs of decreased coronary artery perfusion - ex. Angina  - Evaluate fluid balance, assess for edema, trend weights  Outcome: Progressing     Problem: RESPIRATORY - ADULT  Goal: Achieves optimal ventilation and oxygenation  Description: INTERVENTIONS:  - Assess for changes in respiratory status  - Assess for changes in mentation and behavior  - Position to facilitate oxygenation and minimize respiratory effort  - Oxygen supplementation based on oxygen saturation or ABGs  - Provide Smoking Cessation handout, if applicable  - Encourage broncho-pulmonary hygiene including cough, deep breathe, Incentive Spirometry  - Assess the need for suctioning and perform as needed  - Assess and instruct to report SOB or any respiratory difficulty  - Respiratory Therapy support as indicated  - Manage/alleviate anxiety  - Monitor for signs/symptoms of CO2 retention  Outcome: Progressing     Problem: METABOLIC/FLUID AND ELECTROLYTES - ADULT  Goal: Glucose maintained within prescribed range  Description: INTERVENTIONS:  - Monitor Blood Glucose as ordered  - Assess for signs and symptoms of hyperglycemia and hypoglycemia  - Administer ordered medications to maintain glucose within target range  - Assess barriers to adequate nutritional intake and initiate nutrition consult as needed  - Instruct patient on self management of diabetes  Outcome: Progressing  Goal: Electrolytes maintained within normal limits  Description: INTERVENTIONS:  - Monitor labs and rhythm and assess patient for signs and symptoms of electrolyte imbalances  - Administer electrolyte  replacement as ordered  - Monitor response to electrolyte replacements, including rhythm and repeat lab results as appropriate  - Fluid restriction as ordered  - Instruct patient on fluid and nutrition restrictions as appropriate  Outcome: Progressing  Goal: Hemodynamic stability and optimal renal function maintained  Description: INTERVENTIONS:  - Monitor labs and assess for signs and symptoms of volume excess or deficit  - Monitor intake, output and patient weight  - Monitor urine specific gravity, serum osmolarity and serum sodium as indicated or ordered  - Monitor response to interventions for patient's volume status, including labs, urine output, blood pressure (other measures as available)  - Encourage oral intake as appropriate  - Instruct patient on fluid and nutrition restrictions as appropriate  Outcome: Progressing     Problem: Delirium  Goal: Minimize duration of delirium  Description: Interventions:  - Encourage use of hearing aids, eye glasses  - Promote highest level of mobility daily  - Provide frequent reorientation  - Promote wakefulness i.e. lights on, blinds open  - Promote sleep, encourage patient's normal rest cycle i.e. lights off, TV off, minimize noise and interruptions  - Encourage family to assist in orientation and promotion of home routines  Outcome: Progressing

## 2024-04-29 LAB
ANION GAP SERPL CALC-SCNC: 1 MMOL/L (ref 0–18)
BASOPHILS # BLD AUTO: 0.03 X10(3) UL (ref 0–0.2)
BASOPHILS NFR BLD AUTO: 0.4 %
BUN BLD-MCNC: 26 MG/DL (ref 9–23)
CALCIUM BLD-MCNC: 9.1 MG/DL (ref 8.5–10.1)
CHLORIDE SERPL-SCNC: 103 MMOL/L (ref 98–112)
CO2 SERPL-SCNC: 35 MMOL/L (ref 21–32)
CREAT BLD-MCNC: 1.72 MG/DL
CREAT BLD-MCNC: 1.72 MG/DL
EGFRCR SERPLBLD CKD-EPI 2021: 30 ML/MIN/1.73M2 (ref 60–?)
EGFRCR SERPLBLD CKD-EPI 2021: 30 ML/MIN/1.73M2 (ref 60–?)
EOSINOPHIL # BLD AUTO: 0.37 X10(3) UL (ref 0–0.7)
EOSINOPHIL NFR BLD AUTO: 4.5 %
ERYTHROCYTE [DISTWIDTH] IN BLOOD BY AUTOMATED COUNT: 19.8 %
GLUCOSE BLD-MCNC: 112 MG/DL (ref 70–99)
GLUCOSE BLD-MCNC: 133 MG/DL (ref 70–99)
GLUCOSE BLD-MCNC: 149 MG/DL (ref 70–99)
GLUCOSE BLD-MCNC: 170 MG/DL (ref 70–99)
GLUCOSE BLD-MCNC: 181 MG/DL (ref 70–99)
HCT VFR BLD AUTO: 35.2 %
HGB BLD-MCNC: 9.9 G/DL
IMM GRANULOCYTES # BLD AUTO: 0.03 X10(3) UL (ref 0–1)
IMM GRANULOCYTES NFR BLD: 0.4 %
LYMPHOCYTES # BLD AUTO: 0.8 X10(3) UL (ref 1–4)
LYMPHOCYTES NFR BLD AUTO: 9.7 %
MCH RBC QN AUTO: 16.6 PG (ref 26–34)
MCHC RBC AUTO-ENTMCNC: 28.1 G/DL (ref 31–37)
MCV RBC AUTO: 59.1 FL
MONOCYTES # BLD AUTO: 0.74 X10(3) UL (ref 0.1–1)
MONOCYTES NFR BLD AUTO: 9 %
NEUTROPHILS # BLD AUTO: 6.25 X10 (3) UL (ref 1.5–7.7)
NEUTROPHILS # BLD AUTO: 6.25 X10(3) UL (ref 1.5–7.7)
NEUTROPHILS NFR BLD AUTO: 76 %
OSMOLALITY SERPL CALC.SUM OF ELEC: 295 MOSM/KG (ref 275–295)
PLATELET # BLD AUTO: 166 10(3)UL (ref 150–450)
PLATELETS.RETICULATED NFR BLD AUTO: 6.1 % (ref 0–7)
POTASSIUM SERPL-SCNC: 3.3 MMOL/L (ref 3.5–5.1)
POTASSIUM SERPL-SCNC: 4.2 MMOL/L (ref 3.5–5.1)
RBC # BLD AUTO: 5.96 X10(6)UL
SODIUM SERPL-SCNC: 139 MMOL/L (ref 136–145)
WBC # BLD AUTO: 8.2 X10(3) UL (ref 4–11)

## 2024-04-29 PROCEDURE — 97116 GAIT TRAINING THERAPY: CPT

## 2024-04-29 PROCEDURE — 82565 ASSAY OF CREATININE: CPT | Performed by: INTERNAL MEDICINE

## 2024-04-29 PROCEDURE — 85025 COMPLETE CBC W/AUTO DIFF WBC: CPT | Performed by: STUDENT IN AN ORGANIZED HEALTH CARE EDUCATION/TRAINING PROGRAM

## 2024-04-29 PROCEDURE — 97110 THERAPEUTIC EXERCISES: CPT

## 2024-04-29 PROCEDURE — 82962 GLUCOSE BLOOD TEST: CPT

## 2024-04-29 PROCEDURE — 80048 BASIC METABOLIC PNL TOTAL CA: CPT | Performed by: STUDENT IN AN ORGANIZED HEALTH CARE EDUCATION/TRAINING PROGRAM

## 2024-04-29 PROCEDURE — 84132 ASSAY OF SERUM POTASSIUM: CPT | Performed by: STUDENT IN AN ORGANIZED HEALTH CARE EDUCATION/TRAINING PROGRAM

## 2024-04-29 RX ORDER — ACETAMINOPHEN 500 MG
1000 TABLET ORAL EVERY 6 HOURS PRN
Status: DISCONTINUED | OUTPATIENT
Start: 2024-04-29 | End: 2024-04-30

## 2024-04-29 RX ORDER — POTASSIUM CHLORIDE 20 MEQ/1
40 TABLET, EXTENDED RELEASE ORAL EVERY 4 HOURS
Status: COMPLETED | OUTPATIENT
Start: 2024-04-29 | End: 2024-04-29

## 2024-04-29 NOTE — PROGRESS NOTES
Pulmonary Progress Note        NAME: Nina Donnelly - ROOM: 2626/2626-A - MRN: [id number] - Age: 78 year old - : 10/9/1945        Last 24hrs: No events overnight, feels a bit better today    OBJECTIVE:  Vitals:    24 2259 24 0317 24 0524 24 0842   BP: 143/52 134/60  131/51   BP Location: Right arm Right arm  Left arm   Pulse: 72 69 97 82   Resp: 16 18  14   Temp: 98.6 °F (37 °C) 98.4 °F (36.9 °C)  98.6 °F (37 °C)   TempSrc: Oral Oral  Oral   SpO2: (!) 87% 95% 97% 95%   Weight:   192 lb 14.4 oz (87.5 kg)    Height:           Oxygen Therapy  SpO2: 95 %  O2 Device: Nasal cannula  O2 Flow Rate (L/min): 1.5 L/min  Pulse Oximetry Type: Continuous  Oximetry Probe Site Changed: No  Pulse Ox Probe Location: Left hand                  Intake/Output Summary (Last 24 hours) at 2024 1151  Last data filed at 2024 0800  Gross per 24 hour   Intake 780 ml   Output --   Net 780 ml       Scheduled Medication:   pregabalin  300 mg Oral Nightly    furosemide  20 mg Oral BID (Diuretic)    insulin degludec  26 Units Subcutaneous Nightly    hydrALAZINE  75 mg Oral Q8H CHICO    insulin aspart  7 Units Subcutaneous Daily with dinner    insulin aspart  6 Units Subcutaneous BID AC    aspirin  81 mg Oral Daily    atorvastatin  10 mg Oral Nightly    donepezil  10 mg Oral Nightly    DULoxetine  60 mg Oral Daily    fluticasone propionate  1 spray Each Nare Daily    montelukast  10 mg Oral Nightly    enoxaparin  30 mg Subcutaneous Daily    insulin aspart  2-10 Units Subcutaneous TID AC and HS     Continuous Infusing Medication:    Lungs: clear to auscultation bilaterally  Heart: S1, S2 normal, no murmur, click, rub or gallop, regular rate and rhythm  Abdomen: soft, non-tender; bowel sounds normal; no masses,  no organomegaly  Extremities: extremities normal, atraumatic, no cyanosis or edema    Labs reviewed as noted below        ASSESSMENT/PLAN:    Acute on chronic hypercapnic and hypoxic resp failure- due to CHF  exacerbation, atelectasis, obesity, COPD and possible developing PNA.  initially declining CPAP while here bc she doesn't like mask; now using BIPAP prn and with sleep.    - wean O2 as able, baseline is 2L w/ sleep and activity  - ABG with compensated resp acidosis  - echo with preserved EF, RV mildly increased in size, could not estimate PASP.  Bubble study negative  - d-dimer negative, LLE doppler negative for DVT  - CT chest without focal infiltrate   - cont with BIPAP with sleep and prn  - IS/BDs prn. Not actively wheezing on exam  - diuresis per cards  ID - ?PNA  - on admission, COVID/RVP negative, PCT negative and no infectious symptoms.  Antibiotics started empirically 4/21, broadened to zosyn on 4/25 bc of interval deterioration  - cultures NGTD, repeat PCT negative.  RVP negative   - has completed Abx  WYATT- cont with CPAP with all sleep with O2 entrained as OP  H/o COPD- not on inhalers as OP due to intolerance- no signs of exac  - pt with mild obstruction with moderate decrease in diffusion capacity on most recent OP PFTs  - singulair   - BDs prn  Encephalopathy: suspect TME with hospital induced delirium  -frequent reorientation   Proph- LMWH  Dispo- Full code  - will follow  -reassess ambulatory O2 tomorrow on 2L        Hernandez EarlyManakin Sabotander  [facility]  Pulmonary and Critical Care

## 2024-04-29 NOTE — PLAN OF CARE
Assumed care of pt at 0730  A&Ox4, up with standby assist and a walker, no complaints of pain at this time  2L NC at rest 3L NC with ambulation, uses CPAP at night, NSR, no chest pain, pt endorses dyspnea on exertion  Skin is clean, dry, and intact, no wounds present  Continent of bowel and bladder, last BM 4/29  Fall precautions in place, bed and chair alarm on, call light within reach, pt and family updated on plan of care, will continue to monitor                      Problem: Diabetes/Glucose Control  Goal: Glucose maintained within prescribed range  Description: INTERVENTIONS:  - Monitor Blood Glucose as ordered  - Assess for signs and symptoms of hyperglycemia and hypoglycemia  - Administer ordered medications to maintain glucose within target range  - Assess barriers to adequate nutritional intake and initiate nutrition consult as needed  - Instruct patient on self management of diabetes  Outcome: Progressing     Problem: Patient/Family Goals  Goal: Patient/Family Long Term Goal  Description: Patient's Long Term Goal: to go home    Interventions:  - comply to care plan  - Mds to see  - See additional Care Plan goals for specific interventions  Outcome: Progressing  Goal: Patient/Family Short Term Goal  Description: Patient's Short Term Goal: to feel better    Interventions:   - IV lasix  - IV atbx  - comply to care plan  - Mds to see  - See additional Care Plan goals for specific interventions  Outcome: Progressing     Problem: CARDIOVASCULAR - ADULT  Goal: Maintains optimal cardiac output and hemodynamic stability  Description: INTERVENTIONS:  - Monitor vital signs, rhythm, and trends  - Monitor for bleeding, hypotension and signs of decreased cardiac output  - Evaluate effectiveness of vasoactive medications to optimize hemodynamic stability  - Monitor arterial and/or venous puncture sites for bleeding and/or hematoma  - Assess quality of pulses, skin color and temperature  - Assess for signs of decreased  coronary artery perfusion - ex. Angina  - Evaluate fluid balance, assess for edema, trend weights  Outcome: Progressing     Problem: RESPIRATORY - ADULT  Goal: Achieves optimal ventilation and oxygenation  Description: INTERVENTIONS:  - Assess for changes in respiratory status  - Assess for changes in mentation and behavior  - Position to facilitate oxygenation and minimize respiratory effort  - Oxygen supplementation based on oxygen saturation or ABGs  - Provide Smoking Cessation handout, if applicable  - Encourage broncho-pulmonary hygiene including cough, deep breathe, Incentive Spirometry  - Assess the need for suctioning and perform as needed  - Assess and instruct to report SOB or any respiratory difficulty  - Respiratory Therapy support as indicated  - Manage/alleviate anxiety  - Monitor for signs/symptoms of CO2 retention  Outcome: Progressing     Problem: METABOLIC/FLUID AND ELECTROLYTES - ADULT  Goal: Glucose maintained within prescribed range  Description: INTERVENTIONS:  - Monitor Blood Glucose as ordered  - Assess for signs and symptoms of hyperglycemia and hypoglycemia  - Administer ordered medications to maintain glucose within target range  - Assess barriers to adequate nutritional intake and initiate nutrition consult as needed  - Instruct patient on self management of diabetes  Outcome: Progressing  Goal: Electrolytes maintained within normal limits  Description: INTERVENTIONS:  - Monitor labs and rhythm and assess patient for signs and symptoms of electrolyte imbalances  - Administer electrolyte replacement as ordered  - Monitor response to electrolyte replacements, including rhythm and repeat lab results as appropriate  - Fluid restriction as ordered  - Instruct patient on fluid and nutrition restrictions as appropriate  Outcome: Progressing  Goal: Hemodynamic stability and optimal renal function maintained  Description: INTERVENTIONS:  - Monitor labs and assess for signs and symptoms of volume  excess or deficit  - Monitor intake, output and patient weight  - Monitor urine specific gravity, serum osmolarity and serum sodium as indicated or ordered  - Monitor response to interventions for patient's volume status, including labs, urine output, blood pressure (other measures as available)  - Encourage oral intake as appropriate  - Instruct patient on fluid and nutrition restrictions as appropriate  Outcome: Progressing     Problem: Delirium  Goal: Minimize duration of delirium  Description: Interventions:  - Encourage use of hearing aids, eye glasses  - Promote highest level of mobility daily  - Provide frequent reorientation  - Promote wakefulness i.e. lights on, blinds open  - Promote sleep, encourage patient's normal rest cycle i.e. lights off, TV off, minimize noise and interruptions  - Encourage family to assist in orientation and promotion of home routines  Outcome: Progressing

## 2024-04-29 NOTE — OCCUPATIONAL THERAPY NOTE
Attempted to see pt for skilled OT services this date. RN made aware of attempt. Pt occupied with nursing care. Will follow-up later today as schedule permits.

## 2024-04-29 NOTE — PHYSICAL THERAPY NOTE
PHYSICAL THERAPY TREATMENT NOTE - INPATIENT    Room Number: 2626/2626-A     Session: 2     Number of Visits to Meet Established Goals: 5    Presenting Problem: PNA  Co-Morbidities : DM2, GERD, HLD, HTN, MDD, CKD3, neuropathy, WYATT and COPD and dementia  PHYSICAL THERAPY MEDICAL/SOCIAL HISTORY  History related to current admission: Patient is a 78 year old female admitted on 4/21/2024: Presents with SOB and BLE edema        HOME SITUATION  Type of Home: Independent living facility ([facility])   Home Layout: One level     Lives With: Alone  Drives: No  Patient Owned Equipment: Rolling walker  Patient Regularly Uses: Home O2 (reports only wears at night)     Prior Level of Menard: Pt lives in ILF at [facility] reports able to ambulate community distances and down to the dining marvin, denies any falls in the past 6 months, has a pulse ox at home       ASSESSMENT   Patient demonstrates good  progress this session, goals  remain in progress.    Patient continues to function below baseline with transfers and gait.  Contributing factors to remaining limitations include decreased functional strength, decreased endurance/aerobic capacity, decreased muscular endurance, and increased O2 needs from baseline.  Next session anticipate patient to progress transfers and gait.  Physical Therapy will continue to follow patient for duration of hospitalization.    Patient continues to benefit from continued skilled PT services: at discharge to promote functional independence in home.  Anticipate patient will return home with home health PT.    PLAN  PT Treatment Plan: Bed mobility;Endurance;Energy conservation;Patient education;Gait training;Family education;Strengthening;Stair training;Transfer training;Balance training  Rehab Potential : Good  Frequency (Obs): 3-5x/week    CURRENT GOALS   Goal #1 Patient is able to participate in emery balance assessment       Goal #2 Patient is able to demonstrate transfers EOB to/from  Chair/Wheelchair at assistance level: modified independent      Goal #3 Patient is able to ambulate 150 feet with assist device: walker - rolling at assistance level: modified independent      Goal #4     Goal #5     Goal #6     Goal Comments: Goals established on 4/23/2024 4/29/2024 all goals ongoing     SUBJECTIVE  \"I don't want to wear oxygen when I go down to breakfast, lunch, and dinner\"     OBJECTIVE  Precautions: Bed/chair alarm;Low vision    WEIGHT BEARING RESTRICTION  Weight Bearing Restriction: None                PAIN ASSESSMENT   Rating: Unable to rate  Location: BLE  Management Techniques: Activity promotion;Relaxation;Repositioning    BALANCE                                                                                                                       Static Sitting: Good  Dynamic Sitting: Good           Static Standing: Fair -  Dynamic Standing: Fair -    ACTIVITY TOLERANCE                         O2 WALK   Liters of O2 at rest = 1  Liters of O2 while ambulating = 3  SPO2 after ambulation = 88%, recovered quickly to 91% on 1 L of O2  RN notified      AM-PAC '6-Clicks' INPATIENT SHORT FORM - BASIC MOBILITY  How much difficulty does the patient currently have...  Patient Difficulty: Turning over in bed (including adjusting bedclothes, sheets and blankets)?: None   Patient Difficulty: Sitting down on and standing up from a chair with arms (e.g., wheelchair, bedside commode, etc.): A Little   Patient Difficulty: Moving from lying on back to sitting on the side of the bed?: None   How much help from another person does the patient currently need...   Help from Another: Moving to and from a bed to a chair (including a wheelchair)?: A Little   Help from Another: Need to walk in hospital room?: A Little   Help from Another: Climbing 3-5 steps with a railing?: A Little       AM-PAC Score:  Raw Score: 20   Approx Degree of Impairment: 35.83%   Standardized Score (AM-PAC Scale): 47.67   CMS Modifier  (G-Code): CJ    FUNCTIONAL ABILITY STATUS  Gait Assessment   Functional Mobility/Gait Assessment  Gait Assistance: Supervision  Distance (ft): 200  Assistive Device: Rolling walker  Pattern: Within Functional Limits    Skilled Therapy Provided: Per RN okay to work with pt. Pt received in supine and was agreeable to PT session.     Bed Mobility:  Rolling: NT   Supine<>Sit: mod ind    Sit<>Supine: mod ind      Transfer Mobility:  Sit<>Stand: supervision   Stand<>Sit: supervision   Gait: pt ambulated with RW and supervision, see O2 walk section for details     Therapist's Comments: Pt educated on role of therapy, goals for session, safety, fall prevention, activity recommendations, pursed lip breathing, and energy conservation.       THERAPEUTIC EXERCISES  Lower Extremity Alternating marching  Ankle pumps  LAQ     Upper Extremity Elbow flex/ext,  - open/close, and OH Reaching     Position Sitting     Repetitions   10   Sets   3     Patient End of Session: In bed;Needs met;Call light within reach;RN aware of session/findings;All patient questions and concerns addressed;Alarm set    PT Session Time: 30 minutes  Gait Trainin minutes  Therapeutic Exercise: 15 minutes

## 2024-04-29 NOTE — PLAN OF CARE
Assumed care of patient @ 1930 patient resting in bed, AOX2, forgetful at times, reports mild generalized pain, prn provided. Lung sounds clear, but diminished bilaterally, O2 saturation adequate on 2L at rest when awake, CPAP at night. Some dyspnea with exertion. Sinus rhythm on tele, S1-S2 present, no adventitious sounds noted. Bowel sounds present and active in all quadrants, last bowel movement today. Patient voiding without issue. Pt ambulating with walker and steady gait. No skin issues noted.     Plan of Care: IV antibiotics. PO lasix. CPAP at night. Possible right heart cath.     Discussed plan of care with patient, daughter at bedside, verbalized understanding. Call light within reach. Fall precautions in place.     Approx 0015 pt desaturating on CPAP to 84% on home nasal mask. Respiratory at bedside to place full face mask with improvement in sats to 91%.    Problem: Diabetes/Glucose Control  Goal: Glucose maintained within prescribed range  Description: INTERVENTIONS:  - Monitor Blood Glucose as ordered  - Assess for signs and symptoms of hyperglycemia and hypoglycemia  - Administer ordered medications to maintain glucose within target range  - Assess barriers to adequate nutritional intake and initiate nutrition consult as needed  - Instruct patient on self management of diabetes  Outcome: Progressing     Problem: Patient/Family Goals  Goal: Patient/Family Long Term Goal  Description: Patient's Long Term Goal: to go home    Interventions:  - comply to care plan  - Mds to see  - See additional Care Plan goals for specific interventions  Outcome: Progressing  Goal: Patient/Family Short Term Goal  Description: Patient's Short Term Goal: to feel better    Interventions:   - IV lasix  - IV atbx  - comply to care plan  - Mds to see  - See additional Care Plan goals for specific interventions  Outcome: Progressing     Problem: CARDIOVASCULAR - ADULT  Goal: Maintains optimal cardiac output and hemodynamic  stability  Description: INTERVENTIONS:  - Monitor vital signs, rhythm, and trends  - Monitor for bleeding, hypotension and signs of decreased cardiac output  - Evaluate effectiveness of vasoactive medications to optimize hemodynamic stability  - Monitor arterial and/or venous puncture sites for bleeding and/or hematoma  - Assess quality of pulses, skin color and temperature  - Assess for signs of decreased coronary artery perfusion - ex. Angina  - Evaluate fluid balance, assess for edema, trend weights  Outcome: Progressing     Problem: RESPIRATORY - ADULT  Goal: Achieves optimal ventilation and oxygenation  Description: INTERVENTIONS:  - Assess for changes in respiratory status  - Assess for changes in mentation and behavior  - Position to facilitate oxygenation and minimize respiratory effort  - Oxygen supplementation based on oxygen saturation or ABGs  - Provide Smoking Cessation handout, if applicable  - Encourage broncho-pulmonary hygiene including cough, deep breathe, Incentive Spirometry  - Assess the need for suctioning and perform as needed  - Assess and instruct to report SOB or any respiratory difficulty  - Respiratory Therapy support as indicated  - Manage/alleviate anxiety  - Monitor for signs/symptoms of CO2 retention  Outcome: Progressing     Problem: METABOLIC/FLUID AND ELECTROLYTES - ADULT  Goal: Glucose maintained within prescribed range  Description: INTERVENTIONS:  - Monitor Blood Glucose as ordered  - Assess for signs and symptoms of hyperglycemia and hypoglycemia  - Administer ordered medications to maintain glucose within target range  - Assess barriers to adequate nutritional intake and initiate nutrition consult as needed  - Instruct patient on self management of diabetes  Outcome: Progressing  Goal: Electrolytes maintained within normal limits  Description: INTERVENTIONS:  - Monitor labs and rhythm and assess patient for signs and symptoms of electrolyte imbalances  - Administer electrolyte  replacement as ordered  - Monitor response to electrolyte replacements, including rhythm and repeat lab results as appropriate  - Fluid restriction as ordered  - Instruct patient on fluid and nutrition restrictions as appropriate  Outcome: Progressing  Goal: Hemodynamic stability and optimal renal function maintained  Description: INTERVENTIONS:  - Monitor labs and assess for signs and symptoms of volume excess or deficit  - Monitor intake, output and patient weight  - Monitor urine specific gravity, serum osmolarity and serum sodium as indicated or ordered  - Monitor response to interventions for patient's volume status, including labs, urine output, blood pressure (other measures as available)  - Encourage oral intake as appropriate  - Instruct patient on fluid and nutrition restrictions as appropriate  Outcome: Progressing     Problem: Delirium  Goal: Minimize duration of delirium  Description: Interventions:  - Encourage use of hearing aids, eye glasses  - Promote highest level of mobility daily  - Provide frequent reorientation  - Promote wakefulness i.e. lights on, blinds open  - Promote sleep, encourage patient's normal rest cycle i.e. lights off, TV off, minimize noise and interruptions  - Encourage family to assist in orientation and promotion of home routines  Outcome: Progressing

## 2024-04-29 NOTE — PROGRESS NOTES
[facility] AND [facility]   part of [facility]     Progress Note  Nina Donnelly Patient Status:  Inpatient    10/9/1945 MRN [id number]   Location [facility] 2NE-A Attending Татьяна Schneider, DO   Hosp Day # 8 PCP Chiki Caldwell MD         Assessment and Plan:    78 year old female with a PMHx sig for DM2, GERD, HLD, HTN, MDD, CKD3, neuropathy, WYATT and COPD and dementia presents to the hospital with shortness of breath and bilateral ankle swelling.  Patient managed for acute on chronic hypoxic and hypercapnic respiratory failure as well as CHF exacerbation.  Course continues to be complicated by encephalopathy.       Altered mental status improving  Toxic metabolic encephalopathy-improving  - In the setting of possible infection and WYATT  - BiPAP/CPAP for naps and sleep  - Delirium precautions  - Pregabalin initially held now patient having neuropathy symptoms, resumed at half dose  - Discontinue tramadol  - If persistently encephalopathic, may consider neurology consult  - Continue to monitor    Acute on chronic respiratory failure with hypoxia  Respiratory acidosis  -Wean O2 as able  - CPAP/BiPAP with all naps and sleep  - 2D echo with preserved EF, RVSP was not able to be estimated,  - Empiric antibiotics for pneumonia however Pro-Dougie negative-antibiotics to complete today  - CT chest reviewed, central emphysema otherwise no consolidation or edema noted  - Plan for 2D echo with bubble study per pulmonology- no shunt noted     COPD  - History of smoking in the past  - Outpatient PFTs with mild obstruction and moderate decrease in diffusion capacity  - Cannot tolerate inhalers in the outpatient setting  - Bronchodilators as needed    CAP  Acute on chronic diastolic CHF  -2D echo with preserved EF  -HFpEF  -proBNP elevated 695->200  -Previously on IV Lasix, now switched over to oral  - proBNP not significantly elevated, 2D echo with preserved EF, patient with known COPD and chronic  respiratory failure and WYATT.  Possible concern for pulmonary hypertension- ECHO could not measaure systolic pressure  - Telemetry  -Cardiology on consult, recommendations reviewed     HTN  -po hydralazine started, still receiving IV PRN hydralazine as well       BIBIANA on CKD3-   -Follow BMP given diuretics on board    WYATT  Chronic RF w hypoxia  -cpap  -singulair  -02 walk test ordered     DM2 with hyperglycemia  -hold orals  -ADA diet  -accucheck ac/hs  -ssi, cont home regime as able  -insulin adjusted     Type 2 diabetes with neuropathy  - Takes pregabalin 600 mg nightly  - Was held for the past 3 doses given encephalopathy, patient now with neuropathy symptoms  - Will resume pregabalin 300 nightly    MDD  -cymbalta       Dementia  -donepezil     GOC: Full code     MA/ACO Reach  -Re- Entry: no  -Consults:cards  -Discharge Needs: TBD  -Appointments: PCP       CARLOS fam in am  -diet-cc     Prophy  -SCD  -lovenox     Dispo  -pending clinical course    PCP: Chiki Caldwell MD    [facility]  Internal Medicine  Answering Service number: [phone]                   SUBJECTIVE:     Patient did not wear CPAP at night.  Family had to come in to help her wear the oxygen.  This morning she is somewhat altered, states that \"there are chicken on the window\" family at bedside.                OBJECTIVE:   Blood pressure 131/51, pulse 82, temperature 98.6 °F (37 °C), temperature source Oral, resp. rate 14, height 5' 0.98\" (1.549 m), weight 192 lb 14.4 oz (87.5 kg), SpO2 95%.    GENERAL: no apparent distress, flat affect  NEURO: A/A Ox 2 (cannot tell me her date of birth), some confusion, no focal deficits  RESP: non labored, diminished bilaterally  CARDIO: Regular, no murmur  ABD: soft, NT, ND, BS+  EXTREMITIES: no edema, no calf tenderness    DIAGNOSTIC DATA:   Labs:     Recent Labs   Lab 04/24/24  0655 04/25/24  0630 04/26/24  0659 04/27/24  0631 04/29/24  0652   WBC 6.2 7.5 7.0 6.9 8.2    HGB 9.7* 11.0* 10.6* 10.6* 9.9*   MCV 59.7* 57.7* 58.0* 57.9* 59.1*   .0 215.0 202.0 170.0 166.0       Recent Labs   Lab 04/23/24  0534 04/24/24  0655 04/25/24  0630 04/26/24  0700 04/27/24  0631 04/28/24  0751 04/29/24  0652    139 136 139 141  --  139   K 4.5 4.5 4.9 4.4 4.1  --  3.3*    105 102 102 101  --  103   CO2 33.0* 32.0 32.0 36.0* 36.0*  --  35.0*   BUN 26* 25* 30* 33* 30*  --  26*   CREATSERUM 1.68* 1.52* 1.51* 1.74*  1.74* 1.79*  1.79* 1.70* 1.72*  1.72*   CA 9.0 8.7 9.2 8.8 9.1  --  9.1   MG 2.0 2.3 2.4  --   --   --   --    * 105* 150* 140* 135*  --  133*       No results for input(s): \"ALT\", \"AST\", \"ALB\", \"AMYLASE\", \"LIPASE\", \"LDH\" in the last 168 hours.    Invalid input(s): \"ALPHOS\", \"TBIL\", \"DBIL\", \"TPROT\"      Recent Labs   Lab 04/28/24  0522 04/28/24  1220 04/28/24  1620 04/28/24  2022 04/29/24  0456   PGLU 126* 204* 204* 126* 112*       No results for input(s): \"TROP\" in the last 168 hours.      MEDICATIONS      Current Facility-Administered Medications   Medication Dose Route Frequency    pregabalin (Lyrica) cap 300 mg  300 mg Oral Nightly    furosemide (Lasix) tab 20 mg  20 mg Oral BID (Diuretic)    insulin degludec 100 units/mL flextouch 26 Units  26 Units Subcutaneous Nightly    hydrALAZINE (Apresoline) tab 75 mg  75 mg Oral Q8H CHICO    diphenhydrAMINE-zinc (Benadryl-Zinc) 2-0.1 % cream 1 Application  1 Application Topical TID PRN    hydrALAzine (Apresoline) 20 mg/mL injection 10 mg  10 mg Intravenous Q4H PRN    acetaminophen (Tylenol Extra Strength) tab 1,000 mg  1,000 mg Oral Q6H PRN    insulin aspart (NovoLOG) 100 Units/mL FlexPen 7 Units  7 Units Subcutaneous Daily with dinner    insulin aspart (NovoLOG) 100 Units/mL FlexPen 6 Units  6 Units Subcutaneous BID AC    trolamine salicyliate 10 % cream   Topical TID PRN    aspirin chewable tab 81 mg  81 mg Oral Daily    atorvastatin (Lipitor) tab 10 mg  10 mg Oral Nightly    donepezil (Aricept) tab 10 mg  10 mg  Oral Nightly    DULoxetine (Cymbalta) DR cap 60 mg  60 mg Oral Daily    fluticasone propionate (Flonase) 50 MCG/ACT nasal suspension 1 spray  1 spray Each Nare Daily    montelukast (Singulair) tab 10 mg  10 mg Oral Nightly    glucose (Dex4) 15 GM/59ML oral liquid 15 g  15 g Oral Q15 Min PRN    Or    glucose (Glutose) 40% oral gel 15 g  15 g Oral Q15 Min PRN    Or    glucose-vitamin C (Dex-4) chewable tab 4 tablet  4 tablet Oral Q15 Min PRN    Or    dextrose 50% injection 50 mL  50 mL Intravenous Q15 Min PRN    Or    glucose (Dex4) 15 GM/59ML oral liquid 30 g  30 g Oral Q15 Min PRN    Or    glucose (Glutose) 40% oral gel 30 g  30 g Oral Q15 Min PRN    Or    glucose-vitamin C (Dex-4) chewable tab 8 tablet  8 tablet Oral Q15 Min PRN    enoxaparin (Lovenox) 30 MG/0.3ML SUBQ injection 30 mg  30 mg Subcutaneous Daily    insulin aspart (NovoLOG) 100 Units/mL FlexPen 2-10 Units  2-10 Units Subcutaneous TID AC and HS                  IMAGING     CT BRAIN OR HEAD (05278)    Result Date: 4/28/2024  PROCEDURE:  CT BRAIN OR HEAD (10664)  COMPARISON:  None.  INDICATIONS:  altered mental status  TECHNIQUE:  Noncontrast CT scanning is performed through the brain. Dose reduction techniques were used. Dose information is transmitted to the ACR (American College of Radiology) NRDR (National Radiology Data Registry) which includes the Dose Index Registry.  PATIENT STATED HISTORY: (As transcribed by Technologist)  Patient hs altered mental status with confusion.    FINDINGS: No evidence of intracranial hemorrhage or extra-axial fluid collection. Lucencies in the deep periventricular white matter are likely sequelae of chronic small vessel ischemic disease. Mild prominence of the sulci. No mass effect. Visualized portions of paranasal sinuses are unremarkable. Visualized portions of the mastoid air cells are unremarkable. Visualized portions of the orbits are unremarkable. IMPRESSION: Sequelae of chronic small vessel ischemic disease is  noted. No evidence of intracranial hemorrhage or extra-axial fluid collection.    LOCATION:  [facility]   Dictated by (CST): Christoph Gómez MD on 4/28/2024 at 4:09 PM     Finalized by (CST): Christoph Gómez MD on 4/28/2024 at 4:11 PM       CT CHEST (VKZ=[id number])    Result Date: 4/27/2024  CONCLUSION:  1. Although recent radiographs suggested bibasilar airspace opacities, no acute airspace disease within the chest is identified. 2. Mild centrilobular emphysematous changes primarily within the upper lobes. 3. Cardiomegaly with atherosclerotic changes of the coronary vessels as described above. 4. Please see further details within the body of the report above.  LOCATION:  [facility]   Dictated by (CST): Ha Giordano DO on 4/27/2024 at 1:01 PM     Finalized by (CST): Ha Giordano DO on 4/27/2024 at 1:07 PM

## 2024-04-30 VITALS
WEIGHT: 196 LBS | DIASTOLIC BLOOD PRESSURE: 60 MMHG | SYSTOLIC BLOOD PRESSURE: 136 MMHG | HEART RATE: 69 BPM | HEIGHT: 60.98 IN | RESPIRATION RATE: 18 BRPM | TEMPERATURE: 98 F | BODY MASS INDEX: 37 KG/M2 | OXYGEN SATURATION: 97 %

## 2024-04-30 LAB
ANION GAP SERPL CALC-SCNC: 5 MMOL/L (ref 0–18)
BASOPHILS # BLD AUTO: 0.05 X10(3) UL (ref 0–0.2)
BASOPHILS NFR BLD AUTO: 0.8 %
BUN BLD-MCNC: 26 MG/DL (ref 9–23)
CALCIUM BLD-MCNC: 9.5 MG/DL (ref 8.5–10.1)
CHLORIDE SERPL-SCNC: 104 MMOL/L (ref 98–112)
CO2 SERPL-SCNC: 29 MMOL/L (ref 21–32)
CREAT BLD-MCNC: 1.72 MG/DL
CREAT BLD-MCNC: 1.82 MG/DL
EGFRCR SERPLBLD CKD-EPI 2021: 28 ML/MIN/1.73M2 (ref 60–?)
EGFRCR SERPLBLD CKD-EPI 2021: 30 ML/MIN/1.73M2 (ref 60–?)
EOSINOPHIL # BLD AUTO: 0.37 X10(3) UL (ref 0–0.7)
EOSINOPHIL NFR BLD AUTO: 6.1 %
ERYTHROCYTE [DISTWIDTH] IN BLOOD BY AUTOMATED COUNT: 20.8 %
GLUCOSE BLD-MCNC: 112 MG/DL (ref 70–99)
GLUCOSE BLD-MCNC: 117 MG/DL (ref 70–99)
GLUCOSE BLD-MCNC: 120 MG/DL (ref 70–99)
GLUCOSE BLD-MCNC: 97 MG/DL (ref 70–99)
HCT VFR BLD AUTO: 37.8 %
HGB BLD-MCNC: 10.5 G/DL
IMM GRANULOCYTES # BLD AUTO: 0.02 X10(3) UL (ref 0–1)
IMM GRANULOCYTES NFR BLD: 0.3 %
LYMPHOCYTES # BLD AUTO: 1.63 X10(3) UL (ref 1–4)
LYMPHOCYTES NFR BLD AUTO: 26.7 %
MCH RBC QN AUTO: 16.6 PG (ref 26–34)
MCHC RBC AUTO-ENTMCNC: 27.8 G/DL (ref 31–37)
MCV RBC AUTO: 59.8 FL
MONOCYTES # BLD AUTO: 0.76 X10(3) UL (ref 0.1–1)
MONOCYTES NFR BLD AUTO: 12.4 %
NEUTROPHILS # BLD AUTO: 3.28 X10 (3) UL (ref 1.5–7.7)
NEUTROPHILS # BLD AUTO: 3.28 X10(3) UL (ref 1.5–7.7)
NEUTROPHILS NFR BLD AUTO: 53.7 %
OSMOLALITY SERPL CALC.SUM OF ELEC: 292 MOSM/KG (ref 275–295)
PLATELET # BLD AUTO: 168 10(3)UL (ref 150–450)
PLATELETS.RETICULATED NFR BLD AUTO: 9.7 % (ref 0–7)
POTASSIUM SERPL-SCNC: 4.1 MMOL/L (ref 3.5–5.1)
RBC # BLD AUTO: 6.32 X10(6)UL
SODIUM SERPL-SCNC: 138 MMOL/L (ref 136–145)
WBC # BLD AUTO: 6.1 X10(3) UL (ref 4–11)

## 2024-04-30 PROCEDURE — 82565 ASSAY OF CREATININE: CPT | Performed by: INTERNAL MEDICINE

## 2024-04-30 PROCEDURE — 97116 GAIT TRAINING THERAPY: CPT

## 2024-04-30 PROCEDURE — 85025 COMPLETE CBC W/AUTO DIFF WBC: CPT

## 2024-04-30 PROCEDURE — 82962 GLUCOSE BLOOD TEST: CPT

## 2024-04-30 PROCEDURE — 80048 BASIC METABOLIC PNL TOTAL CA: CPT

## 2024-04-30 PROCEDURE — 97535 SELF CARE MNGMENT TRAINING: CPT

## 2024-04-30 RX ORDER — HYDRALAZINE HYDROCHLORIDE 25 MG/1
75 TABLET, FILM COATED ORAL EVERY 8 HOURS SCHEDULED
Qty: 90 TABLET | Refills: 0 | Status: SHIPPED | OUTPATIENT
Start: 2024-04-30

## 2024-04-30 RX ORDER — MELATONIN
3 NIGHTLY PRN
Status: DISCONTINUED | OUTPATIENT
Start: 2024-04-30 | End: 2024-04-30

## 2024-04-30 NOTE — CM/SW NOTE
Met with patient and son.  Updated both of accepting [facility] agency--[employer] [facility]  Phone  [phone]  Fax  [phone]    Listed son Moy Arango as second contact   Phone  [phone]    Both patient/son appreciative of above

## 2024-04-30 NOTE — PROGRESS NOTES
Nina Donnelly Patient Status:  Inpatient    10/9/1945 MRN [id number]   [facility] 2NE-A Attending Jamar Arora MD   Hosp Day # 2 PCP Chiki Caldwell MD        Reason for Consultation:  CHF        History of Present Illness:  Nina Donnelly is a a(n) 78 year old female. She has COPD, WYATT, Diabetes, Dyslipidemia .  Uses CPAP and O2 at 2 l/min at home.  Still able to do some walking.  Reports 2 weeks of dry cough, worsened dyspnea.  Daughter notes worse hypoxia (O2 sats down to 70's instead of high 80's ).  LE edema was reported as well. Feels weak and tired.  No fever. Chills.  Previous bouts of pneumonia.     Subjective:   -No events.  -CV stable.     Medications:  Scheduled Medications    pregabalin  600 mg Oral Nightly    insulin degludec  20 Units Subcutaneous Nightly    insulin aspart  6 Units Subcutaneous BID AC    insulin aspart  8 Units Subcutaneous Daily with dinner    aspirin  81 mg Oral Daily    atorvastatin  10 mg Oral Nightly    donepezil  10 mg Oral Nightly    DULoxetine  60 mg Oral Daily    fluticasone propionate  1 spray Each Nare Daily    montelukast  10 mg Oral Nightly    cefTRIAXone  1 g Intravenous Q24H    enoxaparin  30 mg Subcutaneous Daily    insulin aspart  2-10 Units Subcutaneous TID AC and HS    azithromycin  500 mg Oral Daily    furosemide  40 mg Intravenous Daily            Continuous Infusions:  Medication Infusions            Social History:   reports that she quit smoking about 9 years ago. Her smoking use included cigarettes. She started smoking about 64 years ago. She has a 55 pack-year smoking history. She has never used smokeless tobacco. She reports that she does not drink alcohol and does not use drugs.     Review of Systems:  All systems were reviewed and are negative except as described above in HPI.     Physical Exam:            Wt Readings from Last 3 Encounters:   24 203 lb 4.2 oz (92.2 kg)   22 199 lb (90.3 kg)   22 193 lb  (87.5 kg)         Vitals          Vitals:     04/22/24 2340 04/22/24 2345 04/22/24 2350 04/23/24 0505   BP:   (!) 169/61 (!) 173/57     BP Location:   Left arm Right arm     Pulse: 67 72 70     Resp:   22   24   Temp:   97.9 °F (36.6 °C)   98.2 °F (36.8 °C)   TempSrc:   Oral   Oral   SpO2: 95% 96% 94%     Weight:       203 lb 4.2 oz (92.2 kg)   Height:                   Temp:  [97.9 °F (36.6 °C)-98.7 °F (37.1 °C)] 98.2 °F (36.8 °C)  Pulse:  [53-82] 70  Resp:  [16-24] 24  BP: 131/48  SpO2:  [85 %-98 %] 94 %     Temp: 98.2 °F (36.8 °C)  Pulse: 70  Resp: 24  BP: 173/57       General:  Appears comfortable  HEENT: No focal deficits.  Neck: No JVD, carotids 2+ no bruits.  Cardiac: Regular S1S2.  No S3, S4, rub, click.  No murmur. No JVD  Lungs: diminished. No rales.  Abdomen: Soft, non-tender.   Extremities: No edema.  No clubbing or cyanosis  Neurologic: Alert and oriented, normal affect.  Skin: Warm and dry.      Labs:        HEM:        Recent Labs   Lab 04/21/24 2033 04/22/24  0514 04/23/24  0534   WBC 8.8 8.6 6.5   HGB 9.9* 10.0* 9.9*   HCT 34.3* 35.0 35.8   .0 202.0 197.0         Chem:        Recent Labs   Lab 04/21/24 2033 04/22/24  0514 04/23/24  0534    144 141   K 4.1 3.9 4.5    110 105   CO2 28.0 31.0 33.0*   BUN 26* 22 26*   CREATSERUM 1.68* 1.55* 1.68*   MG  --   --  2.0   ALT 15  --   --    AST 18  --   --    ALB 3.2*  --   --              Recent Labs   Lab 04/21/24 2033   INR 1.08         No results found for: \"TROP\", \"CKMB\"        Invalid input(s): \"PBNPML\"        Telemetry:      Laboratories and Data:  Diagnostics:     EKG, 4/21/2024:  NSR, NSSTTW changes     CXR, 4/22/2024:       CONCLUSION:  Bilateral lower lobe predominant interstitial opacities and suspected small left pleural effusion and cardiomegaly.  Findings may be related to edema, though infectious/inflammatory process is not excluded.       Echo, 10/2023:         Summary:     1. Left ventricle: The cavity size is  normal. There is mild concentric      hypertrophy. Systolic function is normal. The estimated ejection fraction      is 55-60%. Grade I diastolic dysfunction.   2. Aortic valve: Trileaflet. The leaflets are mildly thickened and mildly      calcified. The peak systolic velocity is 1.5m/sec. The mean systolic      gradient is 4mm Hg. The peak systolic gradient is 9mm Hg. The valve area      is 2.2cm^2. The valve area index is 1.15cm^2/m^2.   3. Mitral valve: The annulus is calcified, fibrotic, and thickened. The      leaflets are mildly thickened and mildly calcified. There is mild      regurgitation.   4. Left atrium: The atrium is mildly to moderately dilated.   5. Right ventricle: The cavity size is normal. Wall thickness is normal.      Systolic function is normal. Estimation of the right ventricular systolic      pressure is within the normal range. The RV pressure during systole is      31mm Hg.   6. Tricuspid valve: There is mild regurgitation.   7. Pericardium, extracardiac: A probable, trace pericardial effusion is      identified. There is no evidence of hemodynamic compromise.         Impression:  1.  HFpEF      Net negative 1L   Pro BNP normal yesterday    Well compensated by exam    On 20mg PO lasix BID   2. HTN   Normotensive 120s/50-60s   On hydralazine 75mg TID   3. COPD/PNA     3. WYATT     4. Diabetes     5. Dyslipidemia     On ASA and statin   6. CKD, Cr = 1.4-1.6   Cr 1.79 yesterday   Labs this AM pending      7. Microcytic anemia, chronic     8. Lower extremity pain (popliteal) - no DVT.         Recommend:  1. Telemetry  2. Echo - Ef 70-75%, mild LVH, mild valvulopathy.  3. Continue PO diuretics as long as Cr stable (labs pending)  4. Treatment of COPD/PNA per IM/pulm; deescalate antibiotics as cultures direct.  5. Continue PO hydralizine to 75mg TID.    6. PRN IV hydralazine for BP over 160               - BP meds limited by renal function /  COPD and LE edema  7. Monitor renal functio with  diuresis

## 2024-04-30 NOTE — DIETARY NOTE
[facility]   part of [facility]   CLINICAL NUTRITION    Nina Donnelly     Admitting diagnosis:  Hypoxia [R09.02]  Community acquired pneumonia, unspecified laterality [J18.9]  Acute on chronic congestive heart failure, unspecified heart failure type (HCC) [I50.9]    Ht: 154.9 cm (5' 0.98\")  Wt: 88.9 kg (195 lb 15.8 oz).   Body mass index is 37.05 kg/m².  IBW: 47.7 kg    Wt Readings from Last 6 Encounters:   04/30/24 88.9 kg (195 lb 15.8 oz)   03/30/22 90.3 kg (199 lb)   03/21/22 87.5 kg (193 lb)   03/11/22 88.5 kg (195 lb)   01/13/22 88.9 kg (196 lb)   01/07/22 89 kg (196 lb 4 oz)      Labs/Meds reviewed  -POC Glu:112-181, Glu:120, BUN:26, Cr:1.72, GFR:30  -Lasix, Insulin      Diet:       Procedures    Carbohydrate controlled diet 1800 kcal/60 grams; Is Patient on Accuchecks? Yes; Misc Restriction: Cardiac     Percent Meals Eaten (last 3 days)       Date/Time Percent Meals Eaten (%)    04/27/24 0900 0 %     Percent Meals Eaten (%): Pt refused at 04/27/24 0900    04/27/24 1150 20 %    04/27/24 2126 50 %    04/28/24 1900 50 %    04/29/24 0800 100 %    04/29/24 1327 100 %    04/29/24 1900 100 %    04/30/24 0841 100 %    04/30/24 1324 100 %          4/30- Diet advanced to Carb Controlled, Cardiac on 4/25. PO intakes are trending up: 20-50% on 4/27-4/28 and 100% past 5 meals.   Wt down 6 lb this admit; suspect d/t diuresis.   BIPAP with sleep. Plans for possible discharge today.     4/25- Dropped off additional heart healthy-low sodium diet information per pt request. Yesterday, pt stated she has macular degeneration and would like information in audio form. Gave handouts to pt's daughter present at bedside: Delicious Meets Nutritious Classes-2024, DASH diet-audio book, [employer] Healthy Eating videos, and AHA Cookbooks.  Pt began to desat and have increased O2 needs overnight. Made NPO. Currently on BIPAP.      4/24-Pt chart reviewed d/t nutrition consult for renal diet education. K+ is WNL. No need for potassium  restriction at this time.  Provided and discussed handout on Low Sodium Nutrition Therapy with list of foods recommended, foods to avoid/limit, sample menus, and list of salt free seasoning alternatives. All pt's nutrition questions answered at this time. Pt's son present at bedside, pt's daughter on speaker phone.  Placed contact information for outpatient Dietitian in pt instructions for discharge.    Patient reports good appetite at this time.  Nursing notes reports Percent Meals Eaten (%): 100 % intake for last meal.  Tolerating po diet without diarrhea, emesis, or constipation. Last BM:4/23.  Skin intact. Edema to B/L LE.    No significant weight changes noted per EMR hx. Pt denied any unintentional wt changes.    PMH:former tobacco use, COPD, WYATT, HTN, HLD, CHF, DM2, CKD3, GERD, dementia.    Patient is at low nutrition risk at this time.    Please consult if patient status changes or nutrition issues arise.    Maryjane De La Cruz MS, RD, LDN  Clinical Dietitian  Ext:[phone]

## 2024-04-30 NOTE — PROGRESS NOTES
[facility] AND [facility]   part of [facility]     Progress Note  Nina Donnelly Patient Status:  Inpatient    10/9/1945 MRN [id number]   Location [facility] 2NE-A Attending Jamar Arora MD   Hosp Day # 9 PCP Chiki Caldwell MD       SEE ATTENDING NOTE AT BOTTOM OF PAGE    Is this a shared or split note between Advanced Practice Provider and Physician? Yes    Assessment and Plan:    78 year old female with a PMHx sig for DM2, GERD, HLD, HTN, MDD, CKD3, neuropathy, WYATT and COPD and dementia presents to the hospital with shortness of breath and bilateral ankle swelling.  Patient managed for acute on chronic hypoxic and hypercapnic respiratory failure as well as CHF exacerbation.  Course continues to be complicated by encephalopathy.        Altered mental status improving  Toxic metabolic encephalopathy-improving  - In the setting of possible infection and WYATT  - BiPAP/CPAP for naps and sleep  - Delirium precautions  - Pregabalin initially held now patient having neuropathy symptoms, resumed at half dose  - Discontinue tramadol  - If persistently encephalopathic, may consider neurology consult  - Continue to monitor     Acute on chronic respiratory failure with hypoxia  Respiratory acidosis  -Wean O2 as able  - CPAP/BiPAP with all naps and sleep  - 2D echo with preserved EF, RVSP was not able to be estimated,  - s/p Empiric antibiotics for pneumonia however Pro-Dougie negative  - CT chest reviewed, central emphysema otherwise no consolidation or edema noted  - 2D echo with bubble study per pulmonology- no shunt noted  -plan for 02 walk test on 2L per NC prior to discharge     COPD  - History of smoking in the past  - Outpatient PFTs with mild obstruction and moderate decrease in diffusion capacity  - Cannot tolerate inhalers in the outpatient setting  - Bronchodilators as needed     CAP  Acute on chronic diastolic CHF  -2D echo with preserved EF  -HFpEF  -proBNP elevated  695->200  -Previously on IV Lasix, now switched over to oral  - proBNP not significantly elevated, 2D echo with preserved EF, patient with known COPD and chronic respiratory failure and WYATT.  Possible concern for pulmonary hypertension- ECHO could not measaure systolic pressure  - Telemetry  -Cardiology on consult, recommendations reviewed     HTN  -po hydralazine started     BIBIANA on CKD3-   -Follow BMP given diuretics on board  - 4/30 cr bumped up again to 1.82    WYATT  Chronic RF w hypoxia  -cpap  -singulair  -02 walk test ordered     DM2 with hyperglycemia  -hold orals  -ADA diet  -accucheck ac/hs  -ssi, cont home regime as able  -insulin adjusted     Type 2 diabetes with neuropathy  - Takes pregabalin 600 mg nightly  - Was held for the past 3 doses given encephalopathy, patient now with neuropathy symptoms  - Will resume pregabalin 300 nightly     MDD  -cymbalta        Dementia  -donepezil     GOC: Full code     MA/ACO Reach  -Re- Entry: no  -Consults:cards  -Discharge Needs: TBD  -Appointments: PCP       CARLOS fam in am  -diet-cc     Prophy  -SCD  -lovenox     Dispo  -pending clinical course    PCP: Chiki Caldwell MD    Concerns regarding plan of care were discussed with patient. Patient agrees with plan as detailed above. Discussed plan of care with Dr. Arora    Note: This chart was prepared using voice recognition software and may contain unintended word substitution errors.          Lakhwinder CARPIO  [facility] Hospitalist Team  Contact via Perfect Serve and Bubble (Check Availability)  4/30/2024             SUBJECTIVE:   In a chair, on 02, very conversant   Denies cp/sob, n/v, f/c  Feels great               OBJECTIVE:   Blood pressure 132/58, pulse 65, temperature 98.4 °F (36.9 °C), temperature source Axillary, resp. rate 17, height 5' 0.98\" (1.549 m), weight 195 lb 15.8 oz (88.9 kg), SpO2 99%.    GENERAL: no apparent distress  NEURO: A/A Ox3  RESP: non labored, CTA, on 02  CARDIO:  Regular, no murmur  ABD: soft, NT, ND, BS+  EXTREMITIES: no edema, no calf tenderness    DIAGNOSTIC DATA:   Labs:     Recent Labs   Lab 04/24/24  0655 04/25/24  0630 04/26/24  0659 04/27/24  0631 04/29/24  0652   WBC 6.2 7.5 7.0 6.9 8.2   HGB 9.7* 11.0* 10.6* 10.6* 9.9*   MCV 59.7* 57.7* 58.0* 57.9* 59.1*   .0 215.0 202.0 170.0 166.0       Recent Labs   Lab 04/24/24  0655 04/25/24  0630 04/26/24  0700 04/27/24  0631 04/28/24  0751 04/29/24  0652 04/29/24  1542 04/30/24  0552    136 139 141  --  139  --   --    K 4.5 4.9 4.4 4.1  --  3.3* 4.2  --     102 102 101  --  103  --   --    CO2 32.0 32.0 36.0* 36.0*  --  35.0*  --   --    BUN 25* 30* 33* 30*  --  26*  --   --    CREATSERUM 1.52* 1.51* 1.74*  1.74* 1.79*  1.79* 1.70* 1.72*  1.72*  --  1.82*   CA 8.7 9.2 8.8 9.1  --  9.1  --   --    MG 2.3 2.4  --   --   --   --   --   --    * 150* 140* 135*  --  133*  --   --        No results for input(s): \"ALT\", \"AST\", \"ALB\", \"AMYLASE\", \"LIPASE\", \"LDH\" in the last 168 hours.    Invalid input(s): \"ALPHOS\", \"TBIL\", \"DBIL\", \"TPROT\"    Recent Labs   Lab 04/29/24  0456 04/29/24  1235 04/29/24  1702 04/29/24  2140 04/30/24  0532   PGLU 112* 170* 181* 149* 112*       No results for input(s): \"TROP\" in the last 168 hours.      MEDICATIONS      Current Facility-Administered Medications   Medication Dose Route Frequency    melatonin tab 3 mg  3 mg Oral Nightly PRN    acetaminophen (Tylenol Extra Strength) tab 1,000 mg  1,000 mg Oral Q6H PRN    pregabalin (Lyrica) cap 300 mg  300 mg Oral Nightly    furosemide (Lasix) tab 20 mg  20 mg Oral BID (Diuretic)    insulin degludec 100 units/mL flextouch 26 Units  26 Units Subcutaneous Nightly    hydrALAZINE (Apresoline) tab 75 mg  75 mg Oral Q8H CHICO    diphenhydrAMINE-zinc (Benadryl-Zinc) 2-0.1 % cream 1 Application  1 Application Topical TID PRN    hydrALAzine (Apresoline) 20 mg/mL injection 10 mg  10 mg Intravenous Q4H PRN    insulin aspart (NovoLOG) 100  Units/mL FlexPen 7 Units  7 Units Subcutaneous Daily with dinner    insulin aspart (NovoLOG) 100 Units/mL FlexPen 6 Units  6 Units Subcutaneous BID AC    trolamine salicyliate 10 % cream   Topical TID PRN    aspirin chewable tab 81 mg  81 mg Oral Daily    atorvastatin (Lipitor) tab 10 mg  10 mg Oral Nightly    donepezil (Aricept) tab 10 mg  10 mg Oral Nightly    DULoxetine (Cymbalta) DR cap 60 mg  60 mg Oral Daily    fluticasone propionate (Flonase) 50 MCG/ACT nasal suspension 1 spray  1 spray Each Nare Daily    montelukast (Singulair) tab 10 mg  10 mg Oral Nightly    glucose (Dex4) 15 GM/59ML oral liquid 15 g  15 g Oral Q15 Min PRN    Or    glucose (Glutose) 40% oral gel 15 g  15 g Oral Q15 Min PRN    Or    glucose-vitamin C (Dex-4) chewable tab 4 tablet  4 tablet Oral Q15 Min PRN    Or    dextrose 50% injection 50 mL  50 mL Intravenous Q15 Min PRN    Or    glucose (Dex4) 15 GM/59ML oral liquid 30 g  30 g Oral Q15 Min PRN    Or    glucose (Glutose) 40% oral gel 30 g  30 g Oral Q15 Min PRN    Or    glucose-vitamin C (Dex-4) chewable tab 8 tablet  8 tablet Oral Q15 Min PRN    enoxaparin (Lovenox) 30 MG/0.3ML SUBQ injection 30 mg  30 mg Subcutaneous Daily    insulin aspart (NovoLOG) 100 Units/mL FlexPen 2-10 Units  2-10 Units Subcutaneous TID AC and HS                  IMAGING     CT BRAIN OR HEAD (82995)    Result Date: 4/28/2024  PROCEDURE:  CT BRAIN OR HEAD (82042)  COMPARISON:  None.  INDICATIONS:  altered mental status  TECHNIQUE:  Noncontrast CT scanning is performed through the brain. Dose reduction techniques were used. Dose information is transmitted to the ACR (American College of Radiology) NRDR (National Radiology Data Registry) which includes the Dose Index Registry.  PATIENT STATED HISTORY: (As transcribed by Technologist)  Patient hs altered mental status with confusion.    FINDINGS: No evidence of intracranial hemorrhage or extra-axial fluid collection. Lucencies in the deep periventricular white  matter are likely sequelae of chronic small vessel ischemic disease. Mild prominence of the sulci. No mass effect. Visualized portions of paranasal sinuses are unremarkable. Visualized portions of the mastoid air cells are unremarkable. Visualized portions of the orbits are unremarkable. IMPRESSION: Sequelae of chronic small vessel ischemic disease is noted. No evidence of intracranial hemorrhage or extra-axial fluid collection.    LOCATION:  [facility]   Dictated by (CST): Christoph Gómez MD on 4/28/2024 at 4:09 PM     Finalized by (CST): Christoph Gómez MD on 4/28/2024 at 4:11 PM       CT CHEST (ABL=66795)    Result Date: 4/27/2024  CONCLUSION:  1. Although recent radiographs suggested bibasilar airspace opacities, no acute airspace disease within the chest is identified. 2. Mild centrilobular emphysematous changes primarily within the upper lobes. 3. Cardiomegaly with atherosclerotic changes of the coronary vessels as described above. 4. Please see further details within the body of the report above.  LOCATION:  [facility]   Dictated by (CST): Ha Giordano DO on 4/27/2024 at 1:01 PM     Finalized by (CST): Ha Giordano DO on 4/27/2024 at 1:07 PM          SEE ATTENDING NOTE BELOW:

## 2024-04-30 NOTE — PLAN OF CARE
Assumed care at 0730. Pt AO x4. No complaints of chest pain or SOB. O2 saturations WNL on 2L nasal canula. Dyspnea with exertion. CPAP at night. NSR on tele monitor. Up with 1 assist and walker. Pt up to chair. Updated on POC. Verbalized understanding. Chair alarm on. Call light within reach.      Problem: Diabetes/Glucose Control  Goal: Glucose maintained within prescribed range  Description: INTERVENTIONS:  - Monitor Blood Glucose as ordered  - Assess for signs and symptoms of hyperglycemia and hypoglycemia  - Administer ordered medications to maintain glucose within target range  - Assess barriers to adequate nutritional intake and initiate nutrition consult as needed  - Instruct patient on self management of diabetes  Outcome: Progressing     Problem: Patient/Family Goals  Goal: Patient/Family Long Term Goal  Description: Patient's Long Term Goal: to go home    Interventions:  - comply to care plan  - Mds to see  - See additional Care Plan goals for specific interventions  Outcome: Progressing  Goal: Patient/Family Short Term Goal  Description: Patient's Short Term Goal: to feel better    Interventions:   - IV lasix  - IV atbx  - comply to care plan  - Mds to see  - See additional Care Plan goals for specific interventions  Outcome: Progressing     Problem: CARDIOVASCULAR - ADULT  Goal: Maintains optimal cardiac output and hemodynamic stability  Description: INTERVENTIONS:  - Monitor vital signs, rhythm, and trends  - Monitor for bleeding, hypotension and signs of decreased cardiac output  - Evaluate effectiveness of vasoactive medications to optimize hemodynamic stability  - Monitor arterial and/or venous puncture sites for bleeding and/or hematoma  - Assess quality of pulses, skin color and temperature  - Assess for signs of decreased coronary artery perfusion - ex. Angina  - Evaluate fluid balance, assess for edema, trend weights  Outcome: Progressing     Problem: RESPIRATORY - ADULT  Goal: Achieves optimal  ventilation and oxygenation  Description: INTERVENTIONS:  - Assess for changes in respiratory status  - Assess for changes in mentation and behavior  - Position to facilitate oxygenation and minimize respiratory effort  - Oxygen supplementation based on oxygen saturation or ABGs  - Provide Smoking Cessation handout, if applicable  - Encourage broncho-pulmonary hygiene including cough, deep breathe, Incentive Spirometry  - Assess the need for suctioning and perform as needed  - Assess and instruct to report SOB or any respiratory difficulty  - Respiratory Therapy support as indicated  - Manage/alleviate anxiety  - Monitor for signs/symptoms of CO2 retention  Outcome: Progressing     Problem: METABOLIC/FLUID AND ELECTROLYTES - ADULT  Goal: Glucose maintained within prescribed range  Description: INTERVENTIONS:  - Monitor Blood Glucose as ordered  - Assess for signs and symptoms of hyperglycemia and hypoglycemia  - Administer ordered medications to maintain glucose within target range  - Assess barriers to adequate nutritional intake and initiate nutrition consult as needed  - Instruct patient on self management of diabetes  Outcome: Progressing  Goal: Electrolytes maintained within normal limits  Description: INTERVENTIONS:  - Monitor labs and rhythm and assess patient for signs and symptoms of electrolyte imbalances  - Administer electrolyte replacement as ordered  - Monitor response to electrolyte replacements, including rhythm and repeat lab results as appropriate  - Fluid restriction as ordered  - Instruct patient on fluid and nutrition restrictions as appropriate  Outcome: Progressing  Goal: Hemodynamic stability and optimal renal function maintained  Description: INTERVENTIONS:  - Monitor labs and assess for signs and symptoms of volume excess or deficit  - Monitor intake, output and patient weight  - Monitor urine specific gravity, serum osmolarity and serum sodium as indicated or ordered  - Monitor response to  interventions for patient's volume status, including labs, urine output, blood pressure (other measures as available)  - Encourage oral intake as appropriate  - Instruct patient on fluid and nutrition restrictions as appropriate  Outcome: Progressing     Problem: Delirium  Goal: Minimize duration of delirium  Description: Interventions:  - Encourage use of hearing aids, eye glasses  - Promote highest level of mobility daily  - Provide frequent reorientation  - Promote wakefulness i.e. lights on, blinds open  - Promote sleep, encourage patient's normal rest cycle i.e. lights off, TV off, minimize noise and interruptions  - Encourage family to assist in orientation and promotion of home routines  Outcome: Progressing

## 2024-04-30 NOTE — PHYSICAL THERAPY NOTE
PHYSICAL THERAPY TREATMENT NOTE - INPATIENT    Room Number: 2626/2626-A     Session: 3     Number of Visits to Meet Established Goals: 5    Presenting Problem: PNA  Co-Morbidities : DM2, GERD, HLD, HTN, MDD, CKD3, neuropathy, WYATT and COPD and dementia    PHYSICAL THERAPY MEDICAL/SOCIAL HISTORY  History related to current admission: Patient is a 78 year old female admitted on 4/21/2024: Presents with SOB and BLE edema     HOME SITUATION  Type of Home: Independent living facility ([facility])   Home Layout: One level     Lives With: Alone  Drives: No  Patient Owned Equipment: Rolling walker  Patient Regularly Uses: Home O2 (reports only wears at night)     Prior Level of Union: Pt lives in ILF at [facility] reports able to ambulate community distances and down to the dining marvin, denies any falls in the past 6 months, has a pulse ox at home     ASSESSMENT   Patient demonstrates good  progress this session, goals  updated to reflect patient performance.    Patient is currently functioning near baseline with bed mobility, transfers, gait, and stair negotiation.    Patient currently does not meet criteria for skilled inpatient physical therapy services, however patient will continue to benefit from QID ambulation with nursing staff.  Pt is discharged from IP PT services.  RN aware to re-order if there is a decline in mobility status.      Patient continues to benefit from continued skilled PT services: at discharge to promote functional independence in home.  Anticipate patient will return home with home health PT.    PLAN  PT Treatment Plan: Bed mobility;Endurance;Energy conservation;Patient education;Gait training;Family education;Strengthening;Stair training;Transfer training;Balance training  Rehab Potential : Good  Frequency (Obs): 3-5x/week    CURRENT GOALS   Goal #1 Patient is able to participate in emery balance assessment       Goal #2 Patient is able to demonstrate transfers EOB to/from Chair/Wheelchair  at assistance level: modified independent      Goal #3 Patient is able to ambulate 150 feet with assist device: walker - rolling at assistance level: modified independent      Goal #4     Goal #5     Goal #6     Goal Comments: Goals established on 2024 all goals achieved     SUBJECTIVE  \"Thank you bethanyheart\"     OBJECTIVE  Precautions: Bed/chair alarm;Low vision    WEIGHT BEARING RESTRICTION  Weight Bearing Restriction: None                PAIN ASSESSMENT   Ratin  Location: BLE  Management Techniques: Activity promotion;Relaxation;Repositioning    BALANCE                                                                                                                       Static Sitting: Good  Dynamic Sitting: Good           Static Standing: Fair -  Dynamic Standing: Fair -    ACTIVITY TOLERANCE   Pt tolerated activity well. Mildly short of breath with stair climbing. Recovered quickly with pursed lip breathing.                       O2 WALK         AM-PAC '6-Clicks' INPATIENT SHORT FORM - BASIC MOBILITY  How much difficulty does the patient currently have...  Patient Difficulty: Turning over in bed (including adjusting bedclothes, sheets and blankets)?: None   Patient Difficulty: Sitting down on and standing up from a chair with arms (e.g., wheelchair, bedside commode, etc.): None   Patient Difficulty: Moving from lying on back to sitting on the side of the bed?: A Little   How much help from another person does the patient currently need...   Help from Another: Moving to and from a bed to a chair (including a wheelchair)?: A Little   Help from Another: Need to walk in hospital room?: A Little   Help from Another: Climbing 3-5 steps with a railing?: A Little       AM-PAC Score:  Raw Score: 20   Approx Degree of Impairment: 35.83%   Standardized Score (AM-PAC Scale): 47.67   CMS Modifier (G-Code): CJ    FUNCTIONAL ABILITY STATUS  Gait Assessment   Functional Mobility/Gait Assessment  Gait  Assistance: Supervision;Modified independent  Distance (ft): 300  Assistive Device: Rolling walker  Pattern: Within Functional Limits  Stairs: Stairs;Stoop/curb  How Many Stairs: 5  Device: 1 Rail  Assist: Supervision  Pattern: Ascend and Descend  Ascend and Descend : Step to  Stoop/Curb: Pt educated on sequencing for ascending and descending 1 curb step with RW and verbalized understanding.    Skilled Therapy Provided: Per RN okay to work with pt. Pt received in bathroom and was agreeable to PT session.     Bed Mobility:  Rolling: NT   Supine<>Sit: NT   Sit<>Supine: NT     Transfer Mobility:  Sit<>Stand: mod ind    Stand<>Sit: mod ind    Gait: pt ambulated with RW and supervision that progressed to mod ind     Pt stair climbed as above.     Therapist's Comments: Pt and family educated on role of therapy, goals for session, safety, fall prevention, activity recommendations, energy conservation techniques, and pursed lip breathing.    Patient End of Session: Up in chair;Needs met;Call light within reach;RN aware of session/findings;All patient questions and concerns addressed;Family present    PT Session Time: 15 minutes  Gait Training: 15 minutes

## 2024-04-30 NOTE — CM/SW NOTE
Patient current with [facility] for oxygen--updated oxygen order sent in [facility]IDIN--pending.    Additional C referrals sent in AIDIN--updated insurance information attached--thus far, no agency able to accept due to patient's insurance--pending--per request of son--called Mary Walker with [facility]--informed agency no longer contracted with patient's insurance    Spoke with Lizbet with [facility]--reserved in [facility]--if patient needs oxygen tank for transport--nurse to request portable tank from [facility]

## 2024-04-30 NOTE — PLAN OF CARE
Patient alert and oriented x 3. On 2L O2 support ,sinus on cardiac monitor. Patient denies any chest pain or chest discomfort at this time. Patient voids, last BM 4/29,ambulation she need 3L  O2 Support and report headache at that time , will doctor aware. Plan of care updated, all questions answered. Safety precautions in place. Bed alarm on. Will continue to monitor tele/labs/vital signs closely.     Plan:  Discharge plan, wean oxygen    Problem: Diabetes/Glucose Control  Goal: Glucose maintained within prescribed range  Description: INTERVENTIONS:  - Monitor Blood Glucose as ordered  - Assess for signs and symptoms of hyperglycemia and hypoglycemia  - Administer ordered medications to maintain glucose within target range  - Assess barriers to adequate nutritional intake and initiate nutrition consult as needed  - Instruct patient on self management of diabetes  Outcome: Progressing     Problem: Patient/Family Goals  Goal: Patient/Family Long Term Goal  Description: Patient's Long Term Goal: to go home    Interventions:  - comply to care plan  - Mds to see  - See additional Care Plan goals for specific interventions  Outcome: Progressing  Goal: Patient/Family Short Term Goal  Description: Patient's Short Term Goal: to feel better    Interventions:   - IV lasix  - IV atbx  - comply to care plan  - Mds to see  - See additional Care Plan goals for specific interventions  Outcome: Progressing     Problem: CARDIOVASCULAR - ADULT  Goal: Maintains optimal cardiac output and hemodynamic stability  Description: INTERVENTIONS:  - Monitor vital signs, rhythm, and trends  - Monitor for bleeding, hypotension and signs of decreased cardiac output  - Evaluate effectiveness of vasoactive medications to optimize hemodynamic stability  - Monitor arterial and/or venous puncture sites for bleeding and/or hematoma  - Assess quality of pulses, skin color and temperature  - Assess for signs of decreased coronary artery perfusion - ex.  Angina  - Evaluate fluid balance, assess for edema, trend weights  Outcome: Progressing     Problem: RESPIRATORY - ADULT  Goal: Achieves optimal ventilation and oxygenation  Description: INTERVENTIONS:  - Assess for changes in respiratory status  - Assess for changes in mentation and behavior  - Position to facilitate oxygenation and minimize respiratory effort  - Oxygen supplementation based on oxygen saturation or ABGs  - Provide Smoking Cessation handout, if applicable  - Encourage broncho-pulmonary hygiene including cough, deep breathe, Incentive Spirometry  - Assess the need for suctioning and perform as needed  - Assess and instruct to report SOB or any respiratory difficulty  - Respiratory Therapy support as indicated  - Manage/alleviate anxiety  - Monitor for signs/symptoms of CO2 retention  Outcome: Progressing     Problem: METABOLIC/FLUID AND ELECTROLYTES - ADULT  Goal: Glucose maintained within prescribed range  Description: INTERVENTIONS:  - Monitor Blood Glucose as ordered  - Assess for signs and symptoms of hyperglycemia and hypoglycemia  - Administer ordered medications to maintain glucose within target range  - Assess barriers to adequate nutritional intake and initiate nutrition consult as needed  - Instruct patient on self management of diabetes  Outcome: Progressing  Goal: Electrolytes maintained within normal limits  Description: INTERVENTIONS:  - Monitor labs and rhythm and assess patient for signs and symptoms of electrolyte imbalances  - Administer electrolyte replacement as ordered  - Monitor response to electrolyte replacements, including rhythm and repeat lab results as appropriate  - Fluid restriction as ordered  - Instruct patient on fluid and nutrition restrictions as appropriate  Outcome: Progressing  Goal: Hemodynamic stability and optimal renal function maintained  Description: INTERVENTIONS:  - Monitor labs and assess for signs and symptoms of volume excess or deficit  - Monitor  intake, output and patient weight  - Monitor urine specific gravity, serum osmolarity and serum sodium as indicated or ordered  - Monitor response to interventions for patient's volume status, including labs, urine output, blood pressure (other measures as available)  - Encourage oral intake as appropriate  - Instruct patient on fluid and nutrition restrictions as appropriate  Outcome: Progressing     Problem: Delirium  Goal: Minimize duration of delirium  Description: Interventions:  - Encourage use of hearing aids, eye glasses  - Promote highest level of mobility daily  - Provide frequent reorientation  - Promote wakefulness i.e. lights on, blinds open  - Promote sleep, encourage patient's normal rest cycle i.e. lights off, TV off, minimize noise and interruptions  - Encourage family to assist in orientation and promotion of home routines  Outcome: Progressing

## 2024-04-30 NOTE — DISCHARGE SUMMARY
[facility]   Hospitalist Team  [facility]   part of [facility]     Discharge Summary    Nina Donnelly Patient Status:  Inpatient    10/9/1945 MRN [id number]   Location [facility] 2NE-A Attending Jamar Arora MD   Hosp Day # 9 PCP Chiki Caldwell MD     Admit date: 2024  Discharge date: 2024    Consults:   Consultants         Provider   Role Specialty     Татьяна Schneider DO      Consulting Physician HOSPITALIST     Olga Amador MD      Consulting Physician PULMONARY DISEASES     Yasmani Marcano MD      Consulting Physician Cardiac Electrophysiology     Alley Samuel MD      Consulting Physician Internal Medicine            Hospital Discharge Diagnoses:   Community acquired pneumonia, unspecified laterality  ----See D/C Summary for further Dx    Risk of Readmission Lace+ Score: 63  59-90 High Risk  29-58 Medium Risk  0-28   Low Risk.    TCM Follow-Up Recommendation:  LACE > 58: High Risk of readmission after discharge from the hospital.    Please note that only IHP DMG and EMG patients enrolled in the Medicare ACO, BCBS ACO and [facility] HMOs will be handled by the [facility] Care Management team.  For all other patients, please follow usual protocol for discharge care transition.    Reason for admission  Per H/P Dated 24 by Dr Schneider   (see HPI on HP for further detail)    Hospital Course:     78 year old female with a PMHx sig for DM2, GERD, HLD, HTN, MDD, CKD3, neuropathy, WYATT and COPD and dementia presents to the hospital with shortness of breath and bilateral ankle swelling.  Patient managed for acute on chronic hypoxic and hypercapnic respiratory failure as well as CHF exacerbation.  Course was complicated by encephalopathy.        Altered mental status resolved  Toxic metabolic encephalopathy -resolved  - In the setting of possible infection and WYATT  - home CPAP for naps and sleep     Acute on chronic respiratory failure with hypoxia  Respiratory  acidosis  -Wean O2 as able  - CPAP with all naps and sleep  - 2D echo with preserved EF, RVSP was not able to be estimated,  - s/p Empiric antibiotics for pneumonia however Pro-Dougie negative  - CT chest reviewed, central emphysema otherwise no consolidation or edema noted  - 2D echo with bubble study per pulmonology- no shunt noted  -plan for 02 walk test on 2L per NC prior to discharge     COPD  - History of smoking in the past  - Outpatient PFTs with mild obstruction and moderate decrease in diffusion capacity  - Cannot tolerate inhalers in the outpatient setting  - Bronchodilators as needed     CAP  Acute on chronic diastolic CHF  -2D echo with preserved EF  -HFpEF  -proBNP elevated 695->200  -Previously on IV Lasix, now switched over to oral  - proBNP not significantly elevated, 2D echo with preserved EF, patient with known COPD and chronic respiratory failure and WYATT.  Possible concern for pulmonary hypertension- ECHO could not measaure systolic pressure  -Cardiology recommendations reviewed     HTN  -po hydralazine started per cards     BIBIANA on CKD3-   - close to baseline, cr 1.72       WYATT  Chronic RF w hypoxia  -cpap  -singulair  -02 walk test ordered prior to discharge     DM2 with hyperglycemia  -resume home regime  -ADA diet     Type 2 diabetes with neuropathy  - Takes pregabalin 600 mg nightly     MDD  -cymbalta      Dementia  -donepezil     GOC: Full code     MA/ACO Reach  -Re- Entry: no  -Consults:cards, pulm  -Discharge Needs: TBD  -Appointments: PCP     EXAM: NAD  GENERAL: no apparent distress  NEURO: A/A Ox3  RESP: non labored, CTA  CARDIO: Regular, no murmur  ABD: soft, NT, ND, BS+  EXTREMITIES: no edema, no calf tenderness    Operative Procedures:   Radiology:   CT BRAIN OR HEAD ([id number])    Result Date: 4/28/2024  PROCEDURE:  CT BRAIN OR HEAD ([id number])  COMPARISON:  None.  INDICATIONS:  altered mental status  TECHNIQUE:  Noncontrast CT scanning is performed through the brain. Dose reduction techniques  were used. Dose information is transmitted to the ACR (American College of Radiology) NRDR (National Radiology Data Registry) which includes the Dose Index Registry.  PATIENT STATED HISTORY: (As transcribed by Technologist)  Patient hs altered mental status with confusion.    FINDINGS: No evidence of intracranial hemorrhage or extra-axial fluid collection. Lucencies in the deep periventricular white matter are likely sequelae of chronic small vessel ischemic disease. Mild prominence of the sulci. No mass effect. Visualized portions of paranasal sinuses are unremarkable. Visualized portions of the mastoid air cells are unremarkable. Visualized portions of the orbits are unremarkable. IMPRESSION: Sequelae of chronic small vessel ischemic disease is noted. No evidence of intracranial hemorrhage or extra-axial fluid collection.    LOCATION:  [facility]   Dictated by (CST): Christoph Gómez MD on 4/28/2024 at 4:09 PM     Finalized by (CST): Christoph Gómez MD on 4/28/2024 at 4:11 PM       CT CHEST (PZI=[id number])    Result Date: 4/27/2024  CONCLUSION:  1. Although recent radiographs suggested bibasilar airspace opacities, no acute airspace disease within the chest is identified. 2. Mild centrilobular emphysematous changes primarily within the upper lobes. 3. Cardiomegaly with atherosclerotic changes of the coronary vessels as described above. 4. Please see further details within the body of the report above.  LOCATION:  [facility]   Dictated by (CST): Ha Giordano DO on 4/27/2024 at 1:01 PM     Finalized by (CST): Ha Giordano DO on 4/27/2024 at 1:07 PM      Discharge Instructions     Medication List        CONTINUE taking these medications      Easy Comfort Pen Needles 31G X 8 MM Misc  Generic drug: Insulin Pen Needle  Use daily for victoza shots subcut.     True Metrix Level 1 Low Soln  1 Application by In Vitro route daily.     True Metrix Meter w/Device Kit  Check sugars TID1     TRUEplus Lancets 33G Misc  Check sugars  TID            ASK your doctor about these medications      * Accu-Chek SmartView Strp     * True Metrix Blood Glucose Test Strp  Check sugars TID     aspirin 81 MG Tabs     atorvastatin 10 MG Tabs  Commonly known as: Lipitor  Take 1 tablet (10 mg total) by mouth nightly.     calcium carb-cholecalciferol 500-10 MG-MCG Chew     cyanocobalamin 1000 MCG Tabs  Commonly known as: Vitamin B12     donepezil 10 MG Tabs  Commonly known as: Aricept     DULoxetine 60 MG Cpep  Commonly known as: Cymbalta  TAKE 1 CAPSULE EVERY DAY     fluticasone propionate 50 MCG/ACT Susp  Commonly known as: Flonase     furosemide 20 MG Tabs  Commonly known as: Lasix  TAKE 1 TABLET TWICE DAILY     * insulin aspart 100 Units/mL Sopn  Commonly known as: NovoLOG  Ask about: Which instructions should I use?     * insulin aspart 100 Units/mL Sopn  Commonly known as: NovoLOG  Ask about: Which instructions should I use?     * insulin aspart 100 Units/mL Sopn  Commonly known as: NovoLOG  Ask about: Which instructions should I use?     insulin glargine 100 UNIT/ML Sopn  Ask about: Which instructions should I use?     montelukast 10 MG Tabs  Commonly known as: Singulair     pregabalin 300 MG Caps  Commonly known as: LYRICA  Ask about: Which instructions should I use?     traMADol 50 MG Tabs  Commonly known as: Ultram  Take 1 tablet (50 mg total) by mouth 2 (two) times daily as needed for Pain.     TURMERIC OR     VITAMIN D-3 OR           * This list has 5 medication(s) that are the same as other medications prescribed for you. Read the directions carefully, and ask your doctor or other care provider to review them with you.                  Activity: activity as tolerated  Diet: diabetic diet  Wound Care: none needed  Code Status: No Order    Important follow up:   Follow-up Information       Chiki Caldwell MD. Schedule an appointment as soon as possible for a visit in 1 week(s).    Specialty: Geriatrics  Contact information:  [address] [facility]  [place] [address]  [phone]                             -PCP in [x] within 3 days [] within 14 days []   Disposition: home  Discharged Condition: good    =========================================================================================================================  I Reconciled current and discharge medications on day of discharge  Patient had opportunity to ask questions and state understand and agree with therapeutic plan as outlined  Total Time Coordinating Care: greater than 30 minutes  Is this a shared or split note between Advanced Practice Provider and Physician? Yes  Note: This chart was prepared using voice recognition software and may contain unintended word substitution errors.     Lakhwinder CARPIO  [facility] Hospitalist Team   4/30/2024      SEE ATTENDING NOTE BELOW        Agree with above

## 2024-04-30 NOTE — OCCUPATIONAL THERAPY NOTE
OCCUPATIONAL THERAPY TREATMENT NOTE - INPATIENT     Room Number: 2626/2626-A  Session: 1   Number of Visits to Meet Established Goals: 3    Presenting Problem: pneumonia  Prior Level of Function: lives at [facility]. Independent with all ADL, including medication management.  Does not use device. Has home oxygen, uses 2 liters at night.  Pt enjoys going to outings and doing crafts at the facility.   Low vision because of macular degeneration.    ASSESSMENT   Patient demonstrates excellent progress this session, goals updated to reflect patient performance.    Patient functions near baseline with toileting, lower body dressing, and transfers.   Occupational Therapy will discharge patient at this time as all goals have been met at supervision/mod I.    Patient would benefit from home at discharge.         OT Device Recommendations: TBD    History: Patient is a 78 year old female admitted on 4/21/2024 with Presenting Problem: pneumonia. Co-Morbidities : DM2, GERD, HLD, HTN, MDD, CKD3, neuropathy, WYATT and COPD and dementia    WEIGHT BEARING RESTRICTION  Weight Bearing Restriction: None                Recommendations for nursing staff:   Transfers: 1 person  Toileting location: toilet    TREATMENT SESSION:  Patient Start of Session: seated EOB  FUNCTIONAL TRANSFER ASSESSMENT  Sit to Stand: Edge of Bed; Chair  Edge of Bed: Supervision  Chair: Supervision  Toilet Transfer: Supervision    BED MOBILITY  Supine to Sit : Not tested    BALANCE ASSESSMENT     FUNCTIONAL ADL ASSESSMENT  Grooming Standing: Supervision  LB Dressing Seated: Modified Independent  LB Dressing Standing: Supervision  Toileting Seated: Modified Independent  Toileting Standing: Supervision      ACTIVITY TOLERANCE: WFL                         O2 SATURATIONS       EDUCATION PROVIDED  Patient: Role of Occupational Therapy; Plan of Care; Discharge Recommendations; Functional Transfer Techniques; Fall Prevention; Posture/Positioning; Energy  Conservation; Proper Body Mechanics  Patient's Response to Education: Verbalized Understanding; Returned Demonstration      Equipment used: RW  Demonstrates functional use    Therapist comments: Pt demos footwear management at mod I while sitting with figure 4. Pt demos ambulatory toilet transfer with RW and completes all aspects of toileting at supervision/mod I. Pt returns to chair and is instructed on pursed lip breathing techniques to maintain O2 sats above 90%. Pt educated on energy conservation/work simplification strategies to implement during ADLs, functional transfers, and functional mobility in home environment for safe completion of daily needs. Pt then demos LB dressing at supervision/mod I.   Patient End of Session: Up in chair;Needs met;Call light within reach;RN aware of session/findings;All patient questions and concerns addressed    SUBJECTIVE  \"How should I pace myself more? When I'm given a task I go for it,.\"    PAIN ASSESSMENT  Ratin  Location: denies        OBJECTIVE  Precautions: Bed/chair alarm;Low vision    AM-PAC ‘6-Clicks’ Inpatient Daily Activity Short Form  -   Putting on and taking off regular lower body clothing?: A Little  -   Bathing (including washing, rinsing, drying)?: A Little  -   Toileting, which includes using toilet, bedpan or urinal? : A Little  -   Putting on and taking off regular upper body clothing?: None  -   Taking care of personal grooming such as brushing teeth?: None  -   Eating meals?: None    AM-PAC Score:  Score: 21  Approx Degree of Impairment: 32.79%  Standardized Score (AM-PAC Scale): 44.27    PLAN  OT Treatment Plan: Balance activities;Energy conservation/work simplification techniques;ADL training;Functional transfer training;Patient/Family education;Equipment eval/education;Compensatory technique education  Rehab Potential : Fair  Frequency: 3x/week    OT Goals:     All goals ongoing     ADL Goals   Patient will perform grooming: with supervision  and while standing at sink- goal met 04/30  Patient will perform lower body dressing:  with supervision- goal met 04/30  Patient will perform toileting: with supervision- goal met 04/30     Functional Transfer Goals  Patient will transfer to toilet:  with supervision- goal met 04/30        Additional Goals  Perform 2 out of 2 therapeutic exercises for respiratory functions  Pt will incorporate 2 energy conservation techniques into ADL.    OT Session Time: 30 minutes  Self-Care Home Management: 30 minutes

## 2024-04-30 NOTE — PROGRESS NOTES
Oxygen walk:      04/30/24 1237   Mobility   O2 walk? Yes   SPO2% on Room Air at Rest 92   SPO2% Ambulation on Room Air 83   SPO2% Ambulation on Oxygen 92   Ambulation oxygen flow (liters per minute) 2

## 2024-04-30 NOTE — PROGRESS NOTES
Pulmonary Progress Note        NAME: Nina Donnelly - ROOM: 2626/2626-A - MRN: [id number] - Age: 78 year old - : 10/9/1945        Last 24hrs: No events overnight, concerned about swelling in her legs    OBJECTIVE:  Vitals:    24 0520 24 0525 24 0841 24 1245   BP: 132/58  148/52    BP Location: Left arm  Left arm    Pulse: 65  69    Resp: 17  20    Temp:   98.6 °F (37 °C) 98.6 °F (37 °C)   TempSrc:   Oral Oral   SpO2: 99%  92%    Weight:  195 lb 15.8 oz (88.9 kg)     Height:           Oxygen Therapy  SpO2: 92 %  O2 Device: Nasal cannula  O2 Flow Rate (L/min): 2 L/min  Pulse Oximetry Type: Continuous  Oximetry Probe Site Changed: No  Pulse Ox Probe Location: Left hand                  Intake/Output Summary (Last 24 hours) at 2024 1320  Last data filed at 2024 0841  Gross per 24 hour   Intake 1140 ml   Output 1760 ml   Net -620 ml       Scheduled Medication:   pregabalin  300 mg Oral Nightly    furosemide  20 mg Oral BID (Diuretic)    insulin degludec  26 Units Subcutaneous Nightly    hydrALAZINE  75 mg Oral Q8H CHICO    insulin aspart  7 Units Subcutaneous Daily with dinner    insulin aspart  6 Units Subcutaneous BID AC    aspirin  81 mg Oral Daily    atorvastatin  10 mg Oral Nightly    donepezil  10 mg Oral Nightly    DULoxetine  60 mg Oral Daily    fluticasone propionate  1 spray Each Nare Daily    montelukast  10 mg Oral Nightly    enoxaparin  30 mg Subcutaneous Daily    insulin aspart  2-10 Units Subcutaneous TID AC and HS     Continuous Infusing Medication:    Lungs: clear to auscultation bilaterally  Heart: S1, S2 normal, no murmur, click, rub or gallop, regular rate and rhythm  Abdomen: soft, non-tender; bowel sounds normal; no masses,  no organomegaly  Extremities: extremities normal, atraumatic, no cyanosis or edema    Labs reviewed as noted below        ASSESSMENT/PLAN:    Acute on chronic hypercapnic and hypoxic resp failure- due to CHF exacerbation, atelectasis, obesity,  COPD and possible developing PNA.  initially declining CPAP while here bc she doesn't like mask; now using BIPAP prn and with sleep.    - acute component has resolved  -cont 2L w/ activity and sleep  -discussed with patient that her chronic oxygen need is still present and that she should continue to use oxygen with activity and with sleep and that failure to do so could result in medical complications and adverse outcomes. Pt expressed understanding  ID - ?PNA  - on admission, COVID/RVP negative, PCT negative and no infectious symptoms.  Antibiotics started empirically 4/21, broadened to zosyn on 4/25 bc of interval deterioration  - cultures NGTD, repeat PCT negative.  RVP negative   - has completed Abx  WYATT- cont with CPAP with all sleep with O2 entrained as OP  H/o COPD- not on inhalers as OP due to intolerance- no signs of exac  - pt with mild obstruction with moderate decrease in diffusion capacity on most recent OP PFTs  - singulair   - BDs prn  Encephalopathy: suspect TME with hospital induced delirium  -frequent reorientation   Proph- LMWH  Dispo- Full code  -stable to dc home  -f/u w/ Dr. Amador in 1-2 weeks        [facility]  Pulmonary and Critical Care

## 2024-04-30 NOTE — PROGRESS NOTES
Nina Donnelly Patient Status:  Inpatient    10/9/1945 MRN [id number]   [facility] 2NE-A Attending aJmar Arora MD   Hosp Day # 2 PCP Chiki Caldwell MD        Reason for Consultation:  CHF        History of Present Illness:  Nina Donnelly is a a(n) 78 year old female. She has COPD, WYATT, Diabetes, Dyslipidemia .  Uses CPAP and O2 at 2 l/min at home.  Still able to do some walking.  Reports 2 weeks of dry cough, worsened dyspnea.  Daughter notes worse hypoxia (O2 sats down to 70's instead of high 80's ).  LE edema was reported as well. Feels weak and tired.  No fever. Chills.  Previous bouts of pneumonia.     Subjective:    -Awake, alert, interactive.  -Ambulating without sig dyspnea.  -No CP.  -Breathing comfortably.  -Wore CPAP mask last night and mentions it might work in the future.     Medications:  Scheduled Medications    pregabalin  600 mg Oral Nightly    insulin degludec  20 Units Subcutaneous Nightly    insulin aspart  6 Units Subcutaneous BID AC    insulin aspart  8 Units Subcutaneous Daily with dinner    aspirin  81 mg Oral Daily    atorvastatin  10 mg Oral Nightly    donepezil  10 mg Oral Nightly    DULoxetine  60 mg Oral Daily    fluticasone propionate  1 spray Each Nare Daily    montelukast  10 mg Oral Nightly    cefTRIAXone  1 g Intravenous Q24H    enoxaparin  30 mg Subcutaneous Daily    insulin aspart  2-10 Units Subcutaneous TID AC and HS    azithromycin  500 mg Oral Daily    furosemide  40 mg Intravenous Daily            Continuous Infusions:  Medication Infusions            Social History:   reports that she quit smoking about 9 years ago. Her smoking use included cigarettes. She started smoking about 64 years ago. She has a 55 pack-year smoking history. She has never used smokeless tobacco. She reports that she does not drink alcohol and does not use drugs.     Review of Systems:  All systems were reviewed and are negative except as described above in HPI.      Physical Exam:            Wt Readings from Last 3 Encounters:   04/23/24 203 lb 4.2 oz (92.2 kg)   03/30/22 199 lb (90.3 kg)   03/21/22 193 lb (87.5 kg)         Vitals          Vitals:     04/22/24 2340 04/22/24 2345 04/22/24 2350 04/23/24 0505   BP:   (!) 169/61 (!) 173/57     BP Location:   Left arm Right arm     Pulse: 67 72 70     Resp:   22   24   Temp:   97.9 °F (36.6 °C)   98.2 °F (36.8 °C)   TempSrc:   Oral   Oral   SpO2: 95% 96% 94%     Weight:       203 lb 4.2 oz (92.2 kg)   Height:                   Temp:  [97.9 °F (36.6 °C)-98.7 °F (37.1 °C)] 98.2 °F (36.8 °C)  Pulse:  [53-82] 70  Resp:  [16-24] 24  BP: 131/48  SpO2:  [85 %-98 %] 94 %     Temp: 98.2 °F (36.8 °C)  Pulse: 70  Resp: 24  BP: 173/57       General:  Appears comfortable  HEENT: No focal deficits.  Neck: No JVD, carotids 2+ no bruits.  Cardiac: Regular S1S2.  No S3, S4, rub, click.  No murmur. No JVD  Lungs: diminished. No rales.  Abdomen: Soft, non-tender.   Extremities: No edema.  No clubbing or cyanosis  Neurologic: Alert and oriented, normal affect.  Skin: Warm and dry.      Labs:        HEM:        Recent Labs   Lab 04/21/24 2033 04/22/24  0514 04/23/24  0534   WBC 8.8 8.6 6.5   HGB 9.9* 10.0* 9.9*   HCT 34.3* 35.0 35.8   .0 202.0 197.0         Chem:        Recent Labs   Lab 04/21/24 2033 04/22/24  0514 04/23/24  0534    144 141   K 4.1 3.9 4.5    110 105   CO2 28.0 31.0 33.0*   BUN 26* 22 26*   CREATSERUM 1.68* 1.55* 1.68*   MG  --   --  2.0   ALT 15  --   --    AST 18  --   --    ALB 3.2*  --   --              Recent Labs   Lab 04/21/24 2033   INR 1.08         No results found for: \"TROP\", \"CKMB\"        Invalid input(s): \"PBNPML\"        Telemetry: SR     Laboratories and Data:  Diagnostics:     EKG, 4/21/2024:  NSR, NSSTTW changes     CXR, 4/22/2024:       CONCLUSION:  Bilateral lower lobe predominant interstitial opacities and suspected small left pleural effusion and cardiomegaly.  Findings may be related to  edema, though infectious/inflammatory process is not excluded.       Echo, 10/2023:         Summary:     1. Left ventricle: The cavity size is normal. There is mild concentric      hypertrophy. Systolic function is normal. The estimated ejection fraction      is 55-60%. Grade I diastolic dysfunction.   2. Aortic valve: Trileaflet. The leaflets are mildly thickened and mildly      calcified. The peak systolic velocity is 1.5m/sec. The mean systolic      gradient is 4mm Hg. The peak systolic gradient is 9mm Hg. The valve area      is 2.2cm^2. The valve area index is 1.15cm^2/m^2.   3. Mitral valve: The annulus is calcified, fibrotic, and thickened. The      leaflets are mildly thickened and mildly calcified. There is mild      regurgitation.   4. Left atrium: The atrium is mildly to moderately dilated.   5. Right ventricle: The cavity size is normal. Wall thickness is normal.      Systolic function is normal. Estimation of the right ventricular systolic      pressure is within the normal range. The RV pressure during systole is      31mm Hg.   6. Tricuspid valve: There is mild regurgitation.   7. Pericardium, extracardiac: A probable, trace pericardial effusion is      identified. There is no evidence of hemodynamic compromise.         Impression:  1.  HFpEF      Net negative 1L   Pro BNP normal yesterday    Well compensated by exam    On 20mg PO lasix BID   2. HTN   Normotensive 120s/50-60s   On hydralazine 75mg TID   3. COPD/PNA     3. WYATT     4. Diabetes     5. Dyslipidemia     On ASA and statin   6. CKD, Cr = 1.4-1.6   Cr 1.79 yesterday   Labs this AM pending      7. Microcytic anemia, chronic     8. Lower extremity pain (popliteal) - no DVT.         Recommend:  1. Telemetry  2. Echo - Ef 70-75%, mild LVH, mild valvulopathy.  3. Continue PO diuretics as long as Cr stable (labs pending)  4. Treatment of COPD/PNA per IM/pulm; deescalate antibiotics as cultures direct.  5. Continue PO hydralizine to 75mg TID.    6.  PRN IV hydralazine for BP over 160               - BP meds limited by renal function /  COPD and LE edema  7. Monitor renal functio with diuresis     Ok to DC home on current meds (PO diuretics BID and current hydralizine dose and lipitor).  If other service want to keep until tomorrow, I'm not opposed to that either.    [facility] MD

## 2024-05-01 NOTE — PROGRESS NOTES
IV removed with catheter intact. Discharge instructions reviewed with pt and son. Both verbalized understanding. Pt transported to [facility] via wheelchair for discharge home.

## 2024-05-01 NOTE — PAYOR COMM NOTE
--------------  DISCHARGE REVIEW    Payor: EVANGELISTA PARKER O  Subscriber #:  [id number]  Authorization Number: [id number]    Admit date: 24  Admit time:  10:54 PM  Discharge Date: 2024  7:19 PM     Admitting Physician: Raymond Paniagua MD  Attending Physician:  No att. providers found  Primary Care Physician: Chiki Caldwell MD          Discharge Summary Notes        Discharge Summary signed by Jamar Arora MD at 2024  2:58 PM       Author: Jamar Arora MD Specialty: HOSPITALIST, Internal Medicine Author Type: Physician    Filed: 2024  2:58 PM Date of Service: 2024 12:11 PM Status: Addendum    : Jamar Arora MD (Physician)    Related Notes: Original Note by Lakhwinder Lawson APRN (Nurse Practitioner) filed at 2024 12:22 PM         [facility]   Hospitalist Team  [facility]   part of [facility]     Discharge Summary    Nina Donnelly Patient Status:  Inpatient    10/9/1945 MRN [id number]   Location [facility] 2NE-A Attending Jamar Arora MD   Hosp Day # 9 PCP Chiki Caldwell MD     Admit date: 2024  Discharge date: 2024    Consults:   Consultants         Provider   Role Specialty     Татьяна Schneider DO      Consulting Physician HOSPITALIST     Olga Amador MD      Consulting Physician PULMONARY DISEASES     Yasmani Marcano MD      Consulting Physician Cardiac Electrophysiology     Alley Samuel MD      Consulting Physician Internal Medicine            Hospital Discharge Diagnoses:   Community acquired pneumonia, unspecified laterality  ----See D/C Summary for further Dx    Risk of Readmission Lace+ Score: 63  59-90 High Risk  29-58 Medium Risk  0-28   Low Risk.    TCM Follow-Up Recommendation:  LACE > 58: High Risk of readmission after discharge from the hospital.    Please note that only IHP DMG and EMG patients enrolled in the Medicare ACO, BCBS ACO and BCBS HMOs will be handled by the  hospitals Care Management team.  For all other patients, please follow usual protocol for discharge care transition.    Reason for admission  Per H/P Dated 4/22/24 by Dr Schneider   (see HPI on HP for further detail)    Hospital Course:     78 year old female with a PMHx sig for DM2, GERD, HLD, HTN, MDD, CKD3, neuropathy, WYATT and COPD and dementia presents to the hospital with shortness of breath and bilateral ankle swelling.  Patient managed for acute on chronic hypoxic and hypercapnic respiratory failure as well as CHF exacerbation.  Course was complicated by encephalopathy.        Altered mental status resolved  Toxic metabolic encephalopathy -resolved  - In the setting of possible infection and WYATT  - home CPAP for naps and sleep     Acute on chronic respiratory failure with hypoxia  Respiratory acidosis  -Wean O2 as able  - CPAP with all naps and sleep  - 2D echo with preserved EF, RVSP was not able to be estimated,  - s/p Empiric antibiotics for pneumonia however Pro-Dougie negative  - CT chest reviewed, central emphysema otherwise no consolidation or edema noted  - 2D echo with bubble study per pulmonology- no shunt noted  -plan for 02 walk test on 2L per NC prior to discharge     COPD  - History of smoking in the past  - Outpatient PFTs with mild obstruction and moderate decrease in diffusion capacity  - Cannot tolerate inhalers in the outpatient setting  - Bronchodilators as needed     CAP  Acute on chronic diastolic CHF  -2D echo with preserved EF  -HFpEF  -proBNP elevated 695->200  -Previously on IV Lasix, now switched over to oral  - proBNP not significantly elevated, 2D echo with preserved EF, patient with known COPD and chronic respiratory failure and WYATT.  Possible concern for pulmonary hypertension- ECHO could not measaure systolic pressure  -Cardiology recommendations reviewed     HTN  -po hydralazine started per cards     BIBIANA on CKD3-   - close to baseline, cr 1.72       WYATT  Chronic RF w  hypoxia  -cpap  -singulair  -02 walk test ordered prior to discharge     DM2 with hyperglycemia  -resume home regime  -ADA diet     Type 2 diabetes with neuropathy  - Takes pregabalin 600 mg nightly     MDD  -cymbalta      Dementia  -donepezil     GOC: Full code     MA/ACO Reach  -Re- Entry: no  -Consults:cards, pulm  -Discharge Needs: TBD  -Appointments: PCP     EXAM: NAD  GENERAL: no apparent distress  NEURO: A/A Ox3  RESP: non labored, CTA  CARDIO: Regular, no murmur  ABD: soft, NT, ND, BS+  EXTREMITIES: no edema, no calf tenderness    Operative Procedures:   Radiology:   CT BRAIN OR HEAD ([id number])    Result Date: 4/28/2024  PROCEDURE:  CT BRAIN OR HEAD (56019)  COMPARISON:  None.  INDICATIONS:  altered mental status  TECHNIQUE:  Noncontrast CT scanning is performed through the brain. Dose reduction techniques were used. Dose information is transmitted to the ACR (American College of Radiology) NRDR (National Radiology Data Registry) which includes the Dose Index Registry.  PATIENT STATED HISTORY: (As transcribed by Technologist)  Patient hs altered mental status with confusion.    FINDINGS: No evidence of intracranial hemorrhage or extra-axial fluid collection. Lucencies in the deep periventricular white matter are likely sequelae of chronic small vessel ischemic disease. Mild prominence of the sulci. No mass effect. Visualized portions of paranasal sinuses are unremarkable. Visualized portions of the mastoid air cells are unremarkable. Visualized portions of the orbits are unremarkable. IMPRESSION: Sequelae of chronic small vessel ischemic disease is noted. No evidence of intracranial hemorrhage or extra-axial fluid collection.    LOCATION:  [facility]   Dictated by (CST): Christoph Gómez MD on 4/28/2024 at 4:09 PM     Finalized by (CST): Christoph Gómez MD on 4/28/2024 at 4:11 PM       CT CHEST (GWK=[id number])    Result Date: 4/27/2024  CONCLUSION:  1. Although recent radiographs suggested bibasilar airspace  opacities, no acute airspace disease within the chest is identified. 2. Mild centrilobular emphysematous changes primarily within the upper lobes. 3. Cardiomegaly with atherosclerotic changes of the coronary vessels as described above. 4. Please see further details within the body of the report above.  LOCATION:  [place]   Dictated by (CST): Ha Giordano DO on 4/27/2024 at 1:01 PM     Finalized by (CST): Ha Giordano DO on 4/27/2024 at 1:07 PM      Discharge Instructions     Medication List        CONTINUE taking these medications      Easy Comfort Pen Needles 31G X 8 MM Misc  Generic drug: Insulin Pen Needle  Use daily for victoza shots subcut.     True Metrix Level 1 Low Soln  1 Application by In Vitro route daily.     True Metrix Meter w/Device Kit  Check sugars TID1     TRUEplus Lancets 33G Misc  Check sugars TID            ASK your doctor about these medications      * Accu-Chek SmartView Strp     * True Metrix Blood Glucose Test Strp  Check sugars TID     aspirin 81 MG Tabs     atorvastatin 10 MG Tabs  Commonly known as: Lipitor  Take 1 tablet (10 mg total) by mouth nightly.     calcium carb-cholecalciferol 500-10 MG-MCG Chew     cyanocobalamin 1000 MCG Tabs  Commonly known as: Vitamin B12     donepezil 10 MG Tabs  Commonly known as: Aricept     DULoxetine 60 MG Cpep  Commonly known as: Cymbalta  TAKE 1 CAPSULE EVERY DAY     fluticasone propionate 50 MCG/ACT Susp  Commonly known as: Flonase     furosemide 20 MG Tabs  Commonly known as: Lasix  TAKE 1 TABLET TWICE DAILY     * insulin aspart 100 Units/mL Sopn  Commonly known as: NovoLOG  Ask about: Which instructions should I use?     * insulin aspart 100 Units/mL Sopn  Commonly known as: NovoLOG  Ask about: Which instructions should I use?     * insulin aspart 100 Units/mL Sopn  Commonly known as: NovoLOG  Ask about: Which instructions should I use?     insulin glargine 100 UNIT/ML Sopn  Ask about: Which instructions should I use?     montelukast 10 MG  Tabs  Commonly known as: Singulair     pregabalin 300 MG Caps  Commonly known as: LYRICA  Ask about: Which instructions should I use?     traMADol 50 MG Tabs  Commonly known as: Ultram  Take 1 tablet (50 mg total) by mouth 2 (two) times daily as needed for Pain.     TURMERIC OR     VITAMIN D-3 OR           * This list has 5 medication(s) that are the same as other medications prescribed for you. Read the directions carefully, and ask your doctor or other care provider to review them with you.                  Activity: activity as tolerated  Diet: diabetic diet  Wound Care: none needed  Code Status: No Order    Important follow up:   Follow-up Information       Chiki Caldwell MD. Schedule an appointment as soon as possible for a visit in 1 week(s).    Specialty: Geriatrics  Contact information:  [address] [facility] [address]  [phone]                             -PCP in [x] within 3 days [] within 14 days []   Disposition: home  Discharged Condition: good    =========================================================================================================================  I Reconciled current and discharge medications on day of discharge  Patient had opportunity to ask questions and state understand and agree with therapeutic plan as outlined  Total Time Coordinating Care: greater than 30 minutes  Is this a shared or split note between Advanced Practice Provider and Physician? Yes  Note: This chart was prepared using voice recognition software and may contain unintended word substitution errors.     Lakhwinder CARPIO  [facility] Hospitalist Team   4/30/2024      SEE ATTENDING NOTE BELOW        Agree with above          Electronically signed by Jamar Arora MD on 4/30/2024  2:58 PM

## 2024-07-03 ENCOUNTER — ORDER TRANSCRIPTION (OUTPATIENT)
Dept: CARDIAC REHAB | Facility: HOSPITAL | Age: 79
End: 2024-07-03

## 2024-07-03 DIAGNOSIS — J44.9 COPD (CHRONIC OBSTRUCTIVE PULMONARY DISEASE) (HCC): Primary | ICD-10-CM

## 2024-07-10 ENCOUNTER — APPOINTMENT (OUTPATIENT)
Dept: CARDIAC REHAB | Facility: HOSPITAL | Age: 79
End: 2024-07-10
Attending: INTERNAL MEDICINE
Payer: MEDICARE

## 2024-08-12 ENCOUNTER — ORDER TRANSCRIPTION (OUTPATIENT)
Dept: CARDIAC REHAB | Facility: HOSPITAL | Age: 79
End: 2024-08-12

## 2024-08-12 DIAGNOSIS — J44.9 COPD (CHRONIC OBSTRUCTIVE PULMONARY DISEASE) (HCC): Primary | ICD-10-CM

## 2024-08-19 ENCOUNTER — CARDPULM VISIT (OUTPATIENT)
Dept: CARDIAC REHAB | Facility: HOSPITAL | Age: 79
End: 2024-08-19
Attending: INTERNAL MEDICINE
Payer: MEDICARE

## 2024-08-27 ENCOUNTER — CARDPULM VISIT (OUTPATIENT)
Dept: CARDIAC REHAB | Facility: HOSPITAL | Age: 79
End: 2024-08-27
Attending: INTERNAL MEDICINE
Payer: MEDICARE

## 2024-08-27 LAB
GLUCOSE BLD-MCNC: 107 MG/DL (ref 70–99)
GLUCOSE BLD-MCNC: 84 MG/DL (ref 70–99)
GLUCOSE BLD-MCNC: 84 MG/DL (ref 70–99)

## 2024-08-27 PROCEDURE — 94625 PHY/QHP OP PULM RHB W/O MNTR: CPT

## 2024-08-29 ENCOUNTER — CARDPULM VISIT (OUTPATIENT)
Dept: CARDIAC REHAB | Facility: HOSPITAL | Age: 79
End: 2024-08-29
Attending: INTERNAL MEDICINE
Payer: MEDICARE

## 2024-08-29 PROCEDURE — 94625 PHY/QHP OP PULM RHB W/O MNTR: CPT

## 2024-09-03 ENCOUNTER — CARDPULM VISIT (OUTPATIENT)
Dept: CARDIAC REHAB | Facility: HOSPITAL | Age: 79
End: 2024-09-03
Attending: INTERNAL MEDICINE
Payer: MEDICARE

## 2024-09-03 PROCEDURE — 94625 PHY/QHP OP PULM RHB W/O MNTR: CPT

## 2024-09-05 ENCOUNTER — CARDPULM VISIT (OUTPATIENT)
Dept: CARDIAC REHAB | Facility: HOSPITAL | Age: 79
End: 2024-09-05
Attending: INTERNAL MEDICINE
Payer: MEDICARE

## 2024-09-05 PROCEDURE — 94625 PHY/QHP OP PULM RHB W/O MNTR: CPT

## 2024-09-10 ENCOUNTER — CARDPULM VISIT (OUTPATIENT)
Dept: CARDIAC REHAB | Facility: HOSPITAL | Age: 79
End: 2024-09-10
Attending: INTERNAL MEDICINE
Payer: MEDICARE

## 2024-09-10 PROCEDURE — 94625 PHY/QHP OP PULM RHB W/O MNTR: CPT

## 2024-09-12 ENCOUNTER — CARDPULM VISIT (OUTPATIENT)
Dept: CARDIAC REHAB | Facility: HOSPITAL | Age: 79
End: 2024-09-12
Attending: INTERNAL MEDICINE
Payer: MEDICARE

## 2024-09-12 PROCEDURE — 94625 PHY/QHP OP PULM RHB W/O MNTR: CPT

## 2024-09-17 ENCOUNTER — CARDPULM VISIT (OUTPATIENT)
Dept: CARDIAC REHAB | Facility: HOSPITAL | Age: 79
End: 2024-09-17
Attending: INTERNAL MEDICINE
Payer: MEDICARE

## 2024-09-17 PROCEDURE — 94625 PHY/QHP OP PULM RHB W/O MNTR: CPT

## 2024-09-19 ENCOUNTER — CARDPULM VISIT (OUTPATIENT)
Dept: CARDIAC REHAB | Facility: HOSPITAL | Age: 79
End: 2024-09-19
Attending: INTERNAL MEDICINE
Payer: MEDICARE

## 2024-09-19 PROCEDURE — 94625 PHY/QHP OP PULM RHB W/O MNTR: CPT

## 2024-09-24 ENCOUNTER — CARDPULM VISIT (OUTPATIENT)
Dept: CARDIAC REHAB | Facility: HOSPITAL | Age: 79
End: 2024-09-24
Attending: INTERNAL MEDICINE
Payer: MEDICARE

## 2024-09-24 PROCEDURE — 94625 PHY/QHP OP PULM RHB W/O MNTR: CPT

## 2024-09-26 ENCOUNTER — APPOINTMENT (OUTPATIENT)
Dept: CARDIAC REHAB | Facility: HOSPITAL | Age: 79
End: 2024-09-26
Attending: INTERNAL MEDICINE
Payer: MEDICARE

## 2024-10-01 ENCOUNTER — CARDPULM VISIT (OUTPATIENT)
Dept: CARDIAC REHAB | Facility: HOSPITAL | Age: 79
End: 2024-10-01
Attending: INTERNAL MEDICINE
Payer: MEDICARE

## 2024-10-01 PROCEDURE — 94625 PHY/QHP OP PULM RHB W/O MNTR: CPT

## 2024-10-03 ENCOUNTER — CARDPULM VISIT (OUTPATIENT)
Dept: CARDIAC REHAB | Facility: HOSPITAL | Age: 79
End: 2024-10-03
Attending: INTERNAL MEDICINE
Payer: MEDICARE

## 2024-10-03 PROCEDURE — 94625 PHY/QHP OP PULM RHB W/O MNTR: CPT

## 2024-10-08 ENCOUNTER — APPOINTMENT (OUTPATIENT)
Dept: CARDIAC REHAB | Facility: HOSPITAL | Age: 79
End: 2024-10-08
Attending: INTERNAL MEDICINE
Payer: MEDICARE

## 2024-10-10 ENCOUNTER — CARDPULM VISIT (OUTPATIENT)
Dept: CARDIAC REHAB | Facility: HOSPITAL | Age: 79
End: 2024-10-10
Attending: INTERNAL MEDICINE
Payer: MEDICARE

## 2024-10-10 PROCEDURE — 94625 PHY/QHP OP PULM RHB W/O MNTR: CPT

## 2024-10-11 ENCOUNTER — APPOINTMENT (OUTPATIENT)
Dept: ULTRASOUND IMAGING | Facility: HOSPITAL | Age: 79
End: 2024-10-11
Attending: EMERGENCY MEDICINE
Payer: MEDICARE

## 2024-10-11 ENCOUNTER — HOSPITAL ENCOUNTER (INPATIENT)
Facility: HOSPITAL | Age: 79
LOS: 1 days | Discharge: HOME | End: 2024-10-12
Attending: EMERGENCY MEDICINE | Admitting: HOSPITALIST
Payer: MEDICARE

## 2024-10-11 ENCOUNTER — APPOINTMENT (OUTPATIENT)
Dept: GENERAL RADIOLOGY | Facility: HOSPITAL | Age: 79
End: 2024-10-11
Attending: EMERGENCY MEDICINE
Payer: MEDICARE

## 2024-10-11 ENCOUNTER — APPOINTMENT (OUTPATIENT)
Dept: NUCLEAR MEDICINE | Facility: HOSPITAL | Age: 79
End: 2024-10-11
Attending: EMERGENCY MEDICINE
Payer: MEDICARE

## 2024-10-11 DIAGNOSIS — R06.02 SHORTNESS OF BREATH: ICD-10-CM

## 2024-10-11 DIAGNOSIS — J44.1 COPD EXACERBATION (HCC): Primary | ICD-10-CM

## 2024-10-11 PROBLEM — R79.89 ELEVATED TROPONIN: Status: ACTIVE | Noted: 2024-10-11

## 2024-10-11 LAB
FLUAV + FLUBV RNA SPEC NAA+PROBE: NEGATIVE
FLUAV + FLUBV RNA SPEC NAA+PROBE: NEGATIVE
GLUCOSE BLD-MCNC: 151 MG/DL (ref 70–99)
GLUCOSE BLD-MCNC: 180 MG/DL (ref 70–99)
GLUCOSE BLD-MCNC: 48 MG/DL (ref 70–99)
NT-PROBNP SERPL-MCNC: 193 PG/ML (ref ?–450)
RSV RNA SPEC NAA+PROBE: NEGATIVE
SARS-COV-2 RNA RESP QL NAA+PROBE: NOT DETECTED
TROPONIN I SERPL HS-MCNC: 24 NG/L

## 2024-10-11 PROCEDURE — 71045 X-RAY EXAM CHEST 1 VIEW: CPT | Performed by: EMERGENCY MEDICINE

## 2024-10-11 PROCEDURE — 99285 EMERGENCY DEPT VISIT HI MDM: CPT

## 2024-10-11 PROCEDURE — 78582 LUNG VENTILAT&PERFUS IMAGING: CPT | Performed by: EMERGENCY MEDICINE

## 2024-10-11 PROCEDURE — 96374 THER/PROPH/DIAG INJ IV PUSH: CPT

## 2024-10-11 PROCEDURE — 83880 ASSAY OF NATRIURETIC PEPTIDE: CPT | Performed by: EMERGENCY MEDICINE

## 2024-10-11 PROCEDURE — 80048 BASIC METABOLIC PNL TOTAL CA: CPT | Performed by: INTERNAL MEDICINE

## 2024-10-11 PROCEDURE — 84484 ASSAY OF TROPONIN QUANT: CPT | Performed by: EMERGENCY MEDICINE

## 2024-10-11 PROCEDURE — 0241U SARS-COV-2/FLU A AND B/RSV BY PCR (GENEXPERT): CPT | Performed by: EMERGENCY MEDICINE

## 2024-10-11 PROCEDURE — 82962 GLUCOSE BLOOD TEST: CPT

## 2024-10-11 PROCEDURE — 93970 EXTREMITY STUDY: CPT | Performed by: EMERGENCY MEDICINE

## 2024-10-11 RX ORDER — ESCITALOPRAM OXALATE 5 MG/1
5 TABLET ORAL DAILY
COMMUNITY

## 2024-10-11 RX ORDER — ATORVASTATIN CALCIUM 10 MG/1
10 TABLET, FILM COATED ORAL NIGHTLY
COMMUNITY

## 2024-10-11 RX ORDER — FUROSEMIDE 40 MG/1
60 TABLET ORAL DAILY
Status: ON HOLD | COMMUNITY
End: 2024-10-15 | Stop reason: CLARIF

## 2024-10-11 RX ORDER — DULOXETIN HYDROCHLORIDE 60 MG/1
60 CAPSULE, DELAYED RELEASE ORAL DAILY
COMMUNITY

## 2024-10-11 RX ORDER — CETIRIZINE HYDROCHLORIDE 10 MG/1
10 TABLET ORAL DAILY
COMMUNITY

## 2024-10-11 RX ORDER — DEXTROSE MONOHYDRATE 25 G/50ML
50 INJECTION, SOLUTION INTRAVENOUS ONCE
Status: COMPLETED | OUTPATIENT
Start: 2024-10-11 | End: 2024-10-11

## 2024-10-11 RX ORDER — SPIRONOLACTONE 25 MG/1
25 TABLET ORAL DAILY
COMMUNITY

## 2024-10-11 RX ORDER — ESCITALOPRAM OXALATE 5 MG/1
5 TABLET ORAL DAILY
COMMUNITY
Start: 2024-10-08 | End: 2024-10-11 | Stop reason: CLARIF

## 2024-10-11 RX ORDER — METHYLPREDNISOLONE SODIUM SUCCINATE 125 MG/2ML
125 INJECTION, POWDER, LYOPHILIZED, FOR SOLUTION INTRAMUSCULAR; INTRAVENOUS ONCE
Status: DISCONTINUED | OUTPATIENT
Start: 2024-10-11 | End: 2024-10-12

## 2024-10-11 RX ORDER — ASPIRIN 81 MG/1
324 TABLET, CHEWABLE ORAL ONCE
Status: COMPLETED | OUTPATIENT
Start: 2024-10-11 | End: 2024-10-11

## 2024-10-11 RX ORDER — HYDRALAZINE HYDROCHLORIDE 100 MG/1
100 TABLET, FILM COATED ORAL 3 TIMES DAILY
COMMUNITY

## 2024-10-11 NOTE — ED PROVIDER NOTES
Patient Seen in: [facility] Emergency Department      History     Chief Complaint   Patient presents with    Abnormal Labs     Stated Complaint: elevated trop and ddimer from pulm, needs vq scan.     Subjective:   HPI  Patient is a 78 yo F with history of HLD, DM with neuropathy, depression, chronic hypoxemic respiratory failure 2/2 copd on 2LPM with activity, WYATT on cpap, HFpEF who presents to ED for evaluation sent in by pulmonary clinic for elevated troponin and D-dimer.  Patient reports that she has felt \"off\" over the past 2 to 3 weeks where she has noticed worsening dyspnea and fatigue.  Patient states that she has intermittent left chest discomfort that she describes as twinges but no CP currently.  Pt also notes bilateral LE edema, with LLE worse since .  Patient denies any history of blood clots.  She is not on any blood thinners.  Patient states that she has had to use her oxygen more frequently due to low sats  at home. Pt has also been falling asleep more easily, sometimes during conversation. Pt reports mild dry cough. Denies any fevers, chills, hemoptysis, vomiting, abd pain.     From chart review, patient had outpatient labs today which showed elevated D-dimer, BNP.  Given D-dimer and troponin were elevated, recommended patient come to the ED for VQ scan and lower extremity Dopplers given patient history of CKD.  If no DVT or PE, then recommended treatment for COPD exacerbation with prednisone.    Objective:     Past Medical History:    Congestive heart disease (HCC)    Hyperlipidemia    Mild episode of recurrent major depressive disorder (HCC)    Neuropathy    Obstructive apnea    [facility] PSG AHI 11    WYATT on CPAP              Past Surgical History:   Procedure Laterality Date          Cabg      Foot surgery      Hysterectomy      Knee surgery      Tonsillectomy                  Social History     Socioeconomic History    Marital status:    Tobacco Use    Smoking status: Former      Current packs/day: 0.00     Average packs/day: 1 pack/day for 55.0 years (55.0 ttl pk-yrs)     Types: Cigarettes     Start date: 1960     Quit date: 2015     Years since quittin.7    Smokeless tobacco: Never   Vaping Use    Vaping status: Never Used   Substance and Sexual Activity    Alcohol use: No    Drug use: No     Social Drivers of Health     Financial Resource Strain: Low Risk  (2023)    Received from [facility]    Overall Financial Resource Strain (CARDIA)     Difficulty of Paying Living Expenses: Not very hard   Food Insecurity: No Food Insecurity (10/12/2024)    Food Insecurity     Food Insecurity: Never true   Transportation Needs: No Transportation Needs (10/12/2024)    Transportation Needs     Lack of Transportation: No   Physical Activity: Sufficiently Active (2023)    Received from [facility]    Exercise Vital Sign     Days of Exercise per Week: 3 days     Minutes of Exercise per Session: 50 min   Stress: Stress Concern Present (2023)    Received from [facility]    [employer] - Occupational Stress Questionnaire     Feeling of Stress : Rather much   Social Connections: Moderately Isolated (2023)    Received from [facility]    Social Connection and Isolation Panel [NHANES]     Frequency of Communication with Friends and Family: More than three times a week     Frequency of Social Gatherings with Friends and Family: More than three times a week     Attends Christianity Services: More than 4 times per year     Active Member of Clubs or Organizations: No     Attends Club or Organization Meetings: Never     Marital Status:    Housing Stability: Low Risk  (10/12/2024)    Housing Stability     Housing Instability: No                  Physical Exam     ED Triage Vitals   BP 10/11/24 1556 153/84   Pulse 10/11/24 1556 73   Resp 10/11/24 1556 20   Temp 10/11/24 1556 97.1 °F (36.2 °C)   Temp src 10/11/24 1556  Temporal   SpO2 10/11/24 1556 97 %   O2 Device 10/11/24 1600 None (Room air)       Current Vitals:   Vital Signs  BP: 145/57  Pulse: 69  Resp: 17  Temp: 97.9 °F (36.6 °C)  Temp src: Oral  MAP (mmHg): 81    Oxygen Therapy  SpO2: 92 %  O2 Device: Nasal cannula  O2 Flow Rate (L/min): 2 L/min        Physical Exam  Vitals and nursing note reviewed.   Constitutional:       General: She is not in acute distress.     Appearance: She is not ill-appearing.   HENT:      Head: Normocephalic and atraumatic.      Mouth/Throat:      Mouth: Mucous membranes are moist.   Eyes:      Extraocular Movements: Extraocular movements intact.      Pupils: Pupils are equal, round, and reactive to light.   Cardiovascular:      Rate and Rhythm: Normal rate and regular rhythm.   Pulmonary:      Effort: Pulmonary effort is normal. No respiratory distress.      Breath sounds: Normal breath sounds. No wheezing.   Abdominal:      General: There is no distension.      Palpations: Abdomen is soft.      Tenderness: There is no abdominal tenderness.   Musculoskeletal:      Cervical back: Neck supple.      Right lower leg: Edema present.      Left lower leg: Edema present.   Skin:     General: Skin is warm and dry.      Capillary Refill: Capillary refill takes less than 2 seconds.   Neurological:      General: No focal deficit present.      Mental Status: She is alert.   Psychiatric:         Mood and Affect: Mood normal.           ED Course     Labs Reviewed   POCT GLUCOSE - Abnormal; Notable for the following components:       Result Value    POC Glucose 48 (*)     All other components within normal limits   POCT GLUCOSE - Abnormal; Notable for the following components:    POC Glucose 180 (*)     All other components within normal limits   POCT GLUCOSE - Abnormal; Notable for the following components:    POC Glucose 151 (*)     All other components within normal limits   TROPONIN I HIGH SENSITIVITY - Normal   PRO BETA NATRIURETIC PEPTIDE - Normal    SARS-COV-2/FLU A AND B/RSV BY PCR (GENEXPERT) - Normal    Narrative:     This test is intended for the qualitative detection and differentiation of SARS-CoV-2, influenza A, influenza B, and respiratory syncytial virus (RSV) viral RNA in nasopharyngeal or nares swabs from individuals suspected of respiratory viral infection consistent with COVID-19 by their healthcare provider. Signs and symptoms of respiratory viral infection due to SARS-CoV-2, influenza, and RSV can be similar.    Test performed using the Xpert Xpress SARS-CoV-2/FLU/RSV (real time RT-PCR)  assay on the [employer] instrument, [employer], [place] [address].   This test is being used under the Food and Drug Administration's Emergency Use Authorization.    The authorized Fact Sheet for Healthcare Providers for this assay is available upon request from the laboratory.   RAINBOW DRAW LAVENDER   RAINBOW DRAW BLUE   RAINBOW DRAW GOLD       ED Course as of 10/12/24 0131  ------------------------------------------------------------  Time: 10/11 1659  Comment: Trop downtrended from earlier. BNP wnl.   ------------------------------------------------------------  Time: 10/11 1752  Comment: Notified by RN that BS was 48. RN gave amp. Will also PO  ------------------------------------------------------------  Time: 10/11 1947  Comment: US neg for DVT  ------------------------------------------------------------  Time: 10/11 1948  Comment: CXR independently reviewed and shows no large consolidation or pulmonary edema.   ------------------------------------------------------------  Time: 10/11 2107  Value: US VENOUS DOPPLER LEG BILAT - DIAG IMG (CPT=93970)  Comment: (Reviewed)              MDM    Differential diagnosis includes but not limited to COPD exacerbation, PE, ACS, fluid overload, pneumonia    Patient is a 80 yo F with history of HLD, DM with neuropathy, depression, chronic hypoxemic respiratory failure 2/2 copd on 2LPM with activity, WYATT on cpap, HFpEF  who presents to ED for evaluation sent in by pulmonary clinic for elevated troponin and D-dimer.  Patient is afebrile, satting 90% on room air, placed on 2 L.  She is hemodynamically stable.    Outpatient labs today reviewed.  Patient had no leukocytosis.  Hemoglobin stable at 10.  Platelets normal.  CMP showed BUN of 42, creatinine of 1.77, bicarb of 33. Troponin 49. D-dimer 0.56.    ED work up shows troponin downtrended. VQ scan showed low probability for PE. US neg for DVT. CXR independently reviewed and shows no large focal consolidation or fluid overload. Pt given ASA. Ordered IV solumedrol as pulm note recommended steroids if work up otherwise unremarkable, but per RN pt was refusing. Admitted to [facility] Hospitalist. Alfredo cardiology also placed on consult    Admission disposition: 10/11/2024 10:02 PM         Medical Decision Making      Disposition and Plan     Clinical Impression:  1. COPD exacerbation (HCC)    2. Shortness of breath         Disposition:  Admit  10/11/2024 10:02 pm    Follow-up:  No follow-up provider specified.        Medications Prescribed:  Current Discharge Medication List              Supplementary Documentation:         Hospital Problems       Present on Admission  Date Reviewed: 3/30/2022            ICD-10-CM Noted POA    * (Principal) Elevated troponin R79.89 10/11/2024 Unknown    COPD exacerbation (HCC) J44.1 10/11/2024 Unknown    Shortness of breath R06.02 10/11/2024 Unknown

## 2024-10-11 NOTE — ED QUICK NOTES
Orders for admission, patient is aware of plan and ready to go upstairs. Any questions, please call ED RN Lenin COHI at extension [phone].     Patient Covid vaccination status: Fully vaccinated     COVID Test Ordered in ED: SARS-CoV-2/Flu A and B/RSV by PCR (GeneXpert)    COVID Suspicion at Admission: N/A    Running Infusions:  None    Mental Status/LOC at time of transport: a/o x 4    Other pertinent information:   CIWA score: N/A   NIH score:  N/A

## 2024-10-12 VITALS
HEART RATE: 61 BPM | TEMPERATURE: 98 F | DIASTOLIC BLOOD PRESSURE: 55 MMHG | BODY MASS INDEX: 37 KG/M2 | SYSTOLIC BLOOD PRESSURE: 129 MMHG | WEIGHT: 197.06 LBS | OXYGEN SATURATION: 94 % | RESPIRATION RATE: 18 BRPM

## 2024-10-12 PROBLEM — R06.02 SHORTNESS OF BREATH: Status: RESOLVED | Noted: 2024-10-11 | Resolved: 2024-10-12

## 2024-10-12 PROBLEM — R79.89 ELEVATED TROPONIN: Status: RESOLVED | Noted: 2024-10-11 | Resolved: 2024-10-12

## 2024-10-12 PROBLEM — J40 BRONCHITIS: Status: ACTIVE | Noted: 2024-10-12

## 2024-10-12 PROBLEM — J44.1 COPD EXACERBATION (HCC): Status: RESOLVED | Noted: 2024-10-11 | Resolved: 2024-10-12

## 2024-10-12 LAB
ANION GAP SERPL CALC-SCNC: 5 MMOL/L (ref 0–18)
ATRIAL RATE: 57 BPM
BUN BLD-MCNC: 42 MG/DL (ref 9–23)
CALCIUM BLD-MCNC: 9.6 MG/DL (ref 8.7–10.4)
CHLORIDE SERPL-SCNC: 102 MMOL/L (ref 98–112)
CO2 SERPL-SCNC: 32 MMOL/L (ref 21–32)
CREAT BLD-MCNC: 1.79 MG/DL
EGFRCR SERPLBLD CKD-EPI 2021: 29 ML/MIN/1.73M2 (ref 60–?)
ERYTHROCYTE [DISTWIDTH] IN BLOOD BY AUTOMATED COUNT: 20.4 %
GLUCOSE BLD-MCNC: 105 MG/DL (ref 70–99)
GLUCOSE BLD-MCNC: 126 MG/DL (ref 70–99)
GLUCOSE BLD-MCNC: 202 MG/DL (ref 70–99)
GLUCOSE BLD-MCNC: 63 MG/DL (ref 70–99)
HCT VFR BLD AUTO: 32.8 %
HGB BLD-MCNC: 9.7 G/DL
MCH RBC QN AUTO: 17 PG (ref 26–34)
MCHC RBC AUTO-ENTMCNC: 29.6 G/DL (ref 31–37)
MCV RBC AUTO: 57.5 FL
OSMOLALITY SERPL CALC.SUM OF ELEC: 297 MOSM/KG (ref 275–295)
P AXIS: 62 DEGREES
P-R INTERVAL: 166 MS
PLATELET # BLD AUTO: 177 10(3)UL (ref 150–450)
PLATELETS.RETICULATED NFR BLD AUTO: 2.7 % (ref 0–7)
POTASSIUM SERPL-SCNC: 4 MMOL/L (ref 3.5–5.1)
Q-T INTERVAL: 460 MS
QRS DURATION: 98 MS
QTC CALCULATION (BEZET): 447 MS
R AXIS: 8 DEGREES
RBC # BLD AUTO: 5.7 X10(6)UL
SODIUM SERPL-SCNC: 139 MMOL/L (ref 136–145)
T AXIS: 74 DEGREES
TROPONIN I SERPL HS-MCNC: 18 NG/L
VENTRICULAR RATE: 57 BPM
WBC # BLD AUTO: 7.4 X10(3) UL (ref 4–11)

## 2024-10-12 PROCEDURE — 94660 CPAP INITIATION&MGMT: CPT

## 2024-10-12 PROCEDURE — 85027 COMPLETE CBC AUTOMATED: CPT | Performed by: INTERNAL MEDICINE

## 2024-10-12 PROCEDURE — 93005 ELECTROCARDIOGRAM TRACING: CPT

## 2024-10-12 PROCEDURE — 93010 ELECTROCARDIOGRAM REPORT: CPT | Performed by: INTERNAL MEDICINE

## 2024-10-12 PROCEDURE — 84484 ASSAY OF TROPONIN QUANT: CPT | Performed by: INTERNAL MEDICINE

## 2024-10-12 PROCEDURE — 82962 GLUCOSE BLOOD TEST: CPT

## 2024-10-12 RX ORDER — ATORVASTATIN CALCIUM 10 MG/1
10 TABLET, FILM COATED ORAL NIGHTLY
Status: DISCONTINUED | OUTPATIENT
Start: 2024-10-12 | End: 2024-10-12

## 2024-10-12 RX ORDER — DEXTROSE MONOHYDRATE 25 G/50ML
50 INJECTION, SOLUTION INTRAVENOUS
Status: DISCONTINUED | OUTPATIENT
Start: 2024-10-12 | End: 2024-10-12

## 2024-10-12 RX ORDER — ESCITALOPRAM OXALATE 5 MG/1
5 TABLET ORAL DAILY
Status: DISCONTINUED | OUTPATIENT
Start: 2024-10-12 | End: 2024-10-12

## 2024-10-12 RX ORDER — PREGABALIN 75 MG/1
300 CAPSULE ORAL NIGHTLY
Status: DISCONTINUED | OUTPATIENT
Start: 2024-10-12 | End: 2024-10-12

## 2024-10-12 RX ORDER — HYDRALAZINE HYDROCHLORIDE 50 MG/1
100 TABLET, FILM COATED ORAL 3 TIMES DAILY
Status: DISCONTINUED | OUTPATIENT
Start: 2024-10-12 | End: 2024-10-12

## 2024-10-12 RX ORDER — NICOTINE POLACRILEX 4 MG
15 LOZENGE BUCCAL
Status: DISCONTINUED | OUTPATIENT
Start: 2024-10-12 | End: 2024-10-12

## 2024-10-12 RX ORDER — FUROSEMIDE 20 MG/1
20 TABLET ORAL EVERY EVENING
Status: DISCONTINUED | OUTPATIENT
Start: 2024-10-12 | End: 2024-10-12

## 2024-10-12 RX ORDER — NICOTINE POLACRILEX 4 MG
30 LOZENGE BUCCAL
Status: DISCONTINUED | OUTPATIENT
Start: 2024-10-12 | End: 2024-10-12

## 2024-10-12 RX ORDER — MONTELUKAST SODIUM 10 MG/1
10 TABLET ORAL NIGHTLY
Status: DISCONTINUED | OUTPATIENT
Start: 2024-10-12 | End: 2024-10-12

## 2024-10-12 RX ORDER — DONEPEZIL HYDROCHLORIDE 10 MG/1
10 TABLET, FILM COATED ORAL NIGHTLY
Status: DISCONTINUED | OUTPATIENT
Start: 2024-10-12 | End: 2024-10-12

## 2024-10-12 RX ORDER — FUROSEMIDE 40 MG/1
40 TABLET ORAL DAILY
Status: DISCONTINUED | OUTPATIENT
Start: 2024-10-12 | End: 2024-10-12

## 2024-10-12 RX ORDER — INSULIN DEGLUDEC 100 U/ML
25 INJECTION, SOLUTION SUBCUTANEOUS DAILY
Status: DISCONTINUED | OUTPATIENT
Start: 2024-10-12 | End: 2024-10-12

## 2024-10-12 RX ORDER — CETIRIZINE HYDROCHLORIDE 10 MG/1
10 TABLET ORAL DAILY
Status: DISCONTINUED | OUTPATIENT
Start: 2024-10-12 | End: 2024-10-12

## 2024-10-12 RX ORDER — FLUTICASONE PROPIONATE 50 MCG
2 SPRAY, SUSPENSION (ML) NASAL DAILY
Status: DISCONTINUED | OUTPATIENT
Start: 2024-10-12 | End: 2024-10-12

## 2024-10-12 RX ORDER — DULOXETIN HYDROCHLORIDE 30 MG/1
60 CAPSULE, DELAYED RELEASE ORAL DAILY
Status: DISCONTINUED | OUTPATIENT
Start: 2024-10-12 | End: 2024-10-12

## 2024-10-12 RX ORDER — SPIRONOLACTONE 25 MG/1
25 TABLET ORAL DAILY
Status: DISCONTINUED | OUTPATIENT
Start: 2024-10-12 | End: 2024-10-12

## 2024-10-12 RX ORDER — DOXYCYCLINE 100 MG/1
100 CAPSULE ORAL 2 TIMES DAILY
Qty: 10 CAPSULE | Refills: 0 | Status: SHIPPED | OUTPATIENT
Start: 2024-10-12 | End: 2024-10-17

## 2024-10-12 NOTE — PLAN OF CARE
Receive patient from ED for SOB. Pt lives alone at senior resident. Patient is alert & oriented x4. Pt ambulates w/ SBA. Breath sounds are clear;diminished bilateral. Back on 2L NC(baseline). Normal sinus rhythm. No pain reported. Skin dry and intact. Bed in low position and call light within reach. Reviewed plan of care and patient verbalizes understanding.        Problem: Diabetes/Glucose Control  Goal: Glucose maintained within prescribed range  Description: INTERVENTIONS:  - Monitor Blood Glucose as ordered  - Assess for signs and symptoms of hyperglycemia and hypoglycemia  - Administer ordered medications to maintain glucose within target range  - Assess barriers to adequate nutritional intake and initiate nutrition consult as needed  - Instruct patient on self management of diabetes  Outcome: Progressing     Problem: Patient/Family Goals  Goal: Patient/Family Long Term Goal  Description: Patient's Long Term Goal: Back home    Interventions:  - O2 supplement  - See additional Care Plan goals for specific interventions  Outcome: Progressing  Goal: Patient/Family Short Term Goal  Description: Patient's Short Term Goal: Get better    Interventions:   - Medication management  - See additional Care Plan goals for specific interventions  Outcome: Progressing

## 2024-10-12 NOTE — DISCHARGE SUMMARY
[facility] Hospitalist Discharge Summary     Patient ID:  Nina Donnelly  79 year old  10/9/1945    Admit date: 10/11/2024  Discharge date and time: 10/12/2024  1:44 PM   Attending Physician: No att. providers found   Primary Care Physician: Chiki Caldwell MD   Discharge Diagnoses: Elevated troponin [R79.89]  COPD exacerbation (HCC) [J44.1]    Discharge Condition: Stable    Disposition:  Home    Follow up:   - PCP within 1 week    Hospital Course:  79 year old female with PMH sig for Diastolic HF, HLD, T2DM, WYATT on CPAP, CRF (2L baseline) who presents for shortness of breath.  Patient stated experiencing dyspnea for about 3 weeks.  She also had a mildly elevated troponin outpatient labs.  Was instructed by her pulmonologist to come to the ED for further evaluation.     #Acute Bronchitis  #Chronic respiratory failure  #Atelectasis  #Elevated troponin, noncardiac in setting of CKD/ hypoxia  -No significant symptoms reported. Lungs sounds clear. Remains on baseline O2. No indication for steroids.   -V/Q and LE duplex negative  -Troponin mildly elevated but downtrended  -Echo 4/2024 reviewed EF 60-65%  -Start bronchodilators/ inhalers ordered by established pulmonologist  -Cardiology consulted  -Can DC on course of doxycyline     #CKD IV  -Trend renal function     #Stage C/NYHA II/ HFpEF, chronic  #Hypertension  -Continue Lasix, Aldactone, and hydralazine     #HLD  -statin     #WYATT  -CPAP qhs and prn     #T2DM  -Tresiba 10 units qam + SSI while inpatient       Exam on Day of DC:  /55 (BP Location: Right arm)   Pulse 61   Temp 97.7 °F (36.5 °C) (Oral)   Resp 18   Wt 197 lb 1.5 oz (89.4 kg)   SpO2 94%   BMI 37.26 kg/m²   Physical Exam:  General: Alert, awake, cooperative.  HEENT:  Normocephalic, atraumatic.  Neck:  Trachea midline.  No JVD.   Chest:  Clear to auscultation bilaterally. No wheezes, rales, or rhonchi.  CV:  Regular rate and rhythm.  Positive S1/S2. No murmur, no gallops, no rubs  GI: Bowel sounds  present in all four quadrants, abdomen is soft, non-tender, non-distended.  Extremities:  1+ lower extremity edema, no cyanosis.  Neurological:  AAOx4. No focal deficits.  Skin:  Warm and dry.         I as the attending physician reconciled the current and discharge medications on day of discharge.     Discharge Medication List as of 10/12/2024  1:09 PM        START taking these medications    Details   doxycycline 100 MG Oral Cap Take 1 capsule (100 mg total) by mouth 2 (two) times daily for 5 days., Normal, Disp-10 capsule, R-0           CONTINUE these medications which have NOT CHANGED    Details   budeson-glycopyrrol-formoterol 160-9-4.8 MCG/ACT Inhalation Aerosol Inhale 2 puffs into the lungs 2 (two) times daily., Historical      atorvastatin 10 MG Oral Tab Take 1 tablet (10 mg total) by mouth nightly., Historical      DULoxetine 60 MG Oral Cap DR Particles Take 1 capsule (60 mg total) by mouth daily., Historical      furosemide 40 MG Oral Tab Take 1.5 tablets (60 mg total) by mouth daily., Historical      hydrALAZINE 100 MG Oral Tab Take 1 tablet (100 mg total) by mouth 3 (three) times daily., Historical      insulin aspart 100 Units/mL Subcutaneous Solution Pen-injector Inject 6 Units into the skin 3 (three) times daily before meals. Inject 6 units into skin 3 times daily before meals. Add sliding scale.  150-199 = 8 units  200-249 = 10 units  250-299 = 12 units  300-349 = 14 units, Historical      spironolactone 25 MG Oral Tab Take 1 tablet (25 mg total) by mouth daily., Historical      cetirizine 10 MG Oral Tab Take 1 tablet (10 mg total) by mouth daily., Historical      escitalopram 5 MG Oral Tab Take 1 tablet (5 mg total) by mouth daily., Historical      Multiple Vitamins-Minerals (PRESERVISION AREDS 2 OR) Take 1 tablet by mouth every morning., Historical      insulin glargine 100 UNIT/ML Subcutaneous Solution Pen-injector Inject 20 Units into the skin 2 (two) times daily., Historical      donepezil 10 MG  Oral Tab Take 1 tablet (10 mg total) by mouth nightly., Historical      fluticasone propionate 50 MCG/ACT Nasal Suspension 2 sprays by Each Nare route daily., Historical      pregabalin 300 MG Oral Cap Take 2 capsules (600 mg total) by mouth nightly., Historical      Cholecalciferol (VITAMIN D-3) 25 MCG (1000 UT) Oral Cap Take 2 capsules by mouth daily., Historical      Vitamin B-12 1000 MCG Oral Tab Take 1 tablet (1,000 mcg total) by mouth daily., Historical      aspirin 81 MG Oral Tab Take 1 tablet (81 mg total) by mouth daily., Historical      !! Glucose Blood (TRUE METRIX BLOOD GLUCOSE TEST) In Vitro Strip Check sugars TID, Normal, Disp-300 strip, R-3Dx E11.42  Insulin dependent      !! Glucose Blood (ACCU-CHEK SMARTVIEW) In Vitro Strip 3 (three) times daily., Historical       !! - Potential duplicate medications found. Please discuss with provider.          Asael Slaughter [facility] Hospitalist

## 2024-10-12 NOTE — PLAN OF CARE
Received patient at 0730. Alert and oriented x4. Tele rhythm NSR. O2 saturation 94% on 2L o2. Breath sounds clear. Bed is locked and in low position. Call light and personal belongings in reach. No c/o chest pain or shortness of breath. Pt voiding with no issue. Pt ambulated in hallway with no issues. Skin dry and intact. Care plan reviewed, pt verbalizes understanding.       Problem: Diabetes/Glucose Control  Goal: Glucose maintained within prescribed range  Description: INTERVENTIONS:  - Monitor Blood Glucose as ordered  - Assess for signs and symptoms of hyperglycemia and hypoglycemia  - Administer ordered medications to maintain glucose within target range  - Assess barriers to adequate nutritional intake and initiate nutrition consult as needed  - Instruct patient on self management of diabetes  Outcome: Progressing     Problem: Patient/Family Goals  Goal: Patient/Family Long Term Goal  Description: Patient's Long Term Goal: Back home    Interventions:  - O2 supplement  - See additional Care Plan goals for specific interventions  Outcome: Progressing  Goal: Patient/Family Short Term Goal  Description: Patient's Short Term Goal: Get better    Interventions:   - Medication management  - See additional Care Plan goals for specific interventions  Outcome: Progressing

## 2024-10-12 NOTE — CONSULTS
[facility]    PATIENT'S NAME: IZABELA SCHROEDER   ATTENDING PHYSICIAN: Bhanu Louise MD   CONSULTING PHYSICIAN: Lakhwinder Chowdhury M.D.   PATIENT ACCOUNT#:   [id number]    LOCATION:  [address] [place] [address]  MEDICAL RECORD #:   [id number]       YOB: 1945  ADMISSION DATE:       10/11/2024      CONSULT DATE:  10/12/2024    REPORT OF CONSULTATION    HISTORY OF PRESENT ILLNESS:  This is a 79-year-old patient we were asked to see for elevated troponin.    Patient was at her pulmonologist's office.  She was complaining of just not feeling right for the last couple of weeks.  This included no energy, fatigue, shortness of breath.  She is on home O2.  She is having heaviness with her breathing and feeling like she should cough.  No chest pain, no chest pressure, no chest tightness.    While leaving the office, she had blood work done, including an elevated troponin and elevated D-dimer.  She was sent to the ER.    In the emergency room, her troponin was normal.  Her earlier troponin several hours ago was by a different technique, i-STAT.  She had a V/Q scan and DVT scan which were negative.    She has a history of chronic diastolic heart failure and chronic edema.  She has never had an MI, angina.  She denies orthopnea.  There is no lightheadedness or fainting.  At times, her heart will race.    Cardiac risk factors include tobacco which she quit in 2013.  She has hypertension, hyperlipidemia, and diabetes.    PAST MEDICAL HISTORY:  Hyperlipidemia; diabetes; hypertension; sleep apnea; depression; bursitis, limiting ambulation; chronic renal insufficiency; chronic diastolic heart failure; obesity; COPD/emphysema, on home O2.    PAST SURGICAL HISTORY:  Tonsillectomy, cholecystectomy, herniorrhaphy.    MEDICATIONS:  On admission, incluided inhalers, Lasix, insulin, spironolactone, aspirin.    ALLERGIES:  Actos and vancomycin.    SOCIAL HISTORY:  Patient is  with 3 children.  No alcohol.  She drinks caffeine.   Sedentary lifestyle.    FAMILY HISTORY:  Mother with an MI in her 40s.    REVIEW OF SYSTEMS:  Obtained and was negative including no history of thyroid disease.      PHYSICAL EXAMINATION:    GENERAL:  She is a heavyset lady in no distress.  VITAL SIGNS:  Blood pressure is 117/47, pulse is 58.  HEENT:  Head is normocephalic.  Eyes are nonicteric.  Oral mucosa is moist.  NECK:  Without JVD or carotid bruits.  Thyroid is small.  LUNGS:  Clear.  She moves air poorly.  HEART:  Regular rate and rhythm.  ABDOMEN:  Soft and benign.  Central adiposity.  EXTREMITIES:  Trace ankle edema.  NEUROLOGIC:  She is alert and oriented.  PSYCHIATRIC:  Appropriate.  Patient is a little angry.    LABORATORY DATA:  V/Q scan is low probability.      Venous Dopplers are negative.      EKG; NSR  PRWP     Chest x-ray, she has a normal heart size.  No pleural effusions, some atelectasis.      Old CT scan has coronary calcifications.    ASSESSMENT:    1.   Elevated troponin, doubt acute coronary syndrome.  2.   Chronic diastolic heart failure.  3.   Chronic renal insufficiency.  4.   Chronic obstructive pulmonary disease/emphysema, on home O2, quit tobacco ~ 2013.  5.   Hypertension.  6.   Hyperlipidemia.  7.   Diabetes.  8.   Coronary calcifications on CT.    PLAN:    1.   We will check EKG and enzymes.  I do not believe her current troponin elevation is related to ischemia. No anginal symptoms.   May be false-positive with different techniques or mismatch from her respiratory status.  If tests are negative, would do outpatient Lexiscan. Patient adamant on leaving hospital.   2.   Blood pressure is controlled.  No change in therapy.  She is on hydralazine.  3.   Continue aspirin and statin for coronary calcifications and dyslipidemia.  4.   Case reviewed with Dr. Hicks.  5.   Echo in April 2024 was reviewed, EF 60%, and we will not repeat at this time. HFpEF  compensated continue diuretics.   6.   Cardiology will follow.    Dictated By  Lakhwinder Chowdhury M.D.  d: 10/12/2024 09:16:31  t: 10/12/2024 11:28:11  [facility] [id number]  MON/

## 2024-10-12 NOTE — ED QUICK NOTES
Patient able to stand, provided with non slip socks and she was ambulate to the restroom. Patient with steady gait and denies any dizziness.

## 2024-10-12 NOTE — H&P
[facility]      Chief Complaint   Patient presents with    Abnormal Labs        PCP: Chiki Caldwell MD      History of Present Illness: Patient is a 79 year old female with PMH sig for Diastolic HF, HLD, T2DM, WYATT on CPAP, CRF (2L baseline) who presents for shortness of breath.  Patient stated experiencing dyspnea for about 3 weeks.  She also had a mildly elevated troponin outpatient labs.  Was instructed by her pulmonologist to come to the ED for further evaluation.    Past Medical History:    Congestive heart disease (HCC)    Hyperlipidemia    Mild episode of recurrent major depressive disorder (HCC)    Neuropathy    Obstructive apnea    [facility] PSG AHI 11    WYATT on CPAP      Past Surgical History:   Procedure Laterality Date          Cabg      Foot surgery      Hysterectomy      Knee surgery      Tonsillectomy          ALL:  Allergies[1]     No current outpatient medications on file.       Social History     Tobacco Use    Smoking status: Former     Current packs/day: 0.00     Average packs/day: 1 pack/day for 55.0 years (55.0 ttl pk-yrs)     Types: Cigarettes     Start date: 1960     Quit date: 2015     Years since quittin.7    Smokeless tobacco: Never   Substance Use Topics    Alcohol use: No        Fam Hx  Family History   Problem Relation Age of Onset    Psychiatric Mother     Diabetes Mother     Heart Disorder Mother     Breast Cancer Paternal Aunt         dx age unknown        Review of Systems  Comprehensive ROS reviewed and negative except for what is stated in HPI.      OBJECTIVE:  /47 (BP Location: Right arm)   Pulse 59   Temp 98 °F (36.7 °C) (Oral)   Resp 18   Wt 197 lb 1.5 oz (89.4 kg)   SpO2 94%   BMI 37.26 kg/m²   Physical Exam:  General: Alert, awake, cooperative.  HEENT:  Normocephalic, atraumatic.  Neck:  Trachea midline.  No JVD.   Chest:  Clear to auscultation bilaterally. No wheezes, rales, or rhonchi.  CV:  Regular  rate and rhythm.  Positive S1/S2. No murmur, no gallops, no rubs  GI: Bowel sounds present in all four quadrants, abdomen is soft, non-tender, non-distended.  Extremities:  1+ lower extremity edema, no cyanosis.  Neurological:  AAOx4. No focal deficits.  Skin:  Warm and dry.        Data Review:    LABS:   Lab Results   Component Value Date    WBC 7.4 10/12/2024    HGB 9.7 10/12/2024    HCT 32.8 10/12/2024    .0 10/12/2024    CREATSERUM 1.79 10/11/2024    BUN 42 10/11/2024     10/11/2024    K 4.0 10/11/2024     10/11/2024    CO2 32.0 10/11/2024    GLU 63 10/11/2024    CA 9.6 10/11/2024    PGLU 126 10/12/2024       CXR: image personally reviewed.  Subsegmental atelectasis b/l noted.     Radiology: NM LUNG VQ VENT / PERFUSION SCAN  (CPT=78582)    Result Date: 10/11/2024  PROCEDURE:  NM LUNG VQ VENT / PERFUSION SCAN  (CPT=78582)  COMPARISON:  [facility] , XR, XR CHEST AP PORTABLE  (CPT=71045), 10/11/2024, 4:57 PM.  INDICATIONS:  Difficulty breathing and elevated D-dimer.  TECHNIQUE:  The patient was ventilated with 5.6 mCi Xe-133 gas and posterior supine images of the lungs were obtained. This was followed by IV injection of 2.8 mCi Tc-99m MAA, and similar projection images were obtained.  FINDINGS:  PERFUSION:    Uniform. REGIONS OF MISMATCH:  None.  Ventilation demonstrates delayed washout could be due to COPD.            CONCLUSION:  Very low probability for pulmonary embolus.   LOCATION:  [facility]   Dictated by (CST): Nikko Gonzáles MD on 10/11/2024 at 8:58 PM     Finalized by (CST): Nikko Gonzáles MD on 10/11/2024 at 8:59 PM       US VENOUS DOPPLER LEG BILAT - DIAG IMG (CPT=93970)    Result Date: 10/11/2024  PROCEDURE:  US VENOUS DOPPLER LEG BILAT - DIAG IMG (CPT=93970)  COMPARISON:  [facility] , US, US VENOUS DOPPLER LEG LEFT - DIAG IMG (CPT=93971), 4/23/2024, 7:29 AM.  INDICATIONS:  Shortness of breath with bilateral leg cramping and swelling.  TECHNIQUE:  Real time, grey scale, and duplex ultrasound was  used to evaluate the lower extremity venous system. B-mode two-dimensional images of the vascular structures, Doppler spectral analysis, and color flow.  Doppler imaging were performed.  The following veins were imaged bilaterally:  Common, deep, and superficial femoral, popliteal, sapheno-femoral junction, and posterior tibial veins.  PATIENT STATED HISTORY: (As transcribed by Technologist)     FINDINGS:  SAPHENOFEMORAL JUNCTION:  No reflux. THROMBI:  None visible. COMPRESSION:  Normal compressibility, phasicity, and augmentation. OTHER:  Right Magallon's cyst measures 3.4 x 1.1 x 3.2 cm. .            CONCLUSION:  No DVT of the bilateral lower extremities.   LOCATION:  [facility]   Dictated by (CST): Nikko Gonzáles MD on 10/11/2024 at 7:25 PM     Finalized by (CST): Nikko Gonzáles MD on 10/11/2024 at 7:25 PM       XR CHEST AP PORTABLE  (CPT=71045)    Result Date: 10/11/2024  PROCEDURE:  XR CHEST AP PORTABLE  (CPT=71045)  TECHNIQUE:  AP chest radiograph was obtained.  COMPARISON:  [facility] , CT, CT CHEST (CPT=71250), 4/27/2024, 12:36 PM.  [facility] , XR, XR CHEST AP PORTABLE  (CPT=71045), 4/25/2024, 9:37 AM.  INDICATIONS:  elevated trop and ddimer from pulm, needs vq scan. , 3L NC O2 Baseline  PATIENT STATED HISTORY: (As transcribed by Technologist)  Patient offered no additional history at this time.    FINDINGS:  Minimal subsegmental atelectasis at the lung bases.  The heart is normal size.  There are no pleural effusions.  The bones are unremarkable.            CONCLUSION:  Minimal subsegmental atelectasis at the lung bases.   LOCATION:  [facility]      Dictated by (CST): Nikko Gonzáles MD on 10/11/2024 at 5:20 PM     Finalized by (CST): Nikko Gonzáles MD on 10/11/2024 at 5:20 PM          Assessment/Plan:   79 year old female with PMH sig for Diastolic HF, HLD, T2DM, WYATT on CPAP, CRF (2L baseline) who presents for shortness of breath.     #Acute Bronchitis  #Chronic respiratory failure  #Atelectasis  #Elevated troponin, noncardiac  in setting of CKD/ hypoxia  -No significant symptoms reported. Lungs sounds clear. Remains on baseline O2. No indication for steroids.   -V/Q and LE duplex negative  -Troponin mildly elevated but downtrended  -Echo 4/2024 reviewed EF 60-65%  -Start bronchodilators/ inhalers ordered by established pulmonologist  -Cardiology consulted  -Can DC on course of doxycyline    #CKD IV  -Trend renal function    #Stage C/NYHA II/ HFpEF, chronic  #Hypertension  -Continue Lasix, Aldactone, and hydralazine    #HLD  -statin    #WYATT  -CPAP qhs and prn    #T2DM  -Tresiba 10 units qam + SSI while inpatient        Outpatient records or previous hospital records reviewed.   [facility] hospitalist to continue to follow patient while in house  A total of 77 minutes taken with patient and coordinating care.  Greater than 50% face to face encounter.      Asael Gautam DO  [facility] Hospitalist      **Certification      PHYSICIAN Certification of Need for Inpatient Hospitalization - Initial Certification    Patient will require inpatient services that will reasonably be expected to span two midnight's based on the clinical documentation in H+P.   Based on patients current state of illness, I anticipate that, after discharge, patient will require TBD.         [1]   Allergies  Allergen Reactions    Pioglitazone UNKNOWN     Weight Gain    Vancomycin ITCHING

## 2024-10-14 ENCOUNTER — APPOINTMENT (OUTPATIENT)
Dept: GENERAL RADIOLOGY | Facility: HOSPITAL | Age: 79
End: 2024-10-14
Attending: EMERGENCY MEDICINE
Payer: MEDICARE

## 2024-10-14 ENCOUNTER — HOSPITAL ENCOUNTER (INPATIENT)
Facility: HOSPITAL | Age: 79
LOS: 3 days | Discharge: HOME | End: 2024-10-17
Attending: EMERGENCY MEDICINE | Admitting: INTERNAL MEDICINE
Payer: MEDICARE

## 2024-10-14 DIAGNOSIS — R06.00 DYSPNEA, UNSPECIFIED TYPE: Primary | ICD-10-CM

## 2024-10-14 DIAGNOSIS — E87.29 CO2 RETENTION: ICD-10-CM

## 2024-10-14 DIAGNOSIS — R60.0 BILATERAL LOWER EXTREMITY EDEMA: ICD-10-CM

## 2024-10-14 PROBLEM — R73.9 HYPERGLYCEMIA: Status: ACTIVE | Noted: 2024-10-14

## 2024-10-14 PROBLEM — R79.89 AZOTEMIA: Status: ACTIVE | Noted: 2024-10-14

## 2024-10-14 PROBLEM — D64.9 ANEMIA: Status: ACTIVE | Noted: 2024-10-14

## 2024-10-14 LAB
ADENOVIRUS PCR:: NOT DETECTED
ALBUMIN SERPL-MCNC: 4.2 G/DL (ref 3.2–4.8)
ALBUMIN/GLOB SERPL: 1.8 {RATIO} (ref 1–2)
ALP LIVER SERPL-CCNC: 92 U/L
ALT SERPL-CCNC: 14 U/L
ANION GAP SERPL CALC-SCNC: 4 MMOL/L (ref 0–18)
ARTERIAL PATENCY WRIST A: POSITIVE
AST SERPL-CCNC: 24 U/L (ref ?–34)
ATRIAL RATE: 55 BPM
B PARAPERT DNA SPEC QL NAA+PROBE: NOT DETECTED
B PERT DNA SPEC QL NAA+PROBE: NOT DETECTED
BASE EXCESS BLDA CALC-SCNC: 8.2 MMOL/L (ref ?–2)
BASE EXCESS BLDV CALC-SCNC: 1.5 MMOL/L
BASOPHILS # BLD AUTO: 0.05 X10(3) UL (ref 0–0.2)
BASOPHILS NFR BLD AUTO: 0.6 %
BILIRUB SERPL-MCNC: 0.7 MG/DL (ref 0.2–1.1)
BODY TEMPERATURE: 98.6 F
BUN BLD-MCNC: 39 MG/DL (ref 9–23)
C PNEUM DNA SPEC QL NAA+PROBE: NOT DETECTED
CA-I BLD-SCNC: 1 MMOL/L (ref 0.95–1.32)
CA-I BLD-SCNC: 1.25 MMOL/L (ref 0.95–1.32)
CALCIUM BLD-MCNC: 9.5 MG/DL (ref 8.7–10.4)
CHLORIDE SERPL-SCNC: 104 MMOL/L (ref 98–112)
CO2 SERPL-SCNC: 33 MMOL/L (ref 21–32)
COHGB MFR BLD: 1.5 % SAT (ref 0–3)
CORONAVIRUS 229E PCR:: NOT DETECTED
CORONAVIRUS HKU1 PCR:: NOT DETECTED
CORONAVIRUS NL63 PCR:: NOT DETECTED
CORONAVIRUS OC43 PCR:: NOT DETECTED
CREAT BLD-MCNC: 1.64 MG/DL
EGFRCR SERPLBLD CKD-EPI 2021: 32 ML/MIN/1.73M2 (ref 60–?)
EOSINOPHIL # BLD AUTO: 0.27 X10(3) UL (ref 0–0.7)
EOSINOPHIL NFR BLD AUTO: 3.1 %
ERYTHROCYTE [DISTWIDTH] IN BLOOD BY AUTOMATED COUNT: 20.8 %
FLUAV + FLUBV RNA SPEC NAA+PROBE: NEGATIVE
FLUAV + FLUBV RNA SPEC NAA+PROBE: NEGATIVE
FLUAV RNA SPEC QL NAA+PROBE: NOT DETECTED
FLUBV RNA SPEC QL NAA+PROBE: NOT DETECTED
GLOBULIN PLAS-MCNC: 2.3 G/DL (ref 2–3.5)
GLUCOSE BLD-MCNC: 202 MG/DL (ref 70–99)
GLUCOSE BLD-MCNC: 304 MG/DL (ref 70–99)
GLUCOSE BLD-MCNC: 306 MG/DL (ref 70–99)
GLUCOSE BLD-MCNC: 88 MG/DL (ref 70–99)
HCO3 BLDA-SCNC: 31.3 MEQ/L (ref 21–27)
HCO3 BLDV-SCNC: 25 MEQ/L (ref 22–26)
HCT VFR BLD AUTO: 32.3 %
HGB BLD-MCNC: 10.4 G/DL
HGB BLD-MCNC: 9.6 G/DL
IMM GRANULOCYTES # BLD AUTO: 0.03 X10(3) UL (ref 0–1)
IMM GRANULOCYTES NFR BLD: 0.3 %
L/M: 3 L/MIN
LACTATE BLD-SCNC: 1 MMOL/L (ref 0.5–2)
LYMPHOCYTES # BLD AUTO: 1.66 X10(3) UL (ref 1–4)
LYMPHOCYTES NFR BLD AUTO: 19.3 %
MCH RBC QN AUTO: 16.9 PG (ref 26–34)
MCHC RBC AUTO-ENTMCNC: 29.7 G/DL (ref 31–37)
MCV RBC AUTO: 57 FL
METAPNEUMOVIRUS PCR:: NOT DETECTED
METHGB MFR BLD: 1 % SAT (ref 0.4–1.5)
MONOCYTES # BLD AUTO: 0.67 X10(3) UL (ref 0.1–1)
MONOCYTES NFR BLD AUTO: 7.8 %
MYCOPLASMA PNEUMONIA PCR:: NOT DETECTED
NEUTROPHILS # BLD AUTO: 5.91 X10 (3) UL (ref 1.5–7.7)
NEUTROPHILS # BLD AUTO: 5.91 X10(3) UL (ref 1.5–7.7)
NEUTROPHILS NFR BLD AUTO: 68.9 %
NT-PROBNP SERPL-MCNC: 239 PG/ML (ref ?–450)
OSMOLALITY SERPL CALC.SUM OF ELEC: 307 MOSM/KG (ref 275–295)
OXYHGB MFR BLDA: 95.8 % (ref 92–100)
OXYHGB MFR BLDV: 56.5 % (ref 72–78)
P AXIS: 59 DEGREES
P-R INTERVAL: 174 MS
PARAINFLUENZA 1 PCR:: NOT DETECTED
PARAINFLUENZA 2 PCR:: NOT DETECTED
PARAINFLUENZA 3 PCR:: NOT DETECTED
PARAINFLUENZA 4 PCR:: NOT DETECTED
PCO2 BLDA: 54 MM HG (ref 35–45)
PCO2 BLDV: 49 MM HG (ref 38–50)
PH BLDA: 7.41 [PH] (ref 7.35–7.45)
PH BLDV: 7.36 [PH] (ref 7.33–7.43)
PLATELET # BLD AUTO: 175 10(3)UL (ref 150–450)
PLATELETS.RETICULATED NFR BLD AUTO: 2.7 % (ref 0–7)
PO2 BLDA: 81 MM HG (ref 80–100)
PO2 BLDV: 33 MM HG (ref 30–50)
POTASSIUM BLD-SCNC: 3.4 MMOL/L (ref 3.6–5.1)
POTASSIUM BLD-SCNC: 4.5 MMOL/L (ref 3.6–5.1)
POTASSIUM SERPL-SCNC: 4.4 MMOL/L (ref 3.5–5.1)
POTASSIUM SERPL-SCNC: 4.6 MMOL/L (ref 3.5–5.1)
PROCALCITONIN SERPL-MCNC: 0.08 NG/ML (ref ?–0.05)
PROT SERPL-MCNC: 6.5 G/DL (ref 5.7–8.2)
Q-T INTERVAL: 450 MS
QRS DURATION: 86 MS
QTC CALCULATION (BEZET): 430 MS
R AXIS: 9 DEGREES
RBC # BLD AUTO: 5.67 X10(6)UL
RHINOVIRUS/ENTERO PCR:: NOT DETECTED
RSV RNA SPEC NAA+PROBE: NEGATIVE
RSV RNA SPEC QL NAA+PROBE: NOT DETECTED
SARS-COV-2 RNA NPH QL NAA+NON-PROBE: NOT DETECTED
SARS-COV-2 RNA RESP QL NAA+PROBE: NOT DETECTED
SODIUM BLD-SCNC: 139 MMOL/L (ref 135–145)
SODIUM BLD-SCNC: 139 MMOL/L (ref 135–145)
SODIUM SERPL-SCNC: 141 MMOL/L (ref 136–145)
T AXIS: 66 DEGREES
TROPONIN I SERPL HS-MCNC: 11 NG/L
VENTRICULAR RATE: 55 BPM
WBC # BLD AUTO: 8.6 X10(3) UL (ref 4–11)

## 2024-10-14 PROCEDURE — 94640 AIRWAY INHALATION TREATMENT: CPT

## 2024-10-14 PROCEDURE — 84132 ASSAY OF SERUM POTASSIUM: CPT | Performed by: HOSPITALIST

## 2024-10-14 PROCEDURE — 94660 CPAP INITIATION&MGMT: CPT

## 2024-10-14 PROCEDURE — 82330 ASSAY OF CALCIUM: CPT | Performed by: EMERGENCY MEDICINE

## 2024-10-14 PROCEDURE — 83605 ASSAY OF LACTIC ACID: CPT | Performed by: EMERGENCY MEDICINE

## 2024-10-14 PROCEDURE — 83050 HGB METHEMOGLOBIN QUAN: CPT | Performed by: EMERGENCY MEDICINE

## 2024-10-14 PROCEDURE — 71045 X-RAY EXAM CHEST 1 VIEW: CPT | Performed by: EMERGENCY MEDICINE

## 2024-10-14 PROCEDURE — 80053 COMPREHEN METABOLIC PANEL: CPT | Performed by: EMERGENCY MEDICINE

## 2024-10-14 PROCEDURE — 82962 GLUCOSE BLOOD TEST: CPT

## 2024-10-14 PROCEDURE — 0241U SARS-COV-2/FLU A AND B/RSV BY PCR (GENEXPERT): CPT | Performed by: EMERGENCY MEDICINE

## 2024-10-14 PROCEDURE — 84132 ASSAY OF SERUM POTASSIUM: CPT | Performed by: EMERGENCY MEDICINE

## 2024-10-14 PROCEDURE — 82803 BLOOD GASES ANY COMBINATION: CPT | Performed by: EMERGENCY MEDICINE

## 2024-10-14 PROCEDURE — 99285 EMERGENCY DEPT VISIT HI MDM: CPT

## 2024-10-14 PROCEDURE — 82375 ASSAY CARBOXYHB QUANT: CPT | Performed by: EMERGENCY MEDICINE

## 2024-10-14 PROCEDURE — 84295 ASSAY OF SERUM SODIUM: CPT | Performed by: EMERGENCY MEDICINE

## 2024-10-14 PROCEDURE — 84145 PROCALCITONIN (PCT): CPT | Performed by: INTERNAL MEDICINE

## 2024-10-14 PROCEDURE — 93010 ELECTROCARDIOGRAM REPORT: CPT

## 2024-10-14 PROCEDURE — 85025 COMPLETE CBC W/AUTO DIFF WBC: CPT | Performed by: EMERGENCY MEDICINE

## 2024-10-14 PROCEDURE — 93005 ELECTROCARDIOGRAM TRACING: CPT

## 2024-10-14 PROCEDURE — 5A09357 ASSISTANCE WITH RESPIRATORY VENTILATION, LESS THAN 24 CONSECUTIVE HOURS, CONTINUOUS POSITIVE AIRWAY PRESSURE: ICD-10-PCS | Performed by: INTERNAL MEDICINE

## 2024-10-14 PROCEDURE — 84484 ASSAY OF TROPONIN QUANT: CPT | Performed by: EMERGENCY MEDICINE

## 2024-10-14 PROCEDURE — 85018 HEMOGLOBIN: CPT | Performed by: EMERGENCY MEDICINE

## 2024-10-14 PROCEDURE — 96374 THER/PROPH/DIAG INJ IV PUSH: CPT

## 2024-10-14 PROCEDURE — 0202U NFCT DS 22 TRGT SARS-COV-2: CPT | Performed by: INTERNAL MEDICINE

## 2024-10-14 PROCEDURE — 36600 WITHDRAWAL OF ARTERIAL BLOOD: CPT | Performed by: EMERGENCY MEDICINE

## 2024-10-14 PROCEDURE — 83880 ASSAY OF NATRIURETIC PEPTIDE: CPT | Performed by: EMERGENCY MEDICINE

## 2024-10-14 RX ORDER — HYDRALAZINE HYDROCHLORIDE 50 MG/1
100 TABLET, FILM COATED ORAL 3 TIMES DAILY
Status: DISCONTINUED | OUTPATIENT
Start: 2024-10-14 | End: 2024-10-17

## 2024-10-14 RX ORDER — FLUTICASONE PROPIONATE AND SALMETEROL 250; 50 UG/1; UG/1
1 POWDER RESPIRATORY (INHALATION) 2 TIMES DAILY
Status: DISCONTINUED | OUTPATIENT
Start: 2024-10-14 | End: 2024-10-17

## 2024-10-14 RX ORDER — NICOTINE POLACRILEX 4 MG
30 LOZENGE BUCCAL
Status: DISCONTINUED | OUTPATIENT
Start: 2024-10-14 | End: 2024-10-17

## 2024-10-14 RX ORDER — ACETAMINOPHEN 500 MG
500 TABLET ORAL EVERY 4 HOURS PRN
Status: DISCONTINUED | OUTPATIENT
Start: 2024-10-14 | End: 2024-10-17

## 2024-10-14 RX ORDER — DONEPEZIL HYDROCHLORIDE 10 MG/1
10 TABLET, FILM COATED ORAL NIGHTLY
Status: DISCONTINUED | OUTPATIENT
Start: 2024-10-14 | End: 2024-10-17

## 2024-10-14 RX ORDER — ASPIRIN 81 MG/1
81 TABLET ORAL DAILY
Status: DISCONTINUED | OUTPATIENT
Start: 2024-10-15 | End: 2024-10-17

## 2024-10-14 RX ORDER — INSULIN DEGLUDEC 100 U/ML
15 INJECTION, SOLUTION SUBCUTANEOUS NIGHTLY
Status: DISCONTINUED | OUTPATIENT
Start: 2024-10-14 | End: 2024-10-15

## 2024-10-14 RX ORDER — FUROSEMIDE 10 MG/ML
40 INJECTION INTRAMUSCULAR; INTRAVENOUS ONCE
Status: COMPLETED | OUTPATIENT
Start: 2024-10-14 | End: 2024-10-14

## 2024-10-14 RX ORDER — IPRATROPIUM BROMIDE 21 UG/1
2 SPRAY, METERED NASAL EVERY 12 HOURS
COMMUNITY
Start: 2024-09-23

## 2024-10-14 RX ORDER — IPRATROPIUM BROMIDE AND ALBUTEROL SULFATE 2.5; .5 MG/3ML; MG/3ML
3 SOLUTION RESPIRATORY (INHALATION)
Status: DISCONTINUED | OUTPATIENT
Start: 2024-10-14 | End: 2024-10-17

## 2024-10-14 RX ORDER — DEXTROSE MONOHYDRATE 25 G/50ML
50 INJECTION, SOLUTION INTRAVENOUS
Status: DISCONTINUED | OUTPATIENT
Start: 2024-10-14 | End: 2024-10-17

## 2024-10-14 RX ORDER — NICOTINE POLACRILEX 4 MG
15 LOZENGE BUCCAL
Status: DISCONTINUED | OUTPATIENT
Start: 2024-10-14 | End: 2024-10-17

## 2024-10-14 RX ORDER — ESCITALOPRAM OXALATE 5 MG/1
5 TABLET ORAL DAILY
Status: DISCONTINUED | OUTPATIENT
Start: 2024-10-15 | End: 2024-10-17

## 2024-10-14 RX ORDER — ONDANSETRON 2 MG/ML
4 INJECTION INTRAMUSCULAR; INTRAVENOUS EVERY 4 HOURS PRN
Status: ACTIVE | OUTPATIENT
Start: 2024-10-14 | End: 2024-10-14

## 2024-10-14 RX ORDER — HYDRALAZINE HYDROCHLORIDE 50 MG/1
100 TABLET, FILM COATED ORAL EVERY 8 HOURS SCHEDULED
Status: DISCONTINUED | OUTPATIENT
Start: 2024-10-14 | End: 2024-10-14

## 2024-10-14 RX ORDER — SPIRONOLACTONE 25 MG/1
25 TABLET ORAL DAILY
Status: DISCONTINUED | OUTPATIENT
Start: 2024-10-14 | End: 2024-10-14

## 2024-10-14 RX ORDER — HEPARIN SODIUM 5000 [USP'U]/ML
5000 INJECTION, SOLUTION INTRAVENOUS; SUBCUTANEOUS EVERY 8 HOURS SCHEDULED
Status: DISCONTINUED | OUTPATIENT
Start: 2024-10-14 | End: 2024-10-17

## 2024-10-14 RX ORDER — DULOXETIN HYDROCHLORIDE 30 MG/1
60 CAPSULE, DELAYED RELEASE ORAL DAILY
Status: DISCONTINUED | OUTPATIENT
Start: 2024-10-15 | End: 2024-10-17

## 2024-10-14 RX ORDER — SPIRONOLACTONE 25 MG/1
25 TABLET ORAL DAILY
Status: DISCONTINUED | OUTPATIENT
Start: 2024-10-15 | End: 2024-10-17

## 2024-10-14 RX ORDER — FUROSEMIDE 10 MG/ML
40 INJECTION INTRAMUSCULAR; INTRAVENOUS
Status: DISCONTINUED | OUTPATIENT
Start: 2024-10-14 | End: 2024-10-15

## 2024-10-14 RX ORDER — PREGABALIN 75 MG/1
150 CAPSULE ORAL NIGHTLY
Status: DISCONTINUED | OUTPATIENT
Start: 2024-10-14 | End: 2024-10-17

## 2024-10-14 RX ORDER — ATORVASTATIN CALCIUM 10 MG/1
10 TABLET, FILM COATED ORAL NIGHTLY
Status: DISCONTINUED | OUTPATIENT
Start: 2024-10-14 | End: 2024-10-17

## 2024-10-14 NOTE — ED PROVIDER NOTES
Patient Seen in: [facility] Emergency Department      History     Chief Complaint   Patient presents with    Difficulty Breathing     Stated Complaint: 96% on 3L.  pt normally on 2L.  pt with HA and cough    Subjective:   HPI    This is a 79-year-old female who has a history of congestive heart failure hyperlipidemia neuropathy obstructive sleep apnea, the patient arrives here because the family states she has been having increasing shortness of breath.  They have noticed that her oxygen level has gone down.  She was hospitalized just a few days ago with an extensive workup had a VQ scan, venous Dopplers were negative for any DVT.  Low probability Q scan had a borderline D-dimer 0.52 she was seen by her pulmonologist was sent over for further evaluation including because of an abnormal D-dimer to get further workup and further treatment the patient's family states that she has been short of breath primarily when she talks a lot or when she did with activity she did have some pain when she first arrived at when she first got admitted but that is pretty much resolved at this present time.  She does not have any chest pain she is not short of breath right now she has noticed her legs have gotten more swollen.  She denies any calf pain she just noted edema in her legs.  The family states on even on her 3 L her oxygen level is going down to 60..  When she is sleeping her O2 sat drops to 60s.  She is normally on 2 L nasal, she has noticed a little bit of bilateral lower extremity swelling.  She does take aspirin she is on hydralazine insulin and furosemide.  She does have history of COPD, she denies any productive sputum she does have a dry cough.  She denies any chest pain at this present time.      Objective:     Past Medical History:    Congestive heart disease (HCC)    Hyperlipidemia    Mild episode of recurrent major depressive disorder (HCC)    Neuropathy    Obstructive apnea    [facility] PSG AHI 11    WYATT on CPAP               Past Surgical History:   Procedure Laterality Date          Cabg      Foot surgery      Hysterectomy      Knee surgery      Tonsillectomy                  Social History     Socioeconomic History    Marital status:    Tobacco Use    Smoking status: Former     Current packs/day: 0.00     Average packs/day: 1 pack/day for 55.0 years (55.0 ttl pk-yrs)     Types: Cigarettes     Start date: 1960     Quit date: 2015     Years since quittin.7    Smokeless tobacco: Never   Vaping Use    Vaping status: Never Used   Substance and Sexual Activity    Alcohol use: No    Drug use: No     Social Drivers of Health     Financial Resource Strain: Low Risk  (2023)    Received from [facility]    Overall Financial Resource Strain (CARDIA)     Difficulty of Paying Living Expenses: Not very hard   Food Insecurity: No Food Insecurity (10/12/2024)    Food Insecurity     Food Insecurity: Never true   Transportation Needs: No Transportation Needs (10/12/2024)    Transportation Needs     Lack of Transportation: No   Physical Activity: Sufficiently Active (2023)    Received from [facility]    Exercise Vital Sign     Days of Exercise per Week: 3 days     Minutes of Exercise per Session: 50 min   Stress: Stress Concern Present (2023)    Received from [facility]    [employer] [place] [employer] - Occupational Stress Questionnaire     Feeling of Stress : Rather much   Social Connections: Moderately Isolated (2023)    Received from [facility]    Social Connection and Isolation Panel [NHANES]     Frequency of Communication with Friends and Family: More than three times a week     Frequency of Social Gatherings with Friends and Family: More than three times a week     Attends Sabianism Services: More than 4 times per year     Active Member of Clubs or Organizations: No     Attends Club or Organization Meetings: Never     Marital Status:     Housing Stability: Low Risk  (10/12/2024)    Housing Stability     Housing Instability: No                  Physical Exam     ED Triage Vitals   BP 10/14/24 1200 144/61   Pulse 10/14/24 1200 60   Resp 10/14/24 1200 20   Temp 10/14/24 1207 98.1 °F (36.7 °C)   Temp src 10/14/24 1207 Oral   SpO2 10/14/24 1200 100 %   O2 Device 10/14/24 1200 Nasal cannula       Current Vitals:   Vital Signs  BP: 152/61  Pulse: 66  Resp: 20  Temp: 98.1 °F (36.7 °C)  Temp src: Oral  MAP (mmHg): 89    Oxygen Therapy  SpO2: 98 %  O2 Device: Nasal cannula  O2 Flow Rate (L/min): 3 L/min        Physical Exam  General: .  Patient is in no severe respiratory distress.  At this present time she looks comfortable.  The patient is in no respiratory distress    HEENT: Atraumatic, conjunctiva are not pale.  There is no icterus.  Oral mucosa Is wet.  No facial trauma.  The neck is supple.    LUNGS: Clear to auscultation, there is no wheezing or retraction.  Some crackles at the bases is noted.    CV: Cardiovascular is regular without murmurs or rubs.    ABD: The abdomen is soft nondistended nontender.  There is no rebound.  There is no guarding.    EXT: There is good pulses bilaterally.  There is no calf tenderness.  There is no rash noted.  There is bilateral 1+ edema lower to lower extremities.    NEURO: Alert and oriented x4.  Muscle strength and sensory exam is grossly normal.  And the patient is neurologically intact with no focal findings.      ED Course     Labs Reviewed   COMP METABOLIC PANEL (14) - Abnormal; Notable for the following components:       Result Value    Glucose 202 (*)     CO2 33.0 (*)     BUN 39 (*)     Creatinine 1.64 (*)     Calculated Osmolality 307 (*)     eGFR-Cr 32 (*)     All other components within normal limits   CBC WITH DIFFERENTIAL WITH PLATELET - Abnormal; Notable for the following components:    RBC 5.67 (*)     HGB 9.6 (*)     HCT 32.3 (*)     MCV 57.0 (*)     MCH 16.9 (*)     MCHC 29.7 (*)     All  other components within normal limits   VBG PANEL WITH ELECTROLYTES - Abnormal; Notable for the following components:    Venous O2Hb 56.5 (*)     Potassium Blood Gas 3.4 (*)     All other components within normal limits   ABG PANEL W ELECT AND LACTATE - Abnormal; Notable for the following components:    ABG pCO2 54 (*)     ABG HCO3 31.3 (*)     ABG Base Excess 8.2 (*)     Total Hemoglobin 10.4 (*)     All other components within normal limits   TROPONIN I HIGH SENSITIVITY - Normal   PRO BETA NATRIURETIC PEPTIDE - Normal   SARS-COV-2/FLU A AND B/RSV BY PCR (GENEXPERT) - Normal    Narrative:     This test is intended for the qualitative detection and differentiation of SARS-CoV-2, influenza A, influenza B, and respiratory syncytial virus (RSV) viral RNA in nasopharyngeal or nares swabs from individuals suspected of respiratory viral infection consistent with COVID-19 by their healthcare provider. Signs and symptoms of respiratory viral infection due to SARS-CoV-2, influenza, and RSV can be similar.    Test performed using the Xpert Xpress SARS-CoV-2/FLU/RSV (real time RT-PCR)  assay on the [employer] instrument, [employer], [place] [address].   This test is being used under the Food and Drug Administration's Emergency Use Authorization.    The authorized Fact Sheet for Healthcare Providers for this assay is available upon request from the laboratory.   PROCALCITONIN   RESPIRATORY FLU EXPAND PANEL + COVID-19              The patient was placed on monitors, IV was started, blood was drawn.     Workup was done to rule out pneumonia, pneumothorax, congestive heart failure CO2 retention was also considered.  MDM   The EKG shows sinus bradycardia there is no acute ST elevations or ischemic findings.  The rest of the EKG including rate rhythm axis and intervals I agree with the EKG report . The rate is 55 there is some nonspecific ST changes    Admission disposition: 10/14/2024  2:15 PM       The patient had a VBG that showed  no significant abnormalities pCO2 was 49.  This is on nasal cannula 3 L, COVID, flu was negative proBNP was not elevated comprehensive shows a chronically elevated CO2 of 33, BUN 39 creatinine of 1.69 which is not significant worse than before troponin is negative CBC shows white count of 8.6 hemoglobin 9.6, platelet count 175 which is not worse than before.    I personally reviewed the radiographs and my individual interpretation shows        Also reviewed official report and it shows  XR CHEST AP PORTABLE  (CPT=71045)    Result Date: 10/14/2024  PROCEDURE:  XR CHEST AP PORTABLE  (CPT=71045)  TECHNIQUE:  AP chest radiograph was obtained.  COMPARISON:  [facility] , XR, XR CHEST AP PORTABLE  (CPT=71045), 10/11/2024, 4:57 PM.  INDICATIONS:  96% on 3L.  pt normally on 2L.  pt with HA and cough  PATIENT STATED HISTORY: (As transcribed by Technologist)  patient states headache and cough.               CONCLUSION:   Stable cardiac and mediastinal contours.  Mild diffuse interstitial opacities may reflect mild interstitial pulmonary edema versus viral/atypical pneumonitis.  No associated pleural effusion or pneumothorax.     LOCATION:  [facility]      Dictated by (CST): Cornelius Powers MD on 10/14/2024 at 1:19 PM     Finalized by (CST): Cornelius Powers MD on 10/14/2024 at 1:21 PM       NM LUNG VQ VENT / PERFUSION SCAN  (CPT=78582)    Result Date: 10/11/2024  PROCEDURE:  NM LUNG VQ VENT / PERFUSION SCAN  (CPT=78582)  COMPARISON:  [facility] , XR, XR CHEST AP PORTABLE  (CPT=71045), 10/11/2024, 4:57 PM.  INDICATIONS:  Difficulty breathing and elevated D-dimer.  TECHNIQUE:  The patient was ventilated with 5.6 mCi Xe-133 gas and posterior supine images of the lungs were obtained. This was followed by IV injection of 2.8 mCi Tc-99m MAA, and similar projection images were obtained.  FINDINGS:  PERFUSION:    Uniform. REGIONS OF MISMATCH:  None.  Ventilation demonstrates delayed washout could be due to COPD.            CONCLUSION:  Very low  probability for pulmonary embolus.   LOCATION:  [facility]   Dictated by (CST): Nikko Gonzáles MD on 10/11/2024 at 8:58 PM     Finalized by (CST): Nikko Gonzáles MD on 10/11/2024 at 8:59 PM       US VENOUS DOPPLER LEG BILAT - DIAG IMG (CPT=93970)    Result Date: 10/11/2024  PROCEDURE:  US VENOUS DOPPLER LEG BILAT - DIAG IMG (CPT=93970)  COMPARISON:  [facility] , US, US VENOUS DOPPLER LEG LEFT - DIAG IMG (CPT=93971), 4/23/2024, 7:29 AM.  INDICATIONS:  Shortness of breath with bilateral leg cramping and swelling.  TECHNIQUE:  Real time, grey scale, and duplex ultrasound was used to evaluate the lower extremity venous system. B-mode two-dimensional images of the vascular structures, Doppler spectral analysis, and color flow.  Doppler imaging were performed.  The following veins were imaged bilaterally:  Common, deep, and superficial femoral, popliteal, sapheno-femoral junction, and posterior tibial veins.  PATIENT STATED HISTORY: (As transcribed by Technologist)     FINDINGS:  SAPHENOFEMORAL JUNCTION:  No reflux. THROMBI:  None visible. COMPRESSION:  Normal compressibility, phasicity, and augmentation. OTHER:  Right Magallon's cyst measures 3.4 x 1.1 x 3.2 cm. .            CONCLUSION:  No DVT of the bilateral lower extremities.   LOCATION:  [facility]   Dictated by (CST): Nikko Gonzáles MD on 10/11/2024 at 7:25 PM     Finalized by (CST): Nikko Gonzáles MD on 10/11/2024 at 7:25 PM       XR CHEST AP PORTABLE  (CPT=71045)    Result Date: 10/11/2024  PROCEDURE:  XR CHEST AP PORTABLE  (CPT=71045)  TECHNIQUE:  AP chest radiograph was obtained.  COMPARISON:  [facility] , CT, CT CHEST (CPT=71250), 4/27/2024, 12:36 PM.  [facility] , XR, XR CHEST AP PORTABLE  (CPT=71045), 4/25/2024, 9:37 AM.  INDICATIONS:  elevated trop and ddimer from pulm, needs vq scan. , 3L NC O2 Baseline  PATIENT STATED HISTORY: (As transcribed by Technologist)  Patient offered no additional history at this time.    FINDINGS:  Minimal subsegmental atelectasis at the lung bases.  The  heart is normal size.  There are no pleural effusions.  The bones are unremarkable.            CONCLUSION:  Minimal subsegmental atelectasis at the lung bases.   LOCATION:  [facility]      Dictated by (CST): Nikko Gonzáles MD on 10/11/2024 at 5:20 PM     Finalized by (CST): Nikko Gonzáles MD on 10/11/2024 at 5:20 PM        ABG shows a pCO2 of 54 and a bicarb of 31 suggesting chronic CO2 retention.  The patient's case was discussed with Dr. Lu who did see the patient and felt the patient may be a little fluid overloaded did recommend Lasix in which she was given Lasix.  Also discussed this case with the cardiologist Dr. Beach.  Also discussed this case with the [facility] hospitalist.  Patient will be admitted for further evaluation treatment she did get Lasix here she was able to ambulate here.  Some of her symptoms may be related to CO2 retention is most the time is occurs when she sleeps when she drops her O2 sat.    Medical Decision Making      Disposition and Plan     Clinical Impression:  1. Dyspnea, unspecified type    2. CO2 retention    3. Bilateral lower extremity edema         Disposition:  Admit  10/14/2024  2:15 pm    Follow-up:  No follow-up provider specified.        Medications Prescribed:  Current Discharge Medication List              Supplementary Documentation:         Hospital Problems       Present on Admission  Date Reviewed: 10/12/2024            ICD-10-CM Noted POA    * (Principal) Dyspnea, unspecified type R06.00 10/14/2024 Unknown    Anemia D64.9 10/14/2024 Yes    Azotemia R79.89 10/14/2024 Yes    Hyperglycemia R73.9 10/14/2024 Yes    Respiratory acidosis E87.29 10/14/2024 Yes

## 2024-10-14 NOTE — ED QUICK NOTES
Orders for admission, patient is aware of plan and ready to go upstairs. Any questions, please call ED RN Myla at extension [phone].     Patient Covid vaccination status: Fully vaccinated     COVID Test Ordered in ED: $$$$Respiratory Flu Expanded Panel + Covid-19$$$$    COVID Suspicion at Admission: N/A    Running Infusions:  None    Mental Status/LOC at time of transport: AXO4    Other pertinent information:   CIWA score: N/A   NIH score:  N/A

## 2024-10-14 NOTE — CONSULTS
[facility] Consultation    Nina Donnelly Patient Status:  Emergency    10/9/1945 MRN [id number]   [facility] EMERGENCY DEPARTMENT Attending Dontrell Lee MD   Hosp Day # 0 PCP Chiki Caldwell MD     Reason for Consultation:  CHF      History of Present Illness:  Nina Donnelly is a a(n) 79 year old female. Patient of Dr. Chowdhury with COPD, WYATT, Diabetes, Dyslipidemia .  Uses CPAP and O2 at home. Recent admission 10/12/24 for fatigue, dyspnea, lassitude. Minimal troponin elevation.  Per daughter pt feels much the same: fatigue, somnolence, dyspnea, LE edema, 10 lb weight gain.  O2 desat's to 60's while asleep.  In ER ABG was 7.42/54/81. normal trop. Normal BNP.  Given IV lasix. Echo from 2024: LVEF = 65-70%. RV enlargement.  No fever, chills.    History:  Past Medical History:    Congestive heart disease (HCC)    Hyperlipidemia    Mild episode of recurrent major depressive disorder (HCC)    Neuropathy    Obstructive apnea    [facility] PSG AHI 11    WYATT on CPAP     Past Surgical History:   Procedure Laterality Date          Cabg      Foot surgery      Hysterectomy      Knee surgery      Tonsillectomy       Family History   Problem Relation Age of Onset    Psychiatric Mother     Diabetes Mother     Heart Disorder Mother     Breast Cancer Paternal Aunt         dx age unknown          Allergies:  Allergies[1]    Medications:      Continuous Infusions:      Social History:   reports that she quit smoking about 9 years ago. Her smoking use included cigarettes. She started smoking about 64 years ago. She has a 55 pack-year smoking history. She has never used smokeless tobacco. She reports that she does not drink alcohol and does not use drugs.    Review of Systems:  All systems were reviewed and are negative except as described above in HPI.    Physical Exam:      Wt Readings from Last 3 Encounters:   10/11/24 197 lb 1.5 oz (89.4 kg)   24 195 lb 15.8 oz (88.9 kg)   22 199 lb (90.3 kg)        Vitals:    10/14/24 1300 10/14/24 1330 10/14/24 1349 10/14/24 1430   BP: 148/52 (!) 162/79 152/61 151/52   Pulse: 56 58 66 59   Resp: 16 17 20 20   Temp:       TempSrc:       SpO2: 99% 98% 98% 96%       Temp:  [98.1 °F (36.7 °C)] 98.1 °F (36.7 °C)  Pulse:  [53-66] 59  Resp:  [16-20] 20  BP: (144-162)/(52-79) 151/52  SpO2:  [96 %-100 %] 96 %    Temp: 98.1 °F (36.7 °C)  Pulse: 59  Resp: 20  BP: 151/52      General:  Appears comfortable  HEENT: No focal deficits.  Neck: No JVD, carotids 2+ no bruits.  Cardiac: Regular S1S2.  No S3, S4, rub, click.  No murmur.  Lungs: few rales at bases  Abdomen: Soft, non-tender.   Extremities: trace  LE edema.  No clubbing or cyanosis  Neurologic: Alert and oriented, normal affect.  Skin: Warm and dry.     Labs:      HEM:  Recent Labs   Lab 10/12/24  0910 10/14/24  1212   WBC 7.4 8.6   HGB 9.7* 9.6*   HCT 32.8* 32.3*   .0 175.0       Chem:  Recent Labs   Lab 10/11/24  1620 10/14/24  1212    141   K 4.0 4.6    104   CO2 32.0 33.0*   BUN 42* 39*   CREATSERUM 1.79* 1.64*   ALT  --  14   AST  --  24   ALB  --  4.2       No results for input(s): \"INR\" in the last 168 hours.                  No results found for: \"TROP\", \"CKMB\"      Invalid input(s): \"PBNPML\"                 Telemetry:     Laboratories and Data:  Diagnostics:    EKG, 10/14/2024:  SB, PRWP    CXR, 10/14/2024:  Stable cardiac and mediastinal contours.  Mild diffuse interstitial opacities may reflect mild interstitial pulmonary edema versus viral/atypical pneumonitis.  No associated pleural effusion or pneumothorax.           Impression:    1.  Fatigue, lethargy, somnolence, dyspnea, LE edema, weight gain.  Suggests multiple factors: COPD with hypoxia and CO2 retention in combination with right heart failure    2. Diabetes    3. Hypertension     4. CKD (1.6)      Recommend:    1.  Telemetry  2. Trial of IV lasix followed by change of po diuretic to torsemide  3. Follow renal fct, I/O's, weights  4.  Echo for  LV, RV fct, PA pressure          Dago Beach MD  10/14/2024  2:31 PM         [1]   Allergies  Allergen Reactions    Pioglitazone UNKNOWN     Weight Gain    Vancomycin ITCHING

## 2024-10-14 NOTE — ED QUICK NOTES
Pulmonologist MD at [facility] side.   The patient is a 63y Female complaining of abnormal lab result.

## 2024-10-14 NOTE — PLAN OF CARE
Assumed care at 1530. Pt alert, oriented x4. Oxygen saturation adequate on 2L nasal cannula, pt at baseline oxygen needs. Tele: NSR. Continent. Denies pain. Ambulates standby. Plan of care: echocardiogram, IV lasix per orders, intake and output. Pt and family updated on plan of care. Questions answered.     1845 - pt reporting cramping to bilateral feet. MD notified, orders to check potassium. Tyelnol offered, pt refused.     Problem: Diabetes/Glucose Control  Goal: Glucose maintained within prescribed range  Description: INTERVENTIONS:  - Monitor Blood Glucose as ordered  - Assess for signs and symptoms of hyperglycemia and hypoglycemia  - Administer ordered medications to maintain glucose within target range  - Assess barriers to adequate nutritional intake and initiate nutrition consult as needed  - Instruct patient on self management of diabetes  Outcome: Progressing     Problem: Patient/Family Goals  Goal: Patient/Family Long Term Goal  Description: Patient's Long Term Goal: discharge home    Interventions:  - follow MD orders  - medications as ordered  - See additional Care Plan goals for specific interventions  Outcome: Progressing  Goal: Patient/Family Short Term Goal  Description: Patient's Short Term Goal: no more leg cramping    Interventions:   - PRN pain medications per order  - check K  - electrolyte replacement per protocol  - See additional Care Plan goals for specific interventions  Outcome: Progressing

## 2024-10-14 NOTE — CONSULTS
[facility] [place]    Nina oDnnelly Patient Status:  Emergency    10/9/1945 MRN [id number]   Location [facility] EMERGENCY DEPARTMENT Attending Dontrell Lee MD   Hosp Day # 0 PCP Chiki Caldwell MD     Date of Admission: 10/14/2024 11:48 AM  Admission Diagnosis: No admission diagnoses are documented for this encounter.  Reason for Consult: progressive SOB     History of Present Illness: 78 y/o with h/o HFpEF, WYATT on CPAP, depression, CKD, COPD on 2L home O2, presented to clinic with worsening LE edema, SOB and chest discomfort.  OP labs revealed modest elevation in ddimer, BNP, troponin.  Pt referred to ED and admitted for observation. Labs at that time, were repeated and noted to be in normal range.  Cards consulted, no further w/u done. Discharged on doxy for possible bronchitis.  Symptoms worsened and now pt returns to ER with progressive dyspnea 24-48hrs after discharge.  Pt notes ongoing mild cough, increased fatigue and hypersomnia despite use of CPAP therapy.  - denies recent f/c/n/v/d/dysuria/hematuria/hemoptysis.     Past Medical History:    Congestive heart disease (HCC)    Hyperlipidemia    Mild episode of recurrent major depressive disorder (HCC)    Neuropathy    Obstructive apnea    [facility] PSG AHI 11    WYATT on CPAP      Past Surgical History:   Procedure Laterality Date          Cabg      Foot surgery      Hysterectomy      Knee surgery      Tonsillectomy        Allergies[1]     Social History:   reports that she quit smoking about 9 years ago. Her smoking use included cigarettes. She started smoking about 64 years ago. She has a 55 pack-year smoking history. She has never used smokeless tobacco. She reports that she does not drink alcohol and does not use drugs.      Family History:  Family History   Problem Relation Age of Onset    Psychiatric Mother     Diabetes Mother     Heart Disorder Mother     Breast Cancer Paternal Aunt         dx age unknown          Home  Medications:  Medications Taking[2]     Current Medications:    Current Facility-Administered Medications:     furosemide (Lasix) 10 mg/mL injection 40 mg, 40 mg, Intravenous, Once     REVIEW OF SYSTEMS:   As listed in HPI, otherwise ten point ROS is negative.     OBJECTIVE:  Patient Vitals for the past 24 hrs:   BP Temp Temp src Pulse Resp SpO2   10/14/24 1330 (!) 162/79 -- -- 58 17 98 %   10/14/24 1300 148/52 -- -- 56 16 99 %   10/14/24 1215 145/52 -- -- 53 18 98 %   10/14/24 1207 -- 98.1 °F (36.7 °C) Oral -- -- --   10/14/24 1200 144/61 -- -- 60 20 100 %     O2 requirement: on 2-3L nc    Wt Readings from Last 3 Encounters:   10/11/24 197 lb 1.5 oz (89.4 kg)   04/30/24 195 lb 15.8 oz (88.9 kg)   03/30/22 199 lb (90.3 kg)        No intake/output data recorded.  No intake/output data recorded.    General appearance: alert, appears stated age, and cooperative  Head: Normocephalic, without obvious abnormality, atraumatic  Neck: no adenopathy, no carotid bruit, no JVD, and supple, symmetrical, trachea midline  Back: symmetric, no curvature. ROM normal. No CVA tenderness.  Lungs:  faint bibasilar crackles, no wheezing. Good air entry  Heart: regular rate and rhythm  Abdomen: soft, non-tender; bowel sounds normal; no masses,  no organomegaly  Extremities: edema +1 nonpitting  Pulses: 2+ and symmetric  Skin: Skin color, texture, turgor normal. No rashes or lesions     Lab Results   Component Value Date    WBC 8.6 10/14/2024    RBC 5.67 10/14/2024    HGB 9.6 10/14/2024    HCT 32.3 10/14/2024    MCV 57.0 10/14/2024    MCH 16.9 10/14/2024    MCHC 29.7 10/14/2024    RDW 20.8 10/14/2024    .0 10/14/2024     Lab Results   Component Value Date     10/14/2024    K 4.6 10/14/2024     10/14/2024    CO2 33.0 10/14/2024    BUN 39 10/14/2024    CREATSERUM 1.64 10/14/2024     10/14/2024    CA 9.5 10/14/2024     Lab Results   Component Value Date     10/14/2024    K 4.6 10/14/2024     10/14/2024     CO2 33.0 10/14/2024    VHCO3 25.0 10/14/2024    BUN 39 10/14/2024    CREATSERUM 1.64 10/14/2024     10/14/2024    CA 9.5 10/14/2024    ALKPHO 92 10/14/2024    ALT 14 10/14/2024    AST 24 10/14/2024    BILT 0.7 10/14/2024    ALB 4.2 10/14/2024    TP 6.5 10/14/2024     Lab Results   Component Value Date    INR 1.08 04/21/2024        Recent Labs   Lab 10/14/24  1338   ABGPHT 7.41   WHUNDX7H 54*   VTRQJ1Z 81   ABGHCO3 31.3*   ABGBE 8.2*   TEMP 98.6   ZOILA Positive   SITE Right Radial   DEV Nasal cannula   THGB 10.4*       Imaging: CXR: personally reviewed.  Mild pulm vasc congestion and bibasilar infiltrates vs edema     ASSESSMENT/PLAN:  Chronic hypoxic resp failure- due to COPD- home O2 baseline is 2L with is unchanged.  No signs of AECOPD, no PNA on imaging.  Likely has component of HFpEF/volume retention contributing to her dyspnea.  ABG showed well compensated resp acidosis.  - cont with O2 as ordered  - BD nebs  COPD- no exac  - recently started on breztri as OP- pt previously intolerant of spiriva  - advair bid, BD nebs  - recently treated with doxy; no signs of PNA.  Check RVP/PCT/sputum cx. Hold on further abx  HFpEF- in setting of CKD and increasing LE edema.  BNP wnl but exam suggestive of mild volume retention  - recent VQ scan and LE dopplers neg for PE/DVT making VTE unlikely.  Hold on CTA Chest given underlying CKD and need for diuresis.  - cards consulted for further ischemic w/u  WYATT- on CPAP with 2L entrained.  Continue while here  Proph- subcut hep  Dispo - Full code  - d/w daughter    Jose Luis Fna MD  10/14/2024  1:44 PM            [1]   Allergies  Allergen Reactions    Pioglitazone UNKNOWN     Weight Gain    Vancomycin ITCHING   [2]   No outpatient medications have been marked as taking for the 10/14/24 encounter (Hospital Encounter).

## 2024-10-14 NOTE — PLAN OF CARE
NURSING ADMISSION NOTE      Patient admitted via Cart at 1530.   Oriented to room.  Safety precautions initiated.  Bed in low position.  Call light in reach.  Admission navigator and medication reconciliation reviewed and completed with patient.

## 2024-10-14 NOTE — ED INITIAL ASSESSMENT (HPI)
A&Ox3 patient p/w worsening SOB and O2 demands    Patient recently admitted for COPD exacerbation    Reports SPO2 dropping to 60s when asleep, placed on 5L NC w/ improvement to mid 80%    Patient normally on 2L NC now needing 3L when awake    +BLE swelling despite diuretic compliance     RR even/NL, speaking in full clear sentences, ambulatory w/ steady gait

## 2024-10-14 NOTE — H&P
[facility] Hospital[facility] H&P/Consult note       CC:   Chief Complaint   Patient presents with    Difficulty Breathing        PCP: Chiki Caldwell MD    History of Present Illness: Patient is a 79 year old female with PMH sig for HTN, HLD, CHF, WYATT, here for sob    She reports about 3 weeks hx of increasing swelling in legs, sob with exertion, talking, dropping of O2 at home. Has also had intermittent chest pressure. Shew as here over the weekend but discharged on Saturday.   No recent f/c. No n/v. Has had diarrhea at home. No urinary symptoms     PMH  Past Medical History:    Congestive heart disease (HCC)    Hyperlipidemia    Mild episode of recurrent major depressive disorder (HCC)    Neuropathy    Obstructive apnea    [facility] PSG AHI 11    WYATT on CPAP        PSH  Past Surgical History:   Procedure Laterality Date          Cabg      Foot surgery      Hysterectomy      Knee surgery      Tonsillectomy          ALL:  Allergies[1]     Home Medications:  Medications Taking[2]      Soc Hx  Social History     Tobacco Use    Smoking status: Former     Current packs/day: 0.00     Average packs/day: 1 pack/day for 55.0 years (55.0 ttl pk-yrs)     Types: Cigarettes     Start date: 1960     Quit date: 2015     Years since quittin.7    Smokeless tobacco: Never   Substance Use Topics    Alcohol use: No        Fam Hx  Family History   Problem Relation Age of Onset    Psychiatric Mother     Diabetes Mother     Heart Disorder Mother     Breast Cancer Paternal Aunt         dx age unknown        Review of Systems  Comprehensive ROS reviewed and negative except for what's stated above.       OBJECTIVE:  /61   Pulse 66   Temp 98.1 °F (36.7 °C) (Oral)   Resp 20   SpO2 98%   General:  Alert, no distress, appears stated age.   Head:  Normocephalic    Eyes:  Sclera anicteric, No conjunctival pallor, EOMs intact.    Throat: dry   Neck: Supple    Lungs:   Clear to auscultation bilaterally. Normal effort   Chest wall:   No tenderness or deformity.   Heart:  Regular rate and rhythm    Abdomen:   Soft, NT/ND,    Extremities: Atraumatic, LE edema   Skin: No visible rashes or lesions.    Neurologic: Normal strength, no focal deficit appreciated     Diagnostic Data:    CBC/Chem  Recent Labs   Lab 10/12/24  0910 10/14/24  1212   WBC 7.4 8.6   HGB 9.7* 9.6*   MCV 57.5* 57.0*   .0 175.0       Recent Labs   Lab 10/11/24  1620 10/14/24  1212    141   K 4.0 4.6    104   CO2 32.0 33.0*   BUN 42* 39*   CREATSERUM 1.79* 1.64*   GLU 63* 202*   CA 9.6 9.5       Recent Labs   Lab 10/14/24  1212   ALT 14   AST 24   ALB 4.2       No results for input(s): \"TROP\" in the last 168 hours.    Additional Diagnostics: ECG: sinus    CXR: image personally reviewed   Radiology: XR CHEST AP PORTABLE  (CPT=71045)    Result Date: 10/14/2024  PROCEDURE:  XR CHEST AP PORTABLE  (CPT=71045)  TECHNIQUE:  AP chest radiograph was obtained.  COMPARISON:  [facility] , XR, XR CHEST AP PORTABLE  (CPT=71045), 10/11/2024, 4:57 PM.  INDICATIONS:  96% on 3L.  pt normally on 2L.  pt with HA and cough  PATIENT STATED HISTORY: (As transcribed by Technologist)  patient states headache and cough.               CONCLUSION:   Stable cardiac and mediastinal contours.  Mild diffuse interstitial opacities may reflect mild interstitial pulmonary edema versus viral/atypical pneumonitis.  No associated pleural effusion or pneumothorax.     LOCATION:  [facility]      Dictated by (CST): Cornelius Powers MD on 10/14/2024 at 1:19 PM     Finalized by (CST): Cornelius Powers MD on 10/14/2024 at 1:21 PM       NM LUNG VQ VENT / PERFUSION SCAN  (CPT=78582)    Result Date: 10/11/2024  PROCEDURE:  NM LUNG VQ VENT / PERFUSION SCAN  (CPT=78582)  COMPARISON:  [facility] , XR, XR CHEST AP PORTABLE  (CPT=71045), 10/11/2024, 4:57 PM.  INDICATIONS:  Difficulty breathing and elevated D-dimer.  TECHNIQUE:  The patient was ventilated with 5.6 mCi Xe-133 gas and posterior supine images of the lungs were obtained.  This was followed by IV injection of 2.8 mCi Tc-99m MAA, and similar projection images were obtained.  FINDINGS:  PERFUSION:    Uniform. REGIONS OF MISMATCH:  None.  Ventilation demonstrates delayed washout could be due to COPD.            CONCLUSION:  Very low probability for pulmonary embolus.   LOCATION:  [facility]   Dictated by (CST): Nikko Gonzáles MD on 10/11/2024 at 8:58 PM     Finalized by (CST): Nikko Gonzáles MD on 10/11/2024 at 8:59 PM       US VENOUS DOPPLER LEG BILAT - DIAG IMG (CPT=93970)    Result Date: 10/11/2024  PROCEDURE:  US VENOUS DOPPLER LEG BILAT - DIAG IMG (CPT=93970)  COMPARISON:  [facility] , US, US VENOUS DOPPLER LEG LEFT - DIAG IMG (CPT=93971), 4/23/2024, 7:29 AM.  INDICATIONS:  Shortness of breath with bilateral leg cramping and swelling.  TECHNIQUE:  Real time, grey scale, and duplex ultrasound was used to evaluate the lower extremity venous system. B-mode two-dimensional images of the vascular structures, Doppler spectral analysis, and color flow.  Doppler imaging were performed.  The following veins were imaged bilaterally:  Common, deep, and superficial femoral, popliteal, sapheno-femoral junction, and posterior tibial veins.  PATIENT STATED HISTORY: (As transcribed by Technologist)     FINDINGS:  SAPHENOFEMORAL JUNCTION:  No reflux. THROMBI:  None visible. COMPRESSION:  Normal compressibility, phasicity, and augmentation. OTHER:  Right Magallon's cyst measures 3.4 x 1.1 x 3.2 cm. .            CONCLUSION:  No DVT of the bilateral lower extremities.   LOCATION:  [facility]   Dictated by (CST): Nikko Gonzáles MD on 10/11/2024 at 7:25 PM     Finalized by (CST): Nikko Gonzáles MD on 10/11/2024 at 7:25 PM       XR CHEST AP PORTABLE  (CPT=71045)    Result Date: 10/11/2024  PROCEDURE:  XR CHEST AP PORTABLE  (CPT=71045)  TECHNIQUE:  AP chest radiograph was obtained.  COMPARISON:  [facility] , CT, CT CHEST (CPT=71250), 4/27/2024, 12:36 PM.  [facility] , XR, XR CHEST AP PORTABLE  (CPT=71045), 4/25/2024, 9:37 AM.   INDICATIONS:  elevated trop and ddimer from pulm, needs vq scan. , 3L NC O2 Baseline  PATIENT STATED HISTORY: (As transcribed by Technologist)  Patient offered no additional history at this time.    FINDINGS:  Minimal subsegmental atelectasis at the lung bases.  The heart is normal size.  There are no pleural effusions.  The bones are unremarkable.            CONCLUSION:  Minimal subsegmental atelectasis at the lung bases.   LOCATION:  [facility]      Dictated by (CST): Nikko Gonzáles MD on 10/11/2024 at 5:20 PM     Finalized by (CST): Nikko Gonzáles MD on 10/11/2024 at 5:20 PM          ASSESSMENT / PLAN:      Patient is a 79 year old female with PMH sig for HTN, HLD, CHF, WYATT, here for sob    Impression    -acute on chronic hypoxic RF (on 2L at baseline)  -COPD    -HTN  -HLD  -acute on chronic CHF  -WYATT on CPAP    -CKD 3       Plan    -appear sympoms related to HF - reports about 10 lb wt gain, has LE edema and pulm edema on cxr  -iv diuresis  -repeat echo  -reports intermittent chest pressure, ? May need ischemic eval, cards consulted    -cont o2 support   -cont home meds as able    Further recommendations pending patient's clinical course. [facility] hospitalist to continue to follow patient while in house    Patient and/or patient's family given opportunity to ask questions and note understanding and agreeing with therapeutic plan as outlined    Antonio Fuentes Hospitalist  [phone]  Answering Service: [phone]           [1]   Allergies  Allergen Reactions    Pioglitazone UNKNOWN     Weight Gain    Vancomycin ITCHING   [2]   No outpatient medications have been marked as taking for the 10/14/24 encounter (Hospital Encounter).

## 2024-10-14 NOTE — PROGRESS NOTES
10/14/24 1536 10/14/24 1542 10/14/24 1546   Vital Signs   /60 131/49 123/77   MAP (mmHg) 86 74 87   BP Location Right arm  --   --    BP Method Automatic  --   --    Patient Position Lying Sitting Standing     Orthostatics. Pt asymptomatic.

## 2024-10-15 ENCOUNTER — APPOINTMENT (OUTPATIENT)
Dept: CT IMAGING | Facility: HOSPITAL | Age: 79
End: 2024-10-15
Attending: INTERNAL MEDICINE
Payer: MEDICARE

## 2024-10-15 ENCOUNTER — APPOINTMENT (OUTPATIENT)
Dept: CV DIAGNOSTICS | Facility: HOSPITAL | Age: 79
End: 2024-10-15
Attending: INTERNAL MEDICINE
Payer: MEDICARE

## 2024-10-15 ENCOUNTER — APPOINTMENT (OUTPATIENT)
Dept: CARDIAC REHAB | Facility: HOSPITAL | Age: 79
End: 2024-10-15
Attending: INTERNAL MEDICINE
Payer: MEDICARE

## 2024-10-15 LAB
ALBUMIN SERPL-MCNC: 4.1 G/DL (ref 3.2–4.8)
ALBUMIN/GLOB SERPL: 1.8 {RATIO} (ref 1–2)
ALP LIVER SERPL-CCNC: 94 U/L
ALT SERPL-CCNC: 13 U/L
ANION GAP SERPL CALC-SCNC: 4 MMOL/L (ref 0–18)
AST SERPL-CCNC: 21 U/L (ref ?–34)
BILIRUB SERPL-MCNC: 0.9 MG/DL (ref 0.2–1.1)
BILIRUB UR QL STRIP.AUTO: NEGATIVE
BUN BLD-MCNC: 40 MG/DL (ref 9–23)
CALCIUM BLD-MCNC: 9.4 MG/DL (ref 8.7–10.4)
CHLORIDE SERPL-SCNC: 104 MMOL/L (ref 98–112)
CLARITY UR REFRACT.AUTO: CLEAR
CO2 SERPL-SCNC: 33 MMOL/L (ref 21–32)
CREAT BLD-MCNC: 1.73 MG/DL
EGFRCR SERPLBLD CKD-EPI 2021: 30 ML/MIN/1.73M2 (ref 60–?)
ERYTHROCYTE [DISTWIDTH] IN BLOOD BY AUTOMATED COUNT: 19.8 %
EST. AVERAGE GLUCOSE BLD GHB EST-MCNC: 166 MG/DL (ref 68–126)
GLOBULIN PLAS-MCNC: 2.3 G/DL (ref 2–3.5)
GLUCOSE BLD-MCNC: 103 MG/DL (ref 70–99)
GLUCOSE BLD-MCNC: 113 MG/DL (ref 70–99)
GLUCOSE BLD-MCNC: 154 MG/DL (ref 70–99)
GLUCOSE BLD-MCNC: 246 MG/DL (ref 70–99)
GLUCOSE BLD-MCNC: 287 MG/DL (ref 70–99)
GLUCOSE UR STRIP.AUTO-MCNC: NORMAL MG/DL
HBA1C MFR BLD: 7.4 % (ref ?–5.7)
HCT VFR BLD AUTO: 31.3 %
HGB BLD-MCNC: 9.6 G/DL
KETONES UR STRIP.AUTO-MCNC: NEGATIVE MG/DL
LEUKOCYTE ESTERASE UR QL STRIP.AUTO: NEGATIVE
MAGNESIUM SERPL-MCNC: 1.9 MG/DL (ref 1.6–2.6)
MCH RBC QN AUTO: 17.3 PG (ref 26–34)
MCHC RBC AUTO-ENTMCNC: 30.7 G/DL (ref 31–37)
MCV RBC AUTO: 56.3 FL
NITRITE UR QL STRIP.AUTO: NEGATIVE
OSMOLALITY SERPL CALC.SUM OF ELEC: 302 MOSM/KG (ref 275–295)
PH UR STRIP.AUTO: 5.5 [PH] (ref 5–8)
PLATELET # BLD AUTO: 182 10(3)UL (ref 150–450)
PLATELETS.RETICULATED NFR BLD AUTO: 2.2 % (ref 0–7)
POTASSIUM SERPL-SCNC: 4.3 MMOL/L (ref 3.5–5.1)
PROT SERPL-MCNC: 6.4 G/DL (ref 5.7–8.2)
PROT UR STRIP.AUTO-MCNC: NEGATIVE MG/DL
RBC # BLD AUTO: 5.56 X10(6)UL
RBC UR QL AUTO: NEGATIVE
SODIUM SERPL-SCNC: 141 MMOL/L (ref 136–145)
SP GR UR STRIP.AUTO: 1.01 (ref 1–1.03)
UROBILINOGEN UR STRIP.AUTO-MCNC: NORMAL MG/DL
WBC # BLD AUTO: 8.1 X10(3) UL (ref 4–11)

## 2024-10-15 PROCEDURE — 83735 ASSAY OF MAGNESIUM: CPT | Performed by: HOSPITALIST

## 2024-10-15 PROCEDURE — 94640 AIRWAY INHALATION TREATMENT: CPT

## 2024-10-15 PROCEDURE — 80053 COMPREHEN METABOLIC PANEL: CPT | Performed by: HOSPITALIST

## 2024-10-15 PROCEDURE — 83036 HEMOGLOBIN GLYCOSYLATED A1C: CPT | Performed by: INTERNAL MEDICINE

## 2024-10-15 PROCEDURE — 93306 TTE W/DOPPLER COMPLETE: CPT | Performed by: INTERNAL MEDICINE

## 2024-10-15 PROCEDURE — 94003 VENT MGMT INPAT SUBQ DAY: CPT

## 2024-10-15 PROCEDURE — 82962 GLUCOSE BLOOD TEST: CPT

## 2024-10-15 PROCEDURE — 71250 CT THORAX DX C-: CPT | Performed by: INTERNAL MEDICINE

## 2024-10-15 PROCEDURE — 85027 COMPLETE CBC AUTOMATED: CPT | Performed by: HOSPITALIST

## 2024-10-15 PROCEDURE — 94799 UNLISTED PULMONARY SVC/PX: CPT

## 2024-10-15 PROCEDURE — 81003 URINALYSIS AUTO W/O SCOPE: CPT | Performed by: INTERNAL MEDICINE

## 2024-10-15 RX ORDER — INSULIN DEGLUDEC 100 U/ML
20 INJECTION, SOLUTION SUBCUTANEOUS NIGHTLY
Status: DISCONTINUED | OUTPATIENT
Start: 2024-10-15 | End: 2024-10-17

## 2024-10-15 RX ORDER — FUROSEMIDE 40 MG/1
40 TABLET ORAL EVERY MORNING
COMMUNITY
End: 2024-10-17

## 2024-10-15 RX ORDER — TORSEMIDE 20 MG/1
40 TABLET ORAL DAILY
Status: DISCONTINUED | OUTPATIENT
Start: 2024-10-16 | End: 2024-10-17

## 2024-10-15 RX ORDER — FUROSEMIDE 40 MG/1
20 TABLET ORAL NIGHTLY
COMMUNITY
End: 2024-10-17

## 2024-10-15 NOTE — PROGRESS NOTES
[facility]   part of [facility] Hospitalist Progress Note     Nina Donnelly Patient Status:  Inpatient    10/9/1945 MRN [id number]   Location [facility] 2NE-A Attending Tristan Rosen MD   Hosp Day # 1 PCP Chiki Caldwell MD     CC:       Chief Complaint   Patient presents with    Difficulty Breathing       Subjective:     Patient seen and examined.   Sittng in chair, family at bedside  A &O x 4  Afebrile  Doing well on 2-3 L O2  Dec air entry at [employer]    Objective:    Review of Systems:   10 point ROS completed and was negative, except for pertinent positive and negatives stated in subjective.    Vital signs:  Temp:  [97.5 °F (36.4 °C)-98.4 °F (36.9 °C)] 97.6 °F (36.4 °C)  Pulse:  [53-87] 65  Resp:  [16-21] 18  BP: (116-162)/(48-79) 143/55  SpO2:  [88 %-100 %] 91 %    Physical Exam:    General: No acute distress.   HEENT:  EOMI, PERRLA, OP clear  Respiratory: Clear to auscultation bilaterally. No wheezes. No rhonchi.  Cardiovascular: S1, S2. Regular rate and rhythm. No murmurs.  Abdomen: Soft, nontender, nondistended.  Positive bowel sounds. No rebound or guarding.  Extremities: No edema.  Neuro:  Grossly non focal, no motor deficits.        Diagnostic Data:    Labs:  Recent Labs   Lab 10/12/24  0910 10/14/24  1212 10/15/24  0624   WBC 7.4 8.6 8.1   HGB 9.7* 9.6* 9.6*   MCV 57.5* 57.0* 56.3*   .0 175.0 182.0       Recent Labs   Lab 10/11/24  1620 10/14/24  1212 10/14/24  1907 10/15/24  0624   GLU 63* 202*  --  103*   BUN 42* 39*  --  40*   CREATSERUM 1.79* 1.64*  --  1.73*   CA 9.6 9.5  --  9.4   ALB  --  4.2  --  4.1    141  --  141   K 4.0 4.6 4.4 4.3    104  --  104   CO2 32.0 33.0*  --  33.0*   ALKPHO  --  92  --  94   AST  --  24  --  21   ALT  --  14  --  13   BILT  --  0.7  --  0.9   TP  --  6.5  --  6.4       Estimated Creatinine Clearance: 20.5 mL/min (A) (based on SCr of 1.73 mg/dL (H)).    No results for input(s): \"PTP\", \"INR\" in  the last 168 hours.         COVID-19 Lab Results    COVID-19  Lab Results   Component Value Date    COVID19 Not Detected 10/14/2024    COVID19 Not Detected 10/14/2024    COVID19 Not Detected 10/11/2024       Pro-Calcitonin  Recent Labs   Lab 10/14/24  1212   PCT 0.08*       Cardiac  Recent Labs   Lab 10/14/24  1212   PBNP 239       Creatinine Kinase  No results for input(s): \"CK\" in the last 168 hours.    Inflammatory Markers  No results for input(s): \"CRP\", \"LELA\", \"LDH\", \"DDIMER\" in the last 168 hours.    Imaging: Imaging data reviewed in Epic.    Medications:    fluticasone-salmeterol  1 puff Inhalation BID    ipratropium-albuterol  3 mL Nebulization Q6H WA    furosemide  40 mg Intravenous BID (Diuretic)    aspirin  81 mg Oral Daily    atorvastatin  10 mg Oral Nightly    donepezil  10 mg Oral Nightly    DULoxetine  60 mg Oral Daily    escitalopram  5 mg Oral Daily    hydrALAZINE  100 mg Oral TID    spironolactone  25 mg Oral Daily    pregabalin  150 mg Oral Nightly    heparin  5,000 Units Subcutaneous Q8H CHICO    insulin degludec  15 Units Subcutaneous Nightly    insulin aspart  1-5 Units Subcutaneous TID AC and HS       Assessment & Plan:    Patient is a 79 year old female with PMH sig for HTN, HLD, CHF, WYATT, here for sob     Impression     -acute on chronic hypoxic RF (on 2L at baseline)  -COPD     -HTN  -HLD  -acute on chronic CHF  -WYATT on CPAP     -CKD 3         Plan     -appear sympoms related to HF - reports about 10 lb wt gain, has LE edema and pulm edema on cxr  -iv diuresis  -repeat echo  -Pulm following >> CT chest ordered  -reports intermittent chest pressure, ? May need ischemic eval, cards consulted  -cont o2 support - on baseline O2  -cont home meds as able  -family requesting nephro assessment >> consult Dr Scott     Further recommendations pending patient's clinical course. [facility] hospitalist to continue to follow patient while in house     Patient and/or patient's family given opportunity to ask  questions and note understanding and agreeing with therapeutic plan as outlined    DISPO:  Cont inpt care        Lilly Clarke MD  [facility] Hospitalist  Pager [phone]  Answering Service number: [phone]       **Certification      PHYSICIAN Certification of Need for Inpatient Hospitalization - Initial Certification    Patient will require inpatient services that will reasonably be expected to span two midnight's based on the clinical documentation in H+P.   Based on patients current state of illness, I anticipate that, after discharge, patient will require TBD.

## 2024-10-15 NOTE — CONSULTS
[facility]   part of [facility]    Report of Consultation    Nina Donnelly Patient Status:  Inpatient    10/9/1945 MRN [id number]   Location [facility] 2NE-A Attending Tristan Rosen MD   Hosp Day # 1 PCP Chiki Caldwell MD       REASON FOR CONSULT:     CKD stage 3b    HISTORY OF PRESENT ILLNESS:     80 yo F with history of CKD stage 3b, HFpEF, DM, HTN, WYATT, HL, COPD on home O2, beta thalassemia, macular degeneration, hospitalization 10/11-10/12 with SOB attributed to acute bronchitis and discharged on doxycycline presented with SOB and leg swelling.    She was started on IV lasix, and swelling has improved. Breathing ok but still feels not at baseline.  She has cramping now in hands and feet as well as L flank pain with taking a deep breath and also with tenderness on palpation.      REVIEW OF SYSTEMS:     Please see HPI for pertinent positives. 10 point review of systems otherwise reviewed and negative.     HISTORY:     Past Medical History:    Congestive heart disease (HCC)    Hyperlipidemia    Mild episode of recurrent major depressive disorder (HCC)    Neuropathy    Obstructive apnea    DMG PSG AHI 11    WYATT on CPAP     Past Surgical History:   Procedure Laterality Date          Cabg      Foot surgery      Hysterectomy      Knee surgery      Tonsillectomy       Family History   Problem Relation Age of Onset    Psychiatric Mother     Diabetes Mother     Heart Disorder Mother     Breast Cancer Paternal Aunt         dx age unknown       reports that she quit smoking about 9 years ago. Her smoking use included cigarettes. She started smoking about 64 years ago. She has a 55 pack-year smoking history. She has never used smokeless tobacco. She reports that she does not drink alcohol and does not use drugs.    ALLERGIES:     Allergies[1]    MEDICATIONS:       Current Facility-Administered Medications:     insulin degludec (Tresiba) 100 units/mL flextouch 20 Units, 20 Units,  Subcutaneous, Nightly    insulin aspart (NovoLOG) 100 Units/mL FlexPen 2-10 Units, 2-10 Units, Subcutaneous, TID AC and HS    [START ON 10/16/2024] torsemide (Demadex) tab 40 mg, 40 mg, Oral, Daily    fluticasone-salmeterol (Advair Diskus) 250-50 MCG/ACT inhaler 1 puff, 1 puff, Inhalation, BID    ipratropium-albuterol (Duoneb) 0.5-2.5 (3) MG/3ML inhalation solution 3 mL, 3 mL, Nebulization, Q6H WA    aspirin DR tab 81 mg, 81 mg, Oral, Daily    atorvastatin (Lipitor) tab 10 mg, 10 mg, Oral, Nightly    donepezil (Aricept) tab 10 mg, 10 mg, Oral, Nightly    DULoxetine (Cymbalta) DR cap 60 mg, 60 mg, Oral, Daily    escitalopram (Lexapro) tab 5 mg, 5 mg, Oral, Daily    hydrALAZINE (Apresoline) tab 100 mg, 100 mg, Oral, TID    spironolactone (Aldactone) tab 25 mg, 25 mg, Oral, Daily    pregabalin (Lyrica) cap 150 mg, 150 mg, Oral, Nightly    heparin (Porcine) 5000 UNIT/ML injection 5,000 Units, 5,000 Units, Subcutaneous, Q8H CHICO    acetaminophen (Tylenol Extra Strength) tab 500 mg, 500 mg, Oral, Q4H PRN    glucose (Dex4) 15 GM/59ML oral liquid 15 g, 15 g, Oral, Q15 Min PRN **OR** glucose (Glutose) 40% oral gel 15 g, 15 g, Oral, Q15 Min PRN **OR** glucose-vitamin C (Dex-4) chewable tab 4 tablet, 4 tablet, Oral, Q15 Min PRN **OR** dextrose 50% injection 50 mL, 50 mL, Intravenous, Q15 Min PRN **OR** glucose (Dex4) 15 GM/59ML oral liquid 30 g, 30 g, Oral, Q15 Min PRN **OR** glucose (Glutose) 40% oral gel 30 g, 30 g, Oral, Q15 Min PRN **OR** glucose-vitamin C (Dex-4) chewable tab 8 tablet, 8 tablet, Oral, Q15 Min PRN  No current outpatient medications on file.         PHYSICAL EXAM:     Vital Signs: /47 (BP Location: Left arm)   Pulse 61   Temp 98.1 °F (36.7 °C) (Oral)   Resp 20   Wt 193 lb (87.5 kg)   SpO2 94%   BMI 36.49 kg/m²   Temp (24hrs), Av.9 °F (36.6 °C), Min:97.5 °F (36.4 °C), Max:98.4 °F (36.9 °C)       Intake/Output Summary (Last 24 hours) at 10/15/2024 2305  Last data filed at 10/15/2024 0600  Gross  per 24 hour   Intake 240 ml   Output 1950 ml   Net -1710 ml     Wt Readings from Last 3 Encounters:   10/15/24 193 lb (87.5 kg)   10/11/24 197 lb 1.5 oz (89.4 kg)   04/30/24 195 lb 15.8 oz (88.9 kg)       General: pleasant, obese  Skin: no visible rashes  HEENT: NCAT  Cardiac: Regular rate and rhythm, no murmur/gallop or rub  Lungs: poor air movement  Abdomen: Soft, NTND  Extremities: warm, well perfused, no leg edema  Neurologic/Psych: mentating well, no asterixis    LABORATORY DATA:       Lab Results   Component Value Date     (H) 10/15/2024    BUN 40 (H) 10/15/2024    BUNCREA 18.0 03/15/2022    CREATSERUM 1.73 (H) 10/15/2024    ANIONGAP 4 10/15/2024    GFRNAA 35 (L) 11/09/2021    GFRAA 40 (L) 11/09/2021    CA 9.4 10/15/2024    OSMOCALC 302 (H) 10/15/2024    ALKPHO 94 10/15/2024    AST 21 10/15/2024    ALT 13 10/15/2024    BILT 0.9 10/15/2024    TP 6.4 10/15/2024    ALB 4.1 10/15/2024    GLOBULIN 2.3 10/15/2024     10/15/2024    K 4.3 10/15/2024     10/15/2024    CO2 33.0 (H) 10/15/2024     Lab Results   Component Value Date    WBC 8.1 10/15/2024    RBC 5.56 (H) 10/15/2024    HGB 9.6 (L) 10/15/2024    HCT 31.3 (L) 10/15/2024    .0 10/15/2024    MCV 56.3 (L) 10/15/2024    MCH 17.3 (L) 10/15/2024    MCHC 30.7 (L) 10/15/2024    RDW 19.8 10/15/2024    NEPRELIM 5.91 10/14/2024    NEUTABS 4.34 04/07/2020    LYMPHABS 2.28 04/07/2020    EOSABS 0.14 04/07/2020    BASABS 0.07 04/07/2020    NEPERCENT 68.9 10/14/2024    LYPERCENT 19.3 10/14/2024    MOPERCENT 7.8 10/14/2024    EOPERCENT 3.1 10/14/2024    BAPERCENT 0.6 10/14/2024    NE 5.91 10/14/2024    LYMABS 1.66 10/14/2024    MOABSO 0.67 10/14/2024    EOABSO 0.27 10/14/2024    BAABSO 0.05 10/14/2024     Lab Results   Component Value Date    MALBP <1.2 03/15/2022    CREUR 45.53 03/15/2022     Lab Results   Component Value Date    COLORUR Light-Yellow 04/25/2024    CLARITY Clear 04/25/2024    SPECGRAVITY 1.011 04/25/2024    GLUUR Normal 04/25/2024     BILUR Negative 04/25/2024    KETUR Negative 04/25/2024    BLOODURINE Negative 04/25/2024    PHURINE 5.0 04/25/2024    PROUR Negative 04/25/2024    UROBILINOGEN Normal 04/25/2024    NITRITE Negative 04/25/2024    LEUUR Negative 04/25/2024    NMIC Microscopic not indicated 04/25/2024         IMAGING:     Reviewed.      ASSESSMENT/PLAN:     78 yo F with history of CKD stage 3b, HFpEF, DM, HTN, WYATT, HL, COPD on home O2, beta thalassemia, macular degeneration, hospitalization 10/11-10/12 with SOB attributed to acute bronchitis and discharged on doxycycline presented with SOB and leg swelling. Leg swelling has improved but she continues to feel poorly for which nephrology was consulted.    SOB, leg swelling: attributed to HFpEF  -- s/p IV lasix, now with hand and feet cramping  -- hold PM dose of IV lasix and transition to torsemide 40 mg/d in AM  -- spironolactone 25 mg/d    CKD stage 3b:  -- at baseline 1.6-1.9 mg/dl  -- explained her CKD is unlikely the cause of her symptoms as she remains at her baseline  -- avoid NSAIDs, fleets, contrast    Anemia in CKD:  -- iron stores    MBD:  -- phos, iPTH  -- Vit D at goal 5/2024    HTN:  -- BP acceptable with diuresis + hydralazine    COPD/abn CT chest:  -- per pulmonary    L flank tenderness: likely MSK  -- UA, renal US per family request    Discussed with RN.  Will follow.          Thank you for allowing me to participate in the care of your patient. Please do not hesitate to contact me with concerns or questions.    Shandra Scott MD  [facility]  [address] [place] [id number]    10/15/2024  2:55 PM         [1]   Allergies  Allergen Reactions    Pioglitazone UNKNOWN     Weight Gain    Vancomycin ITCHING

## 2024-10-15 NOTE — PAYOR COMM NOTE
--------------  ADMISSION REVIEW     Payor: EVANGELISTA PARKER [facility]  Subscriber #:  [id number]  Authorization Number: [id number]    Admit date: 10/14/24  Admit time:  3:21 PM       REVIEW DOCUMENTATION:     ED Provider Notes        ED Provider Notes signed by Dontrell Lee MD at 10/14/2024  2:22 PM       Author: Dontrell Lee MD Service: -- Author Type: Physician    Filed: 10/14/2024  2:22 PM Date of Service: 10/14/2024 12:10 PM Status: Signed    : Dontrell Lee MD (Physician)           Patient Seen in: [facility] Emergency Department      History     Chief Complaint   Patient presents with    Difficulty Breathing     Stated Complaint: 96% on 3L.  pt normally on 2L.  pt with HA and cough    Subjective:   HPI    This is a 79-year-old female who has a history of congestive heart failure hyperlipidemia neuropathy obstructive sleep apnea, the patient arrives here because the family states she has been having increasing shortness of breath.  They have noticed that her oxygen level has gone down.  She was hospitalized just a few days ago with an extensive workup had a VQ scan, venous Dopplers were negative for any DVT.  Low probability Q scan had a borderline D-dimer 0.52 she was seen by her pulmonologist was sent over for further evaluation including because of an abnormal D-dimer to get further workup and further treatment the patient's family states that she has been short of breath primarily when she talks a lot or when she did with activity she did have some pain when she first arrived at when she first got admitted but that is pretty much resolved at this present time.  She does not have any chest pain she is not short of breath right now she has noticed her legs have gotten more swollen.  She denies any calf pain she just noted edema in her legs.  The family states on even on her 3 L her oxygen level is going down to 60..  When she is sleeping her O2 sat drops to 60s.  She is normally on 2 L nasal, she  has noticed a little bit of bilateral lower extremity swelling.  She does take aspirin she is on hydralazine insulin and furosemide.  She does have history of COPD, she denies any productive sputum she does have a dry cough.  She denies any chest pain at this present time.      Objective:     Past Medical History:    Congestive heart disease (HCC)    Hyperlipidemia    Mild episode of recurrent major depressive disorder (HCC)    Neuropathy    Obstructive apnea    DMG PSG AHI 11    WYATT on CPAP              Past Surgical History:   Procedure Laterality Date          Cabg      Foot surgery      Hysterectomy      Knee surgery               Physical Exam     ED Triage Vitals   BP 10/14/24 1200 144/61   Pulse 10/14/24 1200 60   Resp 10/14/24 1200 20   Temp 10/14/24 1207 98.1 °F (36.7 °C)   Temp src 10/14/24 1207 Oral   SpO2 10/14/24 1200 100 %   O2 Device 10/14/24 1200 Nasal cannula       Current Vitals:   Vital Signs  BP: 152/61  Pulse: 66  Resp: 20  Temp: 98.1 °F (36.7 °C)  Temp src: Oral  MAP (mmHg): 89    Oxygen Therapy  SpO2: 98 %  O2 Device: Nasal cannula  O2 Flow Rate (L/min): 3 L/min        Physical Exam  General: .  Patient is in no severe respiratory distress.  At this present time she looks comfortable.  The patient is in no respiratory distress    HEENT: Atraumatic, conjunctiva are not pale.  There is no icterus.  Oral mucosa Is wet.  No facial trauma.  The neck is supple.    LUNGS: Clear to auscultation, there is no wheezing or retraction.  Some crackles at the bases is noted.    CV: Cardiovascular is regular without murmurs or rubs.    ABD: The abdomen is soft nondistended nontender.  There is no rebound.  There is no guarding.    EXT: There is good pulses bilaterally.  There is no calf tenderness.  There is no rash noted.  There is bilateral 1+ edema lower to lower extremities.    NEURO: Alert and oriented x4.  Muscle strength and sensory exam is grossly normal.  And the patient is neurologically  intact with no focal findings.      ED Course     Labs Reviewed   COMP METABOLIC PANEL (14) - Abnormal; Notable for the following components:       Result Value    Glucose 202 (*)     CO2 33.0 (*)     BUN 39 (*)     Creatinine 1.64 (*)     Calculated Osmolality 307 (*)     eGFR-Cr 32 (*)     All other components within normal limits   CBC WITH DIFFERENTIAL WITH PLATELET - Abnormal; Notable for the following components:    RBC 5.67 (*)     HGB 9.6 (*)     HCT 32.3 (*)     MCV 57.0 (*)     MCH 16.9 (*)     MCHC 29.7 (*)     All other components within normal limits   VBG PANEL WITH ELECTROLYTES - Abnormal; Notable for the following components:    Venous O2Hb 56.5 (*)     Potassium Blood Gas 3.4 (*)     All other components within normal limits   ABG PANEL W ELECT AND LACTATE - Abnormal; Notable for the following components:    ABG pCO2 54 (*)     ABG HCO3 31.3 (*)     ABG Base Excess 8.2 (*)     Total Hemoglobin 10.4 (*)     All other components within normal limits   TROPONIN I HIGH SENSITIVITY - Normal   PRO BETA NATRIURETIC PEPTIDE - Normal   SARS-COV-2/FLU A AND B/RSV BY PCR (GENEXPERT) - Normal           PROCALCITONIN   RESPIRATORY FLU EXPAND PANEL + COVID-19       The patient was placed on monitors, IV was started, blood was drawn.    Workup was done to rule out pneumonia, pneumothorax, congestive heart failure CO2 retention was also considered.  MDM   The EKG shows sinus bradycardia there is no acute ST elevations or ischemic findings.  The rest of the EKG including rate rhythm axis and intervals I agree with the EKG report . The rate is 55 there is some nonspecific ST changes    Admission disposition: 10/14/2024  2:15 PM       The patient had a VBG that showed no significant abnormalities pCO2 was 49.  This is on nasal cannula 3 L, COVID, flu was negative proBNP was not elevated comprehensive shows a chronically elevated CO2 of 33, BUN 39 creatinine of 1.69 which is not significant worse than before troponin  is negative CBC shows white count of 8.6 hemoglobin 9.6, platelet count 175 which is not worse than before.    I personally reviewed the radiographs and my individual interpretation shows        Also reviewed official report and it shows  XR CHEST AP PORTABLE  (CPT=71045)    Result Date: 10/14/2024  PROCEDURE:  XR CHEST AP PORTABLE  (CPT=71045)  TECHNIQUE:  AP chest radiograph was obtained.  COMPARISON:  [facility] , XR, XR CHEST AP PORTABLE  (CPT=71045), 10/11/2024, 4:57 PM.  INDICATIONS:  96% on 3L.  pt normally on 2L.  pt with HA and cough  PATIENT STATED HISTORY: (As transcribed by Technologist)  patient states headache and cough.               CONCLUSION:   Stable cardiac and mediastinal contours.  Mild diffuse interstitial opacities may reflect mild interstitial pulmonary edema versus viral/atypical pneumonitis.  No associated pleural effusion or pneumothorax.     LOCATION:  [facility]      Dictated by (CST): Cornelius Powers MD on 10/14/2024 at 1:19 PM     Finalized by (CST): Cornelius Powers MD on 10/14/2024 at 1:21 PM       NM LUNG VQ VENT / PERFUSION SCAN  (CPT=78582)    Result Date: 10/11/2024  PROCEDURE:  NM LUNG VQ VENT / PERFUSION SCAN  (CPT=78582)  COMPARISON:  [facility] , XR, XR CHEST AP PORTABLE  (CPT=71045), 10/11/2024, 4:57 PM.  INDICATIONS:  Difficulty breathing and elevated D-dimer.  TECHNIQUE:  The patient was ventilated with 5.6 mCi Xe-133 gas and posterior supine images of the lungs were obtained. This was followed by IV injection of 2.8 mCi Tc-99m MAA, and similar projection images were obtained.  FINDINGS:  PERFUSION:    Uniform. REGIONS OF MISMATCH:  None.  Ventilation demonstrates delayed washout could be due to COPD.            CONCLUSION:  Very low probability for pulmonary embolus.   LOCATION:  [facility]   Dictated by (CST): Nikko Gonzáles MD on 10/11/2024 at 8:58 PM     Finalized by (CST): Nikko Gonzáles MD on 10/11/2024 at 8:59 PM       US VENOUS DOPPLER LEG BILAT - DIAG IMG (CPT=93970)    Result Date:  10/11/2024  PROCEDURE:  US VENOUS DOPPLER LEG BILAT - DIAG IMG (CPT=93970)  COMPARISON:  [facility] , US, US VENOUS DOPPLER LEG LEFT - DIAG IMG (CPT=93971), 4/23/2024, 7:29 AM.  INDICATIONS:  Shortness of breath with bilateral leg cramping and swelling.  TECHNIQUE:  Real time, grey scale, and duplex ultrasound was used to evaluate the lower extremity venous system. B-mode two-dimensional images of the vascular structures, Doppler spectral analysis, and color flow.  Doppler imaging were performed.  The following veins were imaged bilaterally:  Common, deep, and superficial femoral, popliteal, sapheno-femoral junction, and posterior tibial veins.  PATIENT STATED HISTORY: (As transcribed by Technologist)     FINDINGS:  SAPHENOFEMORAL JUNCTION:  No reflux. THROMBI:  None visible. COMPRESSION:  Normal compressibility, phasicity, and augmentation. OTHER:  Right Magallon's cyst measures 3.4 x 1.1 x 3.2 cm. .            CONCLUSION:  No DVT of the bilateral lower extremities.   LOCATION:  [facility]   Dictated by (CST): Nikko Gonzáles MD on 10/11/2024 at 7:25 PM     Finalized by (CST): Nikko Gonzáles MD on 10/11/2024 at 7:25 PM       XR CHEST AP PORTABLE  (CPT=71045)    Result Date: 10/11/2024  PROCEDURE:  XR CHEST AP PORTABLE  (CPT=71045)  TECHNIQUE:  AP chest radiograph was obtained.  COMPARISON:  [facility] , CT, CT CHEST (CPT=71250), 4/27/2024, 12:36 PM.  [facility] , XR, XR CHEST AP PORTABLE  (CPT=71045), 4/25/2024, 9:37 AM.  INDICATIONS:  elevated trop and ddimer from pulm, needs vq scan. , 3L NC O2 Baseline  PATIENT STATED HISTORY: (As transcribed by Technologist)  Patient offered no additional history at this time.    FINDINGS:  Minimal subsegmental atelectasis at the lung bases.  The heart is normal size.  There are no pleural effusions.  The bones are unremarkable.            CONCLUSION:  Minimal subsegmental atelectasis at the lung bases.   LOCATION:  [facility]      Dictated by (CST): Nikko Gonzáles MD on 10/11/2024 at 5:20 PM      Finalized by (CST): Nikko Gonzáles MD on 10/11/2024 at 5:20 PM        ABG shows a pCO2 of 54 and a bicarb of 31 suggesting chronic CO2 retention.  The patient's case was discussed with Dr. Lu who did see the patient and felt the patient may be a little fluid overloaded did recommend Lasix in which she was given Lasix.  Also discussed this case with the cardiologist Dr. Beach.  Also discussed this case with the [facility] hospitalist.  Patient will be admitted for further evaluation treatment she did get Lasix here she was able to ambulate here.  Some of her symptoms may be related to CO2 retention is most the time is occurs when she sleeps when she drops her O2 sat.    Medical Decision Making      Disposition and Plan     Clinical Impression:  1. Dyspnea, unspecified type    2. CO2 retention    3. Bilateral lower extremity edema         Disposition:  Admit         Hospital Problems       Present on Admission  Date Reviewed: 10/12/2024            ICD-10-CM Noted POA    * (Principal) Dyspnea, unspecified type R06.00 10/14/2024 Unknown    Anemia D64.9 10/14/2024 Yes    Azotemia R79.89 10/14/2024 Yes    Hyperglycemia R73.9 10/14/2024 Yes    Respiratory acidosis E87.29 10/14/2024 Yes            10/14 H&P       CC:       Chief Complaint   Patient presents with    Difficulty Breathing         PCP: Chiki Caldwell MD     History of Present Illness: Patient is a 79 year old female with PMH sig for HTN, HLD, CHF, WYATT, here for sob     She reports about 3 weeks hx of increasing swelling in legs, sob with exertion, talking, dropping of O2 at home. Has also had intermittent chest pressure. Shew as here over the weekend but discharged on Saturday.   No recent f/c. No n/v. Has had diarrhea at home. No urinary symptoms      PMH  Past Medical History       Past Medical History:    Congestive heart disease (HCC)    Hyperlipidemia    Mild episode of recurrent major depressive disorder (HCC)    Neuropathy    Obstructive apnea      DMG PSG AHI 11    WYATT on CPAP            PSH  Past Surgical History         Past Surgical History:   Procedure Laterality Date            Cabg        Foot surgery        Hysterectomy        Knee surgery        Tonsillectomy                ALL:  [Allergies]     [Allergies]        Allergen Reactions    Pioglitazone UNKNOWN       Weight Gain    Vancomycin ITCHING      Home Medications:  [Medications Taking]    [Medications Taking]  No outpatient medications have been marked as taking for the 10/14/24 encounter (Hospital Encounter).            Soc Hx  Social History            Tobacco Use    Smoking status: Former       Current packs/day: 0.00       Average packs/day: 1 pack/day for 55.0 years (55.0 ttl pk-yrs)       Types: Cigarettes       Start date: 1960       Quit date: 2015       Years since quittin.7    Smokeless tobacco: Never   Substance Use Topics    Alcohol use: No         Fam Hx  Family History         Family History   Problem Relation Age of Onset    Psychiatric Mother      Diabetes Mother      Heart Disorder Mother      Breast Cancer Paternal Aunt           dx age unknown             Review of Systems  Comprehensive ROS reviewed and negative except for what's stated above.        OBJECTIVE:  /61   Pulse 66   Temp 98.1 °F (36.7 °C) (Oral)   Resp 20   SpO2 98%   General:  Alert, no distress, appears stated age.   Head:  Normocephalic    Eyes:  Sclera anicteric, No conjunctival pallor, EOMs intact.    Throat: dry   Neck: Supple    Lungs:   Clear to auscultation bilaterally. Normal effort   Chest wall:  No tenderness or deformity.   Heart:  Regular rate and rhythm    Abdomen:   Soft, NT/ND,    Extremities: Atraumatic, LE edema   Skin: No visible rashes or lesions.    Neurologic: Normal strength, no focal deficit appreciated      Diagnostic Data:    CBC/Chem       Recent Labs   Lab 10/12/24  0910 10/14/24  1212   WBC 7.4 8.6   HGB 9.7* 9.6*   MCV 57.5* 57.0*   .0 175.0               Recent Labs   Lab 10/11/24  1620 10/14/24  1212    141   K 4.0 4.6    104   CO2 32.0 33.0*   BUN 42* 39*   CREATSERUM 1.79* 1.64*   GLU 63* 202*   CA 9.6 9.5             Recent Labs   Lab 10/14/24  1212   ALT 14   AST 24   ALB 4.2         No results for input(s): \"TROP\" in the last 168 hours.     Additional Diagnostics: ECG: sinus     CXR: image personally reviewed   Radiology: XR CHEST AP PORTABLE  (CPT=71045)     Result Date: 10/14/2024  PROCEDURE:  XR CHEST AP PORTABLE  (CPT=71045)  TECHNIQUE:  AP chest radiograph was obtained.  COMPARISON:  [facility] , XR, XR CHEST AP PORTABLE  (CPT=71045), 10/11/2024, 4:57 PM.  INDICATIONS:  96% on 3L.  pt normally on 2L.  pt with HA and cough  PATIENT STATED HISTORY: (As transcribed by Technologist)  patient states headache and cough.                CONCLUSION:   Stable cardiac and mediastinal contours.  Mild diffuse interstitial opacities may reflect mild interstitial pulmonary edema versus viral/atypical pneumonitis.  No associated pleural effusion or pneumothorax.     LOCATION:  [facility]      Dictated by (CST): Cornelius Powers MD on 10/14/2024 at 1:19 PM     Finalized by (CST): Cornelius Powers MD on 10/14/2024 at 1:21 PM        NM LUNG VQ VENT / PERFUSION SCAN  (CPT=78582)     Result Date: 10/11/2024  PROCEDURE:  NM LUNG VQ VENT / PERFUSION SCAN  (CPT=78582)  COMPARISON:  [facility] , XR, XR CHEST AP PORTABLE  (CPT=71045), 10/11/2024, 4:57 PM.  INDICATIONS:  Difficulty breathing and elevated D-dimer.  TECHNIQUE:  The patient was ventilated with 5.6 mCi Xe-133 gas and posterior supine images of the lungs were obtained. This was followed by IV injection of 2.8 mCi Tc-99m MAA, and similar projection images were obtained.  FINDINGS:  PERFUSION:    Uniform. REGIONS OF MISMATCH:  None.  Ventilation demonstrates delayed washout could be due to COPD.             CONCLUSION:  Very low probability for pulmonary embolus.   LOCATION:  [facility]   Dictated by (CST): Nikko Gonzáles MD  on 10/11/2024 at 8:58 PM     Finalized by (CST): Nikko Gonzáles MD on 10/11/2024 at 8:59 PM        US VENOUS DOPPLER LEG BILAT - DIAG IMG (CPT=93970)     Result Date: 10/11/2024  PROCEDURE:  US VENOUS DOPPLER LEG BILAT - DIAG IMG (CPT=93970)  COMPARISON:  [facility] , US, US VENOUS DOPPLER LEG LEFT - DIAG IMG (CPT=93971), 4/23/2024, 7:29 AM.  INDICATIONS:  Shortness of breath with bilateral leg cramping and swelling.  TECHNIQUE:  Real time, grey scale, and duplex ultrasound was used to evaluate the lower extremity venous system. B-mode two-dimensional images of the vascular structures, Doppler spectral analysis, and color flow.  Doppler imaging were performed.  The following veins were imaged bilaterally:  Common, deep, and superficial femoral, popliteal, sapheno-femoral junction, and posterior tibial veins.  PATIENT STATED HISTORY: (As transcribed by Technologist)     FINDINGS:  SAPHENOFEMORAL JUNCTION:  No reflux. THROMBI:  None visible. COMPRESSION:  Normal compressibility, phasicity, and augmentation. OTHER:  Right Magallon's cyst measures 3.4 x 1.1 x 3.2 cm. .             CONCLUSION:  No DVT of the bilateral lower extremities.   LOCATION:  [facility]   Dictated by (CST): Nikko Gonzáles MD on 10/11/2024 at 7:25 PM     Finalized by (CST): Nikko Gonzáles MD on 10/11/2024 at 7:25 PM        XR CHEST AP PORTABLE  (CPT=71045)     Result Date: 10/11/2024  PROCEDURE:  XR CHEST AP PORTABLE  (CPT=71045)  TECHNIQUE:  AP chest radiograph was obtained.  COMPARISON:  [facility] , CT, CT CHEST (CPT=71250), 4/27/2024, 12:36 PM.  [facility] , XR, XR CHEST AP PORTABLE  (CPT=71045), 4/25/2024, 9:37 AM.  INDICATIONS:  elevated trop and ddimer from pulm, needs vq scan. , 3L NC O2 Baseline  PATIENT STATED HISTORY: (As transcribed by Technologist)  Patient offered no additional history at this time.    FINDINGS:  Minimal subsegmental atelectasis at the lung bases.  The heart is normal size.  There are no pleural effusions.  The bones are unremarkable.              CONCLUSION:  Minimal subsegmental atelectasis at the lung bases.   LOCATION:  [facility]      Dictated by (CST): Nikko Gonzáles MD on 10/11/2024 at 5:20 PM     Finalized by (CST): Nikko Gonzáles MD on 10/11/2024 at 5:20 PM           ASSESSMENT / PLAN:       Patient is a 79 year old female with PMH sig for HTN, HLD, CHF, WYATT, here for sob     Impression     -acute on chronic hypoxic RF (on 2L at baseline)  -COPD     -HTN  -HLD  -acute on chronic CHF  -WYATT on CPAP     -CKD 3         Plan     -appear sympoms related to HF - reports about 10 lb wt gain, has LE edema and pulm edema on cxr  -iv diuresis  -repeat echo  -reports intermittent chest pressure, ? May need ischemic eval, cards consulted     -cont o2 support   -cont home meds as able     Further recommendations pending patient's clinical course. [facility] hospitalist to continue to follow patient while in house     Patient and/or patient's family given opportunity to ask questions and note understanding and agreeing with therapeutic plan as outlined     Antonio Stafford        10/14 consult Cardiology        Reason for Consultation:  CHF        History of Present Illness:  Nina Donnelly is a a(n) 79 year old female. Patient of Dr. Chowdhury with COPD, WYATT, Diabetes, Dyslipidemia .  Uses CPAP and O2 at home. Recent admission 10/12/24 for fatigue, dyspnea, lassitude. Minimal troponin elevation.  Per daughter pt feels much the same: fatigue, somnolence, dyspnea, LE edema, 10 lb weight gain.  O2 desat's to 60's while asleep.  In ER ABG was 7.42/54/81. normal trop. Normal BNP.  Given IV lasix. Echo from 4/2024: LVEF = 65-70%. RV enlargement.  No fever, chills.     History:  Past Medical History       Past Medical History:    Congestive heart disease (HCC)    Hyperlipidemia    Mild episode of recurrent major depressive disorder (HCC)    Neuropathy    Obstructive apnea     DMG PSG AHI 11    WYATT on CPAP         Past Surgical History         Past Surgical History:   Procedure  Laterality Date            Cabg        Foot surgery        Hysterectomy        Knee surgery        Tonsillectomy                 Physical Exam:            Wt Readings from Last 3 Encounters:   10/11/24 197 lb 1.5 oz (89.4 kg)   24 195 lb 15.8 oz (88.9 kg)   22 199 lb (90.3 kg)         Vitals          Vitals:     10/14/24 1300 10/14/24 1330 10/14/24 1349 10/14/24 1430   BP: 148/52 (!) 162/79 152/61 151/52   Pulse: 56 58 66 59   Resp: 16 17 20 20   Temp:           TempSrc:           SpO2: 99% 98% 98% 96%           Temp:  [98.1 °F (36.7 °C)] 98.1 °F (36.7 °C)  Pulse:  [53-66] 59  Resp:  [16-20] 20  BP: (144-162)/(52-79) 151/52  SpO2:  [96 %-100 %] 96 %     Temp: 98.1 °F (36.7 °C)  Pulse: 59  Resp: 20  BP: 151/52       General:  Appears comfortable  HEENT: No focal deficits.  Neck: No JVD, carotids 2+ no bruits.  Cardiac: Regular S1S2.  No S3, S4, rub, click.  No murmur.  Lungs: few rales at bases  Abdomen: Soft, non-tender.   Extremities: trace  LE edema.  No clubbing or cyanosis  Neurologic: Alert and oriented, normal affect.  Skin: Warm and dry.      Labs:        HEM:       Recent Labs   Lab 10/12/24  0910 10/14/24  1212   WBC 7.4 8.6   HGB 9.7* 9.6*   HCT 32.8* 32.3*   .0 175.0         Chem:       Recent Labs   Lab 10/11/24  1620 10/14/24  1212    141   K 4.0 4.6    104   CO2 32.0 33.0*   BUN 42* 39*   CREATSERUM 1.79* 1.64*   ALT  --  14   AST  --  24   ALB  --  4.2          Telemetry:      Laboratories and Data:  Diagnostics:     EKG, 10/14/2024:  SB, PRWP     CXR, 10/14/2024:  Stable cardiac and mediastinal contours.  Mild diffuse interstitial opacities may reflect mild interstitial pulmonary edema versus viral/atypical pneumonitis.  No associated pleural effusion or pneumothorax.               Impression:     1.  Fatigue, lethargy, somnolence, dyspnea, LE edema, weight gain.  Suggests multiple factors: COPD with hypoxia and CO2 retention in combination with right heart  failure     2. Diabetes     3. Hypertension      4. CKD (1.6)        Recommend:     1.  Telemetry  2. Trial of IV lasix followed by change of po diuretic to torsemide  3. Follow renal fct, I/O's, weights  4. Echo for  LV, RV fct, PA pressure                                                 MEDICATIONS ADMINISTERED IN LAST 1 DAY:  acetaminophen (Tylenol Extra Strength) tab 500 mg       Date Action Dose Route User    10/15/2024 0556 Given 500 mg Oral Barbara Damico RN          atorvastatin (Lipitor) tab 10 mg       Date Action Dose Route User    10/14/2024 2315 Given 10 mg Oral Lolis Matthews RN          donepezil (Aricept) tab 10 mg       Date Action Dose Route User    10/14/2024 2315 Given 10 mg Oral Lolis Matthews RN          fluticasone-salmeterol (Advair Diskus) 250-50 MCG/ACT inhaler 1 puff       Date Action Dose Route User    10/15/2024 0836 Given 1 puff Inhalation Vicky Condon, RAJ          furosemide (Lasix) 10 mg/mL injection 40 mg       Date Action Dose Route User    10/14/2024 1349 Given 40 mg Intravenous Myla Esposito RN          furosemide (Lasix) 10 mg/mL injection 40 mg       Date Action Dose Route User    10/14/2024 1640 Given 40 mg Intravenous Mary Werner RN          heparin (Porcine) 5000 UNIT/ML injection 5,000 Units       Date Action Dose Route User    10/15/2024 0545 Given 5,000 Units Subcutaneous (Left Lower Abdomen) Barbara Damico RN    10/14/2024 2318 Given 5,000 Units Subcutaneous (Left Lower Abdomen) Lolis Matthews RN    10/14/2024 1640 Given 5,000 Units Subcutaneous (Left Lower Abdomen) Mary Werner RN          hydrALAZINE (Apresoline) tab 100 mg       Date Action Dose Route User    10/14/2024 2315 Given 100 mg Oral Lolis Matthews RN    10/14/2024 1639 Given 100 mg Oral Mary Werner RN          insulin aspart (NovoLOG) 100 Units/mL FlexPen 1-5 Units       Date Action Dose Route User    10/14/2024 2255 Given 5 Units Subcutaneous (Left Upper Arm) Lolis Matthews RN           insulin degludec (Tresiba) 100 units/mL flextouch 15 Units       Date Action Dose Route User    10/14/2024 2303 Given 15 Units Subcutaneous (Left Upper Arm) Lolis Matthews, STEPH          ipratropium-albuterol (Duoneb) 0.5-2.5 (3) MG/3ML inhalation solution 3 mL       Date Action Dose Route User    10/15/2024 0836 Given 3 mL Nebulization Vicky Condon [facility]    10/14/2024 2117 Given 3 mL Nebulization Pilar Jerry [facility]    10/14/2024 1609 Given 3 mL Nebulization Pilar Jerry, [facility]          pregabalin (Lyrica) cap 150 mg       Date Action Dose Route User    10/14/2024 2314 Given 150 mg Oral Lolis Matthews RN            Vitals (last day)       Date/Time Temp Pulse Resp BP SpO2 Weight O2 Device O2 Flow Rate (L/min) [facility]    10/15/24 0551 97.6 °F (36.4 °C) 65 18 143/55 -- -- Nasal cannula 2 L/min NS    10/15/24 0551 -- -- -- -- 91 % 193 lb (87.5 kg) -- --     10/15/24 0500 -- 73 -- -- 93 % -- Nasal cannula 2 L/min MM    10/15/24 0057 -- -- -- -- -- -- CPAP -- MM    10/15/24 0057 -- 84 -- -- 95 % -- -- --     10/15/24 0048 -- 68 18 -- 94 % -- -- -- TT    10/14/24 2316 97.5 °F (36.4 °C) 73 18 135/48 88 % -- Nasal cannula 3 L/min     10/14/24 2134 -- 87 -- -- 88 % -- -- --     10/14/24 2039 98.2 °F (36.8 °C) 75 20 116/54 91 % -- Nasal cannula 2 L/min     10/14/24 1546 -- 66 -- 123/77 94 % -- -- -- DT    10/14/24 1542 -- -- -- -- -- 195 lb 9.6 oz (88.7 kg) -- -- DT    10/14/24 1542 -- -- -- 131/49 -- -- -- -- CM    10/14/24 1541 -- 60 -- 131/49 95 % -- -- -- DT    10/14/24 1536 98.4 °F (36.9 °C) 61 21 148/60 100 % -- Nasal cannula 2.5 L/min DT    10/14/24 1441 -- 62 18 -- 98 % -- Nasal cannula 3 L/min     10/14/24 1430 -- 59 20 151/52 96 % -- Nasal cannula 3 L/min     10/14/24 1349 -- 66 20 152/61 98 % -- Nasal cannula 3 L/min     10/14/24 1330 -- 58 17 162/79 98 % -- Nasal cannula 3 L/min     10/14/24 1300 -- 56 16 148/52 99 % -- Nasal cannula 3 L/min     10/14/24 1215 -- 53 18 145/52 98 % -- Nasal  cannula 3 L/min     10/14/24 1207 98.1 °F (36.7 °C) -- -- -- -- -- -- --     10/14/24 1204 -- -- -- -- -- -- Nasal cannula 3 L/min     10/14/24 1200 -- 60 20 144/61 100 % -- Nasal cannula 3 L/min

## 2024-10-15 NOTE — CM/SW NOTE
Pt discussed in rounds and chart was reviewed. No anticipated DC needs identified at this time. Will monitor for any changes in needs.      to remain available for support and/or discharge planning.    SAROJ Whitten  Discharge Planner  [phone]

## 2024-10-15 NOTE — PROGRESS NOTES
[facility]    Nina Donnelly Patient Status:  Inpatient    10/9/1945 MRN [id number]   Location [facility] 2NE-A Attending Tristan Rosen MD   Hosp Day # 1 PCP Chiki Caldwell MD     SUBJECTIVE: no new events. Feels about the same but notes LE swelling is better.     OBJECTIVE:  /54 (BP Location: Left arm)   Pulse 61   Temp 97.6 °F (36.4 °C) (Oral)   Resp 20   Wt 193 lb (87.5 kg)   SpO2 90%   BMI 36.49 kg/m²   O2 requirement: on 2-3L at rest     I/O last 3 completed shifts:  In: 240 [P.O.:240]  Out: 1950 [Urine:1950]  No intake/output data recorded.     Current Medications:   Current Facility-Administered Medications:     fluticasone-salmeterol (Advair Diskus) 250-50 MCG/ACT inhaler 1 puff, 1 puff, Inhalation, BID    ipratropium-albuterol (Duoneb) 0.5-2.5 (3) MG/3ML inhalation solution 3 mL, 3 mL, Nebulization, Q6H WA    furosemide (Lasix) 10 mg/mL injection 40 mg, 40 mg, Intravenous, BID (Diuretic)    aspirin DR tab 81 mg, 81 mg, Oral, Daily    atorvastatin (Lipitor) tab 10 mg, 10 mg, Oral, Nightly    donepezil (Aricept) tab 10 mg, 10 mg, Oral, Nightly    DULoxetine (Cymbalta) DR cap 60 mg, 60 mg, Oral, Daily    escitalopram (Lexapro) tab 5 mg, 5 mg, Oral, Daily    hydrALAZINE (Apresoline) tab 100 mg, 100 mg, Oral, TID    spironolactone (Aldactone) tab 25 mg, 25 mg, Oral, Daily    pregabalin (Lyrica) cap 150 mg, 150 mg, Oral, Nightly    heparin (Porcine) 5000 UNIT/ML injection 5,000 Units, 5,000 Units, Subcutaneous, Q8H CHICO    acetaminophen (Tylenol Extra Strength) tab 500 mg, 500 mg, Oral, Q4H PRN    glucose (Dex4) 15 GM/59ML oral liquid 15 g, 15 g, Oral, Q15 Min PRN **OR** glucose (Glutose) 40% oral gel 15 g, 15 g, Oral, Q15 Min PRN **OR** glucose-vitamin C (Dex-4) chewable tab 4 tablet, 4 tablet, Oral, Q15 Min PRN **OR** dextrose 50% injection 50 mL, 50 mL, Intravenous, Q15 Min PRN **OR** glucose (Dex4) 15 GM/59ML oral liquid 30 g, 30 g, Oral, Q15 Min PRN **OR** glucose (Glutose)  40% oral gel 30 g, 30 g, Oral, Q15 Min PRN **OR** glucose-vitamin C (Dex-4) chewable tab 8 tablet, 8 tablet, Oral, Q15 Min PRN    insulin degludec (Tresiba) 100 units/mL flextouch 15 Units, 15 Units, Subcutaneous, Nightly    insulin aspart (NovoLOG) 100 Units/mL FlexPen 1-5 Units, 1-5 Units, Subcutaneous, TID AC and HS     General appearance: alert, appears stated age, and cooperative  Lungs:  slight bibasilar crackles w/o wheezing, good air entry  Heart: S1, S2 normal, no murmur, click, rub or gallop, regular rate and rhythm  Abdomen: soft, non-tender; bowel sounds normal; no masses,  no organomegaly  Extremities: extremities normal, atraumatic, no cyanosis or edema     Lab Results   Component Value Date    WBC 8.1 10/15/2024    RBC 5.56 10/15/2024    HGB 9.6 10/15/2024    HCT 31.3 10/15/2024    MCV 56.3 10/15/2024    MCH 17.3 10/15/2024    MCHC 30.7 10/15/2024    RDW 19.8 10/15/2024    .0 10/15/2024     Lab Results   Component Value Date     10/15/2024    K 4.3 10/15/2024     10/15/2024    CO2 33.0 10/15/2024    VHCO3 25.0 10/14/2024    BUN 40 10/15/2024    CREATSERUM 1.73 10/15/2024     10/15/2024    CA 9.4 10/15/2024    ALKPHO 94 10/15/2024    ALT 13 10/15/2024    AST 21 10/15/2024    BILT 0.9 10/15/2024    ALB 4.1 10/15/2024    TP 6.4 10/15/2024     Lab Results   Component Value Date    INR 1.08 04/21/2024        Recent Labs   Lab 10/14/24  1338   ABGPHT 7.41   KVUOXQ0T 54*   MAJCC2X 81   ABGHCO3 31.3*   ABGBE 8.2*   TEMP 98.6   ZOILA Positive   SITE Right Radial   DEV Nasal cannula   THGB 10.4*       maging: CXR: personally reviewed.  Mild pulm vasc congestion and bibasilar infiltrates vs edema      ASSESSMENT/PLAN:  Chronic hypoxic and hypercapnic resp failure- due to COPD- home O2 baseline is 2L with is unchanged.  No signs of AECOPD, no PNA on imaging.  Likely has component of HFpEF/volume retention contributing to her dyspnea.  ABG showed well compensated resp acidosis which is  unchanged from previous ABG six months ago  - cont with O2 as ordered  - BD nebs prn  - check noncontrast CT chest to further evaluate lung parenchyma given ongoing dyspnea despite fairly benign results on exhaustive w/u.  COPD- no exac  - recently started on breztri as OP- pt previously intolerant of spiriva  - advair bid while in hospital, BD nebs  - recently treated with doxy; no signs of PNA.  RVP negative, PCT essentially negative in setting of CKD, sputum cx pending; unable to expectorate. Hold on further abx  HFpEF- in setting of CKD and increasing LE edema.  BNP wnl but exam suggestive of mild volume retention- improved with diuresis  - recent VQ scan and LE dopplers neg for PE/DVT making VTE unlikely.  Hold on CTA Chest given underlying CKD and need for diuresis.  - cards consulted for further ischemic w/u- echo reviewed. Mostly unremarkable  WYATT- on CPAP with 2L entrained.  Continue while here  - as OP, consider transitioning to BIPAP to help decrease CO2, if clinically indicated  Proph- subcut hep  Dispo - Full code  - d/w daughter at length  - likely dc later today or tomorrow pending results of noncontrast CT chest    Jose Luis Fan MD  10/15/2024  11:11 AM

## 2024-10-15 NOTE — PLAN OF CARE
Assumed care at 0730. Pt alert, oriented x4. Oxygen saturation adequate on 2L nasal cannula, pt at baseline oxygen needs. Lung sounds diminished bilaterally. Tele: NSR. Continent. Denies pain. Refusing bed alarm, educated on fall risk and fall prevention. Also refusing non-skid socks, but agreeable to wearing shoes when ambulating. Plan of care: CT chest, diuresis, QID, possible discharge later today. Pt and family updated on plan of care. Questions answered.     Problem: Diabetes/Glucose Control  Goal: Glucose maintained within prescribed range  Description: INTERVENTIONS:  - Monitor Blood Glucose as ordered  - Assess for signs and symptoms of hyperglycemia and hypoglycemia  - Administer ordered medications to maintain glucose within target range  - Assess barriers to adequate nutritional intake and initiate nutrition consult as needed  - Instruct patient on self management of diabetes  Outcome: Progressing     Problem: Patient/Family Goals  Goal: Patient/Family Long Term Goal  Description: Patient's Long Term Goal: discharge home    Interventions:  - follow MD orders  - medications as ordered  - See additional Care Plan goals for specific interventions  Outcome: Progressing  Goal: Patient/Family Short Term Goal  Description: Patient's Short Term Goal: no more leg cramping    Interventions:   - PRN pain medications per order  - check K  - electrolyte replacement per protocol  - See additional Care Plan goals for specific interventions  Outcome: Progressing

## 2024-10-15 NOTE — PROGRESS NOTES
[facility] Cardiology Progress Note        Nina Donnelly Patient Status:  Inpatient    10/9/1945 MRN [id number]   [facility] 2NE-A Attending Tristan Rosen MD   Hosp Day # 1 PCP Chiki Caldwell MD     Subjective:  The patient denies  chest pain   LE edema is better  Still dyspneic; notices it during conversation    Medications:   fluticasone-salmeterol  1 puff Inhalation BID    ipratropium-albuterol  3 mL Nebulization Q6H WA    furosemide  40 mg Intravenous BID (Diuretic)    aspirin  81 mg Oral Daily    atorvastatin  10 mg Oral Nightly    donepezil  10 mg Oral Nightly    DULoxetine  60 mg Oral Daily    escitalopram  5 mg Oral Daily    hydrALAZINE  100 mg Oral TID    spironolactone  25 mg Oral Daily    pregabalin  150 mg Oral Nightly    heparin  5,000 Units Subcutaneous Q8H CHICO    insulin degludec  15 Units Subcutaneous Nightly    insulin aspart  1-5 Units Subcutaneous TID AC and HS       Continuous Infusions:        Allergies:  Allergies[1]      Objective:        Intake/Output:      Intake/Output Summary (Last 24 hours) at 10/15/2024 0817  Last data filed at 10/15/2024 0600  Gross per 24 hour   Intake 240 ml   Output 1950 ml   Net -1710 ml     Wt Readings from Last 3 Encounters:   10/15/24 193 lb (87.5 kg)   10/11/24 197 lb 1.5 oz (89.4 kg)   24 195 lb 15.8 oz (88.9 kg)       Physical Exam:        Vitals:    10/15/24 0048 10/15/24 0057 10/15/24 0500 10/15/24 0551   BP:    143/55   BP Location:    Left arm   Pulse: 68 84 73 65   Resp: 18   18   Temp:    97.6 °F (36.4 °C)   TempSrc:    Oral   SpO2: 94% 95% 93% 91%   Weight:    193 lb (87.5 kg)       Temp:  [97.5 °F (36.4 °C)-98.4 °F (36.9 °C)] 97.6 °F (36.4 °C)  Pulse:  [53-87] 65  Resp:  [16-21] 18  BP: (116-162)/(48-79) 143/55  SpO2:  [88 %-100 %] 91 %      Temp: 97.6 °F (36.4 °C)  Pulse: 65  Resp: 18  BP: 143/55  General:  Appears comfortable  HEENT: No focal deficits.  Neck: No JVD, carotids 2+ no  bruits.  Cardiac: Regular S1S2.  No S3, S4, rub, click.  No murmur.  Lungs: rales at bases  Abdomen: Soft, non-tender.   Extremities: No LE edema.  No clubbing or cyanosis.    Neurologic: Alert and oriented, normal affect.  Skin: Warm and dry.           LABS:      HEM:  Recent Labs   Lab 10/12/24  0910 10/14/24  1212 10/15/24  0624   WBC 7.4 8.6 8.1   HGB 9.7* 9.6* 9.6*   HCT 32.8* 32.3* 31.3*   .0 175.0 182.0       Chem:  Recent Labs   Lab 10/11/24  1620 10/14/24  1212 10/14/24  1907 10/15/24  0624    141  --  141   K 4.0 4.6 4.4 4.3    104  --  104   CO2 32.0 33.0*  --  33.0*   BUN 42* 39*  --  40*   CREATSERUM 1.79* 1.64*  --  1.73*   CA 9.6 9.5  --  9.4   MG  --   --   --  1.9   GLU 63* 202*  --  103*       Recent Labs   Lab 10/14/24  1212 10/15/24  0624   ALT 14 13   AST 24 21   ALB 4.2 4.1       No results for input(s): \"PT\", \"PTT\", \"INR\" in the last 168 hours.        No results found for: \"TROP\", \"CKMB\"      Invalid input(s): \"PBNPML\"                       Diagnostics:   Telemetry: SR      EKG, 10/14/2024:  SB, PRWP     CXR, 10/14/2024:  Stable cardiac and mediastinal contours.  Mild diffuse interstitial opacities may reflect mild interstitial pulmonary edema versus viral/atypical pneumonitis.  No associated pleural effusion or pneumothorax.               Impression:     1.  Fatigue, lethargy, somnolence, dyspnea, LE edema, weight gain.  Suggests multiple factors: hypoventilation, COPD with hypoxia and CO2 retention in combination with right heart failure     2. Diabetes     3. Hypertension      4. CKD (1.6)        Recommend:     1.  Telemetry  2. Continue  IV lasix followed by change of po diuretic to torsemide, perhaps in am  3. Follow renal fct, I/O's, weights  4. Echo for  LV, RV fct, PA pressure today            Dago Beach MD  10/15/2024  8:17 AM           [1]   Allergies  Allergen Reactions    Pioglitazone UNKNOWN     Weight Gain    Vancomycin ITCHING

## 2024-10-15 NOTE — PLAN OF CARE
Assumed care of patient at 1930. Patient alert and oriented x4. NSR on telemetry. On 2L nasal cannula while awake, CPAP at night. Patient declines pain. Ambulating to bathroom with standby assist. Voiding in bathroom, strict I/O maintained. Call light within reach, bed alarm in place.       Problem: Diabetes/Glucose Control  Goal: Glucose maintained within prescribed range  Description: INTERVENTIONS:  - Monitor Blood Glucose as ordered  - Assess for signs and symptoms of hyperglycemia and hypoglycemia  - Administer ordered medications to maintain glucose within target range  - Assess barriers to adequate nutritional intake and initiate nutrition consult as needed  - Instruct patient on self management of diabetes  Outcome: Progressing     Problem: Patient/Family Goals  Goal: Patient/Family Long Term Goal  Description: Patient's Long Term Goal: discharge home    Interventions:  - follow MD orders  - medications as ordered  - See additional Care Plan goals for specific interventions  Outcome: Progressing  Goal: Patient/Family Short Term Goal  Description: Patient's Short Term Goal: no more leg cramping    Interventions:   - PRN pain medications per order  - check K  - electrolyte replacement per protocol  - See additional Care Plan goals for specific interventions  Outcome: Progressing

## 2024-10-16 ENCOUNTER — APPOINTMENT (OUTPATIENT)
Dept: CV DIAGNOSTICS | Facility: HOSPITAL | Age: 79
End: 2024-10-16
Payer: MEDICARE

## 2024-10-16 ENCOUNTER — APPOINTMENT (OUTPATIENT)
Dept: CT IMAGING | Facility: HOSPITAL | Age: 79
End: 2024-10-16
Attending: INTERNAL MEDICINE
Payer: MEDICARE

## 2024-10-16 ENCOUNTER — APPOINTMENT (OUTPATIENT)
Dept: ULTRASOUND IMAGING | Facility: HOSPITAL | Age: 79
End: 2024-10-16
Attending: INTERNAL MEDICINE
Payer: MEDICARE

## 2024-10-16 LAB
ALBUMIN SERPL-MCNC: 3.9 G/DL (ref 3.2–4.8)
ANION GAP SERPL CALC-SCNC: 4 MMOL/L (ref 0–18)
BUN BLD-MCNC: 35 MG/DL (ref 9–23)
CALCIUM BLD-MCNC: 9.5 MG/DL (ref 8.7–10.4)
CHLORIDE SERPL-SCNC: 103 MMOL/L (ref 98–112)
CO2 SERPL-SCNC: 32 MMOL/L (ref 21–32)
CREAT BLD-MCNC: 1.69 MG/DL
DEPRECATED HBV CORE AB SER IA-ACNC: 32 NG/ML
EGFRCR SERPLBLD CKD-EPI 2021: 31 ML/MIN/1.73M2 (ref 60–?)
GLUCOSE BLD-MCNC: 112 MG/DL (ref 70–99)
GLUCOSE BLD-MCNC: 118 MG/DL (ref 70–99)
GLUCOSE BLD-MCNC: 198 MG/DL (ref 70–99)
GLUCOSE BLD-MCNC: 224 MG/DL (ref 70–99)
GLUCOSE BLD-MCNC: 255 MG/DL (ref 70–99)
GLUCOSE BLD-MCNC: 344 MG/DL (ref 70–99)
IRON SATN MFR SERPL: 17 %
IRON SERPL-MCNC: 58 UG/DL
MAGNESIUM SERPL-MCNC: 2 MG/DL (ref 1.6–2.6)
OSMOLALITY SERPL CALC.SUM OF ELEC: 297 MOSM/KG (ref 275–295)
PHOSPHATE SERPL-MCNC: 4.9 MG/DL (ref 2.4–5.1)
POTASSIUM SERPL-SCNC: 4.7 MMOL/L (ref 3.5–5.1)
PTH-INTACT SERPL-MCNC: 117 PG/ML (ref 18.5–88)
SODIUM SERPL-SCNC: 139 MMOL/L (ref 136–145)
TOTAL IRON BINDING CAPACITY: 334 UG/DL (ref 250–425)
TRANSFERRIN SERPL-MCNC: 265 MG/DL (ref 250–380)

## 2024-10-16 PROCEDURE — 74176 CT ABD & PELVIS W/O CONTRAST: CPT | Performed by: INTERNAL MEDICINE

## 2024-10-16 PROCEDURE — 82962 GLUCOSE BLOOD TEST: CPT

## 2024-10-16 PROCEDURE — 80069 RENAL FUNCTION PANEL: CPT | Performed by: INTERNAL MEDICINE

## 2024-10-16 PROCEDURE — 94640 AIRWAY INHALATION TREATMENT: CPT

## 2024-10-16 PROCEDURE — 78452 HT MUSCLE IMAGE SPECT MULT: CPT

## 2024-10-16 PROCEDURE — 93018 CV STRESS TEST I&R ONLY: CPT

## 2024-10-16 PROCEDURE — 94799 UNLISTED PULMONARY SVC/PX: CPT

## 2024-10-16 PROCEDURE — 83550 IRON BINDING TEST: CPT | Performed by: INTERNAL MEDICINE

## 2024-10-16 PROCEDURE — 83735 ASSAY OF MAGNESIUM: CPT | Performed by: INTERNAL MEDICINE

## 2024-10-16 PROCEDURE — 93017 CV STRESS TEST TRACING ONLY: CPT

## 2024-10-16 PROCEDURE — 76770 US EXAM ABDO BACK WALL COMP: CPT | Performed by: INTERNAL MEDICINE

## 2024-10-16 PROCEDURE — 83970 ASSAY OF PARATHORMONE: CPT | Performed by: INTERNAL MEDICINE

## 2024-10-16 PROCEDURE — 83540 ASSAY OF IRON: CPT | Performed by: INTERNAL MEDICINE

## 2024-10-16 PROCEDURE — 82728 ASSAY OF FERRITIN: CPT | Performed by: INTERNAL MEDICINE

## 2024-10-16 RX ORDER — REGADENOSON 0.08 MG/ML
INJECTION, SOLUTION INTRAVENOUS
Status: COMPLETED
Start: 2024-10-16 | End: 2024-10-16

## 2024-10-16 NOTE — PROGRESS NOTES
[facility] Cardiology Progress Note        Nina Donnelly Patient Status:  Inpatient    10/9/1945 MRN [id number]   [facility] 2NE-A Attending Tristan Rosen MD   Hosp Day # 2 PCP Chiki Caldwell MD     Subjective:  The patient denies  chest pain   LE edema is better  Breathing is stable    Medications:   sodium ferric gluconate  125 mg Intravenous Once    insulin degludec  20 Units Subcutaneous Nightly    insulin aspart  2-10 Units Subcutaneous TID AC and HS    torsemide  40 mg Oral Daily    fluticasone-salmeterol  1 puff Inhalation BID    ipratropium-albuterol  3 mL Nebulization Q6H WA    aspirin  81 mg Oral Daily    atorvastatin  10 mg Oral Nightly    donepezil  10 mg Oral Nightly    DULoxetine  60 mg Oral Daily    escitalopram  5 mg Oral Daily    hydrALAZINE  100 mg Oral TID    spironolactone  25 mg Oral Daily    pregabalin  150 mg Oral Nightly    heparin  5,000 Units Subcutaneous Q8H CHICO       Continuous Infusions:        Allergies:  Allergies[1]      Objective:        Intake/Output:      Intake/Output Summary (Last 24 hours) at 10/16/2024 1439  Last data filed at 10/16/2024 0248  Gross per 24 hour   Intake 360 ml   Output 800 ml   Net -440 ml     Wt Readings from Last 3 Encounters:   10/15/24 193 lb (87.5 kg)   10/11/24 197 lb 1.5 oz (89.4 kg)   24 195 lb 15.8 oz (88.9 kg)       Physical Exam:        Vitals:    10/16/24 0340 10/16/24 0611 10/16/24 0916 10/16/24 1300   BP: (!) 134/27 142/55 129/42    BP Location:  Left arm Left arm    Pulse: 67 73 64    Resp:  20 22    Temp:  98 °F (36.7 °C) 98.1 °F (36.7 °C)    TempSrc:  Oral Oral    SpO2: 96% 94% 90% 94%   Weight:           Temp:  [98 °F (36.7 °C)-98.1 °F (36.7 °C)] 98.1 °F (36.7 °C)  Pulse:  [61-73] 64  Resp:  [16-22] 22  BP: (123-142)/(27-61) 129/42  SpO2:  [90 %-96 %] 94 %      Temp: 98.1 °F (36.7 °C)  Pulse: 64  Resp: 22  BP: 129/42  General:  Appears comfortable  HEENT: No focal deficits.  Neck: No JVD,  carotids 2+ no bruits.  Cardiac: Regular S1S2.  No S3, S4, rub, click.  No murmur.  Lungs: rales at bases  Abdomen: Soft, non-tender.   Extremities: No LE edema.  No clubbing or cyanosis.    Neurologic: Alert and oriented, normal affect.  Skin: Warm and dry.           LABS:      HEM:  Recent Labs   Lab 10/12/24  0910 10/14/24  1212 10/15/24  0624   WBC 7.4 8.6 8.1   HGB 9.7* 9.6* 9.6*   HCT 32.8* 32.3* 31.3*   .0 175.0 182.0       Chem:  Recent Labs   Lab 10/11/24  1620 10/14/24  1212 10/14/24  1907 10/15/24  0624 10/16/24  0608    141  --  141 139   K 4.0 4.6 4.4 4.3 4.7    104  --  104 103   CO2 32.0 33.0*  --  33.0* 32.0   BUN 42* 39*  --  40* 35*   CREATSERUM 1.79* 1.64*  --  1.73* 1.69*   CA 9.6 9.5  --  9.4 9.5   MG  --   --   --  1.9 2.0   PHOS  --   --   --   --  4.9   GLU 63* 202*  --  103* 112*       Recent Labs   Lab 10/14/24  1212 10/15/24  0624 10/16/24  0608   ALT 14 13  --    AST 24 21  --    ALB 4.2 4.1 3.9       No results for input(s): \"PT\", \"PTT\", \"INR\" in the last 168 hours.        No results found for: \"TROP\", \"CKMB\"      Invalid input(s): \"PBNPML\"                       Diagnostics:   Telemetry: SR      EKG, 10/14/2024:  SB, PRWP     CXR, 10/14/2024:  Stable cardiac and mediastinal contours.  Mild diffuse interstitial opacities may reflect mild interstitial pulmonary edema versus viral/atypical pneumonitis.  No associated pleural effusion or pneumothorax.         Lexiscan cardiolyte stress test , 10/16/24:  Preliminary stress test shows  no perfusion defects. Estimated LVEF 74%                          echo, 10/16/24:    Conclusions:     1. Left ventricle: The cavity size was normal. Wall thickness was normal.      Systolic function was normal. The estimated ejection fraction was 65-70%,      by visual assessment. No diagnostic evidence for regional wall motion      abnormalities. Left ventricular diastolic function parameters were      normal.   2. Left atrium: The left  atrial volume was normal.        Impression:     1.  Fatigue, lethargy, somnolence, dyspnea, LE edema, weight gain.  Suggests multiple factors: hypoventilation, COPD with hypoxia and CO2 retention in combination with right heart failure      -     net out = 2.8 liters     2. Diabetes     3. Hypertension      4. CKD (1.6)        Recommend:     1.  Telemetry  2.  Torsemide 40 mg daily  3. Follow renal fct, I/O's, weights   4. BMP in am         Dago Beach MD           [1]   Allergies  Allergen Reactions    Pioglitazone UNKNOWN     Weight Gain    Vancomycin ITCHING

## 2024-10-16 NOTE — PLAN OF CARE
Pt alert and oriented x 4.  Continuous tele monitoring in place.  NSR on the monitor.  Report pain to left abdomen/flank with palpation.  US kidney/bladder and CT abdomen/pelvis completed per md order.  CT negative for hydronephrosis and stone but does demonstrate a left parapelvic cyst.  Results called to renal MD and per renal no new orders needed at this time.  Denies dyspnea and dizziness.  Intermittent nonproductive cough.  O2 at 2L per NC which is her baseline  Stress test completed.  Pt reports having loose to liquid bms x1 week.  BM x2 this shift.  Discussed with hospitalist.  Order for cdiff testing placed and pending. IV ferrlecit x1 ordered and given.  Safety and comfort maintained.  Will continue to monitor.      Problem: Diabetes/Glucose Control  Goal: Glucose maintained within prescribed range  Description: INTERVENTIONS:  - Monitor Blood Glucose as ordered  - Assess for signs and symptoms of hyperglycemia and hypoglycemia  - Administer ordered medications to maintain glucose within target range  - Assess barriers to adequate nutritional intake and initiate nutrition consult as needed  - Instruct patient on self management of diabetes  Outcome: Progressing     Problem: PAIN - ADULT  Goal: Verbalizes/displays adequate comfort level or patient's stated pain goal  Description: INTERVENTIONS:  - Encourage pt to monitor pain and request assistance  - Assess pain using appropriate pain scale  - Administer analgesics based on type and severity of pain and evaluate response  - Implement non-pharmacological measures as appropriate and evaluate response  - Consider cultural and social influences on pain and pain management  - Manage/alleviate anxiety  - Utilize distraction and/or relaxation techniques  - Monitor for opioid side effects  - Notify MD/LIP if interventions unsuccessful or patient reports new pain  - Anticipate increased pain with activity and pre-medicate as appropriate  Outcome: Progressing

## 2024-10-16 NOTE — PLAN OF CARE
Problem: Diabetes/Glucose Control  Goal: Glucose maintained within prescribed range  Description: INTERVENTIONS:  - Monitor Blood Glucose as ordered  - Assess for signs and symptoms of hyperglycemia and hypoglycemia  - Administer ordered medications to maintain glucose within target range  - Assess barriers to adequate nutritional intake and initiate nutrition consult as needed  - Instruct patient on self management of diabetes  Outcome: Progressing     Problem: Patient/Family Goals  Goal: Patient/Family Long Term Goal  Description: Patient's Long Term Goal: discharge home    Interventions:  - follow MD orders  - medications as ordered  - See additional Care Plan goals for specific interventions  Outcome: Progressing  Goal: Patient/Family Short Term Goal  Description: Patient's Short Term Goal: no more leg cramping    Interventions:   - PRN pain medications per order  - check K  - electrolyte replacement per protocol  - See additional Care Plan goals for specific interventions  Outcome: Progressing

## 2024-10-16 NOTE — PAYOR COMM NOTE
--------------  CONTINUED STAY REVIEW  10/15  [place]or[place]ANGELISTA PARKER [facility] [place]:  [id number]  Authorization Number: [id number]    Admit date: 10/14/24  Admit time:  3:21 PM    REVIEW DOCUMENTATION:    10/15    Patient seen and examined.   Sittng in chair, family at bedside  A &O x 4  Afebrile  Doing well on 2-3 L O2  Dec air entry at [employer]        Objective:  Review of Systems:   10 point ROS completed and was negative, except for pertinent positive and negatives stated in subjective.     Vital signs:  Temp:  [97.5 °F (36.4 °C)-98.4 °F (36.9 °C)] 97.6 °F (36.4 °C)  Pulse:  [53-87] 65  Resp:  [16-21] 18  BP: (116-162)/(48-79) 143/55  SpO2:  [88 %-100 %] 91 %     Physical Exam:    General: No acute distress.   HEENT:  EOMI, PERRLA, OP clear  Respiratory: Clear to auscultation bilaterally. No wheezes. No rhonchi.  Cardiovascular: S1, S2. Regular rate and rhythm. No murmurs.  Abdomen: Soft, nontender, nondistended.  Positive bowel sounds. No rebound or guarding.  Extremities: No edema.  Neuro:  Grossly non focal, no motor deficits.          Diagnostic Data:    Labs:        Recent Labs   Lab 10/12/24  0910 10/14/24  1212 10/15/24  0624   WBC 7.4 8.6 8.1   HGB 9.7* 9.6* 9.6*   MCV 57.5* 57.0* 56.3*   .0 175.0 182.0                Recent Labs   Lab 10/11/24  1620 10/14/24  1212 10/14/24  1907 10/15/24  0624   GLU 63* 202*  --  103*   BUN 42* 39*  --  40*   CREATSERUM 1.79* 1.64*  --  1.73*   CA 9.6 9.5  --  9.4   ALB  --  4.2  --  4.1    141  --  141   K 4.0 4.6 4.4 4.3    104  --  104   CO2 32.0 33.0*  --  33.0*   ALKPHO  --  92  --  94   AST  --  24  --  21   ALT  --  14  --  13   BILT  --  0.7  --  0.9   TP  --  6.5  --  6.4      Scheduled Medications    fluticasone-salmeterol  1 puff Inhalation BID    ipratropium-albuterol  3 mL Nebulization Q6H WA    furosemide  40 mg Intravenous BID (Diuretic)            Patient is a 79 year old female with PMH sig for HTN, HLD, CHF, WYATT, here for sob      Impression     -acute on chronic hypoxic RF (on 2L at baseline)  -COPD     -HTN  -HLD  -acute on chronic CHF  -WYATT on CPAP     -CKD 3         Plan     -appear sympoms related to HF - reports about 10 lb wt gain, has LE edema and pulm edema on cxr  -iv diuresis  -repeat echo  -Pulm following >> CT chest ordered  -reports intermittent chest pressure, ? May need ischemic eval, cards consulted  -cont o2 support - on baseline O2  -cont home meds as able  -family requesting nephro assessment >> consult Dr Scott     Further recommendations pending patient's clinical course. [facility] hospitalist to continue to follow patient while in house     Patient and/or patient's family given opportunity to ask questions and note understanding and agreeing with therapeutic plan as outlined      DISPO:  Cont inpt care           10/15 Cardiology    Subjective:  The patient denies  chest pain   LE edema is better  Still dyspneic; notices it during conversation         Impression:     1.  Fatigue, lethargy, somnolence, dyspnea, LE edema, weight gain.  Suggests multiple factors: hypoventilation, COPD with hypoxia and CO2 retention in combination with right heart failure     2. Diabetes     3. Hypertension      4. CKD (1.6)        Recommend:     1.  Telemetry  2. Continue  IV lasix followed by change of po diuretic to torsemide, perhaps in am  3. Follow renal fct, I/O's, weights  4. Echo for  LV, RV fct, PA pressure today        10/15 Pulm  LE swelling is better.    O2 requirement: on 2-3L at rest       Chronic hypoxic and hypercapnic resp failure- due to COPD- home O2 baseline is 2L with is unchanged.  No signs of AECOPD, no PNA on imaging.  Likely has component of HFpEF/volume retention contributing to her dyspnea.  ABG showed well compensated resp acidosis which is unchanged from previous ABG six months ago  - cont with O2 as ordered  - BD nebs prn  - check noncontrast CT chest to further evaluate lung parenchyma given ongoing dyspnea  despite fairly benign results on exhaustive w/u.  COPD- no exac  - recently started on breztri as OP- pt previously intolerant of spiriva  - advair bid while in hospital, BD nebs  - recently treated with doxy; no signs of PNA.  RVP negative, PCT essentially negative in setting of CKD, sputum cx pending; unable to expectorate. Hold on further abx  HFpEF- in setting of CKD and increasing LE edema.  BNP wnl but exam suggestive of mild volume retention- improved with diuresis  - recent VQ scan and LE dopplers neg for PE/DVT making VTE unlikely.  Hold on CTA Chest given underlying CKD and need for diuresis.  - cards consulted for further ischemic w/u- echo reviewed. Mostly unremarkable  WYATT- on CPAP with 2L entrained.  Continue while here  - as OP, consider transitioning to BIPAP to help decrease CO2, if clinically indicated  Proph- subcut hep        10/15 Nephrology    REASON FOR CONSULT:      CKD stage 3b     HISTORY OF PRESENT ILLNESS:      78 yo F with history of CKD stage 3b, HFpEF, DM, HTN, WYATT, HL, COPD on home O2, beta thalassemia, macular degeneration, hospitalization 10/11-10/12 with SOB attributed to acute bronchitis and discharged on doxycycline presented with SOB and leg swelling.    She was started on IV lasix, and swelling has improved. Breathing ok but still feels not at baseline.  She has cramping now in hands and feet as well as L flank pain with taking a deep breath and also with tenderness on palpation.    78 yo F with history of CKD stage 3b, HFpEF, DM, HTN, WYATT, HL, COPD on home O2, beta thalassemia, macular degeneration, hospitalization 10/11-10/12 with SOB attributed to acute bronchitis and discharged on doxycycline presented with SOB and leg swelling. Leg swelling has improved but she continues to feel poorly for which nephrology was consulted.     SOB, leg swelling: attributed to HFpEF  -- s/p IV lasix, now with hand and feet cramping  -- hold PM dose of IV lasix and transition to torsemide 40  mg/d in AM  -- spironolactone 25 mg/d     CKD stage 3b:  -- at baseline 1.6-1.9 mg/dl  -- explained her CKD is unlikely the cause of her symptoms as she remains at her baseline  -- avoid NSAIDs, fleets, contrast     Anemia in CKD:  -- iron stores     MBD:  -- phos, iPTH  -- Vit D at goal 5/2024     HTN:  -- BP acceptable with diuresis + hydralazine     COPD/abn CT chest:  -- per pulmonary     L flank tenderness: likely MSK  -- UA, renal US per family request            MEDICATIONS ADMINISTERED IN LAST 1 DAY:  aspirin DR tab 81 mg       Date Action Dose Route User    10/16/2024 0854 Given 81 mg Oral Barbara Harding RN          atorvastatin (Lipitor) tab 10 mg       Date Action Dose Route User    10/15/2024 2054 Given 10 mg Oral Nessa Shi RN          donepezil (Aricept) tab 10 mg       Date Action Dose Route User    10/15/2024 2054 Given 10 mg Oral Nessa Shi RN          DULoxetine (Cymbalta) DR cap 60 mg       Date Action Dose Route User    10/16/2024 0854 Given 60 mg Oral Barbara Harding RN          escitalopram (Lexapro) tab 5 mg       Date Action Dose Route User    10/16/2024 0854 Given 5 mg Oral Barbara Harding RN          fluticasone-salmeterol (Advair Diskus) 250-50 MCG/ACT inhaler 1 puff       Date Action Dose Route User    10/16/2024 0853 Given 1 puff Inhalation Barbara Harding RN    10/15/2024 2056 Given 1 puff Inhalation Nessa Shi RN          heparin (Porcine) 5000 UNIT/ML injection 5,000 Units       Date Action Dose Route User    10/16/2024 0610 Given 5,000 Units Subcutaneous (Left Lower Abdomen) Nessa Shi RN    10/15/2024 2057 Given 5,000 Units Subcutaneous (Left Lower Abdomen) Nessa Shi RN    10/15/2024 1501 Given 5,000 Units Subcutaneous (Left Lower Abdomen) Mary Werner RN          hydrALAZINE (Apresoline) tab 100 mg       Date Action Dose Route User    10/16/2024 0854 Given 100 mg Oral Barbara Harding, STEPH    10/15/2024 2054 Given 100 mg Oral Jose Guadalupe  STEPH Szymanski    10/15/2024 1501 Given 100 mg Oral Mary Werner RN          insulin aspart (NovoLOG) 100 Units/mL FlexPen 2-10 Units       Date Action Dose Route User    10/15/2024 2104 Given 2 Units Subcutaneous (Right Upper Arm) Nessa Shi RN    10/15/2024 1731 Given 6 Units Subcutaneous (Left Upper Arm) Mary Werner RN          insulin degludec (Tresiba) 100 units/mL flextouch 20 Units       Date Action Dose Route User    10/15/2024 2104 Given 20 Units Subcutaneous (Left Upper Arm) Nessa Shi RN          ipratropium-albuterol (Duoneb) 0.5-2.5 (3) MG/3ML inhalation solution 3 mL       Date Action Dose Route User    10/16/2024 0903 Given 3 mL Nebulization Rufina Faulkner, [facility]    10/15/2024 1934 Given 3 mL Nebulization Lisa Disla, [facility]    10/15/2024 1409 Given 3 mL Nebulization Vicky Condon, [facility]          pregabalin (Lyrica) cap 150 mg       Date Action Dose Route User    10/15/2024 2054 Given 150 mg Oral Nessa Shi RN          regadenoson (Lexiscan) 0.4 mg/5mL injection       Date Action Dose Route User    10/16/2024 1100 Given 0.4 mg Intravenous (Left Antecubital) Sravanthi Brennan RN          spironolactone (Aldactone) tab 25 mg       Date Action Dose Route User    10/16/2024 0854 Given 25 mg Oral Barbara Harding RN          torsemide (Demadex) tab 40 mg       Date Action Dose Route User    10/16/2024 0854 Given 40 mg Oral Barbara Harding RN            Vitals (last day)       Date/Time Temp Pulse Resp BP SpO2 Weight O2 Device O2 Flow Rate (L/min) Who    10/16/24 0916 98.1 °F (36.7 °C) 64 22 129/42 90 % -- Nasal cannula 2 L/min PG    10/16/24 0611 98 °F (36.7 °C) 73 20 142/55 94 % -- Nasal cannula 2 L/min     10/16/24 0340 -- 67 -- 134/27 96 % -- -- --     10/16/24 0248 -- 61 -- -- 92 % -- -- --     10/16/24 0005 -- 68 18 135/50 96 % -- CPAP --     10/15/24 1934 -- -- -- -- 93 % -- Nasal cannula 2 L/min     10/15/24 1927 98.1 °F (36.7 °C) 65 20 123/50 95 % --  Nasal cannula 2 L/min ZM    10/15/24 1700 98.1 °F (36.7 °C) 70 16 134/61 93 % -- Nasal cannula 2 L/min LC    10/15/24 1230 98.1 °F (36.7 °C) 61 -- 127/47 94 % -- Nasal cannula 2 L/min LC    10/15/24 0850 -- -- -- -- -- -- Nasal cannula 2 L/min ML    10/15/24 0815 97.6 °F (36.4 °C) 61 20 122/54 90 % -- Nasal cannula 2 L/min LC    10/15/24 0551 97.6 °F (36.4 °C) 65 18 143/55 -- -- Nasal cannula 2 L/min NS    10/15/24 0551 -- -- -- -- 91 % 193 lb (87.5 kg) -- -- ZM    10/15/24 0500 -- 73 -- -- 93 % -- Nasal cannula 2 L/min MM    10/15/24 0057 -- -- -- -- -- -- CPAP -- MM    10/15/24 0057 -- 84 -- -- 95 % -- -- -- ZM    10/15/24 0048 -- 68 18 -- 94 % -- -- -- TT          CIWA Scores (since admission)       None

## 2024-10-16 NOTE — PROGRESS NOTES
[facility] Pul[facility], Critical Care and Sleep    Nina Donnelly Patient Status:  Inpatient    10/9/1945 MRN [id number]   [facility] 2NE-A Attending Tristan Rosen MD   Hosp Day # 2 PCP Chiki Caldwell MD     Date of Admission: 10/14/2024 11:48 AM    Admission Diagnosis: CO2 retention [E87.29]  Bilateral lower extremity edema [R60.0]  Dyspnea, unspecified type [R06.00]    S: Feeling OK, better. Interestedin bipap.     Scheduled Medications:     insulin degludec  20 Units Subcutaneous Nightly    insulin aspart  2-10 Units Subcutaneous TID AC and HS    torsemide  40 mg Oral Daily    fluticasone-salmeterol  1 puff Inhalation BID    ipratropium-albuterol  3 mL Nebulization Q6H WA    aspirin  81 mg Oral Daily    atorvastatin  10 mg Oral Nightly    donepezil  10 mg Oral Nightly    DULoxetine  60 mg Oral Daily    escitalopram  5 mg Oral Daily    hydrALAZINE  100 mg Oral TID    spironolactone  25 mg Oral Daily    pregabalin  150 mg Oral Nightly    heparin  5,000 Units Subcutaneous Q8H CHICO       Infusing Medications:      PRN Medications:    acetaminophen    glucose **OR** glucose **OR** glucose-vitamin C **OR** dextrose **OR** glucose **OR** glucose **OR** glucose-vitamin C    OBJECTIVE:  /49 (BP Location: Left arm)   Pulse 68   Temp 98.1 °F (36.7 °C) (Oral)   Resp 22   Wt 193 lb (87.5 kg)   SpO2 93%   BMI 36.49 kg/m²    Temp (24hrs), Av.1 °F (36.7 °C), Min:98 °F (36.7 °C), Max:98.1 °F (36.7 °C)         Wt Readings from Last 3 Encounters:   10/15/24 193 lb (87.5 kg)   10/11/24 197 lb 1.5 oz (89.4 kg)   24 195 lb 15.8 oz (88.9 kg)       Intake/Output Summary (Last 24 hours) at 10/16/2024 1636  Last data filed at 10/16/2024 1600  Gross per 24 hour   Intake 1320 ml   Output 800 ml   Net 520 ml     O2: 2 LNC  General: NAD.   Neuro: Alert, no focal deficits.    HEENT: PERRL  Neck : No LAD  CV: RRR, nl S1, S2, no S4, S3 or murmur.   Lungs: Coarse bilaterally.   Abd: Nontender, non  distended.   Ext: No edema.   Skin: No rashes.     Recent Labs   Lab 10/12/24  0910 10/14/24  1212 10/15/24  0624   WBC 7.4 8.6 8.1   HGB 9.7* 9.6* 9.6*   HCT 32.8* 32.3* 31.3*   .0 175.0 182.0     Recent Labs   Lab 10/14/24  1212 10/14/24  1907 10/15/24  0624 10/16/24  0608   *  --  103* 112*   BUN 39*  --  40* 35*   CREATSERUM 1.64*  --  1.73* 1.69*   CA 9.5  --  9.4 9.5     --  141 139   K 4.6 4.4 4.3 4.7     --  104 103   CO2 33.0*  --  33.0* 32.0   AST 24  --  21  --    ALT 14  --  13  --    ALB 4.2  --  4.1 3.9     No results for input(s): \"INR\", \"PTT\" in the last 168 hours.  Recent Labs   Lab 10/14/24  1338   ABGPHT 7.41   PPOINK7E 54*   HVRBB9Y 81   ABGHCO3 31.3*   SITE Right Radial   DEV Nasal cannula   THGB 10.4*       COVID-19 Lab Results    COVID-19  Lab Results   Component Value Date    COVID19 Not Detected 10/14/2024    COVID19 Not Detected 10/14/2024    COVID19 Not Detected 10/11/2024       Pro-Calcitonin  Recent Labs   Lab 10/14/24  1212   PCT 0.08*       Cardiac  Recent Labs   Lab 10/14/24  1212   PBNP 239       Creatinine Kinase  No results for input(s): \"CK\" in the last 168 hours.    Inflammatory Markers  Recent Labs   Lab 10/16/24  0608   LELA 32*       Imaging:   CT chest 10/15:  1. Bilateral centrilobular emphysema with apical predominance, similar to prior.   2. Minimal ground-glass opacity within the right lung apex, new from prior, which may be infectious/inflammatory.   3. Minimal interval enlargement of a right lower paratracheal lymph node, possibly reactive.  Attention on follow-up is recommended.   Minimal basilar atelectasis by my read.   Please see above for further details.       Chest images personally reviewed.     Assessment/Plan   Chronic hypoxic and hypercapnic resp failure- due to COPD- home O2 baseline is 2L with is unchanged.  No signs of AECOPD, no PNA on imaging.  Likely has component of HFpEF/volume retention contributing to her dyspnea.  ABG  showed well compensated resp acidosis which is unchanged from previous ABG six months ago  - cont with O2 as ordered  - BD nebs prn  - noncontrast CT chest with expected centrilobular emphysema and atelectasis. Minimal ground glass would not be sufficient typically to explain sx/ F/u CT in 3 months to monitor.   COPD- no exac  - recently started on breztri as OP- pt previously intolerant of spiriva  - advair bid while in hospital, BD nebs  - recently treated with doxy; no signs of PNA.  RVP negative, PCT essentially negative in setting of CKD, sputum cx pending; unable to expectorate. Hold on further abx  - ? If would be candidate for zephyr valves in the future. Pt is interested.   HFpEF- in setting of CKD and increasing LE edema.  BNP wnl but exam suggestive of mild volume retention- improved with diuresis  - recent VQ scan and LE dopplers neg for PE/DVT making VTE unlikely.  Hold on CTA Chest given underlying CKD and need for diuresis.  - cards consulted for further ischemic w/u- echo reviewed. Mostly unremarkable  WYATT- on CPAP with 2L entrained.  Continue while here  - as OP, consider transitioning to BIPAP to help decrease CO2, if clinically indicated  With overlap syndrome would be indicated.   Proph- subcut hep  Dispo - Full code  -OK for d/c from pulm standpoint. Completing w/u with other services.At d/c can f/u with Dr. Amador in 1-2 weeks. Will follow as needed please call with questions.     My best regards,         Paulo Lares MD  [facility] Pulmonary, Critical Care and Sleep Medicine

## 2024-10-16 NOTE — PROGRESS NOTES
[facility]   part of [facility] Hospitalist Progress Note     Nina Donnelly Patient Status:  Inpatient    10/9/1945 MRN [id number]   Location [facility] 2NE-A Attending Tristan Rosen MD   Hosp Day # 2 PCP Chiki Caldwell MD     CC:       Chief Complaint   Patient presents with    Difficulty Breathing       Subjective:     Patient seen and examined.   Sittng in chair, family at bedside  A &O x 4  Afebrile  Doing well on 2-3 L O2  Dec air entry at [employer]  Had stress test today  CT ordered as well    Objective:    Review of Systems:   10 point ROS completed and was negative, except for pertinent positive and negatives stated in subjective.    Vital signs:  Temp:  [97.6 °F (36.4 °C)-98.1 °F (36.7 °C)] 98 °F (36.7 °C)  Pulse:  [61-73] 73  Resp:  [16-20] 20  BP: (122-142)/(27-61) 142/55  SpO2:  [90 %-96 %] 94 %    Physical Exam:    General: No acute distress.   HEENT:  EOMI, PERRLA, OP clear  Respiratory: Clear to auscultation bilaterally. No wheezes. No rhonchi.  Cardiovascular: S1, S2. Regular rate and rhythm. No murmurs.  Abdomen: Soft, nontender, nondistended.  Positive bowel sounds. No rebound or guarding.  Extremities: No edema.  Neuro:  Grossly non focal, no motor deficits.        Diagnostic Data:    Labs:  Recent Labs   Lab 10/12/24  0910 10/14/24  1212 10/15/24  0624   WBC 7.4 8.6 8.1   HGB 9.7* 9.6* 9.6*   MCV 57.5* 57.0* 56.3*   .0 175.0 182.0       Recent Labs   Lab 10/14/24  1212 10/14/24  1907 10/15/24  0624 10/16/24  0608   *  --  103* 112*   BUN 39*  --  40* 35*   CREATSERUM 1.64*  --  1.73* 1.69*   CA 9.5  --  9.4 9.5   ALB 4.2  --  4.1 3.9     --  141 139   K 4.6 4.4 4.3 4.7     --  104 103   CO2 33.0*  --  33.0* 32.0   ALKPHO 92  --  94  --    AST 24  --  21  --    ALT 14  --  13  --    BILT 0.7  --  0.9  --    TP 6.5  --  6.4  --        Estimated Creatinine Clearance: 21 mL/min (A) (based on SCr of 1.69 mg/dL (H)).    No  results for input(s): \"PTP\", \"INR\" in the last 168 hours.         COVID-19 Lab Results    COVID-19  Lab Results   Component Value Date    COVID19 Not Detected 10/14/2024    COVID19 Not Detected 10/14/2024    COVID19 Not Detected 10/11/2024       Pro-Calcitonin  Recent Labs   Lab 10/14/24  1212   PCT 0.08*       Cardiac  Recent Labs   Lab 10/14/24  1212   PBNP 239       Creatinine Kinase  No results for input(s): \"CK\" in the last 168 hours.    Inflammatory Markers  Recent Labs   Lab 10/16/24  0608   LELA 32*       Imaging: Imaging data reviewed in Epic.    Medications:    insulin degludec  20 Units Subcutaneous Nightly    insulin aspart  2-10 Units Subcutaneous TID AC and HS    torsemide  40 mg Oral Daily    fluticasone-salmeterol  1 puff Inhalation BID    ipratropium-albuterol  3 mL Nebulization Q6H WA    aspirin  81 mg Oral Daily    atorvastatin  10 mg Oral Nightly    donepezil  10 mg Oral Nightly    DULoxetine  60 mg Oral Daily    escitalopram  5 mg Oral Daily    hydrALAZINE  100 mg Oral TID    spironolactone  25 mg Oral Daily    pregabalin  150 mg Oral Nightly    heparin  5,000 Units Subcutaneous Q8H CHICO       Assessment & Plan:    Patient is a 79 year old female with PMH sig for HTN, HLD, CHF, WYATT, here for sob     Impression     -acute on chronic hypoxic RF (on 2L at baseline)  -COPD     -HTN  -HLD  -acute on chronic CHF  -WYATT on CPAP     -CKD 3         Plan     -appear sympoms related to HF - reports about 10 lb wt gain, has LE edema and pulm edema on cxr  -iv diuresis  -repeat echo  -Pulm following >> apprec recs >> cleared for dc by pulm  -reports intermittent chest pressure >> stress test prelim shows no perfusion defects  -cont o2 support - on baseline O2  -cont home meds as able  -nephro consulted >> renal US NEG >> CT ordered       DISPO:  Dc planning in process  Cards and pulm and nephro following      Further recommendations pending patient's clinical course. [facility] hospitalist to continue to follow  patient while in house     Patient and/or patient's family given opportunity to ask questions and note understanding and agreeing with therapeutic plan as outlined      Lilly Montiel Hospitalist  Pager [phone]  Answering Service number: [phone]       **Certification      PHYSICIAN Certification of Need for Inpatient Hospitalization - Initial Certification    Patient will require inpatient services that will reasonably be expected to span two midnight's based on the clinical documentation in H+P.   Based on patients current state of illness, I anticipate that, after discharge, patient will require TBD.

## 2024-10-16 NOTE — PROGRESS NOTES
[facility]   part of [facility]    Nephrology Progress Note    Nina Hernandeztman Attending:  Tristan Rosen MD       SUBJECTIVE:     Underwent renal US and stress test today.  Found to have iron deficiency anemia. She also reports history of thalassemia.  Less breathless today.  No leg swelling.  States she is making urine with the torsemide.    PHYSICAL EXAM:     Vital Signs: /42 (BP Location: Left arm)   Pulse 64   Temp 98.1 °F (36.7 °C) (Oral)   Resp 22   Wt 193 lb (87.5 kg)   SpO2 90%   BMI 36.49 kg/m²   Temp (24hrs), Av.1 °F (36.7 °C), Min:98 °F (36.7 °C), Max:98.1 °F (36.7 °C)       Intake/Output Summary (Last 24 hours) at 10/16/2024 1205  Last data filed at 10/16/2024 0248  Gross per 24 hour   Intake 360 ml   Output 1450 ml   Net -1090 ml     Wt Readings from Last 3 Encounters:   10/15/24 193 lb (87.5 kg)   10/11/24 197 lb 1.5 oz (89.4 kg)   24 195 lb 15.8 oz (88.9 kg)       General: pleasant, obese  Skin: no visible rashes  HEENT: NCAT  Cardiac: Regular rate and rhythm, no murmur/gallop or rub  Lungs: poor air movement  Abdomen: Soft, NTND  Extremities: warm, well perfused, no leg edema  Neurologic/Psych: mentating well, no asterixis       LABORATORY DATA:     Lab Results   Component Value Date     (H) 10/16/2024    BUN 35 (H) 10/16/2024    BUNCREA 18.0 03/15/2022    CREATSERUM 1.69 (H) 10/16/2024    ANIONGAP 4 10/16/2024    GFRNAA 35 (L) 2021    GFRAA 40 (L) 2021    CA 9.5 10/16/2024    OSMOCALC 297 (H) 10/16/2024    ALKPHO 94 10/15/2024    AST 21 10/15/2024    ALT 13 10/15/2024    BILT 0.9 10/15/2024    TP 6.4 10/15/2024    ALB 3.9 10/16/2024    GLOBULIN 2.3 10/15/2024     10/16/2024    K 4.7 10/16/2024     10/16/2024    CO2 32.0 10/16/2024     Lab Results   Component Value Date    WBC 8.1 10/15/2024    RBC 5.56 (H) 10/15/2024    HGB 9.6 (L) 10/15/2024    HCT 31.3 (L) 10/15/2024    .0 10/15/2024    MCV 56.3 (L) 10/15/2024    MCH 17.3  (L) 10/15/2024    MCHC 30.7 (L) 10/15/2024    RDW 19.8 10/15/2024    NEPRELIM 5.91 10/14/2024    NEUTABS 4.34 04/07/2020    LYMPHABS 2.28 04/07/2020    EOSABS 0.14 04/07/2020    BASABS 0.07 04/07/2020    NEPERCENT 68.9 10/14/2024    LYPERCENT 19.3 10/14/2024    MOPERCENT 7.8 10/14/2024    EOPERCENT 3.1 10/14/2024    BAPERCENT 0.6 10/14/2024    NE 5.91 10/14/2024    LYMABS 1.66 10/14/2024    MOABSO 0.67 10/14/2024    EOABSO 0.27 10/14/2024    BAABSO 0.05 10/14/2024     Lab Results   Component Value Date    MALBP <1.2 03/15/2022    CREUR 45.53 03/15/2022     Lab Results   Component Value Date    COLORUR Light-Yellow 10/15/2024    CLARITY Clear 10/15/2024    SPECGRAVITY 1.011 10/15/2024    GLUUR Normal 10/15/2024    BILUR Negative 10/15/2024    KETUR Negative 10/15/2024    BLOODURINE Negative 10/15/2024    PHURINE 5.5 10/15/2024    PROUR Negative 10/15/2024    UROBILINOGEN Normal 10/15/2024    NITRITE Negative 10/15/2024    LEUUR Negative 10/15/2024    NMIC Microscopic not indicated 10/15/2024         IMAGING:     Reviewed.    MEDICATIONS:       Current Facility-Administered Medications   Medication Dose Route Frequency    sodium ferric gluconate (Ferrlecit) 125 mg in sodium chloride 0.9% 100mL IVPB premix  125 mg Intravenous Once    insulin degludec (Tresiba) 100 units/mL flextouch 20 Units  20 Units Subcutaneous Nightly    insulin aspart (NovoLOG) 100 Units/mL FlexPen 2-10 Units  2-10 Units Subcutaneous TID AC and HS    torsemide (Demadex) tab 40 mg  40 mg Oral Daily    fluticasone-salmeterol (Advair Diskus) 250-50 MCG/ACT inhaler 1 puff  1 puff Inhalation BID    ipratropium-albuterol (Duoneb) 0.5-2.5 (3) MG/3ML inhalation solution 3 mL  3 mL Nebulization Q6H WA    aspirin DR tab 81 mg  81 mg Oral Daily    atorvastatin (Lipitor) tab 10 mg  10 mg Oral Nightly    donepezil (Aricept) tab 10 mg  10 mg Oral Nightly    DULoxetine (Cymbalta) DR cap 60 mg  60 mg Oral Daily    escitalopram (Lexapro) tab 5 mg  5 mg Oral Daily     hydrALAZINE (Apresoline) tab 100 mg  100 mg Oral TID    spironolactone (Aldactone) tab 25 mg  25 mg Oral Daily    pregabalin (Lyrica) cap 150 mg  150 mg Oral Nightly    heparin (Porcine) 5000 UNIT/ML injection 5,000 Units  5,000 Units Subcutaneous Q8H CHICO    acetaminophen (Tylenol Extra Strength) tab 500 mg  500 mg Oral Q4H PRN    glucose (Dex4) 15 GM/59ML oral liquid 15 g  15 g Oral Q15 Min PRN    Or    glucose (Glutose) 40% oral gel 15 g  15 g Oral Q15 Min PRN    Or    glucose-vitamin C (Dex-4) chewable tab 4 tablet  4 tablet Oral Q15 Min PRN    Or    dextrose 50% injection 50 mL  50 mL Intravenous Q15 Min PRN    Or    glucose (Dex4) 15 GM/59ML oral liquid 30 g  30 g Oral Q15 Min PRN    Or    glucose (Glutose) 40% oral gel 30 g  30 g Oral Q15 Min PRN    Or    glucose-vitamin C (Dex-4) chewable tab 8 tablet  8 tablet Oral Q15 Min PRN       ASSESSMENT/PLAN:     80 yo F with history of CKD stage 3b, HFpEF, DM, HTN, WYATT, HL, COPD on home O2, beta thalassemia, macular degeneration, hospitalization 10/11-10/12 with SOB attributed to acute bronchitis and discharged on doxycycline presented with SOB and leg swelling. Leg swelling has improved but she continued to feel poorly for which nephrology was consulted.     SOB, leg swelling: attributed to HFpEF  -- torsemide 40 mg/d  -- spironolactone 25 mg/d     CKD stage 3b:  -- at baseline 1.6-1.9 mg/dl  -- explained her CKD is unlikely the cause of her symptoms as she remains at her baseline  -- avoid NSAIDs, fleets, contrast     Anemia in CKD:  -- iron stores show low ferritin and tsat  -- ferrlicit x 1  -- pt also self reports history of thalassemia; recommend heme follow up outpatient     Secondary hyperparathyroidism:  -- phos at goal  -- Vit D at goal 5/2024  -- no indication for calcitriol     HTN:  -- BP acceptable with diuresis + hydralazine     COPD/abn CT chest:  -- per pulmonary     L flank tenderness: likely MSK  -- UA normal  -- renal US with mild prominence of  L renal pelvis; hydronephrosis not excluded -> CT ordered for further characterization, pending results     Discussed with RN.  Will follow.         Thank you for allowing me to participate in the care of this patient. Please do not hesitate to call with questions or concerns.        Shandra Scott MD  [facility]  [address] [place] [id number]    10/16/2024  12:05 PM

## 2024-10-17 ENCOUNTER — APPOINTMENT (OUTPATIENT)
Dept: CARDIAC REHAB | Facility: HOSPITAL | Age: 79
End: 2024-10-17
Attending: INTERNAL MEDICINE
Payer: MEDICARE

## 2024-10-17 VITALS
OXYGEN SATURATION: 93 % | TEMPERATURE: 97 F | DIASTOLIC BLOOD PRESSURE: 56 MMHG | SYSTOLIC BLOOD PRESSURE: 133 MMHG | RESPIRATION RATE: 18 BRPM | WEIGHT: 193.31 LBS | BODY MASS INDEX: 37 KG/M2 | HEART RATE: 77 BPM

## 2024-10-17 LAB
ALBUMIN SERPL-MCNC: 3.9 G/DL (ref 3.2–4.8)
ANION GAP SERPL CALC-SCNC: 2 MMOL/L (ref 0–18)
BUN BLD-MCNC: 39 MG/DL (ref 9–23)
CALCIUM BLD-MCNC: 9.5 MG/DL (ref 8.7–10.4)
CHLORIDE SERPL-SCNC: 104 MMOL/L (ref 98–112)
CO2 SERPL-SCNC: 33 MMOL/L (ref 21–32)
CREAT BLD-MCNC: 1.63 MG/DL
EGFRCR SERPLBLD CKD-EPI 2021: 32 ML/MIN/1.73M2 (ref 60–?)
GLUCOSE BLD-MCNC: 106 MG/DL (ref 70–99)
GLUCOSE BLD-MCNC: 109 MG/DL (ref 70–99)
GLUCOSE BLD-MCNC: 273 MG/DL (ref 70–99)
MAGNESIUM SERPL-MCNC: 2.1 MG/DL (ref 1.6–2.6)
OSMOLALITY SERPL CALC.SUM OF ELEC: 298 MOSM/KG (ref 275–295)
PHOSPHATE SERPL-MCNC: 5.1 MG/DL (ref 2.4–5.1)
POTASSIUM SERPL-SCNC: 4.3 MMOL/L (ref 3.5–5.1)
SODIUM SERPL-SCNC: 139 MMOL/L (ref 136–145)

## 2024-10-17 PROCEDURE — 83735 ASSAY OF MAGNESIUM: CPT | Performed by: INTERNAL MEDICINE

## 2024-10-17 PROCEDURE — 80069 RENAL FUNCTION PANEL: CPT | Performed by: INTERNAL MEDICINE

## 2024-10-17 PROCEDURE — 94640 AIRWAY INHALATION TREATMENT: CPT

## 2024-10-17 PROCEDURE — 82962 GLUCOSE BLOOD TEST: CPT

## 2024-10-17 PROCEDURE — 87493 C DIFF AMPLIFIED PROBE: CPT | Performed by: INTERNAL MEDICINE

## 2024-10-17 RX ORDER — TORSEMIDE 20 MG/1
40 TABLET ORAL DAILY
Qty: 180 TABLET | Refills: 1 | Status: SHIPPED | OUTPATIENT
Start: 2024-10-18

## 2024-10-17 NOTE — PAYOR COMM NOTE
--------------  CONTINUED STAY REVIEW    Payor: EVANGELISTA PARKER O  Subscriber #:  [id number]  Authorization Number: [id number]    Admit date: 10/14/24  Admit time:  3:21 PM    REVIEW DOCUMENTATION:      10/16 IM    CC:         Chief Complaint   Patient presents with    Difficulty Breathing            Subjective:  Patient seen and examined.   Sittng in chair, family at bedside  A &O x 4  Afebrile  Doing well on 2-3 L O2  Dec air entry at [employer]  Had stress test today  CT ordered as well        Objective:  Review of Systems:   10 point ROS completed and was negative, except for pertinent positive and negatives stated in subjective.     Vital signs:  Temp:  [97.6 °F (36.4 °C)-98.1 °F (36.7 °C)] 98 °F (36.7 °C)  Pulse:  [61-73] 73  Resp:  [16-20] 20  BP: (122-142)/(27-61) 142/55  SpO2:  [90 %-96 %] 94 %     Physical Exam:    General: No acute distress.   HEENT:  EOMI, PERRLA, OP clear  Respiratory: Clear to auscultation bilaterally. No wheezes. No rhonchi.  Cardiovascular: S1, S2. Regular rate and rhythm. No murmurs.  Abdomen: Soft, nontender, nondistended.  Positive bowel sounds. No rebound or guarding.  Extremities: No edema.  Neuro:  Grossly non focal, no motor deficits.          Diagnostic Data:    Labs:        Recent Labs   Lab 10/12/24  0910 10/14/24  1212 10/15/24  0624   WBC 7.4 8.6 8.1   HGB 9.7* 9.6* 9.6*   MCV 57.5* 57.0* 56.3*   .0 175.0 182.0                Recent Labs   Lab 10/14/24  1212 10/14/24  1907 10/15/24  0624 10/16/24  0608   *  --  103* 112*   BUN 39*  --  40* 35*   CREATSERUM 1.64*  --  1.73* 1.69*   CA 9.5  --  9.4 9.5   ALB 4.2  --  4.1 3.9     --  141 139   K 4.6 4.4 4.3 4.7     --  104 103   CO2 33.0*  --  33.0* 32.0   ALKPHO 92  --  94  --    AST 24  --  21  --    ALT 14  --  13  --    BILT 0.7  --  0.9  --    TP 6.5  --  6.4  --          Estimated Creatinine Clearance: 21 mL/min (A) (based on SCr of 1.69 mg/dL (H)).     No results for input(s): \"PTP\", \"INR\" in the  last 168 hours.        COVID-19 Lab Results     COVID-19        Lab Results   Component Value Date     COVID19 Not Detected 10/14/2024     COVID19 Not Detected 10/14/2024     COVID19 Not Detected 10/11/2024         Pro-Calcitonin      Recent Labs   Lab 10/14/24  1212   PCT 0.08*         Cardiac      Recent Labs   Lab 10/14/24  1212   PBNP 239         Creatinine Kinase  No results for input(s): \"CK\" in the last 168 hours.     Inflammatory Markers      Recent Labs   Lab 10/16/24  0608   LELA 32*         Imaging: Imaging data reviewed in Epic.     Medications:   Scheduled Medications    insulin degludec  20 Units Subcutaneous Nightly    insulin aspart  2-10 Units Subcutaneous TID AC and HS    torsemide  40 mg Oral Daily    fluticasone-salmeterol  1 puff Inhalation BID    ipratropium-albuterol  3 mL Nebulization Q6H WA    aspirin  81 mg Oral Daily    atorvastatin  10 mg Oral Nightly    donepezil  10 mg Oral Nightly    DULoxetine  60 mg Oral Daily    escitalopram  5 mg Oral Daily    hydrALAZINE  100 mg Oral TID    spironolactone  25 mg Oral Daily    pregabalin  150 mg Oral Nightly    heparin  5,000 Units Subcutaneous Q8H CHICO               Assessment & Plan:  Patient is a 79 year old female with PMH sig for HTN, HLD, CHF, WYATT, here for sob     Impression     -acute on chronic hypoxic RF (on 2L at baseline)  -COPD     -HTN  -HLD  -acute on chronic CHF  -WYATT on CPAP     -CKD 3         Plan     -appear sympoms related to HF - reports about 10 lb wt gain, has LE edema and pulm edema on cxr  -iv diuresis  -repeat echo  -Pulm following >> apprec recs >> cleared for dc by pulm  -reports intermittent chest pressure >> stress test prelim shows no perfusion defects  -cont o2 support - on baseline O2  -cont home meds as able  -nephro consulted >> renal US NEG >> CT ordered        DISPO:  Dc planning in process  Cards and pulm and nephro following    10/16 Pulmonary    Admission Diagnosis: CO2 retention [E87.29]  Bilateral lower  extremity edema [R60.0]  Dyspnea, unspecified type [R06.00]    S: Feeling OK, better. Interestedin bipap.     Scheduled Medications:     Scheduled Medications    insulin degludec  20 Units Subcutaneous Nightly    insulin aspart  2-10 Units Subcutaneous TID AC and HS    torsemide  40 mg Oral Daily    fluticasone-salmeterol  1 puff Inhalation BID    ipratropium-albuterol  3 mL Nebulization Q6H WA    aspirin  81 mg Oral Daily    atorvastatin  10 mg Oral Nightly    donepezil  10 mg Oral Nightly    DULoxetine  60 mg Oral Daily    escitalopram  5 mg Oral Daily    hydrALAZINE  100 mg Oral TID    spironolactone  25 mg Oral Daily    pregabalin  150 mg Oral Nightly    heparin  5,000 Units Subcutaneous Q8H CHICO          Infusing Medications:  Medication Infusions          PRN Medications:    acetaminophen    glucose **OR** glucose **OR** glucose-vitamin C **OR** dextrose **OR** glucose **OR** glucose **OR** glucose-vitamin C    OBJECTIVE:  /49 (BP Location: Left arm)   Pulse 68   Temp 98.1 °F (36.7 °C) (Oral)   Resp 22   Wt 193 lb (87.5 kg)   SpO2 93%   BMI 36.49 kg/m²    Temp (24hrs), Av.1 °F (36.7 °C), Min:98 °F (36.7 °C), Max:98.1 °F (36.7 °C)              Wt Readings from Last 3 Encounters:   10/15/24 193 lb (87.5 kg)   10/11/24 197 lb 1.5 oz (89.4 kg)   24 195 lb 15.8 oz (88.9 kg)        Intake/Output Summary (Last 24 hours) at 10/16/2024 1636  Last data filed at 10/16/2024 1600      Gross per 24 hour   Intake 1320 ml   Output 800 ml   Net 520 ml      O2: 2 LNC  General: NAD.   Neuro: Alert, no focal deficits.    HEENT: PERRL  Neck : No LAD  CV: RRR, nl S1, S2, no S4, S3 or murmur.   Lungs: Coarse bilaterally.   Abd: Nontender, non distended.   Ext: No edema.   Skin: No rashes.            Recent Labs   Lab 10/12/24  0910 10/14/24  1212 10/15/24  0624   WBC 7.4 8.6 8.1   HGB 9.7* 9.6* 9.6*   HCT 32.8* 32.3* 31.3*   .0 175.0 182.0             Recent Labs   Lab 10/14/24  1212 10/14/24  1900  10/15/24  0624 10/16/24  0608   *  --  103* 112*   BUN 39*  --  40* 35*   CREATSERUM 1.64*  --  1.73* 1.69*   CA 9.5  --  9.4 9.5     --  141 139   K 4.6 4.4 4.3 4.7     --  104 103   CO2 33.0*  --  33.0* 32.0   AST 24  --  21  --    ALT 14  --  13  --    ALB 4.2  --  4.1 3.9      No results for input(s): \"INR\", \"PTT\" in the last 168 hours.      Recent Labs   Lab 10/14/24  1338   ABGPHT 7.41   KVTPBI4C 54*   CBBMG6F 81   ABGHCO3 31.3*   SITE Right Radial   DEV Nasal cannula   THGB 10.4*       COVID-19 Lab Results    COVID-19        Lab Results   Component Value Date     COVID19 Not Detected 10/14/2024     COVID19 Not Detected 10/14/2024     COVID19 Not Detected 10/11/2024         Pro-Calcitonin      Recent Labs   Lab 10/14/24  1212   PCT 0.08*         Cardiac      Recent Labs   Lab 10/14/24  1212   PBNP 239         Creatinine Kinase  No results for input(s): \"CK\" in the last 168 hours.     Inflammatory Markers      Recent Labs   Lab 10/16/24  0608   LELA 32*         Imaging:   CT chest 10/15:  1. Bilateral centrilobular emphysema with apical predominance, similar to prior.   2. Minimal ground-glass opacity within the right lung apex, new from prior, which may be infectious/inflammatory.   3. Minimal interval enlargement of a right lower paratracheal lymph node, possibly reactive.  Attention on follow-up is recommended.   Minimal basilar atelectasis by my read.   Please see above for further details.        Chest images personally reviewed.      Assessment/Plan   Chronic hypoxic and hypercapnic resp failure- due to COPD- home O2 baseline is 2L with is unchanged.  No signs of AECOPD, no PNA on imaging.  Likely has component of HFpEF/volume retention contributing to her dyspnea.  ABG showed well compensated resp acidosis which is unchanged from previous ABG six months ago  - cont with O2 as ordered  - BD nebs prn  - noncontrast CT chest with expected centrilobular emphysema and atelectasis. Minimal  ground glass would not be sufficient typically to explain sx/ F/u CT in 3 months to monitor.   COPD- no exac  - recently started on breztri as OP- pt previously intolerant of spiriva  - advair bid while in hospital, BD nebs  - recently treated with doxy; no signs of PNA.  RVP negative, PCT essentially negative in setting of CKD, sputum cx pending; unable to expectorate. Hold on further abx  - ? If would be candidate for zephyr valves in the future. Pt is interested.   HFpEF- in setting of CKD and increasing LE edema.  BNP wnl but exam suggestive of mild volume retention- improved with diuresis  - recent VQ scan and LE dopplers neg for PE/DVT making VTE unlikely.  Hold on CTA Chest given underlying CKD and need for diuresis.  - cards consulted for further ischemic w/u- echo reviewed. Mostly unremarkable  WYATT- on CPAP with 2L entrained.  Continue while here  - as OP, consider transitioning to BIPAP to help decrease CO2, if clinically indicated  With overlap syndrome would be indicated.   Proph- subcut hep              MEDICATIONS ADMINISTERED IN LAST 1 DAY:  acetaminophen (Tylenol Extra Strength) tab 500 mg       Date Action Dose Route User    10/16/2024 1718 Given 500 mg Oral Barbara Harding RN          aspirin DR tab 81 mg       Date Action Dose Route User    10/17/2024 0845 Given 81 mg Oral Barbara Harding RN          atorvastatin (Lipitor) tab 10 mg       Date Action Dose Route User    10/16/2024 2123 Given 10 mg Oral Karen Alejo RN          donepezil (Aricept) tab 10 mg       Date Action Dose Route User    10/16/2024 2123 Given 10 mg Oral Karen Alejo RN          DULoxetine (Cymbalta) DR cap 60 mg       Date Action Dose Route User    10/17/2024 0845 Given 60 mg Oral Barbara Harding RN          escitalopram (Lexapro) tab 5 mg       Date Action Dose Route User    10/17/2024 0845 Given 5 mg Oral Barbara Harding RN          fluticasone-salmeterol (Advair Diskus) 250-50 MCG/ACT inhaler 1 puff        Date Action Dose Route User    10/17/2024 0845 Given 1 puff Inhalation Barbara Harding RN    10/16/2024 2131 Given 1 puff Inhalation Karen Alejo RN          heparin (Porcine) 5000 UNIT/ML injection 5,000 Units       Date Action Dose Route User    10/16/2024 2130 Given 5,000 Units Subcutaneous (Left Lower Abdomen) Karen Alejo RN          hydrALAZINE (Apresoline) tab 100 mg       Date Action Dose Route User    10/17/2024 0845 Given 100 mg Oral Barbara Harding RN    10/16/2024 2123 Given 100 mg Oral Karen Alejo RN    10/16/2024 1718 Given 100 mg Oral Barbara Harding RN          insulin aspart (NovoLOG) 100 Units/mL FlexPen 2-10 Units       Date Action Dose Route User    10/17/2024 1337 Given 8 Units Subcutaneous (Left Upper Arm) Barbara Harding RN    10/16/2024 2125 Given 6 Units Subcutaneous (Left Upper Arm) Karen Alejo RN    10/16/2024 1811 Given 6 Units Subcutaneous (Left Upper Arm) Simona Young RN          insulin degludec (Tresiba) 100 units/mL flextouch 20 Units       Date Action Dose Route User    10/16/2024 2125 Given 20 Units Subcutaneous (Left Upper Arm) Karen Alejo RN          ipratropium-albuterol (Duoneb) 0.5-2.5 (3) MG/3ML inhalation solution 3 mL       Date Action Dose Route User    10/17/2024 1350 Given 3 mL Nebulization Rohan Aly RCP    10/17/2024 0900 Given 3 mL Nebulization Rohan Aly RCP    10/16/2024 2009 Given 3 mL Nebulization Paulo Neves, RAJ          pregabalin (Lyrica) cap 150 mg       Date Action Dose Route User    10/16/2024 2123 Given 150 mg Oral Karen Alejo RN          spironolactone (Aldactone) tab 25 mg       Date Action Dose Route User    10/17/2024 0845 Given 25 mg Oral Barbara Harding RN          torsemide (Demadex) tab 40 mg       Date Action Dose Route User    10/17/2024 0851 Given 40 mg Oral Barbara Harding RN            Vitals (last day)       Date/Time Temp Pulse Resp BP SpO2 Weight O2 Device O2 Flow Rate (L/min)  Who    10/17/24 1210 97.4 °F (36.3 °C) 63 18 121/58 95 % -- Nasal cannula 2 L/min SZ    10/17/24 1115 -- 67 -- -- 95 % -- -- -- SZ    10/17/24 0900 -- -- -- -- -- -- Nasal cannula 2 L/min MA    10/17/24 0812 97.9 °F (36.6 °C) 61 16 129/41 95 % -- Nasal cannula 2 L/min SZ    10/17/24 0458 -- 64 -- -- 94 % 193 lb 5.5 oz (87.7 kg) -- -- RS    10/17/24 0414 98.4 °F (36.9 °C) 66 16 136/54 92 % -- -- -- RS    10/16/24 2307 97.6 °F (36.4 °C) 63 18 137/49 95 % -- -- -- RS    10/16/24 1925 98.4 °F (36.9 °C) 67 20 119/47 90 % -- -- -- RS    10/16/24 1626 -- 68 22 124/49 93 % -- Nasal cannula 2 L/min SZ    10/16/24 1300 -- -- -- -- 94 % -- Nasal cannula 2 L/min KH    10/16/24 0916 98.1 °F (36.7 °C) 64 22 129/42 90 % -- Nasal cannula 2 L/min PG    10/16/24 0611 98 °F (36.7 °C) 73 20 142/55 94 % -- Nasal cannula 2 L/min MC    10/16/24 0340 -- 67 -- 134/27 96 % -- -- -- MC    10/16/24 0248 -- 61 -- -- 92 % -- -- -- ZM    10/16/24 0005 -- 68 18 135/50 96 % -- CPAP -- ZM          CIWA Scores (since admission)       None

## 2024-10-17 NOTE — PLAN OF CARE
Pt alert and oriented x 4.  Continuous tele monitoring in place.  NSR on the monitor.  Denies hand cramps , other pain, dyspnea and dizziness.  O2 at 2L per NC which is pts baseline.    Cleared for dc to home by all consultants.  Pt agreeable to dc.  IV and tele removed.    Discharge paperwork reviewed with pt.  PT verbalized understanding of instructions.  Assisted to son's car in wheelchair with all personal belongings.      Problem: Diabetes/Glucose Control  Goal: Glucose maintained within prescribed range  Description: INTERVENTIONS:  - Monitor Blood Glucose as ordered  - Assess for signs and symptoms of hyperglycemia and hypoglycemia  - Administer ordered medications to maintain glucose within target range  - Assess barriers to adequate nutritional intake and initiate nutrition consult as needed  - Instruct patient on self management of diabetes  Outcome: Progressing     Problem: PAIN - ADULT  Goal: Verbalizes/displays adequate comfort level or patient's stated pain goal  Description: INTERVENTIONS:  - Encourage pt to monitor pain and request assistance  - Assess pain using appropriate pain scale  - Administer analgesics based on type and severity of pain and evaluate response  - Implement non-pharmacological measures as appropriate and evaluate response  - Consider cultural and social influences on pain and pain management  - Manage/alleviate anxiety  - Utilize distraction and/or relaxation techniques  - Monitor for opioid side effects  - Notify MD/LIP if interventions unsuccessful or patient reports new pain  - Anticipate increased pain with activity and pre-medicate as appropriate  Outcome: Progressing     Problem: CARDIOVASCULAR - ADULT  Goal: Maintains optimal cardiac output and hemodynamic stability  Description: INTERVENTIONS:  - Monitor vital signs, rhythm, and trends  - Monitor for bleeding, hypotension and signs of decreased cardiac output  - Evaluate effectiveness of vasoactive medications to  optimize hemodynamic stability  - Monitor arterial and/or venous puncture sites for bleeding and/or hematoma  - Assess quality of pulses, skin color and temperature  - Assess for signs of decreased coronary artery perfusion - ex. Angina  - Evaluate fluid balance, assess for edema, trend weights  Outcome: Progressing  Goal: Absence of cardiac arrhythmias or at baseline  Description: INTERVENTIONS:  - Continuous cardiac monitoring, monitor vital signs, obtain 12 lead EKG if indicated  - Evaluate effectiveness of antiarrhythmic and heart rate control medications as ordered  - Initiate emergency measures for life threatening arrhythmias  - Monitor electrolytes and administer replacement therapy as ordered  Outcome: Progressing

## 2024-10-17 NOTE — PLAN OF CARE
Received pt from previous shift- vital signs stable-tele NSR- 02 at 2l/nc- pt uses c-pap at night- denies any sob-  Plan of care explained to pt  Con't to monitor vital signs/tele/sats  R/o c-diff -Contact isolation maintained  Daily wt  Con't to diurese  Problem: Diabetes/Glucose Control  Goal: Glucose maintained within prescribed range  Description: INTERVENTIONS:  - Monitor Blood Glucose as ordered  - Assess for signs and symptoms of hyperglycemia and hypoglycemia  - Administer ordered medications to maintain glucose within target range  - Assess barriers to adequate nutritional intake and initiate nutrition consult as needed  - Instruct patient on self management of diabetes  Outcome: Progressing     Problem: PAIN - ADULT  Goal: Verbalizes/displays adequate comfort level or patient's stated pain goal  Description: INTERVENTIONS:  - Encourage pt to monitor pain and request assistance  - Assess pain using appropriate pain scale  - Administer analgesics based on type and severity of pain and evaluate response  - Implement non-pharmacological measures as appropriate and evaluate response  - Consider cultural and social influences on pain and pain management  - Manage/alleviate anxiety  - Utilize distraction and/or relaxation techniques  - Monitor for opioid side effects  - Notify MD/LIP if interventions unsuccessful or patient reports new pain  - Anticipate increased pain with activity and pre-medicate as appropriate  Outcome: Progressing     Problem: CARDIOVASCULAR - ADULT  Goal: Maintains optimal cardiac output and hemodynamic stability  Description: INTERVENTIONS:  - Monitor vital signs, rhythm, and trends  - Monitor for bleeding, hypotension and signs of decreased cardiac output  - Evaluate effectiveness of vasoactive medications to optimize hemodynamic stability  - Monitor arterial and/or venous puncture sites for bleeding and/or hematoma  - Assess quality of pulses, skin color and temperature  - Assess for  signs of decreased coronary artery perfusion - ex. Angina  - Evaluate fluid balance, assess for edema, trend weights  Outcome: Progressing  Goal: Absence of cardiac arrhythmias or at baseline  Description: INTERVENTIONS:  - Continuous cardiac monitoring, monitor vital signs, obtain 12 lead EKG if indicated  - Evaluate effectiveness of antiarrhythmic and heart rate control medications as ordered  - Initiate emergency measures for life threatening arrhythmias  - Monitor electrolytes and administer replacement therapy as ordered  Outcome: Progressing

## 2024-10-17 NOTE — PROGRESS NOTES
[facility] Cardiology Progress Note        Nina Donnelly Patient Status:  Inpatient    10/9/1945 MRN [id number]   [facility] 2NE-A Attending Tristan Rosen MD   Hosp Day # 3 PCP Chiki Caldwell MD     Subjective:  The patient denies  chest pain   LE edema is better  Breathing is stable  She reports some left forearm cramping yesterday    Medications:   insulin degludec  20 Units Subcutaneous Nightly    insulin aspart  2-10 Units Subcutaneous TID AC and HS    torsemide  40 mg Oral Daily    fluticasone-salmeterol  1 puff Inhalation BID    ipratropium-albuterol  3 mL Nebulization Q6H WA    aspirin  81 mg Oral Daily    atorvastatin  10 mg Oral Nightly    donepezil  10 mg Oral Nightly    DULoxetine  60 mg Oral Daily    escitalopram  5 mg Oral Daily    hydrALAZINE  100 mg Oral TID    spironolactone  25 mg Oral Daily    pregabalin  150 mg Oral Nightly    heparin  5,000 Units Subcutaneous Q8H [facility]       Continuous Infusions:        Allergies:  Allergies[1]      Objective:        Intake/Output:      Intake/Output Summary (Last 24 hours) at 10/17/2024 0809  Last data filed at 10/17/2024 0700  Gross per 24 hour   Intake 1160 ml   Output 1700 ml   Net -540 ml     Wt Readings from Last 3 Encounters:   10/17/24 193 lb 5.5 oz (87.7 kg)   10/11/24 197 lb 1.5 oz (89.4 kg)   24 195 lb 15.8 oz (88.9 kg)       Physical Exam:        Vitals:    10/16/24 1925 10/16/24 2307 10/17/24 0414 10/17/24 0458   BP: 119/47 137/49 136/54    BP Location: Left arm Left arm Left arm    Pulse: 67 63 66 64   Resp: 20 18 16    Temp: 98.4 °F (36.9 °C) 97.6 °F (36.4 °C) 98.4 °F (36.9 °C)    TempSrc: Oral Oral Oral    SpO2: 90% 95% 92% 94%   Weight:    193 lb 5.5 oz (87.7 kg)       Temp:  [97.6 °F (36.4 °C)-98.4 °F (36.9 °C)] 98.4 °F (36.9 °C)  Pulse:  [63-68] 64  Resp:  [16-22] 16  BP: (119-137)/(42-54) 136/54  SpO2:  [90 %-95 %] 94 %      Temp: 98.4 °F (36.9 °C)  Pulse: 64  Resp: 16  BP: 136/54  General:   Appears comfortable  HEENT: No focal deficits.  Neck: No JVD, carotids 2+ no bruits.  Cardiac: Regular S1S2.  No S3, S4, rub, click.  No murmur.  Lungs: rales at bases  Abdomen: Soft, non-tender.   Extremities: No LE edema.  No clubbing or cyanosis.    Neurologic: Alert and oriented, normal affect.  Skin: Warm and dry.           LABS:      HEM:  Recent Labs   Lab 10/12/24  0910 10/14/24  1212 10/15/24  0624   WBC 7.4 8.6 8.1   HGB 9.7* 9.6* 9.6*   HCT 32.8* 32.3* 31.3*   .0 175.0 182.0       Chem:  Recent Labs   Lab 10/11/24  1620 10/14/24  1212 10/14/24  1907 10/15/24  0624 10/16/24  0608 10/17/24  0631    141  --  141 139 139   K 4.0 4.6 4.4 4.3 4.7 4.3    104  --  104 103 104   CO2 32.0 33.0*  --  33.0* 32.0 33.0*   BUN 42* 39*  --  40* 35* 39*   CREATSERUM 1.79* 1.64*  --  1.73* 1.69* 1.63*   CA 9.6 9.5  --  9.4 9.5 9.5   MG  --   --   --  1.9 2.0  --    PHOS  --   --   --   --  4.9 5.1   GLU 63* 202*  --  103* 112* 109*       Recent Labs   Lab 10/14/24  1212 10/15/24  0624 10/16/24  0608 10/17/24  0631   ALT 14 13  --   --    AST 24 21  --   --    ALB 4.2 4.1 3.9 3.9       No results for input(s): \"PT\", \"PTT\", \"INR\" in the last 168 hours.        No results found for: \"TROP\", \"CKMB\"      Invalid input(s): \"PBNPML\"                       Diagnostics:   Telemetry: SR      EKG, 10/14/2024:  SB, PRWP     CXR, 10/14/2024:  Stable cardiac and mediastinal contours.  Mild diffuse interstitial opacities may reflect mild interstitial pulmonary edema versus viral/atypical pneumonitis.  No associated pleural effusion or pneumothorax.         Lexiscan cardiolyte stress test , 10/16/24:  Preliminary stress test shows  no perfusion defects. Estimated LVEF 74%                          echo, 10/16/24:    Conclusions:     1. Left ventricle: The cavity size was normal. Wall thickness was normal.      Systolic function was normal. The estimated ejection fraction was 65-70%,      by visual assessment. No diagnostic  evidence for regional wall motion      abnormalities. Left ventricular diastolic function parameters were      normal.   2. Left atrium: The left atrial volume was normal.        Impression:     1.  Fatigue, lethargy, somnolence, dyspnea, LE edema, weight gain.  Suggests multiple factors: hypoventilation, COPD with hypoxia and CO2 retention in combination with right heart failure      -     net out = 3.4  liters     2. Diabetes     3. Hypertension      4. CKD (1.6)        Recommend:     1.  Telemetry  2.  Torsemide 40 mg daily. If cramping recurs we may have to reduce diuretic intensity  3. Follow renal fct, I/O's, weights   4. Home later today         Dago Beach MD         [1]   Allergies  Allergen Reactions    Pioglitazone UNKNOWN     Weight Gain    Vancomycin ITCHING

## 2024-10-17 NOTE — PROGRESS NOTES
[facility]   part of [facility]    Nephrology Progress Note    Nina Donnelly Attending:  Tristan Rosen MD       SUBJECTIVE:     Breathing ok  No leg swelling  No urinary complaints    PHYSICAL EXAM:     Vital Signs: /41 (BP Location: Right arm)   Pulse 67   Temp 97.9 °F (36.6 °C) (Oral)   Resp 16   Wt 193 lb 5.5 oz (87.7 kg)   SpO2 95%   BMI 36.55 kg/m²   Temp (24hrs), Av.1 °F (36.7 °C), Min:97.6 °F (36.4 °C), Max:98.4 °F (36.9 °C)       Intake/Output Summary (Last 24 hours) at 10/17/2024 1124  Last data filed at 10/17/2024 1115  Gross per 24 hour   Intake 1160 ml   Output 2200 ml   Net -1040 ml     Wt Readings from Last 3 Encounters:   10/17/24 193 lb 5.5 oz (87.7 kg)   10/11/24 197 lb 1.5 oz (89.4 kg)   24 195 lb 15.8 oz (88.9 kg)       General: pleasant, obese  Skin: no visible rashes  HEENT: NCAT  Cardiac: Regular rate and rhythm, no murmur/gallop or rub  Lungs: poor air movement  Abdomen: Soft, NTND  Extremities: warm, well perfused, no leg edema  Neurologic/Psych: mentating well, no asterixis       LABORATORY DATA:     Lab Results   Component Value Date     (H) 10/17/2024    BUN 39 (H) 10/17/2024    BUNCREA 18.0 03/15/2022    CREATSERUM 1.63 (H) 10/17/2024    ANIONGAP 2 10/17/2024    GFRNAA 35 (L) 2021    GFRAA 40 (L) 2021    CA 9.5 10/17/2024    OSMOCALC 298 (H) 10/17/2024    ALKPHO 94 10/15/2024    AST 21 10/15/2024    ALT 13 10/15/2024    BILT 0.9 10/15/2024    TP 6.4 10/15/2024    ALB 3.9 10/17/2024    GLOBULIN 2.3 10/15/2024     10/17/2024    K 4.3 10/17/2024     10/17/2024    CO2 33.0 (H) 10/17/2024     Lab Results   Component Value Date    WBC 8.1 10/15/2024    RBC 5.56 (H) 10/15/2024    HGB 9.6 (L) 10/15/2024    HCT 31.3 (L) 10/15/2024    .0 10/15/2024    MCV 56.3 (L) 10/15/2024    MCH 17.3 (L) 10/15/2024    MCHC 30.7 (L) 10/15/2024    RDW 19.8 10/15/2024    NEPRELIM 5.91 10/14/2024    NEUTABS 4.34 2020    LYMPHABS 2.28  04/07/2020    EOSABS 0.14 04/07/2020    BASABS 0.07 04/07/2020    NEPERCENT 68.9 10/14/2024    LYPERCENT 19.3 10/14/2024    MOPERCENT 7.8 10/14/2024    EOPERCENT 3.1 10/14/2024    BAPERCENT 0.6 10/14/2024    NE 5.91 10/14/2024    LYMABS 1.66 10/14/2024    MOABSO 0.67 10/14/2024    EOABSO 0.27 10/14/2024    BAABSO 0.05 10/14/2024     Lab Results   Component Value Date    MALBP <1.2 03/15/2022    CREUR 45.53 03/15/2022     Lab Results   Component Value Date    COLORUR Light-Yellow 10/15/2024    CLARITY Clear 10/15/2024    SPECGRAVITY 1.011 10/15/2024    GLUUR Normal 10/15/2024    BILUR Negative 10/15/2024    KETUR Negative 10/15/2024    BLOODURINE Negative 10/15/2024    PHURINE 5.5 10/15/2024    PROUR Negative 10/15/2024    UROBILINOGEN Normal 10/15/2024    NITRITE Negative 10/15/2024    LEUUR Negative 10/15/2024    NMIC Microscopic not indicated 10/15/2024         IMAGING:     Reviewed.    MEDICATIONS:       Current Facility-Administered Medications   Medication Dose Route Frequency    insulin degludec (Tresiba) 100 units/mL flextouch 20 Units  20 Units Subcutaneous Nightly    insulin aspart (NovoLOG) 100 Units/mL FlexPen 2-10 Units  2-10 Units Subcutaneous TID AC and HS    torsemide (Demadex) tab 40 mg  40 mg Oral Daily    fluticasone-salmeterol (Advair Diskus) 250-50 MCG/ACT inhaler 1 puff  1 puff Inhalation BID    ipratropium-albuterol (Duoneb) 0.5-2.5 (3) MG/3ML inhalation solution 3 mL  3 mL Nebulization Q6H WA    aspirin DR tab 81 mg  81 mg Oral Daily    atorvastatin (Lipitor) tab 10 mg  10 mg Oral Nightly    donepezil (Aricept) tab 10 mg  10 mg Oral Nightly    DULoxetine (Cymbalta) DR cap 60 mg  60 mg Oral Daily    escitalopram (Lexapro) tab 5 mg  5 mg Oral Daily    hydrALAZINE (Apresoline) tab 100 mg  100 mg Oral TID    spironolactone (Aldactone) tab 25 mg  25 mg Oral Daily    pregabalin (Lyrica) cap 150 mg  150 mg Oral Nightly    heparin (Porcine) 5000 UNIT/ML injection 5,000 Units  5,000 Units Subcutaneous  Q8H [facility]    acetaminophen (Tylenol Extra Strength) tab 500 mg  500 mg Oral Q4H PRN    glucose (Dex4) 15 GM/59ML oral liquid 15 g  15 g Oral Q15 Min PRN    Or    glucose (Glutose) 40% oral gel 15 g  15 g Oral Q15 Min PRN    Or    glucose-vitamin C (Dex-4) chewable tab 4 tablet  4 tablet Oral Q15 Min PRN    Or    dextrose 50% injection 50 mL  50 mL Intravenous Q15 Min PRN    Or    glucose (Dex4) 15 GM/59ML oral liquid 30 g  30 g Oral Q15 Min PRN    Or    glucose (Glutose) 40% oral gel 30 g  30 g Oral Q15 Min PRN    Or    glucose-vitamin C (Dex-4) chewable tab 8 tablet  8 tablet Oral Q15 Min PRN       ASSESSMENT/PLAN:     78 yo F with history of CKD stage 3b, HFpEF, DM, HTN, WYATT, HL, COPD on home O2, beta thalassemia, macular degeneration, hospitalization 10/11-10/12 with SOB attributed to acute bronchitis and discharged on doxycycline presented with SOB and leg swelling. Leg swelling has improved but she continued to feel poorly for which nephrology was consulted.     SOB, leg swelling: attributed to HFpEF  -- torsemide 40 mg/d  -- spironolactone 25 mg/d     CKD stage 3b:  -- at baseline 1.6-1.9 mg/dl  -- explained her CKD is unlikely the cause of her symptoms as she remains at her baseline  -- avoid NSAIDs, fleets, contrast     Anemia in CKD:  -- iron stores show low ferritin and tsat  -- ferrlicit x 1  -- pt also self reports history of thalassemia; recommend heme follow up outpatient     Secondary hyperparathyroidism:  -- phos at goal  -- Vit D at goal 5/2024  -- no indication for calcitriol     HTN:  -- BP acceptable with diuresis + hydralazine     COPD/abn CT chest:  -- per pulmonary     L flank tenderness: likely MSK  -- UA normal  -- renal US with mild prominence of L renal pelvis; hydronephrosis not excluded -> CT shows parapelvic cyst without obstruction, reviewed with patient that no further evaluation is needed     Discussed with RN.  Ok to discharge; follow up as planned for CKD3 with me q6 months (may need  to schedule from last follow up - her daughter will check)           Thank you for allowing me to participate in the care of this patient. Please do not hesitate to call with questions or concerns.        Shandra Scott MD  [facility]ph[facility]  [address] [place] [id number]    10/17/2024  11:24 AM

## 2024-10-17 NOTE — PROGRESS NOTES
[facility]   part of [facility] Hospitalist Progress Note     Nina Donnelly Patient Status:  Inpatient    10/9/1945 MRN [id number]   Location [facility] 2NE-A Attending Tristan Rosen MD   Hosp Day # 3 PCP Chiki Caldwell MD     CC:       Chief Complaint   Patient presents with    Difficulty Breathing       Subjective:     Patient seen and examined.   Sittng in chair, family at bedside  A &O x 4  Afebrile  Doing well on 2-3 L O2  Dec air entry at margaret bases  Had stress test - NEG  CT ordered as well    Objective:    Review of Systems:   10 point ROS completed and was negative, except for pertinent positive and negatives stated in subjective.    Vital signs:  Temp:  [97.6 °F (36.4 °C)-98.4 °F (36.9 °C)] 97.9 °F (36.6 °C)  Pulse:  [61-68] 61  Resp:  [16-22] 16  BP: (119-137)/(41-54) 129/41  SpO2:  [90 %-95 %] 95 %    Physical Exam:    General: No acute distress.   HEENT:  EOMI, PERRLA, OP clear  Respiratory: Clear to auscultation bilaterally. No wheezes. No rhonchi.  Cardiovascular: S1, S2. Regular rate and rhythm. No murmurs.  Abdomen: Soft, nontender, nondistended.  Positive bowel sounds. No rebound or guarding.  Extremities: No edema.  Neuro:  Grossly non focal, no motor deficits.        Diagnostic Data:    Labs:  Recent Labs   Lab 10/12/24  0910 10/14/24  1212 10/15/24  0624   WBC 7.4 8.6 8.1   HGB 9.7* 9.6* 9.6*   MCV 57.5* 57.0* 56.3*   .0 175.0 182.0       Recent Labs   Lab 10/14/24  1212 10/14/24  1907 10/15/24  0624 10/16/24  0608 10/17/24  0631   *  --  103* 112* 109*   BUN 39*  --  40* 35* 39*   CREATSERUM 1.64*  --  1.73* 1.69* 1.63*   CA 9.5  --  9.4 9.5 9.5   ALB 4.2  --  4.1 3.9 3.9     --  141 139 139   K 4.6   < > 4.3 4.7 4.3     --  104 103 104   CO2 33.0*  --  33.0* 32.0 33.0*   ALKPHO 92  --  94  --   --    AST 24  --  21  --   --    ALT 14  --  13  --   --    BILT 0.7  --  0.9  --   --    TP 6.5  --  6.4  --   --     < > =  values in this interval not displayed.       Estimated Creatinine Clearance: 21.8 mL/min (A) (based on SCr of 1.63 mg/dL (H)).    No results for input(s): \"PTP\", \"INR\" in the last 168 hours.         COVID-19 Lab Results    COVID-19  Lab Results   Component Value Date    COVID19 Not Detected 10/14/2024    COVID19 Not Detected 10/14/2024    COVID19 Not Detected 10/11/2024       Pro-Calcitonin  Recent Labs   Lab 10/14/24  1212   PCT 0.08*       Cardiac  Recent Labs   Lab 10/14/24  1212   PBNP 239       Creatinine Kinase  No results for input(s): \"CK\" in the last 168 hours.    Inflammatory Markers  Recent Labs   Lab 10/16/24  0608   LELA 32*       Imaging: Imaging data reviewed in Epic.    Medications:    insulin degludec  20 Units Subcutaneous Nightly    insulin aspart  2-10 Units Subcutaneous TID AC and HS    torsemide  40 mg Oral Daily    fluticasone-salmeterol  1 puff Inhalation BID    ipratropium-albuterol  3 mL Nebulization Q6H WA    aspirin  81 mg Oral Daily    atorvastatin  10 mg Oral Nightly    donepezil  10 mg Oral Nightly    DULoxetine  60 mg Oral Daily    escitalopram  5 mg Oral Daily    hydrALAZINE  100 mg Oral TID    spironolactone  25 mg Oral Daily    pregabalin  150 mg Oral Nightly    heparin  5,000 Units Subcutaneous Q8H [facility]       Assessment & Plan:    Patient is a 79 year old female with PMH sig for HTN, HLD, CHF, WYATT, here for sob     Impression     -acute on chronic hypoxic RF (on 2L at baseline)  -COPD     -HTN  -HLD  -acute on chronic CHF  -WYATT on CPAP     -CKD 3         Plan     -appear sympoms related to HF - reports about 10 lb wt gain, has LE edema and pulm edema on cxr  -iv diuresis  -repeat echo  -Pulm following >> apprec recs >> cleared for dc by pulm  -reports intermittent chest pressure >> stress test prelim shows no perfusion defects  -cont o2 support - on baseline O2  -cont home meds as able  -nephro consulted >> renal US NEG >> CT ordered       DISPO:  Dc planning in process  Cards and  pulm and nephro following  Hopefully DC home today      Further recommendations pending patient's clinical course. [facility] hospitalist to continue to follow patient while in house     Patient and/or patient's family given opportunity to ask questions and note understanding and agreeing with therapeutic plan as outlined      Lilly Fuentes Hospitalist  Pager [phone]  Answering Service number: [phone]       **Certification      PHYSICIAN Certification of Need for Inpatient Hospitalization - Initial Certification    Patient will require inpatient services that will reasonably be expected to span two midnight's based on the clinical documentation in H+P.   Based on patients current state of illness, I anticipate that, after discharge, patient will require TBD.

## 2024-10-19 NOTE — CDS QUERY
Dear Dr. Clarke,    Please clarify the acuity of the respiratory failure being treated:   (   X) Chronic Hypoxic and hypercapnic Respiratory Failure   (   ) Acute on Chronic Hypoxic Respiratory Failure and Chronic hypercapnic Respiratory Failure  (   ) Other (please specify): __________________  ____________________________________________________________________________                 Documentation from the Medical Record:   78 y/o with h/o HFpEF, WYATT on CPAP, depression, CKD, COPD on 2L home O2, presented to clinic with worsening LE edema, SOB and chest discomfort.    Clinical Indicators/Risk: PMH of COPD, Home o2 at 2L, ex-smoker   Lungs:  faint bibasilar crackles, no wheezing. Good air entry   O2 needs 2-3L this admission    Pulm consult: Chronic hypoxic and hypercapnic resp failure- due to COPD- home O2 baseline is 2L with is unchanged. No signs of AECOPD, no PNA on imaging. Likely has component of HFpEF/volume retention contributing to her dyspnea. ABG showed well compensated resp acidosis.      Hospitalist PN: acute on chronic hypoxic RF (on 2L at baseline)     ABG PH 7.41 PCO2 54 pO2 81 on 3L NC  Treatment: Supplemental O2 2-3 L via nasal cannula. CPAP at noc with 2L entrained, consider BIPAP to help decrease CO2    Use of terms such as suspected, possible, or probable (associated with a specific diagnosis that is being evaluated, monitored, or treated as if it exists) are acceptable and can be coded in the inpatient setting, when documented at the time of discharge.     Please add any additional documentation to your progress note and continue to document this through discharge.    Thank you. For questions regarding this query, please contact Clinical : Yumiko Boswell  [phone]  THIS FORM IS A PERMANENT PART OF THE MEDICAL RECORD

## 2024-10-19 NOTE — CDS QUERY
C(    ) Acute on Chronic Diastolic heart failure  (    ) Other , please  specify_______________________________________________  CLINICAL INFORMATION FROM THE MEDICAL RECORD  78 y/o with h/o HFpEF, WYATT on CPAP, depression, CKD, COPD on 2L home O2, presented to clinic with worsening LE edema, SOB and chest discomfort     Clinical Indicators/Risk:   PMH: HFpEF  pBNP on admit 239  CXR:  Mild diffuse interstitial opacities may reflect mild interstitial pulmonary edema versus viral/atypical pneumonitis.   2d echo: Left ventricle: The cavity size was normal. Wall thickness was normal. Systolic function was normal. The estimated ejection fraction was 65-70%, by visual assessment. No diagnostic evidence for regional wall motion abnormalities. Left ventricular diastolic function parameters were normal.    Assessment; lower extremity edema, dyspnea, orthopnea, 10b weight gain  Pulm consult: HFpEF- in setting of CKD and increasing LE edema. BNP wnl but exam suggestive of mild volume retention   Hospitalist PN: appear symptoms related to HF - reports about 10 lb wt gain, has LE edema and pulm edema on cxr dx: acute on chronic HF    Treatment: pBNP, CXR, 2d echo, card consult, IV furosemide, aldactone, PO torsemide    Use of terms such as suspected, possible, or probable (associated with a specific diagnosis that is being evaluated, monitored, or treated as if it exists) are acceptable and can be coded in the inpatient setting, when documented at the time of discharge.     Please add any additional documentation to your progress note and continue to document this through discharge.    Thank you. For questions regarding this query, please contact Clinical : Yumiko Boswell  [phone]  This document is a permanent part of the medical record.

## 2024-10-22 ENCOUNTER — APPOINTMENT (OUTPATIENT)
Dept: CARDIAC REHAB | Facility: HOSPITAL | Age: 79
End: 2024-10-22
Attending: INTERNAL MEDICINE
Payer: MEDICARE

## 2024-10-24 ENCOUNTER — APPOINTMENT (OUTPATIENT)
Dept: CARDIAC REHAB | Facility: HOSPITAL | Age: 79
End: 2024-10-24
Attending: INTERNAL MEDICINE
Payer: MEDICARE

## 2024-10-29 ENCOUNTER — APPOINTMENT (OUTPATIENT)
Dept: CARDIAC REHAB | Facility: HOSPITAL | Age: 79
End: 2024-10-29
Attending: INTERNAL MEDICINE
Payer: MEDICARE

## 2024-10-31 ENCOUNTER — APPOINTMENT (OUTPATIENT)
Dept: CARDIAC REHAB | Facility: HOSPITAL | Age: 79
End: 2024-10-31
Attending: INTERNAL MEDICINE
Payer: MEDICARE

## 2024-11-05 ENCOUNTER — CARDPULM VISIT (OUTPATIENT)
Dept: CARDIAC REHAB | Facility: HOSPITAL | Age: 79
End: 2024-11-05
Attending: INTERNAL MEDICINE
Payer: MEDICARE

## 2024-11-05 PROCEDURE — 94625 PHY/QHP OP PULM RHB W/O MNTR: CPT

## 2024-11-07 ENCOUNTER — CARDPULM VISIT (OUTPATIENT)
Dept: CARDIAC REHAB | Facility: HOSPITAL | Age: 79
End: 2024-11-07
Attending: INTERNAL MEDICINE
Payer: MEDICARE

## 2024-11-07 PROCEDURE — 94625 PHY/QHP OP PULM RHB W/O MNTR: CPT

## 2024-11-12 ENCOUNTER — APPOINTMENT (OUTPATIENT)
Dept: CARDIAC REHAB | Facility: HOSPITAL | Age: 79
End: 2024-11-12
Attending: INTERNAL MEDICINE
Payer: MEDICARE

## 2024-11-14 ENCOUNTER — CARDPULM VISIT (OUTPATIENT)
Dept: CARDIAC REHAB | Facility: HOSPITAL | Age: 79
End: 2024-11-14
Attending: INTERNAL MEDICINE
Payer: MEDICARE

## 2024-11-14 PROCEDURE — 94625 PHY/QHP OP PULM RHB W/O MNTR: CPT

## 2024-11-19 ENCOUNTER — CARDPULM VISIT (OUTPATIENT)
Dept: CARDIAC REHAB | Facility: HOSPITAL | Age: 79
End: 2024-11-19
Attending: INTERNAL MEDICINE
Payer: MEDICARE

## 2024-11-19 PROCEDURE — 94625 PHY/QHP OP PULM RHB W/O MNTR: CPT

## 2024-11-21 ENCOUNTER — CARDPULM VISIT (OUTPATIENT)
Dept: CARDIAC REHAB | Facility: HOSPITAL | Age: 79
End: 2024-11-21
Attending: INTERNAL MEDICINE
Payer: MEDICARE

## 2024-11-21 PROCEDURE — 94625 PHY/QHP OP PULM RHB W/O MNTR: CPT

## 2024-11-26 ENCOUNTER — APPOINTMENT (OUTPATIENT)
Dept: CARDIAC REHAB | Facility: HOSPITAL | Age: 79
End: 2024-11-26
Attending: INTERNAL MEDICINE
Payer: MEDICARE

## 2024-11-28 ENCOUNTER — APPOINTMENT (OUTPATIENT)
Dept: CARDIAC REHAB | Facility: HOSPITAL | Age: 79
End: 2024-11-28
Attending: INTERNAL MEDICINE
Payer: MEDICARE

## 2024-12-03 ENCOUNTER — CARDPULM VISIT (OUTPATIENT)
Dept: CARDIAC REHAB | Facility: HOSPITAL | Age: 79
End: 2024-12-03
Attending: INTERNAL MEDICINE
Payer: MEDICARE

## 2024-12-03 PROCEDURE — 94625 PHY/QHP OP PULM RHB W/O MNTR: CPT

## 2024-12-03 PROCEDURE — 82962 GLUCOSE BLOOD TEST: CPT

## 2024-12-04 LAB
GLUCOSE BLD-MCNC: 115 MG/DL (ref 70–99)
GLUCOSE BLD-MCNC: 34 MG/DL (ref 70–99)
GLUCOSE BLD-MCNC: 61 MG/DL (ref 70–99)

## 2024-12-05 ENCOUNTER — CARDPULM VISIT (OUTPATIENT)
Dept: CARDIAC REHAB | Facility: HOSPITAL | Age: 79
End: 2024-12-05
Attending: INTERNAL MEDICINE
Payer: MEDICARE

## 2024-12-05 PROCEDURE — 82962 GLUCOSE BLOOD TEST: CPT

## 2024-12-05 PROCEDURE — 94625 PHY/QHP OP PULM RHB W/O MNTR: CPT

## 2024-12-06 LAB — GLUCOSE BLD-MCNC: 121 MG/DL (ref 70–99)

## 2024-12-10 ENCOUNTER — CARDPULM VISIT (OUTPATIENT)
Dept: CARDIAC REHAB | Facility: HOSPITAL | Age: 79
End: 2024-12-10
Attending: INTERNAL MEDICINE
Payer: MEDICARE

## 2024-12-10 PROCEDURE — 94625 PHY/QHP OP PULM RHB W/O MNTR: CPT

## 2024-12-12 ENCOUNTER — CARDPULM VISIT (OUTPATIENT)
Dept: CARDIAC REHAB | Facility: HOSPITAL | Age: 79
End: 2024-12-12
Attending: INTERNAL MEDICINE
Payer: MEDICARE

## 2024-12-12 PROCEDURE — 94625 PHY/QHP OP PULM RHB W/O MNTR: CPT

## 2024-12-17 ENCOUNTER — CARDPULM VISIT (OUTPATIENT)
Dept: CARDIAC REHAB | Facility: HOSPITAL | Age: 79
End: 2024-12-17
Attending: INTERNAL MEDICINE
Payer: MEDICARE

## 2024-12-17 PROCEDURE — 94625 PHY/QHP OP PULM RHB W/O MNTR: CPT

## 2024-12-19 ENCOUNTER — CARDPULM VISIT (OUTPATIENT)
Dept: CARDIAC REHAB | Facility: HOSPITAL | Age: 79
End: 2024-12-19
Attending: INTERNAL MEDICINE
Payer: MEDICARE

## 2024-12-19 PROCEDURE — 94625 PHY/QHP OP PULM RHB W/O MNTR: CPT

## 2024-12-24 ENCOUNTER — CARDPULM VISIT (OUTPATIENT)
Dept: CARDIAC REHAB | Facility: HOSPITAL | Age: 79
End: 2024-12-24
Attending: INTERNAL MEDICINE
Payer: MEDICARE

## 2024-12-24 PROCEDURE — 94625 PHY/QHP OP PULM RHB W/O MNTR: CPT

## 2025-01-19 ENCOUNTER — APPOINTMENT (OUTPATIENT)
Dept: GENERAL RADIOLOGY | Facility: HOSPITAL | Age: 80
End: 2025-01-19
Attending: EMERGENCY MEDICINE
Payer: MEDICARE

## 2025-01-19 ENCOUNTER — HOSPITAL ENCOUNTER (OUTPATIENT)
Facility: HOSPITAL | Age: 80
Setting detail: OBSERVATION
LOS: 1 days | Discharge: HOME | End: 2025-01-21
Attending: EMERGENCY MEDICINE | Admitting: INTERNAL MEDICINE
Payer: MEDICARE

## 2025-01-19 ENCOUNTER — APPOINTMENT (OUTPATIENT)
Dept: CT IMAGING | Facility: HOSPITAL | Age: 80
End: 2025-01-19
Attending: EMERGENCY MEDICINE
Payer: MEDICARE

## 2025-01-19 DIAGNOSIS — N17.9 AKI (ACUTE KIDNEY INJURY): Primary | ICD-10-CM

## 2025-01-19 DIAGNOSIS — R73.9 HYPERGLYCEMIA: ICD-10-CM

## 2025-01-19 DIAGNOSIS — E86.0 DEHYDRATION: ICD-10-CM

## 2025-01-19 DIAGNOSIS — R07.9 ACUTE CHEST PAIN: ICD-10-CM

## 2025-01-19 LAB
ALBUMIN SERPL-MCNC: 4.1 G/DL (ref 3.2–4.8)
ALBUMIN/GLOB SERPL: 1.9 {RATIO} (ref 1–2)
ALP LIVER SERPL-CCNC: 90 U/L
ALT SERPL-CCNC: 17 U/L
ANION GAP SERPL CALC-SCNC: 6 MMOL/L (ref 0–18)
AST SERPL-CCNC: 34 U/L (ref ?–34)
BASOPHILS # BLD AUTO: 0.06 X10(3) UL (ref 0–0.2)
BASOPHILS NFR BLD AUTO: 0.7 %
BILIRUB SERPL-MCNC: 0.5 MG/DL (ref 0.2–1.1)
BUN BLD-MCNC: 32 MG/DL (ref 9–23)
CALCIUM BLD-MCNC: 9.2 MG/DL (ref 8.7–10.6)
CHLORIDE SERPL-SCNC: 99 MMOL/L (ref 98–112)
CO2 SERPL-SCNC: 34 MMOL/L (ref 21–32)
CREAT BLD-MCNC: 2.27 MG/DL
EGFRCR SERPLBLD CKD-EPI 2021: 22 ML/MIN/1.73M2 (ref 60–?)
EOSINOPHIL # BLD AUTO: 0.27 X10(3) UL (ref 0–0.7)
EOSINOPHIL NFR BLD AUTO: 3 %
ERYTHROCYTE [DISTWIDTH] IN BLOOD BY AUTOMATED COUNT: 21.6 %
GLOBULIN PLAS-MCNC: 2.2 G/DL (ref 2–3.5)
GLUCOSE BLD-MCNC: 132 MG/DL (ref 70–99)
GLUCOSE BLD-MCNC: 225 MG/DL (ref 70–99)
GLUCOSE BLD-MCNC: 236 MG/DL (ref 70–99)
HCT VFR BLD AUTO: 33.9 %
HGB BLD-MCNC: 10.5 G/DL
IMM GRANULOCYTES # BLD AUTO: 0.03 X10(3) UL (ref 0–1)
IMM GRANULOCYTES NFR BLD: 0.3 %
LYMPHOCYTES # BLD AUTO: 1.71 X10(3) UL (ref 1–4)
LYMPHOCYTES NFR BLD AUTO: 18.9 %
MCH RBC QN AUTO: 17.4 PG (ref 26–34)
MCHC RBC AUTO-ENTMCNC: 31 G/DL (ref 31–37)
MCV RBC AUTO: 56.3 FL
MONOCYTES # BLD AUTO: 0.68 X10(3) UL (ref 0.1–1)
MONOCYTES NFR BLD AUTO: 7.5 %
NEUTROPHILS # BLD AUTO: 6.32 X10 (3) UL (ref 1.5–7.7)
NEUTROPHILS # BLD AUTO: 6.32 X10(3) UL (ref 1.5–7.7)
NEUTROPHILS NFR BLD AUTO: 69.6 %
NT-PROBNP SERPL-MCNC: 138 PG/ML (ref ?–450)
OSMOLALITY SERPL CALC.SUM OF ELEC: 302 MOSM/KG (ref 275–295)
PLATELET # BLD AUTO: 191 10(3)UL (ref 150–450)
PLATELETS.RETICULATED NFR BLD AUTO: 2.8 % (ref 0–7)
POTASSIUM SERPL-SCNC: 3.7 MMOL/L (ref 3.5–5.1)
PROCALCITONIN SERPL-MCNC: 0.14 NG/ML (ref ?–0.05)
PROT SERPL-MCNC: 6.3 G/DL (ref 5.7–8.2)
RBC # BLD AUTO: 6.02 X10(6)UL
SODIUM SERPL-SCNC: 139 MMOL/L (ref 136–145)
TROPONIN I SERPL HS-MCNC: 11 NG/L
WBC # BLD AUTO: 9.1 X10(3) UL (ref 4–11)

## 2025-01-19 PROCEDURE — 71250 CT THORAX DX C-: CPT | Performed by: EMERGENCY MEDICINE

## 2025-01-19 PROCEDURE — 71045 X-RAY EXAM CHEST 1 VIEW: CPT | Performed by: EMERGENCY MEDICINE

## 2025-01-19 RX ORDER — DULOXETIN HYDROCHLORIDE 30 MG/1
60 CAPSULE, DELAYED RELEASE ORAL DAILY
Status: DISCONTINUED | OUTPATIENT
Start: 2025-01-19 | End: 2025-01-21

## 2025-01-19 RX ORDER — ACETAMINOPHEN 325 MG/1
650 TABLET ORAL EVERY 6 HOURS PRN
Status: DISCONTINUED | OUTPATIENT
Start: 2025-01-19 | End: 2025-01-21

## 2025-01-19 RX ORDER — ONDANSETRON 2 MG/ML
4 INJECTION INTRAMUSCULAR; INTRAVENOUS EVERY 6 HOURS PRN
Status: DISCONTINUED | OUTPATIENT
Start: 2025-01-19 | End: 2025-01-21

## 2025-01-19 RX ORDER — SODIUM CHLORIDE 9 MG/ML
INJECTION, SOLUTION INTRAVENOUS CONTINUOUS
Status: DISCONTINUED | OUTPATIENT
Start: 2025-01-19 | End: 2025-01-20

## 2025-01-19 RX ORDER — INSULIN ASPART 100 [IU]/ML
0.1 INJECTION, SOLUTION INTRAVENOUS; SUBCUTANEOUS ONCE
Status: COMPLETED | OUTPATIENT
Start: 2025-01-19 | End: 2025-01-19

## 2025-01-19 RX ORDER — ESCITALOPRAM OXALATE 10 MG/1
10 TABLET ORAL DAILY
Status: DISCONTINUED | OUTPATIENT
Start: 2025-01-19 | End: 2025-01-20

## 2025-01-19 RX ORDER — DONEPEZIL HYDROCHLORIDE 10 MG/1
10 TABLET, FILM COATED ORAL NIGHTLY
Status: DISCONTINUED | OUTPATIENT
Start: 2025-01-19 | End: 2025-01-21

## 2025-01-19 RX ORDER — ATORVASTATIN CALCIUM 10 MG/1
10 TABLET, FILM COATED ORAL NIGHTLY
Status: DISCONTINUED | OUTPATIENT
Start: 2025-01-19 | End: 2025-01-21

## 2025-01-19 RX ORDER — HYDRALAZINE HYDROCHLORIDE 25 MG/1
100 TABLET, FILM COATED ORAL 3 TIMES DAILY
Status: DISCONTINUED | OUTPATIENT
Start: 2025-01-19 | End: 2025-01-21

## 2025-01-19 RX ORDER — HEPARIN SODIUM 5000 [USP'U]/ML
5000 INJECTION, SOLUTION INTRAVENOUS; SUBCUTANEOUS EVERY 8 HOURS SCHEDULED
Status: DISCONTINUED | OUTPATIENT
Start: 2025-01-19 | End: 2025-01-21

## 2025-01-19 RX ORDER — PREGABALIN 100 MG/1
600 CAPSULE ORAL NIGHTLY
Status: DISCONTINUED | OUTPATIENT
Start: 2025-01-19 | End: 2025-01-20

## 2025-01-19 RX ORDER — ASPIRIN 81 MG/1
81 TABLET, CHEWABLE ORAL DAILY
Status: DISCONTINUED | OUTPATIENT
Start: 2025-01-19 | End: 2025-01-21

## 2025-01-19 RX ORDER — INSULIN DEGLUDEC 100 U/ML
25 INJECTION, SOLUTION SUBCUTANEOUS NIGHTLY
Status: DISCONTINUED | OUTPATIENT
Start: 2025-01-19 | End: 2025-01-21

## 2025-01-19 RX ORDER — CHOLECALCIFEROL (VITAMIN D3) 25 MCG
1000 TABLET ORAL DAILY
Status: DISCONTINUED | OUTPATIENT
Start: 2025-01-19 | End: 2025-01-21

## 2025-01-19 RX ORDER — IPRATROPIUM BROMIDE 21 UG/1
2 SPRAY, METERED NASAL EVERY 12 HOURS
Status: DISCONTINUED | OUTPATIENT
Start: 2025-01-20 | End: 2025-01-21

## 2025-01-20 ENCOUNTER — APPOINTMENT (OUTPATIENT)
Dept: ULTRASOUND IMAGING | Facility: HOSPITAL | Age: 80
End: 2025-01-20
Attending: INTERNAL MEDICINE
Payer: MEDICARE

## 2025-01-20 ENCOUNTER — APPOINTMENT (OUTPATIENT)
Dept: CT IMAGING | Facility: HOSPITAL | Age: 80
End: 2025-01-20
Attending: INTERNAL MEDICINE
Payer: MEDICARE

## 2025-01-20 PROBLEM — Z79.4 TYPE 2 DIABETES MELLITUS WITH HYPERGLYCEMIA, WITH LONG-TERM CURRENT USE OF INSULIN (HCC): Status: ACTIVE | Noted: 2025-01-20

## 2025-01-20 PROBLEM — E11.65 TYPE 2 DIABETES MELLITUS WITH HYPERGLYCEMIA, WITH LONG-TERM CURRENT USE OF INSULIN (HCC): Status: ACTIVE | Noted: 2025-01-20

## 2025-01-20 LAB
ANION GAP SERPL CALC-SCNC: 5 MMOL/L (ref 0–18)
ATRIAL RATE: 70 BPM
BASOPHILS # BLD AUTO: 0.06 X10(3) UL (ref 0–0.2)
BASOPHILS NFR BLD AUTO: 0.8 %
BILIRUB UR QL STRIP.AUTO: NEGATIVE
BUN BLD-MCNC: 27 MG/DL (ref 9–23)
CALCIUM BLD-MCNC: 8.4 MG/DL (ref 8.7–10.6)
CHLORIDE SERPL-SCNC: 103 MMOL/L (ref 98–112)
CLARITY UR REFRACT.AUTO: CLEAR
CO2 SERPL-SCNC: 34 MMOL/L (ref 21–32)
COLOR UR AUTO: COLORLESS
CREAT BLD-MCNC: 1.93 MG/DL
DEPRECATED HBV CORE AB SER IA-ACNC: 18 NG/ML
EGFRCR SERPLBLD CKD-EPI 2021: 26 ML/MIN/1.73M2 (ref 60–?)
EOSINOPHIL # BLD AUTO: 0.39 X10(3) UL (ref 0–0.7)
EOSINOPHIL NFR BLD AUTO: 5.2 %
ERYTHROCYTE [DISTWIDTH] IN BLOOD BY AUTOMATED COUNT: 21.6 %
EST. AVERAGE GLUCOSE BLD GHB EST-MCNC: 183 MG/DL (ref 68–126)
GLUCOSE BLD-MCNC: 100 MG/DL (ref 70–99)
GLUCOSE BLD-MCNC: 103 MG/DL (ref 70–99)
GLUCOSE BLD-MCNC: 116 MG/DL (ref 70–99)
GLUCOSE BLD-MCNC: 145 MG/DL (ref 70–99)
GLUCOSE BLD-MCNC: 223 MG/DL (ref 70–99)
GLUCOSE UR STRIP.AUTO-MCNC: 1000 MG/DL
HBA1C MFR BLD: 8 % (ref ?–5.7)
HCT VFR BLD AUTO: 34.6 %
HGB BLD-MCNC: 10.4 G/DL
IMM GRANULOCYTES # BLD AUTO: 0.02 X10(3) UL (ref 0–1)
IMM GRANULOCYTES NFR BLD: 0.3 %
IRON SATN MFR SERPL: 6 %
IRON SERPL-MCNC: 20 UG/DL
KETONES UR STRIP.AUTO-MCNC: NEGATIVE MG/DL
LEUKOCYTE ESTERASE UR QL STRIP.AUTO: NEGATIVE
LYMPHOCYTES # BLD AUTO: 1.75 X10(3) UL (ref 1–4)
LYMPHOCYTES NFR BLD AUTO: 23.2 %
MAGNESIUM SERPL-MCNC: 2.3 MG/DL (ref 1.6–2.6)
MCH RBC QN AUTO: 17.4 PG (ref 26–34)
MCHC RBC AUTO-ENTMCNC: 30.1 G/DL (ref 31–37)
MCV RBC AUTO: 57.9 FL
MONOCYTES # BLD AUTO: 0.76 X10(3) UL (ref 0.1–1)
MONOCYTES NFR BLD AUTO: 10.1 %
NEUTROPHILS # BLD AUTO: 4.55 X10 (3) UL (ref 1.5–7.7)
NEUTROPHILS # BLD AUTO: 4.55 X10(3) UL (ref 1.5–7.7)
NEUTROPHILS NFR BLD AUTO: 60.4 %
NITRITE UR QL STRIP.AUTO: NEGATIVE
OSMOLALITY SERPL CALC.SUM OF ELEC: 299 MOSM/KG (ref 275–295)
P AXIS: 57 DEGREES
P-R INTERVAL: 166 MS
PH UR STRIP.AUTO: 7 [PH] (ref 5–8)
PLATELET # BLD AUTO: 171 10(3)UL (ref 150–450)
PLATELETS.RETICULATED NFR BLD AUTO: 3.5 % (ref 0–7)
POTASSIUM SERPL-SCNC: 3.7 MMOL/L (ref 3.5–5.1)
PROT UR STRIP.AUTO-MCNC: NEGATIVE MG/DL
Q-T INTERVAL: 434 MS
QRS DURATION: 90 MS
QTC CALCULATION (BEZET): 468 MS
R AXIS: 6 DEGREES
RBC # BLD AUTO: 5.98 X10(6)UL
RBC UR QL AUTO: NEGATIVE
SODIUM SERPL-SCNC: 142 MMOL/L (ref 136–145)
SP GR UR STRIP.AUTO: 1 (ref 1–1.03)
T AXIS: 56 DEGREES
TOTAL IRON BINDING CAPACITY: 358 UG/DL (ref 250–425)
TRANSFERRIN SERPL-MCNC: 277 MG/DL (ref 250–380)
TROPONIN I SERPL HS-MCNC: 12 NG/L
UROBILINOGEN UR STRIP.AUTO-MCNC: NORMAL MG/DL
VENTRICULAR RATE: 70 BPM
VIT D+METAB SERPL-MCNC: 67 NG/ML (ref 30–100)
WBC # BLD AUTO: 7.5 X10(3) UL (ref 4–11)

## 2025-01-20 PROCEDURE — 93970 EXTREMITY STUDY: CPT | Performed by: INTERNAL MEDICINE

## 2025-01-20 PROCEDURE — 70450 CT HEAD/BRAIN W/O DYE: CPT | Performed by: INTERNAL MEDICINE

## 2025-01-20 PROCEDURE — 99222 1ST HOSP IP/OBS MODERATE 55: CPT | Performed by: CLINICAL NURSE SPECIALIST

## 2025-01-20 RX ORDER — POTASSIUM CHLORIDE 1.5 G/1.58G
40 POWDER, FOR SOLUTION ORAL ONCE
Status: COMPLETED | OUTPATIENT
Start: 2025-01-20 | End: 2025-01-20

## 2025-01-20 RX ORDER — CAPSAICIN 0.025 %
CREAM (GRAM) TOPICAL EVERY 6 HOURS PRN
Status: DISCONTINUED | OUTPATIENT
Start: 2025-01-20 | End: 2025-01-20

## 2025-01-20 RX ORDER — CYCLOBENZAPRINE HCL 5 MG
7.5 TABLET ORAL 3 TIMES DAILY PRN
Status: DISCONTINUED | OUTPATIENT
Start: 2025-01-20 | End: 2025-01-21

## 2025-01-20 RX ORDER — ESCITALOPRAM OXALATE 10 MG/1
10 TABLET ORAL NIGHTLY
Status: DISCONTINUED | OUTPATIENT
Start: 2025-01-20 | End: 2025-01-21

## 2025-01-20 RX ORDER — PREGABALIN 75 MG/1
300 CAPSULE ORAL NIGHTLY
Status: DISCONTINUED | OUTPATIENT
Start: 2025-01-20 | End: 2025-01-21

## 2025-01-20 RX ORDER — DIPHENHYDRAMINE HYDROCHLORIDE 50 MG/ML
12.5 INJECTION INTRAMUSCULAR; INTRAVENOUS ONCE
Status: COMPLETED | OUTPATIENT
Start: 2025-01-20 | End: 2025-01-20

## 2025-01-20 RX ORDER — CAPSAICIN 0.025 %
CREAM (GRAM) TOPICAL 3 TIMES DAILY PRN
Status: DISCONTINUED | OUTPATIENT
Start: 2025-01-20 | End: 2025-01-20

## 2025-01-20 NOTE — ED QUICK NOTES
Orders for admission, patient is aware of plan and ready to go upstairs. Any questions, please call ED RN Susanne DUNAWAY at extension A&Ox4.     Patient Covid vaccination status: Fully vaccinated     COVID Test Ordered in ED: None    COVID Suspicion at Admission: N/A    Running Infusions:    sodium chloride          Mental Status/LOC at time of transport: [id number]    Other pertinent information:   CIWA score: N/A   NIH score:  N/A

## 2025-01-20 NOTE — CONSULTS
[facility]  Diabetes Consult Note    Nina Donnelly Patient Status:  Observation    10/9/1945 MRN [id number]   Location [facility] 0SW-A Attending Donna Frausto MD   Hosp Day # 1 PCP Chiki Caldwell MD     Reason for Consult:   Management recommendations in setting of uncontrolled T2DM      Provider Requesting Consult:  Dr. Frausto ([facility] hospitalist)      Diagnosis:  Patient Active Problem List   Diagnosis    Class 1 obesity due to excess calories with serious comorbidity and body mass index (BMI) of 34.0 to 34.9 in adult    Intermediate stage nonexudative age-related macular degeneration of left eye    Amblyopia of left eye    Pseudophakia    Beta thalassemia trait    Type 2 diabetes mellitus with diabetic polyneuropathy, with long-term current use of insulin ([facility])    Mild episode of recurrent major depressive disorder (HCC)    Brain atrophy (HCC)    Benign essential hypertension    Gastroesophageal reflux disease    Mixed hyperlipidemia    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 (HCC)    Thoracic aorta atherosclerosis (HCC)    WYATT on CPAP    Chronic respiratory failure with hypoxia (HCC)    Stage 3a chronic kidney disease (HCC)    Community acquired pneumonia, unspecified laterality    Acute on chronic congestive heart failure, unspecified heart failure type (HCC)    Hypoxia    Bronchitis    Dyspnea, unspecified type    Azotemia    Anemia    Respiratory acidosis    Hyperglycemia    CO2 retention    Bilateral lower extremity edema    BIBIANA (acute kidney injury) (HCC)    Dehydration    Acute chest pain    Type 2 diabetes mellitus with hyperglycemia, with long-term current use of insulin (HCC)         Medical History:  Past Medical History:    Congestive heart disease (HCC)    Hyperlipidemia    Mild episode of recurrent major depressive disorder (HCC)    Neuropathy    Obstructive apnea    [facility] PSG AHI 11    WYATT on CPAP     Past Surgical History:    Procedure Laterality Date          Cabg      Foot surgery      Hysterectomy      Knee surgery      Tonsillectomy       Family History   Problem Relation Age of Onset    Psychiatric Mother     Diabetes Mother     Heart Disorder Mother     Breast Cancer Paternal Aunt         dx age unknown          Diabetes history:  Type:  T2DM  Onset: age 56  Family history of DM: mother w/ T2DM      Allergies: Allergies[1]      Medications: Complete list reviewed. Active diabetes medications include degludec and aspart.      Labs:  Recent Labs   Lab 25  0626 25  1155   PGLU 236* 132* 103* 116*     Recent Labs     25  0556 25  1155     --  142  --   --    CL 99  --  103  --   --    CO2 34.0*  --  34.0*  --   --    BUN 32*  --  27*  --   --    CREATSERUM 2.27*  --  1.93*  --   --    A1C  --   --  8.0*  --   --    PGLU  --    < >  --    < > 116*   CA 9.2  --  8.4*  --   --    ALB 4.1  --   --   --   --     < > = values in this interval not displayed.                   History of Present Illness: Nina Donnelly is a 79 year old female with a PMH of T2DM, COPD on O2, WYATT, HLD, CKD and CHF admitted 2025 with complaints of lightheadedness, weakness and hyperglycemia on home glucometer.        Assessment/Recommendations:  Upon interview today, patient states she was diagnosed with T2DM at age 56; states she has followed with Dr. Del Real ([facility] endocrinology) for management for many years. Patient states that her dose of glargine was just increased by Dr. Del Real from 16u to 24u without a change in amount of insulin written in prescription. Patient states she realized Friday PM as she was about to give her glargine dose of 25u that she only had 6u left in her insulin pens at home; states she gave herself those 6u and sent a Sarata message to her outpatient endocrinology provider for a refill. Per the patient, the pharmacy did  receive a refill, but she was told she was not able to fill the script due to it being too soon until Tuesday 1/21. Patient states that on Saturday and Sunday, her home glucometer readings were >300; patient states she gave herself extra doses of aspart, but did not see much of a decrease in glucose. In addition, patient states she took extra doses of torsemide and hydralazine, as welll.     Explained to patient and son that, patient's hyperglycemia on admission is likely due to lack of basal insulin over 2+ days. Recommended writing larger script at discharge to cover extras in case insurance does not refill on time again given dose changes. Patient and son states understanding of and willingness to participate in plan of care.       Plan:  Inpatient recommendations -   Degludec = given within goal range fasted AM glucose, recommend continuing at 25u every day (similar to patient's home dose)  Aspart = given discharge late yesterday, recommend continuing at medium dose sliding scale  Discharge recommendations -   Restart home basal/bolus insulin:  Glargine = 25u every day   Aspart = 6u + personal sliding scale:  150-199 = 8u  200-249 = 10u  250-299 = 12u  300-349 = 14u      Prescription Recommendations:  Medicare:  Insulin:   Novolog, Fiasp, Apidra, Admelog, Levemir, Lantus, Basaglar, Tresiba, Toujeo  (If no secondary insurance, may need prior auth)  Supplies:  BD pen needles (Pari)  Glucometer:  OneTouch      Provider F/U Recommendations:  PCP - Dr. Caldwell ([facility])  Endocrinology/Diabetes Center - Dr. Del Real ([facility])      A total of 60 minutes were spent with the patient, 100% was spent counseling and coordinating care for T2 diabetes self-management including nutrition, exercise, blood glucose monitoring, insulin administration, medications, treatment options, follow-up coordination and resources.    Miladys Jamison, APRN  1/20/2025  4:13 PM       [1]   Allergies  Allergen Reactions    Pioglitazone UNKNOWN     Weight Gain     Capsaicin ITCHING    Vancomycin ITCHING

## 2025-01-20 NOTE — ED INITIAL ASSESSMENT (HPI)
Pt states she ran out of her insulin since Friday am.  Pt was given novolog.  Pt states feeling her heart is racing.  Pt noting her BS are in the 200-300's

## 2025-01-20 NOTE — CONSULTS
[facility]   part of [facility]    Report of Consultation    Nina Donnelly Patient Status:  Observation    10/9/1945 MRN [id number]   Location [facility] 0SW-A Attending Donna Frausto MD   Hosp Day # 1 PCP Chiki Caldwell MD       REASON FOR CONSULT:     BIBIANA on CKD stage 3b    HISTORY OF PRESENT ILLNESS:     78 yo F with history of CKD stage 3b with baseline creatinine 1.6-1.9 mg/dl, HFpEF, COPD, WYATT, DM, HL, beta thalassemia presented with leg cramping, LH, hyperglycemia.  She was recently increased in her torsemide dose from 40 mg/d to 60 mg/d due to weight gain and leg swelling.  She is also on spironolactone.  Labs here showed BIBIANA with creatinine 2.27 mg/dl.  Diuretics were held and she was started on saline.  Her leg swelling has resolved mostly and she denies urinary complaints, sob currently on RA.    REVIEW OF SYSTEMS:     Please see HPI for pertinent positives. 10 point review of systems otherwise reviewed and negative.     HISTORY:     Past Medical History:    Congestive heart disease (HCC)    Hyperlipidemia    Mild episode of recurrent major depressive disorder (HCC)    Neuropathy    Obstructive apnea    [facility] PSG AHI 11    WYATT on CPAP     Past Surgical History:   Procedure Laterality Date          Cabg      Foot surgery      Hysterectomy      Knee surgery      Tonsillectomy       Family History   Problem Relation Age of Onset    Psychiatric Mother     Diabetes Mother     Heart Disorder Mother     Breast Cancer Paternal Aunt         dx age unknown       reports that she quit smoking about 10 years ago. Her smoking use included cigarettes. She started smoking about 65 years ago. She has a 55 pack-year smoking history. She has never used smokeless tobacco. She reports that she does not drink alcohol and does not use drugs.    ALLERGIES:     Allergies[1]    MEDICATIONS:       Current Facility-Administered Medications:     pregabalin (Lyrica) cap 300 mg, 300 mg, Oral, Nightly     cyclobenzaprine (Flexeril) tab 7.5 mg, 7.5 mg, Oral, TID PRN    escitalopram (Lexapro) tab 10 mg, 10 mg, Oral, Nightly    [COMPLETED] sodium chloride 0.9 % IV bolus 500 mL, 500 mL, Intravenous, Once **FOLLOWED BY** sodium chloride 0.9% infusion, , Intravenous, Continuous    aspirin chewable tab 81 mg, 81 mg, Oral, Daily    atorvastatin (Lipitor) tab 10 mg, 10 mg, Oral, Nightly    cholecalciferol (Vitamin D3) tab 1,000 Units, 1,000 Units, Oral, Daily    donepezil (Aricept) tab 10 mg, 10 mg, Oral, Nightly    DULoxetine (Cymbalta) DR cap 60 mg, 60 mg, Oral, Daily    hydrALAZINE (Apresoline) tab 100 mg, 100 mg, Oral, TID    insulin degludec (Tresiba) 100 units/mL flextouch 25 Units, 25 Units, Subcutaneous, Nightly    ipratropium (Atrovent) 0.03 % nasal solution 2 spray, 2 spray, Nasal, 2 times per day    heparin (Porcine) 5000 UNIT/ML injection 5,000 Units, 5,000 Units, Subcutaneous, Q8H CHICO    insulin aspart (NovoLOG) 100 Units/mL FlexPen 2-10 Units, 2-10 Units, Subcutaneous, TID AC and HS    acetaminophen (Tylenol) tab 650 mg, 650 mg, Oral, Q6H PRN    ondansetron (Zofran) 4 MG/2ML injection 4 mg, 4 mg, Intravenous, Q6H PRN  No current outpatient medications on file.         PHYSICAL EXAM:     Vital Signs: /49 (BP Location: Right arm)   Pulse 64   Temp 97.7 °F (36.5 °C) (Oral)   Resp 18   Ht 154.9 cm (5' 1\")   Wt 194 lb (88 kg)   SpO2 95%   BMI 36.66 kg/m²   Temp (24hrs), Av.8 °F (36.6 °C), Min:97.7 °F (36.5 °C), Max:98 °F (36.7 °C)       Intake/Output Summary (Last 24 hours) at 2025 1546  Last data filed at 2025 1504  Gross per 24 hour   Intake 2213 ml   Output 401 ml   Net 1812 ml     Wt Readings from Last 3 Encounters:   25 194 lb (88 kg)   10/17/24 193 lb 5.5 oz (87.7 kg)   10/11/24 197 lb 1.5 oz (89.4 kg)       General: pleasant, well appearing  Skin: no visible rashes  HEENT: NCAT  Cardiac: Regular rate and rhythm, no murmur/gallop or rub  Lungs: CTAB, no wheeze, no rale, no  rhonchi  Abdomen: Soft, NTND  Extremities: warm, well perfused, no leg edema  Neurologic/Psych: mentating well    LABORATORY DATA:       Lab Results   Component Value Date     (H) 01/20/2025    BUN 27 (H) 01/20/2025    BUNCREA 18.0 03/15/2022    CREATSERUM 1.93 (H) 01/20/2025    ANIONGAP 5 01/20/2025    GFRNAA 35 (L) 11/09/2021    GFRAA 40 (L) 11/09/2021    CA 8.4 (L) 01/20/2025    OSMOCALC 299 (H) 01/20/2025    ALKPHO 90 01/19/2025    AST 34 (H) 01/19/2025    ALT 17 01/19/2025    BILT 0.5 01/19/2025    TP 6.3 01/19/2025    ALB 4.1 01/19/2025    GLOBULIN 2.2 01/19/2025     01/20/2025    K 3.7 01/20/2025     01/20/2025    CO2 34.0 (H) 01/20/2025     Lab Results   Component Value Date    WBC 7.5 01/20/2025    RBC 5.98 (H) 01/20/2025    HGB 10.4 (L) 01/20/2025    HCT 34.6 (L) 01/20/2025    .0 01/20/2025    MCV 57.9 (L) 01/20/2025    MCH 17.4 (L) 01/20/2025    MCHC 30.1 (L) 01/20/2025    RDW 21.6 01/20/2025    NEPRELIM 4.55 01/20/2025    NEUTABS 4.34 04/07/2020    LYMPHABS 2.28 04/07/2020    EOSABS 0.14 04/07/2020    BASABS 0.07 04/07/2020    NEPERCENT 60.4 01/20/2025    LYPERCENT 23.2 01/20/2025    MOPERCENT 10.1 01/20/2025    EOPERCENT 5.2 01/20/2025    BAPERCENT 0.8 01/20/2025    NE 4.55 01/20/2025    LYMABS 1.75 01/20/2025    MOABSO 0.76 01/20/2025    EOABSO 0.39 01/20/2025    BAABSO 0.06 01/20/2025     Lab Results   Component Value Date    MALBP <1.2 03/15/2022    CREUR 45.53 03/15/2022     Lab Results   Component Value Date    COLORUR Colorless (A) 01/20/2025    CLARITY Clear 01/20/2025    SPECGRAVITY 1.005 01/20/2025    GLUUR 1000 (A) 01/20/2025    BILUR Negative 01/20/2025    KETUR Negative 01/20/2025    BLOODURINE Negative 01/20/2025    PHURINE 7.0 01/20/2025    PROUR Negative 01/20/2025    UROBILINOGEN Normal 01/20/2025    NITRITE Negative 01/20/2025    LEUUR Negative 01/20/2025    NMIC Microscopic not indicated 01/20/2025         IMAGING:     Reviewed.      ASSESSMENT/PLAN:          80 yo F with history of CKD stage 3b with baseline creatinine 1.6-1.9 mg/dl, HFpEF, COPD, WYATT, DM, HL, beta thalassemia presented with leg cramping, LH, hyperglycemia.    Nephrology consulted for BIBIANA on CKD3b:    BIBIANA on CKD3b:  -- hold diuretics  -- hold RAASi, spironolactone  -- avoid iodinated contrast, fleets, group 1 gadolinium  -- strict I/Os, daily weights  -- pending weights/vitals/exam, will consider resuming diuretics accordingly    Anemia in CKD:  -- Hb above threshold for BERENICE  -- ferritin 18, tsat 6 -> IV iron if patient willing    MBD:  -- phos with AM labs    Leg cramping:  -- Ca, K, Mg, Vit D acceptable  -- on medications for neuropathy    HFpEF:  -- cardiology following    D/w RN and patient's daughter at bedside.    Thank you for allowing me to participate in the care of your patient. Please do not hesitate to contact me with concerns or questions.    Shandra Scott MD  [facility]  [address] [place] [id number]    1/20/2025  3:46 PM         [1]   Allergies  Allergen Reactions    Pioglitazone UNKNOWN     Weight Gain    Capsaicin ITCHING    Vancomycin ITCHING

## 2025-01-20 NOTE — H&P
[facility] Hospitalist History and Physical     PCP: Chiki Caldwell MD      Chief Complaint: leg cramps     History of Present Illness: Patient is a 79 year old female with hx of HTN, HLD, CKD, DM, COPD on chronic oxygen 2L, HFpEF, WYATT on CPAP, here with weakness and leg cramps    Pt states for the last week or so pt has noted increase leg cramps and also feeling tired.  She did increase her lasix from 40 to 60 and notes when she does that her legs start cramping worse.   She has no chest pain no SOB noted.  She denies fevers no chills no cough no headache. She also states she gets on/off sleepy, started when she got covid and at times it get worse.  She also noted her glucose levels have been high b/c she ran out of her lantus.      Currently pt complaining of severe itching and unable to concentrate due to the itching in her legs, states it was from the cream that was applied.     In the ED, pt afebrile, HR 50s, RR 18, BP 100s systolics, on 2L NC chronic, wbc wnl, pct and bnp wnl          Current Meds  Scheduled Meds:    pregabalin  300 mg Oral Nightly    aspirin  81 mg Oral Daily    atorvastatin  10 mg Oral Nightly    cholecalciferol  1,000 Units Oral Daily    donepezil  10 mg Oral Nightly    DULoxetine  60 mg Oral Daily    escitalopram  10 mg Oral Daily    hydrALAZINE  100 mg Oral TID    insulin degludec  25 Units Subcutaneous Nightly    ipratropium  2 spray Nasal 2 times per day    heparin  5,000 Units Subcutaneous Q8H CHICO    insulin aspart  2-10 Units Subcutaneous TID AC and HS     Continuous Infusions:    sodium chloride 125 mL/hr at 01/19/25 2139    sodium chloride 75 mL/hr at 01/20/25 0125     PRN Meds:   capsaicin    acetaminophen    ondansetron    Allergies[1]     Past Medical History:    Congestive heart disease (HCC)    Hyperlipidemia    Mild episode of recurrent major depressive disorder (HCC)    Neuropathy    Obstructive apnea    [facility] PSG AHI 11    WYATT on CPAP      Past Surgical History:   Procedure  Laterality Date          Cabg      Foot surgery      Hysterectomy      Knee surgery      Tonsillectomy        Social History     Tobacco Use    Smoking status: Former     Current packs/day: 0.00     Average packs/day: 1 pack/day for 55.0 years (55.0 ttl pk-yrs)     Types: Cigarettes     Start date: 1960     Quit date: 2015     Years since quitting: 10.0    Smokeless tobacco: Never   Substance Use Topics    Alcohol use: No      Family History   Problem Relation Age of Onset    Psychiatric Mother     Diabetes Mother     Heart Disorder Mother     Breast Cancer Paternal Aunt         dx age unknown               Intake/Output:  I/O last 3 completed shifts:  In: 1373 [I.V.:873; IV PIGGYBACK:500]  Out: 1 [Urine:1]  Wt Readings from Last 3 Encounters:   25 194 lb (88 kg)   10/17/24 193 lb 5.5 oz (87.7 kg)   10/11/24 197 lb 1.5 oz (89.4 kg)         Exam:   Temp:  [97.7 °F (36.5 °C)-98 °F (36.7 °C)] 97.7 °F (36.5 °C)  Pulse:  [58-71] 58  Resp:  [12-22] 18  BP: (104-145)/(45-75) 104/47  SpO2:  [93 %-99 %] 97 %  General:  Alert, no distress, appears stated age.   Head:  Normocephalic, without obvious abnormality, atraumatic.    Eyes:  Sclera anicteric, No conjunctival pallor, EOMs intact.    Throat: MMM     Neck: Supple, symmetrical, trachea midline    Lungs:   Clear to auscultation bilaterally. Normal effort    Chest wall:  No tenderness or deformity.   Heart:  Regular rate and rhythm, no peripheral edema    Abdomen:   Soft, NT/ND, +bs    MSK: Atraumatic, no cyanosis or edema.    Skin: No visible rashes or lesions.     Neurologic: Normal strength, no focal deficit appreciated           Labs:       Lab Results   Component Value Date    WBC 7.5 2025    HGB 10.4 2025    HCT 34.6 2025    .0 2025    CREATSERUM 1.93 2025    BUN 27 2025     2025    K 3.7 2025     2025    CO2 34.0 2025     2025    CA 8.4 2025     ALB 4.1 01/19/2025    ALKPHO 90 01/19/2025    BILT 0.5 01/19/2025    TP 6.3 01/19/2025    AST 34 01/19/2025    ALT 17 01/19/2025    PGLU 103 01/20/2025               Assessment/Plan:  Patient is a 79 year old female with hx of HTN, HLD, CKD, DM, COPD on chronic oxygen 2L, HFpEF, WYATT on CPAP, here with weakness and leg cramps       Plan:    Leg cramps  - severe per pt, has it at baseline but now worse  - suspecting from possibly being dehydrated, from increase lasix / elevated cr  - hold diuretics - check Mag, replace potassium, check iron studies, b12, vitamin D   - check dopplers to ensure no other etiology of leg pain   - continue home lyrica,   - was seen by neuro in the past for this      Severe ithcing  - pt complaining of itching   - one time dose of benadryl     DM  - pt states she ran out of her insulin - wasn't able to fill it  - insulin in house, A1c, monitor glucose    - DM educator     HTN  - home med, hydralazine 100mg TID   - holding torsemide and aldactone     BIBIANA on CKD   - hold diuretics   - renal consutled     COPD on 2-3L NC at home  - no s/s of exacerbatino   - CTPE no PE noted no PNA noted     On/off sleepy at home   - suspecting 2/2 metabolic derangements  - will check CTOH to ensure no acute pathology     Depression/anxiety  - cymbalta, lexapro     WYATT on CPAP     MCI   - on donepezil       Prophylaxis:  DVT: lovenox    Full code d/w pt and pts son       Donna Frausto MD   [facility] Hospitalist           [1]   Allergies  Allergen Reactions    Pioglitazone UNKNOWN     Weight Gain    Vancomycin ITCHING

## 2025-01-20 NOTE — ED PROVIDER NOTES
Patient Seen in: [facility] Emergency Department      History     Chief Complaint   Patient presents with    Leg Pain    Lightheadedness    Hyperglycemia     Stated Complaint: Bilateral leg pain, feeling lightheaded, hyperglycemia    Subjective:   HPI      79-year-old female presents for evaluation of lightheadedness and weakness for the past 24 hours.  Patient relates this to having run out of her Lantus insulin which will not be refilled until Tuesday due to pharmacy issues.  She has been taking only her NovoLog sliding scale.  Blood sugar today has been running between 200 and 300.  Patient also has had a constant chest discomfort today.  No shortness of breath.  Patient has a history of COPD and CHF on 3 L oxygen nasal cannula at home.  She states her weight has recently been stable    Objective:     Past Medical History:    Congestive heart disease (HCC)    Hyperlipidemia    Mild episode of recurrent major depressive disorder (HCC)    Neuropathy    Obstructive apnea    [facility] PSG AHI 11    WYATT on CPAP              Past Surgical History:   Procedure Laterality Date          Cabg      Foot surgery      Hysterectomy      Knee surgery      Tonsillectomy                  Social History     Socioeconomic History    Marital status:    Tobacco Use    Smoking status: Former     Current packs/day: 0.00     Average packs/day: 1 pack/day for 55.0 years (55.0 ttl pk-yrs)     Types: Cigarettes     Start date: 1960     Quit date: 2015     Years since quitting: 10.0    Smokeless tobacco: Never   Vaping Use    Vaping status: Never Used   Substance and Sexual Activity    Alcohol use: No    Drug use: No     Social Drivers of Health     Financial Resource Strain: Low Risk  (2023)    Received from [facility]    Overall Financial Resource Strain (CARDIA)     Difficulty of Paying Living Expenses: Not very hard   Food Insecurity: No Food Insecurity (10/14/2024)    Food Insecurity     Food  Insecurity: Never true   Transportation Needs: No Transportation Needs (10/14/2024)    Transportation Needs     Lack of Transportation: No   Physical Activity: Sufficiently Active (7/7/2023)    Received from [facility]    Exercise Vital Sign     Days of Exercise per Week: 3 days     Minutes of Exercise per Session: 50 min   Stress: Stress Concern Present (7/7/2023)    Received from [facility]    [employer] - Occupational Stress Questionnaire     Feeling of Stress : Rather much   Social Connections: Moderately Isolated (7/7/2023)    Received from [facility]    Social Connection and Isolation Panel [NHANES]     Frequency of Communication with Friends and Family: More than three times a week     Frequency of Social Gatherings with Friends and Family: More than three times a week     Attends [employer] Services: More than 4 times per year     Active Member of Clubs or Organizations: No     Attends Club or Organization Meetings: Never     Marital Status:    Housing Stability: Low Risk  (10/14/2024)    Housing Stability     Housing Instability: No                  Physical Exam     ED Triage Vitals [01/19/25 1914]   /75   Pulse 67   Resp 16   Temp    Temp src    SpO2 93 %   O2 Device Nasal cannula       Current Vitals:   Vital Signs  BP: 127/52  Pulse: 64  Resp: 15  MAP (mmHg): 74    Oxygen Therapy  SpO2: 95 %  O2 Device: None (Room air)  O2 Flow Rate (L/min): 3 L/min        Physical Exam     General: Alert, oriented, no apparent distress  HEENT:  Pupils equal reactive.  Extraocular motions intact. MMM.  Neck: Supple  Lungs: Clear to auscultation bilaterally.  Heart: Regular rate and rhythm.  Abdomen: Soft, nontender.   Skin: No rash.  No edema.  Neurologic: No focal neurologic deficits.  Normal speech pattern  Musculoskeletal: No tenderness or deformity noted.  Full range of motion throughout.      ED Course     Labs Reviewed   CBC WITH DIFFERENTIAL  WITH PLATELET - Abnormal; Notable for the following components:       Result Value    RBC 6.02 (*)     HGB 10.5 (*)     HCT 33.9 (*)     MCV 56.3 (*)     MCH 17.4 (*)     All other components within normal limits   COMP METABOLIC PANEL (14) - Abnormal; Notable for the following components:    Glucose 225 (*)     CO2 34.0 (*)     BUN 32 (*)     Creatinine 2.27 (*)     Calculated Osmolality 302 (*)     eGFR-Cr 22 (*)     AST 34 (*)     All other components within normal limits   POCT GLUCOSE - Abnormal; Notable for the following components:    POC Glucose 236 (*)     All other components within normal limits   TROPONIN I HIGH SENSITIVITY - Normal   URINALYSIS WITH CULTURE REFLEX   PATH COMMENT CBC   PRO BETA NATRIURETIC PEPTIDE   PROCALCITONIN   RAINBOW DRAW LAVENDER   RAINBOW DRAW LIGHT GREEN   RAINBOW DRAW BLUE   RAINBOW DRAW GOLD     EKG    Rate, intervals and axes as noted on EKG Report.  Rate: 70  Rhythm: Sinus Rhythm  Reading: No acute appearing ST elevation or depression, appears similar to October 14, 2024                       MDM      Differential diagnosis includes hyperglycemia, metabolic abnormality, UTI    I have independently visualized the radiology images, my focus/limited interpretation: No focal consolidation    Defer to radiologist for other/incidental findings    XR CHEST AP PORTABLE  (CPT=71045)    Result Date: 1/19/2025  PROCEDURE:  XR CHEST AP PORTABLE  (CPT=71045)  TECHNIQUE:  AP chest radiograph was obtained.  COMPARISON:  None.  INDICATIONS:  Bilateral leg pain, feeling lightheaded, hyperglycemia  PATIENT STATED HISTORY: (As transcribed by Technologist)  Patient stated feeling light headed.    FINDINGS:  Cardiomegaly with mild prominence of the central pulmonary vascularity. Patchy opacity in the lower lungs is concerning for developing infiltrates.  Clinical correlation recommended.  Small left pleural effusion.  No pneumothorax.            CONCLUSION:  Patchy opacity in the lower lungs is  concerning for developing infiltrates.  Clinical correlation recommended.  Small left pleural effusion.    LOCATION:  [facility]      Dictated by (CST): Dave Pete MD on 1/19/2025 at 7:59 PM     Finalized by (CST): Dave Pete MD on 1/19/2025 at 8:00 PM          Troponin negative.    Chemistry with creatinine of 2.2.  This indicates BIBIANA.    CBC with some mild baseline anemia    Medical Decision Making  Amount and/or Complexity of Data Reviewed  Independent Historian: caregiver     Details: Daughter at bedside    Spoke with Dr. Gunderson for admission.  He request to add on a procalcitonin and BNP.  Noncontrast CT scan was ordered to further evaluate findings on chest x-ray that radiologist reviewed    Disposition and Plan     Clinical Impression:  1. BIBIANA (acute kidney injury) (HCC)    2. Dehydration    3. Acute chest pain    4. Hyperglycemia         Disposition:  There is no disposition on file for this visit.  There is no disposition time on file for this visit.    Follow-up:  No follow-up provider specified.        Medications Prescribed:  Current Discharge Medication List              Supplementary Documentation:                                                            Risk due to confusion

## 2025-01-20 NOTE — CM/SW NOTE
This is a Code 44. Patient failed inpatient criteria. Second level of review completed and supports observation.  UR committee in agreement. Discussed with Dr. Frausto  who approves observation status.  Observation order placed. FITZGERALD given to the patient and signed copy placed in their chart.    Notified of need for MOON for status down grade by email from ELOISA Kent/  tonya discussion with MD and status reviews were completed by her      Code 44: Notification of Observation Status Change delivered to Patient

## 2025-01-20 NOTE — CONSULTS
[facility] Cardiology Consultation    Nina Donnelly Patient Status:  Inpatient    10/9/1945 MRN [id number]   [facility] 0SW-A Attending Lakhwinder Gunderson, DO   Hosp Day # 1 PCP Chiki Caldwell MD     Reason for Consultation:  hx of CHF      History of Present Illness:  Nina Donnelly is a a(n) 79 year old female.  Previous patient of Dr. Chowdhury with COPD, WYATT, Diabetes, Dyslipidemia, CKD, (Cr = 2) .  Uses CPAP and O2 at home. She had an  admission 10/12/24 for fatigue, dyspnea, lassitude. Minimal troponin elevation. Echo and Lexiscan cardiolyte stress test then were  unremarkable. She saw genie puga in HF clinic 25. She c/o fatigue, dyspnea, LE edema.  The torsemide was increased from 40 mg daily to 60 mg daily. Her insulin regimen changed recently.  She came to hospital due to worsening painful leg cramps, fatigue, sleepiness, high sugars, lightheadedness, LE edema, her weight has decreased from 198--> 194--> 191 lbs.  The dyspnea is noted with moderately strenuous activities. No orthopnea.  She uses home O2 for COPD.    History:  Past Medical History:    Congestive heart disease (HCC)    Hyperlipidemia    Mild episode of recurrent major depressive disorder (HCC)    Neuropathy    Obstructive apnea    [facility] PSG AHI 11    WYATT on CPAP     Past Surgical History:   Procedure Laterality Date          Cabg      Foot surgery      Hysterectomy      Knee surgery      Tonsillectomy       Family History   Problem Relation Age of Onset    Psychiatric Mother     Diabetes Mother     Heart Disorder Mother     Breast Cancer Paternal Aunt         dx age unknown          Allergies:  Allergies[1]    Medications:   pregabalin  300 mg Oral Nightly    aspirin  81 mg Oral Daily    atorvastatin  10 mg Oral Nightly    cholecalciferol  1,000 Units Oral Daily    donepezil  10 mg Oral Nightly    DULoxetine  60 mg Oral Daily    escitalopram  10 mg Oral Daily    hydrALAZINE  100 mg Oral TID    insulin degludec  25 Units  Subcutaneous Nightly    ipratropium  2 spray Nasal 2 times per day    heparin  5,000 Units Subcutaneous Q8H CHICO    insulin aspart  2-10 Units Subcutaneous TID AC and HS       Continuous Infusions:   sodium chloride 125 mL/hr at 01/19/25 2139    sodium chloride 75 mL/hr at 01/20/25 0125       Social History:   reports that she quit smoking about 10 years ago. Her smoking use included cigarettes. She started smoking about 65 years ago. She has a 55 pack-year smoking history. She has never used smokeless tobacco. She reports that she does not drink alcohol and does not use drugs.    Review of Systems:  All systems were reviewed and are negative except as described above in HPI.    Physical Exam:      Wt Readings from Last 3 Encounters:   01/19/25 194 lb (88 kg)   10/17/24 193 lb 5.5 oz (87.7 kg)   10/11/24 197 lb 1.5 oz (89.4 kg)       Vitals:    01/20/25 0155 01/20/25 0530 01/20/25 0724 01/20/25 0742   BP:  140/52  104/47   BP Location:  Left arm  Right arm   Pulse: 66 66 65 58   Resp:  16  18   Temp:  97.7 °F (36.5 °C)  97.7 °F (36.5 °C)   TempSrc:  Oral  Oral   SpO2: 96% 97%     Weight:       Height:           Temp:  [97.7 °F (36.5 °C)-98 °F (36.7 °C)] 97.7 °F (36.5 °C)  Pulse:  [58-71] 58  Resp:  [12-22] 18  BP: (104-145)/(45-75) 104/47  SpO2:  [93 %-99 %] 97 %    Temp: 97.7 °F (36.5 °C)  Pulse: 58  Resp: 18  BP: 104/47      General:  Appears comfortable  HEENT: No focal deficits.  Neck: No JVD, carotids 2+ no bruits.  Cardiac: Regular S1S2.  No S3, S4, rub, click.  No murmur.  Lungs: minimal bibasilar crackles.  Abdomen: Soft, non-tender.   Extremities: 1+ bilateral  LE edema.  No clubbing or cyanosis  Neurologic: Alert and oriented, normal affect.  Skin: Warm and dry.     Labs:      HEM:  Recent Labs   Lab 01/19/25 1937 01/20/25  0556   WBC 9.1 7.5   HGB 10.5* 10.4*   HCT 33.9* 34.6*   .0 171.0       Chem:  Recent Labs   Lab 01/19/25 1938 01/20/25  0556    142   K 3.7 3.7   CL 99 103   CO2 34.0*  34.0*   BUN 32* 27*   CREATSERUM 2.27* 1.93*   ALT 17  --    AST 34*  --    ALB 4.1  --        No results for input(s): \"INR\" in the last 168 hours.                  No results found for: \"TROP\", \"CKMB\"      Invalid input(s): \"PBNPML\"                 Telemetry:     Laboratories and Data:  Diagnostics:    EKG, 1/19/2025:  NSR, PRWP    CXR, 1/20/2025:      Echo, 10/2024:    1. Left ventricle: The cavity size was normal. Wall thickness was normal.      Systolic function was normal. The estimated ejection fraction was 65-70%,      by visual assessment. No diagnostic evidence for regional wall motion      abnormalities. Left ventricular diastolic function parameters were      normal.   2. Left atrium: The left atrial volume was normal.     Lexiscan cardiolyte stress test, 10/2024:    IMPRESSION:  Normal nuclear myocardial perfusion scan.   Ejection fraction measures 74% by gated SPECT.   Normal electrocardiographic response to regadenoson infusion.       Impression:    1.  Hx of CHF.  Major concern for this admission is lower extremity edema, cramps, fatigue, elevated sugars. Degree of dyspnea is stable and tolerable.  I suspect an element of dehydration related to recent increase in torsemide resulting in worse renal fct, cramping, fatigue. Does not appear to be in pulm edema.  BNP is normal    2. Acute -0n- CKD  3. Diabetes  4. COPD  5. WYATT      Recommend:    1.  Telemetry  2. Hold diuretics  3. Follow BMP's  4. Hold spironolactone  5. Consider renal consult          Dago [place] MD  1/20/2025  8:10 AM         [1]   Allergies  Allergen Reactions    Pioglitazone UNKNOWN     Weight Gain    Vancomycin ITCHING

## 2025-01-20 NOTE — PHYSICAL THERAPY NOTE
PHYSICAL THERAPY EVALUATION - INPATIENT     Room Number: 0015/0015-A  Evaluation Date: 1/20/2025  Type of Evaluation: Initial  Physician Order: PT Eval and Treat    Presenting Problem: B LE pain, lightheadedness, weakness  Co-Morbidities : HTN, HLD, DM, HFpEF, WYATT, CKD, COPD  Reason for Therapy: Mobility Dysfunction and Discharge Planning    PHYSICAL THERAPY ASSESSMENT   Patient is a 79 year old female admitted 1/19/2025 for B LE pain, lightheadedness, weakness.  Prior to admission, patient's baseline is independent/mod I.  Patient is currently functioning near baseline with bed mobility, transfers, gait, and standing prolonged periods.  Patient is requiring supervision, stand-by assist, and contact guard assist as a result of the following impairments: decreased endurance/aerobic capacity, pain, impaired standing balance, and decreased muscular endurance.  Physical Therapy will continue to follow for duration of hospitalization.    Patient will benefit from continued skilled PT Services at discharge to promote prior level of function.  Anticipate patient will return home with OP PT.     PLAN DURING HOSPITALIZATION  Nursing Mobility Recommendation : 1 Assist  PT Device Recommendation: Rolling walker  PT Treatment Plan: Endurance;Energy conservation;Patient education;Family education;Gait training;Strengthening;Transfer training;Stoop training;Balance training;Range of motion;Body mechanics  Rehab Potential : Good  Frequency (Obs): 3-5x/week     CURRENT GOALS    Goal #1 Patient is able to demonstrate supine - sit EOB @ level: modified independent  MET   Goal #2 Patient is able to demonstrate transfers Sit to/from Stand at assistance level: modified independent     Goal #3 Patient is able to ambulate 150 feet with assist device:  least restrictive  at assistance level: modified independent     Goal #4    Goal #5    Goal #6    Goal Comments: Goals established on 1/20/2025      PHYSICAL THERAPY MEDICAL/SOCIAL  HISTORY  History related to current admission: Patient is a 79 year old female admitted on 2025 from home for B LE pain, lightheadedness, and weakness. B LE US venous doppler negative for DVT.    HOME SITUATION  Type of Home: Independent living facility  Home Layout: One level  Stairs to Enter : 0        Stairs to Bedroom: 0         Lives With: Alone    Drives: No   Patient Regularly Uses: Home O2      Prior Level of [place]: Pt lives alone on 4th floor at [facility]. Pt typically independent/mod I with all functional mobility; ambulates without assistive device but does have RW at home if needed. Pt walks to [employer] 2x/day for meals and walks down for Bingo, cards, etc. Pt currently receiving OP PT.    SUBJECTIVE  Pt states her legs are hurting more than usual right now.    OBJECTIVE  Precautions: Bed/chair alarm  Fall Risk: High fall risk    WEIGHT BEARING RESTRICTION     PAIN ASSESSMENT  Ratin  Location: B legs  Management Techniques: Activity promotion;Repositioning;Body mechanics;Relaxation    COGNITION  Overall Cognitive Status:  WFL - within functional limits    RANGE OF MOTION AND STRENGTH ASSESSMENT  Upper extremity ROM and strength are within functional limits     Lower extremity ROM is within functional limits     Lower extremity strength is within functional limits     BALANCE  Static Sitting: Good  Dynamic Sitting: Fair +  Static Standing: Fair  Dynamic Standing: Fair -    ADDITIONAL TESTS                                    ACTIVITY TOLERANCE           BP: 141/46  BP Location: Right arm  BP Method: Automatic  Patient Position: Sitting    O2 WALK  Oxygen Therapy  SPO2% on Oxygen at Rest: 96  At rest oxygen flow (liters per minute): 3    NEUROLOGICAL FINDINGS                        AM-PAC '6-Clicks' INPATIENT SHORT FORM - BASIC MOBILITY  How much difficulty does the patient currently have...  Patient Difficulty: Turning over in bed (including adjusting bedclothes, sheets and blankets)?: None    Patient Difficulty: Sitting down on and standing up from a chair with arms (e.g., wheelchair, bedside commode, etc.): None   Patient Difficulty: Moving from lying on back to sitting on the side of the bed?: None   How much help from another person does the patient currently need...   Help from Another: Moving to and from a bed to a chair (including a wheelchair)?: A Little   Help from Another: Need to walk in hospital room?: A Little   Help from Another: Climbing 3-5 steps with a railing?: A Little     AM-PAC Score:  Raw Score: 21   Approx Degree of Impairment: 28.97%   Standardized Score (AM-PAC Scale): 50.25   CMS Modifier (G-Code): CJ    FUNCTIONAL ABILITY STATUS  Gait Assessment   Functional Mobility/Gait Assessment  Gait Assistance: Contact guard assist;Supervision  Distance (ft): 15, 100  Assistive Device: None;Rolling walker    Skilled Therapy Provided     Bed Mobility:  Supine to sit: mod I   Sit to supine: mod I     Transfer Mobility:  Sit to stand: supervision   Stand to sit: supervision  Gait = Pt ambulated a total of 115': 100' with RW and SBA -> supervision; more upright posture;15' without assistive device with SBA/CGA - increased lateral sway, decreased gait speed, decreased step length, increased forward trunk flexion when not using RW.    Therapist's Comments: Pt lying in bed and agreeable to PT. Dtr states pt recently received muscle relaxer.  Pt reports feeling slightly \"woozy\" and attributes it to medication. /46, SpO2 96% on 3 L/min O2.    Exercise/Education Provided:  Functional activity tolerated  Gait training  Posture  Transfer training    Patient End of Session: In bed;Needs met;Call light within reach;RN aware of session/findings;All patient questions and concerns addressed;Hospital anti-slip socks;Alarm set;Family present      Patient Evaluation Complexity Level:  History High - 3 or more personal factors and/or co-morbidities   Examination of body systems Moderate - addressing a  total of 3 or more elements   Clinical Presentation  Moderate - Evolving   Clinical Decision Making Low Complexity       PT Session Time: 26 minutes  Gait Training: 10 minutes

## 2025-01-20 NOTE — PLAN OF CARE
The patient is A/Ox4, On 3L NC, Tele - NSR, Vitals Stable , Afebrile, c/o BLE cramping, improved  Voids  Ambulatory   POC - US doppler BLE, CT brain, PT/OT eval  Consults following - cards, nephrology  Safety precautions in place. Staff will continue to monitor.               Problem: RISK FOR INFECTION - ADULT  Goal: Absence of fever/infection during anticipated neutropenic period  Description: INTERVENTIONS  - Monitor WBC  - Administer growth factors as ordered  - Implement neutropenic guidelines  Outcome: Progressing     Problem: SAFETY ADULT - FALL  Goal: Free from fall injury  Description: INTERVENTIONS:  - Assess pt frequently for physical needs  - Identify cognitive and physical deficits and behaviors that affect risk of falls.  - Louin fall precautions as indicated by assessment.  - Educate pt/family on patient safety including physical limitations  - Instruct pt to call for assistance with activity based on assessment  - Modify environment to reduce risk of injury  - Provide assistive devices as appropriate  - Consider OT/PT consult to assist with strengthening/mobility  - Encourage toileting schedule  Outcome: Progressing     Problem: DISCHARGE PLANNING  Goal: Discharge to home or other facility with appropriate resources  Description: INTERVENTIONS:  - Identify barriers to discharge w/pt and caregiver  - Include patient/family/discharge partner in discharge planning  - Arrange for needed discharge resources and transportation as appropriate  - Identify discharge learning needs (meds, wound care, etc)  - Arrange for interpreters to assist at discharge as needed  - Consider post-discharge preferences of patient/family/discharge partner  - Complete POLST form as appropriate  - Assess patient's ability to be responsible for managing their own health  - Refer to Case Management Department for coordinating discharge planning if the patient needs post-hospital services based on physician/LIP order or complex  needs related to functional status, cognitive ability or social support system  Outcome: Progressing

## 2025-01-20 NOTE — PLAN OF CARE
NURSING ADMISSION NOTE      Patient admitted via Cart with daughter at bedside.   Oriented to room.  Safety precautions initiated.  Bed in low position.  Call light in reach. Pt denies any chest pain at that time or sob. Daughter requesting pts kidney doctor be placed on consult. Pt on ivf. Cardiology consult text paged. Discussed POC with pt and daughter and all questions and concerns were addressed.

## 2025-01-21 VITALS
BODY MASS INDEX: 34.87 KG/M2 | HEIGHT: 61 IN | DIASTOLIC BLOOD PRESSURE: 50 MMHG | SYSTOLIC BLOOD PRESSURE: 131 MMHG | OXYGEN SATURATION: 91 % | RESPIRATION RATE: 20 BRPM | WEIGHT: 184.69 LBS | HEART RATE: 62 BPM | TEMPERATURE: 98 F

## 2025-01-21 LAB
ALBUMIN SERPL-MCNC: 3.8 G/DL (ref 3.2–4.8)
ANION GAP SERPL CALC-SCNC: 5 MMOL/L (ref 0–18)
BASOPHILS # BLD AUTO: 0.05 X10(3) UL (ref 0–0.2)
BASOPHILS NFR BLD AUTO: 0.7 %
BUN BLD-MCNC: 25 MG/DL (ref 9–23)
CALCIUM BLD-MCNC: 8.7 MG/DL (ref 8.7–10.6)
CHLORIDE SERPL-SCNC: 106 MMOL/L (ref 98–112)
CO2 SERPL-SCNC: 33 MMOL/L (ref 21–32)
CREAT BLD-MCNC: 1.6 MG/DL
EGFRCR SERPLBLD CKD-EPI 2021: 33 ML/MIN/1.73M2 (ref 60–?)
EOSINOPHIL # BLD AUTO: 0.35 X10(3) UL (ref 0–0.7)
EOSINOPHIL NFR BLD AUTO: 4.7 %
ERYTHROCYTE [DISTWIDTH] IN BLOOD BY AUTOMATED COUNT: 21.8 %
GLUCOSE BLD-MCNC: 206 MG/DL (ref 70–99)
GLUCOSE BLD-MCNC: 65 MG/DL (ref 70–99)
GLUCOSE BLD-MCNC: 74 MG/DL (ref 70–99)
GLUCOSE BLD-MCNC: 87 MG/DL (ref 70–99)
GLUCOSE BLD-MCNC: 88 MG/DL (ref 70–99)
HCT VFR BLD AUTO: 35.6 %
HGB BLD-MCNC: 10.3 G/DL
IMM GRANULOCYTES # BLD AUTO: 0.03 X10(3) UL (ref 0–1)
IMM GRANULOCYTES NFR BLD: 0.4 %
LYMPHOCYTES # BLD AUTO: 1.57 X10(3) UL (ref 1–4)
LYMPHOCYTES NFR BLD AUTO: 21.2 %
MAGNESIUM SERPL-MCNC: 2.3 MG/DL (ref 1.6–2.6)
MCH RBC QN AUTO: 17.2 PG (ref 26–34)
MCHC RBC AUTO-ENTMCNC: 28.9 G/DL (ref 31–37)
MCV RBC AUTO: 59.4 FL
MONOCYTES # BLD AUTO: 0.69 X10(3) UL (ref 0.1–1)
MONOCYTES NFR BLD AUTO: 9.3 %
NEUTROPHILS # BLD AUTO: 4.71 X10 (3) UL (ref 1.5–7.7)
NEUTROPHILS # BLD AUTO: 4.71 X10(3) UL (ref 1.5–7.7)
NEUTROPHILS NFR BLD AUTO: 63.7 %
OSMOLALITY SERPL CALC.SUM OF ELEC: 301 MOSM/KG (ref 275–295)
PHOSPHATE SERPL-MCNC: 3.3 MG/DL (ref 2.4–5.1)
PLATELET # BLD AUTO: 167 10(3)UL (ref 150–450)
PLATELETS.RETICULATED NFR BLD AUTO: 3.7 % (ref 0–7)
POTASSIUM SERPL-SCNC: 4.3 MMOL/L (ref 3.5–5.1)
RBC # BLD AUTO: 5.99 X10(6)UL
SODIUM SERPL-SCNC: 144 MMOL/L (ref 136–145)
VIT B12 SERPL-MCNC: 883 PG/ML (ref 211–911)
WBC # BLD AUTO: 7.4 X10(3) UL (ref 4–11)

## 2025-01-21 RX ORDER — INSULIN DEGLUDEC 100 U/ML
15 INJECTION, SOLUTION SUBCUTANEOUS NIGHTLY
Status: DISCONTINUED | OUTPATIENT
Start: 2025-01-21 | End: 2025-01-21

## 2025-01-21 RX ORDER — FERROUS SULFATE 325(65) MG
325 TABLET, DELAYED RELEASE (ENTERIC COATED) ORAL
Qty: 30 TABLET | Refills: 0 | Status: SHIPPED
Start: 2025-01-21

## 2025-01-21 RX ORDER — INSULIN DEGLUDEC 100 U/ML
20 INJECTION, SOLUTION SUBCUTANEOUS NIGHTLY
Status: DISCONTINUED | OUTPATIENT
Start: 2025-01-21 | End: 2025-01-21

## 2025-01-21 RX ORDER — TORSEMIDE 20 MG/1
20 TABLET ORAL DAILY PRN
Qty: 180 TABLET | Refills: 1 | Status: SHIPPED
Start: 2025-01-21

## 2025-01-21 RX ORDER — INSULIN DEGLUDEC 100 U/ML
12 INJECTION, SOLUTION SUBCUTANEOUS NIGHTLY
Status: DISCONTINUED | OUTPATIENT
Start: 2025-01-21 | End: 2025-01-21

## 2025-01-21 NOTE — DISCHARGE SUMMARY
[facility] Hospitalist Discharge Summary    Patient ID  Nina Donnelly  [id number]  79 year old  10/9/1945  Admit date: 1/19/2025  Discharge date:  1/21/25  Attending: Donna Frausto MD   Primary Care Physician: Chiki Caldwell MD   Reason for admission:  (see HPI on HP for further detail)  Discharge condition: good  Disposition: home    Important follow up:  -PCP    Discharge med list     Medication List        START taking these medications      ferrous sulfate 325 (65 FE) MG Tbec  Take 1 tablet (325 mg total) by mouth daily with breakfast.            CHANGE how you take these medications      insulin glargine 100 UNIT/ML Sopn  What changed:   how much to take  when to take this     torsemide 20 MG Tabs  Commonly known as: Demadex  Take 1 tablet (20 mg total) by mouth daily as needed.  What changed:   how much to take  when to take this  reasons to take this            CONTINUE taking these medications      * Accu-Chek SmartView Strp     * True Metrix Blood Glucose Test Strp  Check sugars TID     aspirin 81 MG Tabs     atorvastatin 10 MG Tabs  Commonly known as: Lipitor     cyanocobalamin 1000 MCG Tabs  Commonly known as: Vitamin B12     donepezil 10 MG Tabs  Commonly known as: Aricept     DULoxetine 60 MG Cpep  Commonly known as: Cymbalta     escitalopram 5 MG Tabs  Commonly known as: Lexapro     hydrALAZINE 100 MG Tabs  Commonly known as: Apresoline     insulin aspart 100 Units/mL Sopn  Commonly known as: NovoLOG     ipratropium 0.03 % Soln  Commonly known as: Atrovent     pregabalin 300 MG Caps  Commonly known as: LYRICA     PRESERVISION AREDS 2 OR     Vitamin D3 25 MCG (1000 UT) Caps           * This list has 2 medication(s) that are the same as other medications prescribed for you. Read the directions carefully, and ask your doctor or other care provider to review them with you.                STOP taking these medications      cetirizine 10 MG Tabs  Commonly known as: ZyrTEC     fluticasone propionate 50 MCG/ACT  Susp  Commonly known as: Flonase     spironolactone 25 MG Tabs  Commonly known as: Aldactone               Where to Get Your Medications        You can get these medications from any pharmacy    Bring a paper prescription for each of these medications  ferrous sulfate 325 (65 FE) MG Tbec  torsemide 20 MG Tabs         Discharge Diagnoses/Hospital course:   79 year old female with hx of HTN, HLD, CKD, DM, COPD on chronic oxygen 2L, HFpEF, WYATT on CPAP, here with weakness and leg cramps        Plan:    Leg cramps  - severe per pt, has it at baseline but now worse - improved now  - suspecting from possibly being dehydrated, from increase lasix / elevated cr  - hold diuretics - check Mag, replace potassium, check iron studies -low was given IV iron- another dose today - dcon oral iron, d/w pt to follow up with GI regarding cscope given STELLA - pt voices understanding   -,  b12, vitamin D - okay   - check dopplers to ensure no other etiology of leg pain - no dvt   - continue home lyrica,   - was seen by neuro in the past for this       Severe ithcing -resolved suspecting from capsacin   - pt complaining of itching   - one time dose of benadryl      DM  - pt states she ran out of her insulin - wasn't able to fill it  - insulin in house,       HTN  - home med, hydralazine 100mg TID   - holding torsemide and aldactone - hold aldactone on dc, continue torsemide      BIBIANA on CKD - improving   - hold diuretics   - renal consutled      COPD on 2-3L NC at home  - no s/s of exacerbatino   - CTPE no PE noted no PNA noted      On/off sleepy at home   - suspecting 2/2 metabolic derangements  - negative CTOH      Depression/anxiety  - cymbalta, lexapro      WYATT on CPAP   - per pt she is to get a BIPAP as outpt      MCI   - on donepezil     Consults:  IP CONSULT TO HOSPITALIST  IP CONSULT TO CARDIOLOGY  IP CONSULT TO DIABETES APRN/PA  IP CONSULT TO NEPHROLOGY    Radiology:  CT BRAIN OR HEAD (CPT=70450)    Result Date: 1/20/2025  PROCEDURE:   CT BRAIN OR HEAD (40855)  COMPARISON:  [facility] , CT, CT BRAIN OR HEAD (48275), 4/28/2024, 3:31 PM.  INDICATIONS:   TECHNIQUE:  Noncontrast CT scanning is performed through the brain. Dose reduction techniques were used. Dose information is transmitted to the ACR (American College of Radiology) NRDR (National Radiology Data Registry) which includes the Dose Index Registry.  PATIENT STATED HISTORY: (As transcribed by Technologist)  ON AND OFF SLEEPLY.  TIRED    FINDINGS:  VENTRICLES/SULCI:  Ventricles and sulci are prominent in size consistent with volume loss, stable.   INTRACRANIAL:  There are no abnormal extraaxial fluid collections.  There is no midline shift.  There is no acute intracranial hemorrhage. Periventricular and subcortical low attenuation are nonspecific but most consistent with chronic small vessel ischemic change.   SINUSES:           No sign of acute sinusitis.   MASTOIDS:          No sign of acute inflammation.  SKULL:             No evidence for fracture or osseous abnormality.  OTHER:             Atherosclerosis.            CONCLUSION:  1. No acute intracranial hemorrhage.  2. Findings most consistent with chronic small vessel ischemic change within the deep white matter.  If an acute infarct is of high clinical concern, recommend MRI for further evaluation.    LOCATION:  [facility]   Dictated by (CST): Deann Higgins MD on 1/20/2025 at 5:34 PM     Finalized by (CST): Deann Higgins MD on 1/20/2025 at 5:35 PM       US VENOUS DOPPLER LEG BILAT - DIAG IMG (CPT=93970)    Result Date: 1/20/2025  PROCEDURE:  US VENOUS DOPPLER LEG BILAT - DIAG IMG (CPT=93970)  COMPARISON:  None.  INDICATIONS:  eval for dvt.  Bilateral leg swelling.  Right leg pain.  TECHNIQUE:  Real time, grey scale, and duplex ultrasound was used to evaluate the lower extremity venous system. B-mode two-dimensional images of the vascular structures, Doppler spectral analysis, and color flow.  Doppler imaging were performed.  The following veins  were imaged bilaterally:  Common, deep, and superficial femoral, popliteal, sapheno-femoral junction, and posterior tibial veins.  PATIENT STATED HISTORY: (As transcribed by Technologist)     FINDINGS:  SAPHENOFEMORAL JUNCTION:  No reflux. THROMBI:  None visible. COMPRESSION:  Normal compressibility, phasicity, and augmentation. OTHER:  5.5 x 4.0 x 1.3 cm left Baker's cyst, previously 3.4 x 3.2 x 1.1 cm. .            CONCLUSION:  There is no DVT in either leg.  5.5 cm left Baker's cyst.   LOCATION:  [id number]   Dictated by (CST): Althea Kirk MD on 1/20/2025 at 2:21 PM     Finalized by (CST): Althea Kirk MD on 1/20/2025 at 2:21 PM       CT CHEST (RSW=47425)    Result Date: 1/19/2025  PROCEDURE:  CT CHEST (CPT=71250)  COMPARISON:  [facility] , CT, CT CHEST (CPT=71250), 10/15/2024, 12:00 PM.  [facility] , CT, CT CHEST (CPT=71250), 4/27/2024, 12:36 PM.  [facility] , CT, CT ABDOMEN+PELVIS KIDNEYSTONE 2D RNDR(NO IV,NO ORAL)(CPT=74176), 10/16/2024, 1:43 PM.  INDICATIONS:  Bilateral leg pain, feeling lightheaded, hyperglycemia  TECHNIQUE:  Unenhanced multislice CT scanning is performed through the chest.  Dose reduction techniques were used. Dose information is transmitted to the ACR (American College of Radiology) NRDR (National Radiology Data Registry) which includes the Dose  Index Registry.  PATIENT STATED HISTORY: (As transcribed by Technologist)  Bilateral leg pain, feeling light headed and hyperglycemia.  Patient states feeling of weakness started yesterday.    FINDINGS:  LUNGS:  Patient respiratory motion noted.  Scattered atelectasis/scarring in the lungs, most pronounced in the lower lobes.  Calcified granuloma in the right lower lobe.  Mild centrilobular emphysematous changes in both lungs.  No lobar consolidation.  Large airways are unremarkable. VASCULATURE:    Unremarkable noncontrast appearance THORACIC AORTA:    Mild calcified plaque.  No aneurysm MEDIASTINUM/MELBA:    Scattered probable reactive lymph nodes measuring  under 1 cm short axis. CARDIAC:    Moderate coronary artery calcifications.  Mild calcification of the aortic annulus.  Minimal calcification of the mitral annulus. PLEURA:    Unremarkable CHEST WALL:    Unremarkable LIMITED ABDOMEN:    Partially imaged cholecystectomy changes.  Indeterminate hypodense lesion in the spleen measuring up to 2.9 cm is unchanged from the previous exam. BONES:    Degenerative changes in the spine OTHER:    None            CONCLUSION:   1. No active cardiopulmonary process identified.  2. Mild emphysematous changes in the lungs with scattered atelectasis/scarring.  3. Indeterminate hypodense lesion in the spleen measuring up to 2.9 cm is unchanged from the previous exam.  Please see above for further details.    LOCATION:  [facility]   Dictated by (CST): Dave Pete MD on 1/19/2025 at 9:05 PM     Finalized by (CST): Dave Pete MD on 1/19/2025 at 9:08 PM       XR CHEST AP PORTABLE  (CPT=71045)    Result Date: 1/19/2025  PROCEDURE:  XR CHEST AP PORTABLE  (CPT=71045)  TECHNIQUE:  AP chest radiograph was obtained.  COMPARISON:  None.  INDICATIONS:  Bilateral leg pain, feeling lightheaded, hyperglycemia  PATIENT STATED HISTORY: (As transcribed by Technologist)  Patient stated feeling light headed.    FINDINGS:  Cardiomegaly with mild prominence of the central pulmonary vascularity. Patchy opacity in the lower lungs is concerning for developing infiltrates.  Clinical correlation recommended.  Small left pleural effusion.  No pneumothorax.            CONCLUSION:  Patchy opacity in the lower lungs is concerning for developing infiltrates.  Clinical correlation recommended.  Small left pleural effusion.    LOCATION:  [facility]      Dictated by (CST): Dave Pete MD on 1/19/2025 at 7:59 PM     Finalized by (CST): Dave Pete MD on 1/19/2025 at 8:00 PM         Operative reports:          Day of discharge exam:  Vitals:    01/21/25 1223   BP: 131/50   Pulse: 62   Resp: 20   Temp: 97.9 °F  (36.6 °C)     No acute distress,    Heart regular  Abdomen benign     Patient and/or family had opportunity to ask questions and expressed understanding and agreement with therapeutic plan as outlined      31 minutes spent on discharge    Donna Frausto MD

## 2025-01-21 NOTE — PLAN OF CARE
Received patient awake and alert x 4.   O2 protocol: On 3LNC, clear lungs, sats 95%. Denies shortness of breath.  Normal sinus rhythm on telemetry monitoring. Denies chest pain.  Non-Cardiac electrolyte protocol, K 3.7  SCD and Heparin Lovenox for VTE prophylaxis.  Saline locked.   Had bowel movement today.  Voiding adequately, clear yellow urine.  Fall precaution, bed alarm on, call light and bedside table within reach.    Up with standby assist, steady.   0140: Crying, \"I want to go home, this pillow is like sleeping in a mattress\"  C/o left leg burning pain, declined pain meds or cold packs.   Daily standing weight.   VIOLETTA mar, .      Problem: RISK FOR INFECTION - ADULT  Goal: Absence of fever/infection during anticipated neutropenic period  Description: INTERVENTIONS  - Monitor WBC  - Administer growth factors as ordered  - Implement neutropenic guidelines  Outcome: Progressing     Problem: SAFETY ADULT - FALL  Goal: Free from fall injury  Description: INTERVENTIONS:  - Assess pt frequently for physical needs  - Identify cognitive and physical deficits and behaviors that affect risk of falls.  - [place] fall precautions as indicated by assessment.  - Educate pt/family on patient safety including physical limitations  - Instruct pt to call for assistance with activity based on assessment  - Modify environment to reduce risk of injury  - Provide assistive devices as appropriate  - Consider OT/PT consult to assist with strengthening/mobility  - Encourage toileting schedule  Outcome: Progressing     Problem: DISCHARGE PLANNING  Goal: Discharge to home or other facility with appropriate resources  Description: INTERVENTIONS:  - Identify barriers to discharge w/pt and caregiver  - Include patient/family/discharge partner in discharge planning  - Arrange for needed discharge resources and transportation as appropriate  - Identify discharge learning needs (meds, wound care, etc)  - Arrange for interpreters  to assist at discharge as needed  - Consider post-discharge preferences of patient/family/discharge partner  - Complete POLST form as appropriate  - Assess patient's ability to be responsible for managing their own health  - Refer to Case Management Department for coordinating discharge planning if the patient needs post-hospital services based on physician/LIP order or complex needs related to functional status, cognitive ability or social support system  Outcome: Progressing     Problem: CARDIOVASCULAR - ADULT  Goal: Absence of cardiac arrhythmias or at baseline  Description: INTERVENTIONS:  - Continuous cardiac monitoring, monitor vital signs, obtain 12 lead EKG if indicated  - Evaluate effectiveness of antiarrhythmic and heart rate control medications as ordered  - Initiate emergency measures for life threatening arrhythmias  - Monitor electrolytes and administer replacement therapy as ordered  Outcome: Progressing     Problem: METABOLIC/FLUID AND ELECTROLYTES - ADULT  Goal: Glucose maintained within prescribed range  Description: INTERVENTIONS:  - Monitor Blood Glucose as ordered  - Assess for signs and symptoms of hyperglycemia and hypoglycemia  - Administer ordered medications to maintain glucose within target range  - Assess barriers to adequate nutritional intake and initiate nutrition consult as needed  - Instruct patient on self management of diabetes  Outcome: Progressing     Problem: Patient/Family Goals  Goal: Patient/Family Long Term Goal  Description: Patient's Long Term Goal: discharge to [facility]    Interventions:  Telemetry monitoring  O2 protocol,   Fall precaution, bed alarm on, non-skid socks on,  call light and bedside table within reach.  Monitor vitals, labs, intake and output.  Electrolyte protocol.  Heparin and SCD for VTE   Daily weight  Pain management  - See additional Care Plan goals for specific interventions  Outcome: Progressing  Goal: Patient/Family Short  Term Goal  Description: Patient's Short Term Goal: vitals stable this shift    Interventions:   Telemetry monitoring  O2 protocol,   Fall precaution, bed alarm on, non-skid socks on,  call light and bedside table within reach.  Monitor vitals, labs, intake and output.  Electrolyte protocol.  Heparin and SCD for VTE   Daily weight  Pain management    - See additional Care Plan goals for specific interventions  Outcome: Progressing

## 2025-01-21 NOTE — DISCHARGE INSTRUCTIONS
Take toresmide only as needed with change in weight or increase in LE swelling     Follow up with your cardiologist asap    Follow up with nephrology     Take iron suplements, can cause black stools     Make sure you take your insulin as prescribed   You may start taking the lower dose of  Glargine prescribed and titrate it up based on your fasting blood sugar level.

## 2025-01-21 NOTE — PROGRESS NOTES
[facility]   part of [facility]    Nephrology Progress Note    Nina Donnelly Attending:  Donna Frausto MD       SUBJECTIVE:     Weights are down  No leg swelling    PHYSICAL EXAM:     Vital Signs: /50 (BP Location: Right arm)   Pulse 62   Temp 97.9 °F (36.6 °C) (Oral)   Resp 20   Ht 154.9 cm (5' 1\")   Wt 184 lb 11.2 oz (83.8 kg)   SpO2 91%   BMI 34.90 kg/m²   Temp (24hrs), Av °F (36.7 °C), Min:97.6 °F (36.4 °C), Max:98.2 °F (36.8 °C)       Intake/Output Summary (Last 24 hours) at 2025 1621  Last data filed at 2025 1726  Gross per 24 hour   Intake 0 ml   Output 0 ml   Net 0 ml     Wt Readings from Last 3 Encounters:   25 184 lb 11.2 oz (83.8 kg)   10/17/24 193 lb 5.5 oz (87.7 kg)   10/11/24 197 lb 1.5 oz (89.4 kg)       General: pleasant, well appearing  Skin: no visible rashes  HEENT: NCAT  Cardiac: Regular rate and rhythm, no murmur/gallop or rub  Lungs: CTAB, no wheeze, no rale, no rhonchi  Abdomen: Soft, NTND  Extremities: warm, well perfused, no leg edema  Neurologic/Psych: mentating well, no asterixis       LABORATORY DATA:     Lab Results   Component Value Date    GLU 74 2025    BUN 25 (H) 2025    BUNCREA 18.0 03/15/2022    CREATSERUM 1.60 (H) 2025    ANIONGAP 5 2025    GFRNAA 35 (L) 2021    GFRAA 40 (L) 2021    CA 8.7 2025    OSMOCALC 301 (H) 2025    ALKPHO 90 2025    AST 34 (H) 2025    ALT 17 2025    BILT 0.5 2025    TP 6.3 2025    ALB 3.8 2025    GLOBULIN 2.2 2025     2025    K 4.3 2025     2025    CO2 33.0 (H) 2025     Lab Results   Component Value Date    WBC 7.4 2025    RBC 5.99 (H) 2025    HGB 10.3 (L) 2025    HCT 35.6 2025    .0 2025    MCV 59.4 (L) 2025    MCH 17.2 (L) 2025    MCHC 28.9 (L) 2025    RDW 21.8 2025    NEPRELIM 4.71 2025    NEUTABS 4.34 2020    LYMPHABS  2.28 04/07/2020    EOSABS 0.14 04/07/2020    BASABS 0.07 04/07/2020    NEPERCENT 63.7 01/21/2025    LYPERCENT 21.2 01/21/2025    MOPERCENT 9.3 01/21/2025    EOPERCENT 4.7 01/21/2025    BAPERCENT 0.7 01/21/2025    NE 4.71 01/21/2025    LYMABS 1.57 01/21/2025    MOABSO 0.69 01/21/2025    EOABSO 0.35 01/21/2025    BAABSO 0.05 01/21/2025     Lab Results   Component Value Date    MALBP <1.2 03/15/2022    CREUR 45.53 03/15/2022     Lab Results   Component Value Date    COLORUR Colorless (A) 01/20/2025    CLARITY Clear 01/20/2025    SPECGRAVITY 1.005 01/20/2025    GLUUR 1000 (A) 01/20/2025    BILUR Negative 01/20/2025    KETUR Negative 01/20/2025    BLOODURINE Negative 01/20/2025    PHURINE 7.0 01/20/2025    PROUR Negative 01/20/2025    UROBILINOGEN Normal 01/20/2025    NITRITE Negative 01/20/2025    LEUUR Negative 01/20/2025    NMIC Microscopic not indicated 01/20/2025         IMAGING:     Reviewed.    MEDICATIONS:       Current Facility-Administered Medications   Medication Dose Route Frequency    insulin aspart (NovoLOG) 100 Units/mL FlexPen 1-5 Units  1-5 Units Subcutaneous TID CC    insulin degludec (Tresiba) 100 units/mL flextouch 15 Units  15 Units Subcutaneous Nightly    pregabalin (Lyrica) cap 300 mg  300 mg Oral Nightly    cyclobenzaprine (Flexeril) tab 7.5 mg  7.5 mg Oral TID PRN    escitalopram (Lexapro) tab 10 mg  10 mg Oral Nightly    aspirin chewable tab 81 mg  81 mg Oral Daily    atorvastatin (Lipitor) tab 10 mg  10 mg Oral Nightly    cholecalciferol (Vitamin D3) tab 1,000 Units  1,000 Units Oral Daily    donepezil (Aricept) tab 10 mg  10 mg Oral Nightly    DULoxetine (Cymbalta) DR cap 60 mg  60 mg Oral Daily    hydrALAZINE (Apresoline) tab 100 mg  100 mg Oral TID    ipratropium (Atrovent) 0.03 % nasal solution 2 spray  2 spray Nasal 2 times per day    heparin (Porcine) 5000 UNIT/ML injection 5,000 Units  5,000 Units Subcutaneous Q8H CHICO    acetaminophen (Tylenol) tab 650 mg  650 mg Oral Q6H PRN     ondansetron (Zofran) 4 MG/2ML injection 4 mg  4 mg Intravenous Q6H PRN       ASSESSMENT/PLAN:     78 yo F with history of CKD stage 3b with baseline creatinine 1.6-1.9 mg/dl, HFpEF, COPD, WYATT, DM, HL, beta thalassemia presented with leg cramping, LH, hyperglycemia.     Nephrology consulted for BIBIANA on CKD3b:     BIBIANA on CKD3b:  -- improved  -- hold currently off RAASi, spironolactone, torsemide  -- ok to resume torsemide prn per cardiology strategy  -- avoid NSAIDs iodinated contrast, fleets, group 1 gadolinium    Anemia in CKD:  -- Hb above threshold for BERENICE  -- ferritin 18, tsat 6 -> IV iron if patient willing     MBD:  -- phos at goal     Leg cramping:  -- Ca, K, Mg, Vit D acceptable  -- on medications for neuropathy  -- borderline high Na and HCO3 noted; increase water intake modestly     HFpEF:  -- cardiology following    Discussed with RN and patient's daughter.  Ok to discharge from renal standpoint.  Can keep her follow up appt scheduled with me already on 1/28.    Thank you for allowing me to participate in the care of this patient. Please do not hesitate to call with questions or concerns.        Shandra Scott MD  [facility]  [address] [place] [id number]    1/21/2025  4:21 PM

## 2025-01-21 NOTE — PROGRESS NOTES
[facility] Hospitalist Progress Note       SUBJECTIVE:  Pt feels better  Leg cramps improved    Was given IV iron yesterday     OBJECTIVE:  Scheduled Meds:    insulin degludec  20 Units Subcutaneous Nightly    insulin aspart  1-5 Units Subcutaneous TID CC    pregabalin  300 mg Oral Nightly    escitalopram  10 mg Oral Nightly    aspirin  81 mg Oral Daily    atorvastatin  10 mg Oral Nightly    cholecalciferol  1,000 Units Oral Daily    donepezil  10 mg Oral Nightly    DULoxetine  60 mg Oral Daily    hydrALAZINE  100 mg Oral TID    ipratropium  2 spray Nasal 2 times per day    heparin  5,000 Units Subcutaneous Q8H CHICO     Continuous Infusions:   PRN Meds:   cyclobenzaprine    acetaminophen    ondansetron    Vitals    Vitals:    01/21/25 0910   BP: 118/45   Pulse: 57   Resp: 20   Temp: 97.9 °F (36.6 °C)         Exam   Gen-    no acute distress, alert and oriented x 3   RESP-   Lungs CTA, normal respiratory effort  CV-      Heart RRR, no mgr  Abd-    soft, nondistended, nontender, bowel sounds present  Skin-    no rash  Neuro-  no focal neurologic deficits  Ext-      No edema in extremities   Psych- alert and oriented x 3     Labs:     Recent Labs   Lab 01/19/25 1937 01/20/25  0556 01/21/25  0537   WBC 9.1 7.5 7.4   HGB 10.5* 10.4* 10.3*   MCV 56.3* 57.9* 59.4*   .0 171.0 167.0       Recent Labs   Lab 01/19/25 1938 01/20/25  0556 01/21/25  0537    142 144   K 3.7 3.7 4.3   CL 99 103 106   CO2 34.0* 34.0* 33.0*   BUN 32* 27* 25*   CREATSERUM 2.27* 1.93* 1.60*   CA 9.2 8.4* 8.7   MG  --  2.3 2.3   PHOS  --   --  3.3   * 100* 74       Recent Labs   Lab 01/19/25 1938 01/21/25  0537   ALT 17  --    AST 34*  --    ALB 4.1 3.8       Recent Labs   Lab 01/20/25  1725 01/20/25  2101 01/21/25  0638 01/21/25  0710 01/21/25  0910   PGLU 223* 145* 65* 88 206*       AP:  79 year old female with hx of HTN, HLD, CKD, DM, COPD on chronic oxygen 2L, HFpEF, WYATT on CPAP, here with weakness and leg cramps        Plan:    Leg  cramps  - severe per pt, has it at baseline but now worse - improved now  - suspecting from possibly being dehydrated, from increase lasix / elevated cr  - hold diuretics - check Mag, replace potassium, check iron studies -low was given IV iron- another dose today   -,  b12, vitamin D - okay   - check dopplers to ensure no other etiology of leg pain - no dvt   - continue home lyrica,   - was seen by neuro in the past for this       Severe ithcing -resolved suspecting from capsacin   - pt complaining of itching   - one time dose of benadryl      DM  - pt states she ran out of her insulin - wasn't able to fill it  - insulin in house, A1c, monitor glucose    - DM educator      HTN  - home med, hydralazine 100mg TID   - holding torsemide and aldactone      BIBIANA on CKD - improving   - hold diuretics   - renal consutled      COPD on 2-3L NC at home  - no s/s of exacerbatino   - CTPE no PE noted no PNA noted      On/off sleepy at home   - suspecting 2/2 metabolic derangements  - negative CTOH      Depression/anxiety  - cymbalta, lexapro      WYATT on CPAP   - per pt she is to get a BIPAP as outpt      MCI   - on donepezil         Prophylaxis:  DVT: lovenox     Full code d/w pt and pts son     Dispo: dc pending ok from cards and marco al         Donna Frausto MD   [facility] Hospitalist

## 2025-01-21 NOTE — PROGRESS NOTES
[facility] Cardiology Progress Note        Nina Donnelly Patient Status:  Observation    10/9/1945 MRN [id number]   Location [facility] 0SW-A Attending Donna Frausto MD   Hosp Day # 1 PCP Chiki Caldwell MD     Subjective:  The patient denies  chest pain   Breathing is ok at rest and with O2   She de-saturates on RA with activity    She c/o R+L LE pain and tenderness.      Medications:   insulin degludec  20 Units Subcutaneous Nightly    insulin aspart  1-5 Units Subcutaneous TID CC    pregabalin  300 mg Oral Nightly    escitalopram  10 mg Oral Nightly    aspirin  81 mg Oral Daily    atorvastatin  10 mg Oral Nightly    cholecalciferol  1,000 Units Oral Daily    donepezil  10 mg Oral Nightly    DULoxetine  60 mg Oral Daily    hydrALAZINE  100 mg Oral TID    ipratropium  2 spray Nasal 2 times per day    heparin  5,000 Units Subcutaneous Q8H CHICO       Continuous Infusions:        Allergies:  Allergies[1]      Objective:        Intake/Output:      Intake/Output Summary (Last 24 hours) at 2025 0938  Last data filed at 2025 1726  Gross per 24 hour   Intake 840 ml   Output 400 ml   Net 440 ml     Wt Readings from Last 3 Encounters:   25 184 lb 11.2 oz (83.8 kg)   10/17/24 193 lb 5.5 oz (87.7 kg)   10/11/24 197 lb 1.5 oz (89.4 kg)       Physical Exam:        Vitals:    25 0600 25 0700 25 0733 25 0910   BP: 134/46   118/45   BP Location: Left arm   Right arm   Pulse: 59   57   Resp: 20   20   Temp: 98.1 °F (36.7 °C)   97.9 °F (36.6 °C)   TempSrc: Oral   Oral   SpO2: 95% 91%  96%   Weight:   184 lb 11.2 oz (83.8 kg)    Height:           Temp:  [97.6 °F (36.4 °C)-98.2 °F (36.8 °C)] 97.9 °F (36.6 °C)  Pulse:  [57-75] 57  Resp:  [18-20] 20  BP: (114-151)/(45-62) 118/45  SpO2:  [91 %-97 %] 96 %      Temp: 97.9 °F (36.6 °C)  Pulse: 57  Resp: 20  BP: 118/45  General:  Appears comfortable  HEENT: No focal deficits.  Neck: No JVD, carotids 2+ no bruits.  Cardiac:  Regular S1S2.  No S3, S4, rub, click.  No murmur.  Lungs: Clear to auscultation and percussion.  Abdomen: Soft, non-tender.   Extremities: No LE edema.  +tenderness to medium palpation.  No redness.   No clubbing or cyanosis.    Neurologic: Alert and oriented, normal affect.  Skin: Warm and dry.           LABS:      HEM:  Recent Labs   Lab 01/19/25 1937 01/20/25 0556 01/21/25  0537   WBC 9.1 7.5 7.4   HGB 10.5* 10.4* 10.3*   HCT 33.9* 34.6* 35.6   .0 171.0 167.0       Chem:  Recent Labs   Lab 01/19/25 1938 01/20/25 0556 01/21/25  0537    142 144   K 3.7 3.7 4.3   CL 99 103 106   CO2 34.0* 34.0* 33.0*   BUN 32* 27* 25*   CREATSERUM 2.27* 1.93* 1.60*   CA 9.2 8.4* 8.7   MG  --  2.3 2.3   PHOS  --   --  3.3   * 100* 74       Recent Labs   Lab 01/19/25 1938 01/21/25  0537   ALT 17  --    AST 34*  --    ALB 4.1 3.8       No results for input(s): \"PT\", \"PTT\", \"INR\" in the last 168 hours.        No results found for: \"TROP\", \"CKMB\"      Invalid input(s): \"PBNPML\"                       Diagnostics:   Telemetry:     EKG, 1/19/2025:  NSR, PRWP     CXR, 1/20/2025:       Echo, 10/2024:     1. Left ventricle: The cavity size was normal. Wall thickness was normal.      Systolic function was normal. The estimated ejection fraction was 65-70%,      by visual assessment. No diagnostic evidence for regional wall motion      abnormalities. Left ventricular diastolic function parameters were      normal.   2. Left atrium: The left atrial volume was normal.      Lexiscan cardiolyte stress test, 10/2024:     IMPRESSION:  Normal nuclear myocardial perfusion scan.   Ejection fraction measures 74% by gated SPECT.   Normal electrocardiographic response to regadenoson infusion.        Impression:     1.  Hx of CHF.  Major concern for this admission is lower extremity edema, cramps, fatigue, elevated sugars. Degree of dyspnea is stable and tolerable.  I suspect an element of dehydration related to recent increase in  torsemide resulting in worse renal fct, cramping, fatigue. Does not appear to be in pulm edema.  BNP is normal     2. Acute -0n- CKD. Improved Cr off diuretics  3. Diabetes  4. COPD  5. WYATT  6. Leg pain. Neuropathic?        Recommend:     1.  Telemetry  2. Hold diuretics  3. Follow BMP's  4. Hold spironolactone  5. Stable CV status for dc.  I suggest she only use diuretics, (torsemide 20 mg tab) on prn basis for rapid weight gain or moderate-to-severe LE edema         Dago Beach MD  1/21/2025  9:38 AM           [1]   Allergies  Allergen Reactions    Pioglitazone UNKNOWN     Weight Gain    Capsaicin ITCHING    Vancomycin ITCHING

## 2025-01-21 NOTE — PLAN OF CARE
Pt discharged home via wheelchair accompanied by her son. Prescriptions and discharge instructions given with pt verbalizing understanding.

## 2025-01-21 NOTE — PAYOR COMM NOTE
--------------  STATUS CHANGED TO OBSERVATION ON    Please cancel the INPT admission           Order Requisition    Place in observation Once (Order #[id number]) on 25       ADMISSION REVIEW     Payor: EVANGELISTA PARKER [facility]  Subscriber #:  [id number]  Authorization Number: [id number]    Admit date: 25  Admit time: 11:04 PM       REVIEW DOCUMENTATION:     ED Provider Notes        ED Provider Notes signed by Jenifer Valderrama MD at 2025  8:43 PM       Author: Jenifer Valderrama MD Service: -- Author Type: Physician    Filed: 2025  8:43 PM Date of Service: 2025  7:28 PM Status: Signed    : Jenifer Valderrama MD (Physician)           Patient Seen in: [facility] Department      History     Chief Complaint   Patient presents with    Leg Pain    Lightheadedness    Hyperglycemia     Stated Complaint: Bilateral leg pain, feeling lightheaded, hyperglycemia    Subjective:   HPI      79-year-old female presents for evaluation of lightheadedness and weakness for the past 24 hours.  Patient relates this to having run out of her Lantus insulin which will not be refilled until Tuesday due to pharmacy issues.  She has been taking only her NovoLog sliding scale.  Blood sugar today has been running between 200 and 300.  Patient also has had a constant chest discomfort today.  No shortness of breath.  Patient has a history of COPD and CHF on 3 L oxygen nasal cannula at home.  She states her weight has recently been stable    Objective:     Past Medical History:    Congestive heart disease (HCC)    Hyperlipidemia    Mild episode of recurrent major depressive disorder (HCC)    Neuropathy    Obstructive apnea    DMG PSG AHI 11    WYATT on CPAP              Past Surgical History:   Procedure Laterality Date          Cabg      Foot surgery      Hysterectomy      Knee surgery      Tonsillectomy                  Social History     Socioeconomic History    Marital status:     Tobacco Use    Smoking status: Former     Current packs/day: 0.00     Average packs/day: 1 pack/day for 55.0 years (55.0 ttl pk-yrs)     Types: Cigarettes     Start date: 1/13/1960     Quit date: 1/13/2015     Years since quitting: 10.0    Smokeless tobacco: Never   Vaping Use    Vaping status: Never Used   Substance and Sexual Activity    Alcohol use: No    Drug use: No     Social Drivers of Health     Financial Resource Strain: Low Risk  (7/7/2023)    Received from [facility]    Overall Financial Resource Strain (CARDIA)     Difficulty of Paying Living Expenses: Not very hard   Food Insecurity: No Food Insecurity (10/14/2024)    Food Insecurity     Food Insecurity: Never true   Transportation Needs: No Transportation Needs (10/14/2024)    Transportation Needs     Lack of Transportation: No   Physical Activity: Sufficiently Active (7/7/2023)    Received from [facility]    Exercise Vital Sign     Days of Exercise per Week: 3 days     Minutes of Exercise per Session: 50 min   Stress: Stress Concern Present (7/7/2023)    Received from [facility]    [employer] - Occupational Stress Questionnaire     Feeling of Stress : Rather much   Social Connections: Moderately Isolated (7/7/2023)    Received from [facility]    Social Connection and Isolation Panel [NHANES]     Frequency of Communication with Friends and Family: More than three times a week     Frequency of Social Gatherings with Friends and Family: More than three times a week     Attends [employer] Services: More than 4 times per year     Active Member of Clubs or Organizations: No     Attends Club or Organization Meetings: Never     Marital Status:    Housing Stability: Low Risk  (10/14/2024)    Housing Stability     Housing Instability: No                  Physical Exam     ED Triage Vitals [01/19/25 1914]   /75   Pulse 67   Resp 16   Temp    Temp src    SpO2 93 %   O2 Device  Nasal cannula       Current Vitals:   Vital Signs  BP: 127/52  Pulse: 64  Resp: 15  MAP (mmHg): 74    Oxygen Therapy  SpO2: 95 %  O2 Device: None (Room air)  O2 Flow Rate (L/min): 3 L/min        Physical Exam     General: Alert, oriented, no apparent distress  HEENT:  Pupils equal reactive.  Extraocular motions intact. MMM.  Neck: Supple  Lungs: Clear to auscultation bilaterally.  Heart: Regular rate and rhythm.  Abdomen: Soft, nontender.   Skin: No rash.  No edema.  Neurologic: No focal neurologic deficits.  Normal speech pattern  Musculoskeletal: No tenderness or deformity noted.  Full range of motion throughout.      ED Course     Labs Reviewed   CBC WITH DIFFERENTIAL WITH PLATELET - Abnormal; Notable for the following components:       Result Value    RBC 6.02 (*)     HGB 10.5 (*)     HCT 33.9 (*)     MCV 56.3 (*)     MCH 17.4 (*)     All other components within normal limits   COMP METABOLIC PANEL (14) - Abnormal; Notable for the following components:    Glucose 225 (*)     CO2 34.0 (*)     BUN 32 (*)     Creatinine 2.27 (*)     Calculated Osmolality 302 (*)     eGFR-Cr 22 (*)     AST 34 (*)     All other components within normal limits   POCT GLUCOSE - Abnormal; Notable for the following components:    POC Glucose 236 (*)     All other components within normal limits   TROPONIN I HIGH SENSITIVITY - Normal   URINALYSIS WITH CULTURE REFLEX   PATH COMMENT CBC   PRO BETA NATRIURETIC PEPTIDE   PROCALCITONIN   RAINBOW DRAW LAVENDER   RAINBOW DRAW LIGHT GREEN   RAINBOW DRAW BLUE   RAINBOW DRAW GOLD     EKG    Rate, intervals and axes as noted on EKG Report.  Rate: 70  Rhythm: Sinus Rhythm  Reading: No acute appearing ST elevation or depression, appears similar to October 14, 2024                      MDM      Differential diagnosis includes hyperglycemia, metabolic abnormality, UTI    I have independently visualized the radiology images, my focus/limited interpretation: No focal consolidation    Defer to radiologist  for other/incidental findings    XR CHEST AP PORTABLE  (CPT=71045)    Result Date: 1/19/2025  PROCEDURE:  XR CHEST AP PORTABLE  (CPT=71045)  TECHNIQUE:  AP chest radiograph was obtained.  COMPARISON:  None.  INDICATIONS:  Bilateral leg pain, feeling lightheaded, hyperglycemia  PATIENT STATED HISTORY: (As transcribed by Technologist)  Patient stated feeling light headed.    FINDINGS:  Cardiomegaly with mild prominence of the central pulmonary vascularity. Patchy opacity in the lower lungs is concerning for developing infiltrates.  Clinical correlation recommended.  Small left pleural effusion.  No pneumothorax.            CONCLUSION:  Patchy opacity in the lower lungs is concerning for developing infiltrates.  Clinical correlation recommended.  Small left pleural effusion.    LOCATION:  [facility]      Dictated by (CST): Dave Pete MD on 1/19/2025 at 7:59 PM     Finalized by (CST): Dave Pete MD on 1/19/2025 at 8:00 PM          Troponin negative.    Chemistry with creatinine of 2.2.  This indicates BIBIANA.    CBC with some mild baseline anemia    Medical Decision Making  Amount and/or Complexity of Data Reviewed  Independent Historian: caregiver     Details: Daughter at bedside    Spoke with Dr. Gunderson for admission.  He request to add on a procalcitonin and BNP.  Noncontrast CT scan was ordered to further evaluate findings on chest x-ray that radiologist reviewed    Disposition and Plan     Clinical Impression:  1. BIBIANA (acute kidney injury) (HCC)    2. Dehydration    3. Acute chest pain    4. Hyperglycemia         Disposition:  There is no disposition on file for this visit.  There is no disposition time on file for this visit.    Follow-up:  No follow-up provider specified.        Medications Prescribed:  Current Discharge Medication List              Supplementary Documentation:                                                             Signed by Jenifer Valderrama MD on 1/19/2025  8:43 PM          MEDICATIONS ADMINISTERED IN LAST 1 DAY:  acetaminophen (Tylenol) tab 650 mg       Date Action Dose Route User    1/21/2025 0845 Given 650 mg Oral Swetha Rhodes RN    1/21/2025 0130 Given 650 mg Oral Melina Maxwell RN          aspirin chewable tab 81 mg       Date Action Dose Route User    1/21/2025 0846 Given 81 mg Oral Swetha Rhodes RN          atorvastatin (Lipitor) tab 10 mg       Date Action Dose Route User    1/20/2025 2105 Given 10 mg Oral Melina Maxwell RN          cyclobenzaprine (Flexeril) tab 7.5 mg       Date Action Dose Route User    1/20/2025 1510 Given 7.5 mg Oral Ti Alfred RN          donepezil (Aricept) tab 10 mg       Date Action Dose Route User    1/20/2025 2105 Given 10 mg Oral Melina Maxwell RN          DULoxetine (Cymbalta) DR cap 60 mg       Date Action Dose Route User    1/21/2025 0846 Given 60 mg Oral Swetha Rhodes RN          escitalopram (Lexapro) tab 10 mg       Date Action Dose Route User    1/20/2025 2105 Given 10 mg Oral Melina Maxwell RN          heparin (Porcine) 5000 UNIT/ML injection 5,000 Units       Date Action Dose Route User    1/21/2025 0847 Given 5,000 Units Subcutaneous (Left Lower Abdomen) Swetha Rhodes RN    1/21/2025 0130 Given 5,000 Units Subcutaneous (Right Lower Abdomen) Melina Maxwell RN    1/20/2025 1754 Given 5,000 Units Subcutaneous (Right Upper Abdomen) Ti Alfred RN          hydrALAZINE (Apresoline) tab 100 mg       Date Action Dose Route User    1/21/2025 0846 Given 100 mg Oral Swetha Rhodes RN    1/20/2025 2105 Given 100 mg Oral Melina Maxwell RN    1/20/2025 1750 Given 100 mg Oral Ti Alfred RN          insulin aspart (NovoLOG) 100 Units/mL FlexPen 2-10 Units       Date Action Dose Route User    1/20/2025 2106 Given 2 Units Subcutaneous (Left Upper Arm) Melina Maxwell RN    1/20/2025 1756 Given 6 Units Subcutaneous (Left Lower Abdomen) Ti Alfred, STEPH          insulin degludec (Tresiba) 100 units/mL  flextouch 25 Units       Date Action Dose Route User    1/20/2025 2106 Given 25 Units Subcutaneous (Left Upper Arm) Melina Maxwell RN          potassium chloride (Klor-Con) 20 MEQ oral powder 40 mEq       Date Action Dose Route User    1/20/2025 1115 Given 40 mEq Oral Ti Alfred RN          pregabalin (Lyrica) cap 300 mg       Date Action Dose Route User    1/20/2025 2105 Given 300 mg Oral Melina Maxwell RN          sodium ferric gluconate (Ferrlecit) 125 mg in sodium chloride 0.9% 100mL IVPB premix       Date Action Dose Route User    1/20/2025 1751 New Bag 125 mg Intravenous Ti Alfred RN          cholecalciferol (Vitamin D3) tab 1,000 Units       Date Action Dose Route User    1/21/2025 0847 Given 1,000 Units Oral Swetha Rhodes RN            Vitals (last day)       Date/Time Temp Pulse Resp BP SpO2 Weight O2 Device O2 Flow Rate (L/min) [facility]    01/21/25 0910 97.9 °F (36.6 °C) 57 20 118/45 96 % -- Nasal cannula 3 L/min KS    01/21/25 0733 -- -- -- -- -- 184 lb 11.2 oz (83.8 kg) -- -- KS    01/21/25 0700 -- -- -- -- 91 % -- Nasal cannula 3 L/min AF    01/21/25 0600 -- 59 20 134/46 95 % -- Nasal cannula 3 L/min LM    01/21/25 0600 98.1 °F (36.7 °C) -- -- -- -- -- -- -- OG    01/21/25 0135 98.1 °F (36.7 °C) 60 20 114/49 97 % -- Nasal cannula 3 L/min LM    01/20/25 2026 98.2 °F (36.8 °C) 75 20 151/53 96 % -- -- -- OG    01/20/25 1726 97.6 °F (36.4 °C) 63 18 149/62 95 % -- Nasal cannula 3 L/min     01/20/25 1627 -- 65 -- -- 95 % 191 lb 1.6 oz (86.7 kg) -- --     01/20/25 1627 -- -- -- 141/46 -- -- -- -- KSA    01/20/25 1520 -- -- -- -- -- -- Nasal cannula 3 L/min AM    01/20/25 1504 -- 64 -- -- 95 % -- -- --     01/20/25 1152 97.7 °F (36.5 °C) 58 18 140/49 97 % -- Nasal cannula 3 L/min     01/20/25 0742 97.7 °F (36.5 °C) 58 18 104/47 -- -- Nasal cannula 3 L/min SB    01/20/25 0724 -- 65 -- -- -- -- -- --     01/20/25 0530 97.7 °F (36.5 °C) 66 16 140/52 97 % -- Nasal cannula 3 L/min LP     01/20/25 0155 -- 66 -- -- 96 % -- -- -- LP          CIWA Scores (since admission)       None

## 2025-01-21 NOTE — PROGRESS NOTES
Brief Endocrinology Note:  Chart reviewed this morning. Patient with one hypoglycemic episode overnight; will decrease insulin doses while inpatient. Patient ran out of insulin at home; did not have basal insulin x2 days.       Inpatient recommendations:  Degludec = given hypoglycemic episode overnight and return to 86 after breakfast this morning w/ no insulin, decrease tonight's dose to 15u every day  Aspart = decrease to low dose sliding scale    Discharge recommendations:  Glargine = 15u every day  Aspart = low dose sliding scale    Follow-up recommendations:  [facility] - patient follows with Dr. Del Real, will need to make follow-up appointment

## 2025-01-21 NOTE — OCCUPATIONAL THERAPY NOTE
OCCUPATIONAL THERAPY EVALUATION - INPATIENT    Room Number: 0015/0015-A  Evaluation Date: 1/21/2025     Type of Evaluation: Initial  Presenting Problem: BLE pain, weakness, BIBIANA, dehydration    Physician Order: IP Consult to Occupational Therapy  Reason for Therapy:  ADL/IADL Dysfunction and Discharge Planning    OCCUPATIONAL THERAPY ASSESSMENT   Patient is a 79 year old female admitted on 1/19/2025 with Presenting Problem: BLE pain, weakness, BIBIANA, dehydration.BLE US negative for DVT.  Co-Morbidities : HTN, HLD, DM, HFpEF, WYATT, CKD, COPD  Patient is currently functioning near baseline with ADLs and functional transfers.  Prior to admission, patient's baseline is independent.  Patient met all OT goals at Mod I level.  Patient reports no further questions/concerns at this time.     Patient will benefit from continued skilled OT Services with no additional skilled services but increased support at home    Recommendations for nursing staff:   Transfers: 1-person  Toileting location: Toilet    EVALUATION SESSION:  Patient at start of session: supine    FUNCTIONAL TRANSFER ASSESSMENT  Sit to Stand: Edge of Bed  Edge of Bed: Modified Independent  Toilet Transfer: Modified Independent (standard height, light use of grab bar, reports grab bar adjacent at home)    BED MOBILITY  Supine to Sit : Modified Independent  Sit to Supine (OT): Modified Independent    BALANCE ASSESSMENT     FUNCTIONAL ADL ASSESSMENT  Grooming Standing: Modified Independent  UB Dressing Seated: Modified Independent (simulated)  LB Dressing Seated: Modified Independent (without AE, elevated cross-leg method)  LB Dressing Standing: Modified Independent  Toileting Seated: Modified Independent  Toileting Standing: Modified Independent    ACTIVITY TOLERANCE: WFL                         O2 SATURATIONS       COGNITION  Overall Cognitive Status:  WFL - within functional limits    COGNITION ASSESSMENTS     Upper Extremity:   ROM: within functional limits    Strength: is within functional limits     EDUCATION PROVIDED  Patient Education : Role of Occupational Therapy; Functional Transfer Techniques; Fall Prevention  Patient's Response to Education: Verbalized Understanding (daughter present part of session)    Equipment used: n/a    Therapist comments: Patient participatory, agreeable to sitting up in chair end of session with encouragement. Educated on safety precautions and incorporation into ADLs and transfers, followed with good return demo. Patient performed all ADLs and functional transfers safely and independently. Patient and daughter aware of recommendation for initial supervision for shower transfers/showering, patient reports does not feel she will need.. Patient reports no further questions or concerns regarding discharge home to ADLs.     Patient End of Session: Up in chair;Call light within reach;Needs met;RN aware of session/findings;All patient questions and concerns addressed;Family present    OCCUPATIONAL PROFILE    HOME SITUATION  Type of Home: Independent living facility ([facility])  Home Layout: One level  Lives With: Alone    Toilet and Equipment: Standard height toilet;Grab bar  Shower/Tub and Equipment: Walk-in shower;Grab bar;Shower chair  Other Equipment: None    Occupation/Status: retired     Drives: No  Patient Regularly Uses: Home O2 (3L)    Prior Level of Function: Patient reports independent in all BADLs, most IADLs, and functional mobility without device prior to admission. Walks to dining marvin two meals a day, and down to activities room. Daughter assists with medication management due to patient's macular degeneration.     SUBJECTIVE  \"Thank you, you were a breath of fresh air!\"    PAIN ASSESSMENT  Rating: Unable to rate  Location: BLE if someone puts pressure on them  Management Techniques: Repositioning    OBJECTIVE  Precautions: Bed/chair alarm  Fall Risk: High fall risk    WEIGHT BEARING RESTRICTION       AM-PAC ‘6-Clicks’  Inpatient Daily Activity Short Form  -   Putting on and taking off regular lower body clothing?: None  -   Bathing (including washing, rinsing, drying)?: None  -   Toileting, which includes using toilet, bedpan or urinal? : None  -   Putting on and taking off regular upper body clothing?: None  -   Taking care of personal grooming such as brushing teeth?: None  -   Eating meals?: None    AM-PAC Score:  Score: 24  Approx Degree of Impairment: 0%  Standardized Score (AM-PAC Scale): 57.54    ADDITIONAL TESTS     NEUROLOGICAL FINDINGS      PLAN   Patient has been evaluated and presents with no skilled Occupational Therapy needs at this time.  Patient discharged from Occupational Therapy services.  Please re-order if a new functional limitation presents during this admission.    OT Device Recommendations: None    Patient Evaluation Complexity Level:   Occupational Profile/Medical History LOW - Brief history including review of medical or therapy records    Specific performance deficits impacting engagement in ADL/IADL LOW  1 - 3 performance deficits    Client Assessment/Performance Deficits LOW - No comorbidities nor modifications of tasks    Clinical Decision Making LOW - Analysis of occupational profile, problem-focused assessments, limited treatment options    Overall Complexity LOW     OT Session Time: 25 minutes

## 2025-04-25 ENCOUNTER — HOSPITAL ENCOUNTER (INPATIENT)
Facility: HOSPITAL | Age: 80
LOS: 3 days | Discharge: HOME HEALTH | End: 2025-05-01
Attending: EMERGENCY MEDICINE | Admitting: HOSPITALIST
Payer: MEDICARE

## 2025-04-25 ENCOUNTER — HOSPITAL ENCOUNTER (INPATIENT)
Facility: HOSPITAL | Age: 80
LOS: 3 days | Discharge: HOME | End: 2025-05-01
Attending: EMERGENCY MEDICINE | Admitting: HOSPITALIST
Payer: MEDICARE

## 2025-04-25 ENCOUNTER — APPOINTMENT (OUTPATIENT)
Dept: GENERAL RADIOLOGY | Facility: HOSPITAL | Age: 80
End: 2025-04-25
Payer: MEDICARE

## 2025-04-25 DIAGNOSIS — I50.31 ACUTE HEART FAILURE WITH PRESERVED EJECTION FRACTION (HFPEF) (HCC): Primary | ICD-10-CM

## 2025-04-25 DIAGNOSIS — R06.00 DYSPNEA, UNSPECIFIED TYPE: ICD-10-CM

## 2025-04-25 DIAGNOSIS — N17.9 ACUTE KIDNEY INJURY: ICD-10-CM

## 2025-04-25 PROBLEM — E87.0 HYPERNATREMIA: Status: ACTIVE | Noted: 2025-04-25

## 2025-04-25 PROBLEM — E87.3 METABOLIC ALKALOSIS: Status: ACTIVE | Noted: 2025-04-25

## 2025-04-25 LAB
ALBUMIN SERPL-MCNC: 4.3 G/DL (ref 3.2–4.8)
ALBUMIN/GLOB SERPL: 2 {RATIO} (ref 1–2)
ALP LIVER SERPL-CCNC: 82 U/L (ref 55–142)
ALT SERPL-CCNC: 12 U/L (ref 10–49)
ANION GAP SERPL CALC-SCNC: 5 MMOL/L (ref 0–18)
APTT PPP: 25.1 SECONDS (ref 23–36)
AST SERPL-CCNC: 22 U/L (ref ?–34)
BASOPHILS # BLD AUTO: 0.05 X10(3) UL (ref 0–0.2)
BASOPHILS NFR BLD AUTO: 0.6 %
BILIRUB SERPL-MCNC: 0.6 MG/DL (ref 0.2–1.1)
BUN BLD-MCNC: 33 MG/DL (ref 9–23)
CALCIUM BLD-MCNC: 9.5 MG/DL (ref 8.7–10.6)
CHLORIDE SERPL-SCNC: 104 MMOL/L (ref 98–112)
CO2 SERPL-SCNC: 36 MMOL/L (ref 21–32)
CREAT BLD-MCNC: 1.99 MG/DL (ref 0.55–1.02)
EGFRCR SERPLBLD CKD-EPI 2021: 25 ML/MIN/1.73M2 (ref 60–?)
EOSINOPHIL # BLD AUTO: 0.33 X10(3) UL (ref 0–0.7)
EOSINOPHIL NFR BLD AUTO: 4.1 %
ERYTHROCYTE [DISTWIDTH] IN BLOOD BY AUTOMATED COUNT: 21.5 %
GLOBULIN PLAS-MCNC: 2.2 G/DL (ref 2–3.5)
GLUCOSE BLD-MCNC: 120 MG/DL (ref 70–99)
GLUCOSE BLD-MCNC: 143 MG/DL (ref 70–99)
GLUCOSE BLD-MCNC: 212 MG/DL (ref 70–99)
HCT VFR BLD AUTO: 36.4 % (ref 35–48)
HGB BLD-MCNC: 10.7 G/DL (ref 12–16)
IMM GRANULOCYTES # BLD AUTO: 0.04 X10(3) UL (ref 0–1)
IMM GRANULOCYTES NFR BLD: 0.5 %
INR BLD: 1.01 (ref 0.8–1.2)
LYMPHOCYTES # BLD AUTO: 1.7 X10(3) UL (ref 1–4)
LYMPHOCYTES NFR BLD AUTO: 21.1 %
MCH RBC QN AUTO: 17.6 PG (ref 26–34)
MCHC RBC AUTO-ENTMCNC: 29.4 G/DL (ref 31–37)
MCV RBC AUTO: 59.8 FL (ref 80–100)
MONOCYTES # BLD AUTO: 0.68 X10(3) UL (ref 0.1–1)
MONOCYTES NFR BLD AUTO: 8.4 %
NEUTROPHILS # BLD AUTO: 5.25 X10 (3) UL (ref 1.5–7.7)
NEUTROPHILS # BLD AUTO: 5.25 X10(3) UL (ref 1.5–7.7)
NEUTROPHILS NFR BLD AUTO: 65.3 %
NT-PROBNP SERPL-MCNC: 842 PG/ML (ref ?–450)
OSMOLALITY SERPL CALC.SUM OF ELEC: 310 MOSM/KG (ref 275–295)
PLATELET # BLD AUTO: 188 10(3)UL (ref 150–450)
PLATELETS.RETICULATED NFR BLD AUTO: 3.2 % (ref 0–7)
POTASSIUM SERPL-SCNC: 4.3 MMOL/L (ref 3.5–5.1)
PROT SERPL-MCNC: 6.5 G/DL (ref 5.7–8.2)
PROTHROMBIN TIME: 13.4 SECONDS (ref 11.6–14.8)
RBC # BLD AUTO: 6.09 X10(6)UL (ref 3.8–5.3)
SODIUM SERPL-SCNC: 145 MMOL/L (ref 136–145)
TROPONIN I SERPL HS-MCNC: 15 NG/L (ref ?–34)
WBC # BLD AUTO: 8.1 X10(3) UL (ref 4–11)

## 2025-04-25 PROCEDURE — 85730 THROMBOPLASTIN TIME PARTIAL: CPT | Performed by: EMERGENCY MEDICINE

## 2025-04-25 PROCEDURE — 71045 X-RAY EXAM CHEST 1 VIEW: CPT

## 2025-04-25 PROCEDURE — 84484 ASSAY OF TROPONIN QUANT: CPT

## 2025-04-25 PROCEDURE — 85610 PROTHROMBIN TIME: CPT

## 2025-04-25 PROCEDURE — 85730 THROMBOPLASTIN TIME PARTIAL: CPT

## 2025-04-25 PROCEDURE — 99285 EMERGENCY DEPT VISIT HI MDM: CPT

## 2025-04-25 PROCEDURE — 83880 ASSAY OF NATRIURETIC PEPTIDE: CPT

## 2025-04-25 PROCEDURE — 94660 CPAP INITIATION&MGMT: CPT

## 2025-04-25 PROCEDURE — 36415 COLL VENOUS BLD VENIPUNCTURE: CPT

## 2025-04-25 PROCEDURE — 83880 ASSAY OF NATRIURETIC PEPTIDE: CPT | Performed by: EMERGENCY MEDICINE

## 2025-04-25 PROCEDURE — 82962 GLUCOSE BLOOD TEST: CPT

## 2025-04-25 PROCEDURE — 84484 ASSAY OF TROPONIN QUANT: CPT | Performed by: EMERGENCY MEDICINE

## 2025-04-25 PROCEDURE — 93010 ELECTROCARDIOGRAM REPORT: CPT

## 2025-04-25 PROCEDURE — 85025 COMPLETE CBC W/AUTO DIFF WBC: CPT | Performed by: EMERGENCY MEDICINE

## 2025-04-25 PROCEDURE — 93005 ELECTROCARDIOGRAM TRACING: CPT

## 2025-04-25 PROCEDURE — 80053 COMPREHEN METABOLIC PANEL: CPT

## 2025-04-25 PROCEDURE — 85025 COMPLETE CBC W/AUTO DIFF WBC: CPT

## 2025-04-25 PROCEDURE — 83036 HEMOGLOBIN GLYCOSYLATED A1C: CPT | Performed by: HOSPITALIST

## 2025-04-25 PROCEDURE — 85610 PROTHROMBIN TIME: CPT | Performed by: EMERGENCY MEDICINE

## 2025-04-25 PROCEDURE — 80053 COMPREHEN METABOLIC PANEL: CPT | Performed by: EMERGENCY MEDICINE

## 2025-04-25 RX ORDER — FERROUS SULFATE 325(65) MG
325 TABLET, DELAYED RELEASE (ENTERIC COATED) ORAL
Status: DISCONTINUED | OUTPATIENT
Start: 2025-04-26 | End: 2025-05-01

## 2025-04-25 RX ORDER — HEPARIN SODIUM 5000 [USP'U]/ML
5000 INJECTION, SOLUTION INTRAVENOUS; SUBCUTANEOUS EVERY 8 HOURS SCHEDULED
Status: DISCONTINUED | OUTPATIENT
Start: 2025-04-25 | End: 2025-05-01

## 2025-04-25 RX ORDER — CETIRIZINE HYDROCHLORIDE 5 MG/1
5 TABLET ORAL DAILY
Status: DISCONTINUED | OUTPATIENT
Start: 2025-04-26 | End: 2025-05-01

## 2025-04-25 RX ORDER — DONEPEZIL HYDROCHLORIDE 5 MG/1
10 TABLET, FILM COATED ORAL NIGHTLY
Status: DISCONTINUED | OUTPATIENT
Start: 2025-04-25 | End: 2025-05-01

## 2025-04-25 RX ORDER — IPRATROPIUM BROMIDE 21 UG/1
2 SPRAY, METERED NASAL EVERY 12 HOURS
Status: DISCONTINUED | OUTPATIENT
Start: 2025-04-26 | End: 2025-05-01

## 2025-04-25 RX ORDER — CETIRIZINE HYDROCHLORIDE 10 MG/1
10 TABLET ORAL DAILY
COMMUNITY

## 2025-04-25 RX ORDER — ACETAMINOPHEN 500 MG
500 TABLET ORAL EVERY 4 HOURS PRN
Status: DISCONTINUED | OUTPATIENT
Start: 2025-04-25 | End: 2025-04-29

## 2025-04-25 RX ORDER — ASPIRIN 81 MG/1
81 TABLET ORAL DAILY
Status: DISCONTINUED | OUTPATIENT
Start: 2025-04-26 | End: 2025-05-01

## 2025-04-25 RX ORDER — INSULIN DEGLUDEC 100 U/ML
15 INJECTION, SOLUTION SUBCUTANEOUS DAILY
Status: DISCONTINUED | OUTPATIENT
Start: 2025-04-26 | End: 2025-04-27

## 2025-04-25 RX ORDER — ATORVASTATIN CALCIUM 10 MG/1
10 TABLET, FILM COATED ORAL NIGHTLY
Status: DISCONTINUED | OUTPATIENT
Start: 2025-04-25 | End: 2025-05-01

## 2025-04-25 RX ORDER — ESCITALOPRAM OXALATE 10 MG/1
10 TABLET ORAL DAILY
Status: DISCONTINUED | OUTPATIENT
Start: 2025-04-26 | End: 2025-04-27

## 2025-04-25 RX ORDER — DULOXETIN HYDROCHLORIDE 30 MG/1
60 CAPSULE, DELAYED RELEASE ORAL DAILY
Status: DISCONTINUED | OUTPATIENT
Start: 2025-04-26 | End: 2025-04-27

## 2025-04-25 RX ORDER — BUMETANIDE 0.25 MG/ML
2 INJECTION, SOLUTION INTRAMUSCULAR; INTRAVENOUS ONCE
Status: DISCONTINUED | OUTPATIENT
Start: 2025-04-25 | End: 2025-04-26

## 2025-04-25 RX ORDER — CHOLECALCIFEROL (VITAMIN D3) 25 MCG
1000 TABLET ORAL DAILY
Status: DISCONTINUED | OUTPATIENT
Start: 2025-04-26 | End: 2025-05-01

## 2025-04-25 RX ORDER — NICOTINE POLACRILEX 4 MG
30 LOZENGE BUCCAL
Status: DISCONTINUED | OUTPATIENT
Start: 2025-04-25 | End: 2025-05-01

## 2025-04-25 RX ORDER — DEXTROSE MONOHYDRATE 25 G/50ML
50 INJECTION, SOLUTION INTRAVENOUS
Status: DISCONTINUED | OUTPATIENT
Start: 2025-04-25 | End: 2025-05-01

## 2025-04-25 RX ORDER — MELATONIN
1000 DAILY
Status: DISCONTINUED | OUTPATIENT
Start: 2025-04-26 | End: 2025-05-01

## 2025-04-25 RX ORDER — BUMETANIDE 0.25 MG/ML
1 INJECTION, SOLUTION INTRAMUSCULAR; INTRAVENOUS EVERY 12 HOURS
Status: DISCONTINUED | OUTPATIENT
Start: 2025-04-26 | End: 2025-04-29

## 2025-04-25 RX ORDER — PREGABALIN 100 MG/1
600 CAPSULE ORAL NIGHTLY
Status: DISCONTINUED | OUTPATIENT
Start: 2025-04-26 | End: 2025-04-28

## 2025-04-25 RX ORDER — METOCLOPRAMIDE HYDROCHLORIDE 5 MG/ML
5 INJECTION INTRAMUSCULAR; INTRAVENOUS EVERY 8 HOURS PRN
Status: DISCONTINUED | OUTPATIENT
Start: 2025-04-25 | End: 2025-05-01

## 2025-04-25 RX ORDER — NICOTINE POLACRILEX 4 MG
15 LOZENGE BUCCAL
Status: DISCONTINUED | OUTPATIENT
Start: 2025-04-25 | End: 2025-05-01

## 2025-04-25 RX ORDER — HYDRALAZINE HYDROCHLORIDE 50 MG/1
100 TABLET, FILM COATED ORAL 3 TIMES DAILY
Status: DISCONTINUED | OUTPATIENT
Start: 2025-04-25 | End: 2025-05-01

## 2025-04-25 RX ORDER — ONDANSETRON 2 MG/ML
4 INJECTION INTRAMUSCULAR; INTRAVENOUS EVERY 6 HOURS PRN
Status: DISCONTINUED | OUTPATIENT
Start: 2025-04-25 | End: 2025-05-01

## 2025-04-25 NOTE — ED INITIAL ASSESSMENT (HPI)
Patient reports having swelling in her legs all week and SOB. Has history of CHF. She was seen by a cardiologist on Tuesday and given a dose of IV Bumex in their office. She was then started on a PO diuretic at home, but reports no improvement to to swelling in her legs or breathing.     Patient is on 3L oxygen at baseline.

## 2025-04-26 ENCOUNTER — APPOINTMENT (OUTPATIENT)
Dept: CV DIAGNOSTICS | Facility: HOSPITAL | Age: 80
End: 2025-04-26
Attending: INTERNAL MEDICINE
Payer: MEDICARE

## 2025-04-26 LAB
ALBUMIN SERPL-MCNC: 4.2 G/DL (ref 3.2–4.8)
ALBUMIN SERPL-MCNC: 4.2 G/DL (ref 3.2–4.8)
ALBUMIN/GLOB SERPL: 1.9 {RATIO} (ref 1–2)
ALP LIVER SERPL-CCNC: 76 U/L (ref 55–142)
ALT SERPL-CCNC: 11 U/L (ref 10–49)
ANION GAP SERPL CALC-SCNC: 9 MMOL/L (ref 0–18)
ANION GAP SERPL CALC-SCNC: 9 MMOL/L (ref 0–18)
AST SERPL-CCNC: 21 U/L (ref ?–34)
ATRIAL RATE: 58 BPM
BASOPHILS # BLD AUTO: 0.06 X10(3) UL (ref 0–0.2)
BASOPHILS NFR BLD AUTO: 0.8 %
BILIRUB SERPL-MCNC: 0.7 MG/DL (ref 0.2–1.1)
BILIRUB UR QL STRIP.AUTO: NEGATIVE
BUN BLD-MCNC: 28 MG/DL (ref 9–23)
BUN BLD-MCNC: 28 MG/DL (ref 9–23)
CALCIUM BLD-MCNC: 9.2 MG/DL (ref 8.7–10.6)
CALCIUM BLD-MCNC: 9.2 MG/DL (ref 8.7–10.6)
CHLORIDE SERPL-SCNC: 102 MMOL/L (ref 98–112)
CHLORIDE SERPL-SCNC: 102 MMOL/L (ref 98–112)
CLARITY UR REFRACT.AUTO: CLEAR
CO2 SERPL-SCNC: 35 MMOL/L (ref 21–32)
CO2 SERPL-SCNC: 35 MMOL/L (ref 21–32)
COLOR UR AUTO: COLORLESS
CREAT BLD-MCNC: 1.86 MG/DL (ref 0.55–1.02)
CREAT BLD-MCNC: 1.86 MG/DL (ref 0.55–1.02)
CREAT UR-SCNC: 68.4 MG/DL
EGFRCR SERPLBLD CKD-EPI 2021: 28 ML/MIN/1.73M2 (ref 60–?)
EGFRCR SERPLBLD CKD-EPI 2021: 28 ML/MIN/1.73M2 (ref 60–?)
EOSINOPHIL # BLD AUTO: 0.35 X10(3) UL (ref 0–0.7)
EOSINOPHIL NFR BLD AUTO: 4.7 %
ERYTHROCYTE [DISTWIDTH] IN BLOOD BY AUTOMATED COUNT: 21.5 %
EST. AVERAGE GLUCOSE BLD GHB EST-MCNC: 186 MG/DL (ref 68–126)
GLOBULIN PLAS-MCNC: 2.2 G/DL (ref 2–3.5)
GLUCOSE BLD-MCNC: 126 MG/DL (ref 70–99)
GLUCOSE BLD-MCNC: 154 MG/DL (ref 70–99)
GLUCOSE BLD-MCNC: 156 MG/DL (ref 70–99)
GLUCOSE BLD-MCNC: 157 MG/DL (ref 70–99)
GLUCOSE BLD-MCNC: 175 MG/DL (ref 70–99)
GLUCOSE BLD-MCNC: 175 MG/DL (ref 70–99)
GLUCOSE UR STRIP.AUTO-MCNC: >1000 MG/DL
HBA1C MFR BLD: 8.1 % (ref ?–5.7)
HCT VFR BLD AUTO: 37.2 % (ref 35–48)
HGB BLD-MCNC: 10.6 G/DL (ref 12–16)
IMM GRANULOCYTES # BLD AUTO: 0.01 X10(3) UL (ref 0–1)
IMM GRANULOCYTES NFR BLD: 0.1 %
KETONES UR STRIP.AUTO-MCNC: NEGATIVE MG/DL
LEUKOCYTE ESTERASE UR QL STRIP.AUTO: NEGATIVE
LYMPHOCYTES # BLD AUTO: 1.53 X10(3) UL (ref 1–4)
LYMPHOCYTES NFR BLD AUTO: 20.6 %
MAGNESIUM SERPL-MCNC: 2.3 MG/DL (ref 1.6–2.6)
MCH RBC QN AUTO: 17.4 PG (ref 26–34)
MCHC RBC AUTO-ENTMCNC: 28.5 G/DL (ref 31–37)
MCV RBC AUTO: 61.2 FL (ref 80–100)
MONOCYTES # BLD AUTO: 0.66 X10(3) UL (ref 0.1–1)
MONOCYTES NFR BLD AUTO: 8.9 %
NEUTROPHILS # BLD AUTO: 4.83 X10 (3) UL (ref 1.5–7.7)
NEUTROPHILS # BLD AUTO: 4.83 X10(3) UL (ref 1.5–7.7)
NEUTROPHILS NFR BLD AUTO: 64.9 %
NITRITE UR QL STRIP.AUTO: NEGATIVE
NT-PROBNP SERPL-MCNC: 559 PG/ML (ref ?–450)
OSMOLALITY SERPL CALC.SUM OF ELEC: 312 MOSM/KG (ref 275–295)
OSMOLALITY SERPL CALC.SUM OF ELEC: 312 MOSM/KG (ref 275–295)
P AXIS: 60 DEGREES
P-R INTERVAL: 166 MS
PH UR STRIP.AUTO: 5.5 [PH] (ref 5–8)
PHOSPHATE SERPL-MCNC: 4.8 MG/DL (ref 2.4–5.1)
PLATELET # BLD AUTO: 182 10(3)UL (ref 150–450)
PLATELETS.RETICULATED NFR BLD AUTO: 3.1 % (ref 0–7)
POTASSIUM SERPL-SCNC: 4 MMOL/L (ref 3.5–5.1)
POTASSIUM SERPL-SCNC: 4 MMOL/L (ref 3.5–5.1)
PROT SERPL-MCNC: 6.4 G/DL (ref 5.7–8.2)
PROT UR STRIP.AUTO-MCNC: NEGATIVE MG/DL
Q-T INTERVAL: 436 MS
QRS DURATION: 90 MS
QTC CALCULATION (BEZET): 428 MS
R AXIS: -1 DEGREES
RBC # BLD AUTO: 6.08 X10(6)UL (ref 3.8–5.3)
RBC UR QL AUTO: NEGATIVE
SODIUM SERPL-SCNC: 146 MMOL/L (ref 136–145)
SODIUM SERPL-SCNC: 146 MMOL/L (ref 136–145)
SODIUM SERPL-SCNC: 36 MMOL/L
SP GR UR STRIP.AUTO: 1.01 (ref 1–1.03)
T AXIS: 88 DEGREES
UROBILINOGEN UR STRIP.AUTO-MCNC: NORMAL MG/DL
UUN UR-MCNC: 473 MG/DL
VENTRICULAR RATE: 58 BPM
WBC # BLD AUTO: 7.4 X10(3) UL (ref 4–11)

## 2025-04-26 PROCEDURE — 84100 ASSAY OF PHOSPHORUS: CPT | Performed by: HOSPITALIST

## 2025-04-26 PROCEDURE — 82962 GLUCOSE BLOOD TEST: CPT

## 2025-04-26 PROCEDURE — 83735 ASSAY OF MAGNESIUM: CPT | Performed by: INTERNAL MEDICINE

## 2025-04-26 PROCEDURE — 84300 ASSAY OF URINE SODIUM: CPT | Performed by: INTERNAL MEDICINE

## 2025-04-26 PROCEDURE — 81003 URINALYSIS AUTO W/O SCOPE: CPT | Performed by: INTERNAL MEDICINE

## 2025-04-26 PROCEDURE — 93306 TTE W/DOPPLER COMPLETE: CPT | Performed by: INTERNAL MEDICINE

## 2025-04-26 PROCEDURE — 94760 N-INVAS EAR/PLS OXIMETRY 1: CPT

## 2025-04-26 PROCEDURE — 84540 ASSAY OF URINE/UREA-N: CPT | Performed by: INTERNAL MEDICINE

## 2025-04-26 PROCEDURE — 83880 ASSAY OF NATRIURETIC PEPTIDE: CPT | Performed by: INTERNAL MEDICINE

## 2025-04-26 PROCEDURE — 80053 COMPREHEN METABOLIC PANEL: CPT | Performed by: HOSPITALIST

## 2025-04-26 PROCEDURE — 82570 ASSAY OF URINE CREATININE: CPT | Performed by: INTERNAL MEDICINE

## 2025-04-26 PROCEDURE — 85025 COMPLETE CBC W/AUTO DIFF WBC: CPT | Performed by: HOSPITALIST

## 2025-04-26 NOTE — CONSULTS
[facility] Consultation    Nina Donnelly Patient Status:  Observation    10/9/1945 MRN [id number]   [facility] 8NE-A Attending Justus Nair, DO   Hosp Day # 0 PCP Gladys Saravia MD     Reason for Consultation:  CHFpEF      History of Present Illness:  Nina Donnelly is a a(n) 79 year old female with known CHF, COPD, diabetes, renal insufficiency comes in with worsening edema and shortness of breath.  She was seen as an outpatient earlier in the week.  Increasing edema and dyspnea and was given IV Bumex.  She continued to feel poorly and came to the ER last night and was admitted for further treatment.. Started on diuretics IV. No I's o's     History:  Chronic heart failure with preserved EF, diabetes, WYATT, hyperlipidemia  Past Surgical History[1]  Family History[2]      Allergies:  Allergies[3]    Medications:  Scheduled Medications[4]    Continuous Infusions:  Medication Infusions[5]    Social History:   reports that she quit smoking about 10 years ago. Her smoking use included cigarettes. She started smoking about 65 years ago. She has a 55 pack-year smoking history. She has never used smokeless tobacco. She reports that she does not drink alcohol and does not use drugs.    Review of Systems:  All systems were reviewed and are negative except as described above in HPI.    Physical Exam:      Temp:  [97.7 °F (36.5 °C)-98.4 °F (36.9 °C)] 97.7 °F (36.5 °C)  Pulse:  [54-75] 75  Resp:  [13-22] 22  BP: (109-146)/(47-64) 130/53  SpO2:  [93 %-100 %] 96 %    Wt Readings from Last 3 Encounters:   25 195 lb 15.8 oz (88.9 kg)   25 184 lb 11.2 oz (83.8 kg)   10/17/24 193 lb 5.5 oz (87.7 kg)       General: No apparent distress  HEENT: No focal deficits.  Neck: supple. JVP normal  Cardiac: Regular rhythm, S1, S2 normal,  rub or gallop.  No murmur.  Lungs: CTA  Abdomen: Soft, non-tender.   Extremities: No edema  Neurologic: no focal deficits  Skin: Warm and dry.              Laboratories and  Data:  Diagnostics:    EKG, 4/26/2025:SB, low volts, ? Anterolateral mi  Lexiscan Stress test 10/16/24-[facility]:  CLINICAL HISTORY: A 79-year-old female with dyspnea, CO2 retention,   bilateral lower extremity edema.     LEXISCAN FINDINGS: The regadenoson portion of this study was interpreted   by Dr. Miranda. Antecubital intravenous access was established. The patient   underwent intravenous infusion of regadenoson at a dose of 0.4 mg over 15   to 20 seconds followed immediately by 5 mL saline flush. The   electrocardiographic response to regadenoson infusion was normal. The   patient denied cardiac symptoms. No arrhythmias noted.     SCINTIGRAPHIC FINDINGS: Routine single isotope myocardial perfusion   imaging was performed. The patient received 10.6 mCi of technetium-99m   sestamibi at rest and 32.2 mCi at stress. A review of the projection   images revealed no evidence of left breast attenuation or motion artifact   of the left ventricular myocardium.     PERFUSION IMAGING: Resting and stress images were obtained. Myocardial   perfusion scan demonstrates homogeneous and uniform radiotracer activity.   There is no fixed or reversible defect.     GATED SPECT ANALYSIS: Normal cardiac motion is noted on gated SPECT   imaging. Ejection fraction measures 74%.   Labs:   HEM:  Recent Labs   Lab 04/25/25 1932 04/26/25  0500   WBC 8.1 7.4   HGB 10.7* 10.6*   .0 182.0       Chem:  Recent Labs   Lab 04/25/25 1932 04/26/25  0500    146*  146*   K 4.3 4.0  4.0    102  102   CO2 36.0* 35.0*  35.0*   BUN 33* 28*  28*   CREATSERUM 1.99* 1.86*  1.86*   CA 9.5 9.2  9.2   MG  --  2.3   PHOS  --  4.8   * 175*  175*       Recent Labs   Lab 04/25/25 1932 04/26/25  0500   ALT 12 11   AST 22 21   ALB 4.3 4.2  4.2       Recent Labs   Lab 04/25/25 1932   INR 1.01       No results for input(s): \"TROP\" in the last 168 hours.          Impression:    1. HFpEF-acute on chronic  2.COPD on home  O2  3.HL  4.DM        Recommend:  Diuretics, please follow strict I's/O's, daily weights.    She asked for renal to be involved as she had some BIBIANA with diuresis in the past.         Kimo Velazco MD  2025  7:33 AM          [1]   Past Surgical History:  Procedure Laterality Date          Cabg      Foot surgery      Hysterectomy      Knee surgery      Tonsillectomy     [2]   Family History  Problem Relation Age of Onset    Psychiatric Mother     Diabetes Mother     Heart Disorder Mother     Breast Cancer Paternal Aunt         dx age unknown    [3]   Allergies  Allergen Reactions    Pioglitazone UNKNOWN     Weight Gain    Capsaicin ITCHING    Vancomycin ITCHING   [4]    bumetanide  2 mg Intravenous Once    aspirin  81 mg Oral Daily    atorvastatin  10 mg Oral Nightly    cetirizine  5 mg Oral Daily    cholecalciferol  1,000 Units Oral Daily    donepezil  10 mg Oral Nightly    DULoxetine  60 mg Oral Daily    escitalopram  10 mg Oral Daily    ferrous sulfate  325 mg Oral Daily with breakfast    hydrALAZINE  100 mg Oral TID    insulin degludec  15 Units Subcutaneous Daily    ipratropium  2 spray Nasal 2 times per day    pregabalin  600 mg Oral Nightly    cyanocobalamin  1,000 mcg Oral Daily    heparin  5,000 Units Subcutaneous Q8H CHICO    insulin aspart  1-5 Units Subcutaneous TID AC and HS    bumetanide  1 mg Intravenous Q12H   [5]

## 2025-04-26 NOTE — PLAN OF CARE
Assumed care at 0730; Pt A/o x 4 drowsy in the morning. Stable VS and complaints of pain. PRN medication and non pharmacological intervention provided. Saturating well on 4L; Plan of care discussed with patient and family at bedside. Educated on fall risk and use of call light. Belongings and call light within reach; Bed at lowest position and locked.     Echo At bedside @1100    Problem: CARDIOVASCULAR - ADULT  Goal: Maintains optimal cardiac output and hemodynamic stability  Description: INTERVENTIONS:- Monitor vital signs, rhythm, and trends- Monitor for bleeding, hypotension and signs of decreased cardiac output- Evaluate effectiveness of vasoactive medications to optimize hemodynamic stability- Monitor arterial and/or venous puncture sites for bleeding and/or hematoma- Assess quality of pulses, skin color and temperature- Assess for signs of decreased coronary artery perfusion - ex. Angina- Evaluate fluid balance, assess for edema, trend weights  Outcome: Progressing  Goal: Absence of cardiac arrhythmias or at baseline  Description: INTERVENTIONS:- Continuous cardiac monitoring, monitor vital signs, obtain 12 lead EKG if indicated- Evaluate effectiveness of antiarrhythmic and heart rate control medications as ordered- Initiate emergency measures for life threatening arrhythmias- Monitor electrolytes and administer replacement therapy as ordered  Outcome: Progressing     Problem: Patient/Family Goals  Goal: Patient/Family Long Term Goal  Description: Patient's Long Term Goal: Go home  Interventions:  - IV bumex  - Monitor creatinine  - Consults to see  - See additional Care Plan goals for specific interventions  Outcome: Progressing  Goal: Patient/Family Short Term Goal  Description: Patient's Short Term Goal: Feel better  Interventions:   - Consults to see  - Bumex  - Monitor labs  - See additional Care Plan goals for specific interventions  Outcome: Progressing

## 2025-04-26 NOTE — CONSULTS
[facility]   part of [facility]    Report of Consultation    Nina Donnelly Patient Status:  Observation    10/9/1945 MRN [id number]   Location [facility] 8NE-A Attending Justus Nair, DO   Hosp Day # 0 PCP Gladys Saravia MD     Date of consult: 2025    REASON FOR CONSULT:     BIBIANA CKD    HISTORY OF PRESENT ILLNESS:     Nina Donnelly is a a(n) 79 year old female w ho CKD 3b (follows w Dr Scott, bl Cr 1.6-1.9), HFpEF, DM2, HTN, WYATT on CPAP, beta thalassemia and macular degeneration, COPD on home o2 who presents with worsening SOB and swelling/weight gain.      Per Dr Scott notes: Hospitalized 2025 with leg cramping, LH, hyperglycemia, iron deficiency and BIBIANA. Felt overdiuresed and given IV fluids and IV iron. Currently off RAASi, spironolactone. Was discharged off torsemide but felt her legs were swelling so she started torsemide 20 mg/d.     This past week increased wt and hypoxia. she saw pulm, sent to cards  Saw cards, given bumex 2mg IV x 2 per notes on 25.   Now w continued SOB  Normally at 3L NC, but noted may need to be higher  +diarrhea x 1 per day since starting jardiance in   No vomiting. No nsaids  Drinks 64oz per day --> above 8oz water + 4oz cranberry + 1 coffee + 40oz water + 16oz water w dinner  Weight went up to 199lbs, good weight upper 180s to lower 190s.         REVIEW OF SYSTEMS:     Please see HPI for pertinent positives. 10 point review of systems otherwise reviewed and negative.     HISTORY:     Past Medical History[1]  Past Surgical History[2]  Family History[3]   reports that she quit smoking about 10 years ago. Her smoking use included cigarettes. She started smoking about 65 years ago. She has a 55 pack-year smoking history. She has never used smokeless tobacco. She reports that she does not drink alcohol and does not use drugs.    ALLERGIES:     Allergies[4]    MEDICATIONS:     Current Hospital Medications[5]  Prior to Admission Medications[6]      PHYSICAL EXAM:      Vital Signs: /41 (BP Location: Left arm)   Pulse 56   Temp 98.4 °F (36.9 °C) (Oral)   Resp 18   Ht 5' 1\" (1.549 m)   Wt 195 lb 15.8 oz (88.9 kg)   SpO2 97%   BMI 37.03 kg/m²   Temp (24hrs), Av.2 °F (36.8 °C), Min:97.7 °F (36.5 °C), Max:98.4 °F (36.9 °C)       Intake/Output Summary (Last 24 hours) at 2025 1125  Last data filed at 2025 0826  Gross per 24 hour   Intake 0 ml   Output --   Net 0 ml     Wt Readings from Last 3 Encounters:   25 195 lb 15.8 oz (88.9 kg)   25 184 lb 11.2 oz (83.8 kg)   10/17/24 193 lb 5.5 oz (87.7 kg)       General: NAD  HEENT: NCAT, MMM, EOMI  Neck: Supple   Cardiac: Regular rate and rhythm   Lungs: CTAB   Abdomen: Soft, non-tender, non-distended   : No CVA tenderness  Extremities: no leg edema  Neurologic/Psych: mentating well, no asterixis  Skin: No rashes    LABORATORY DATA:       Lab Results   Component Value Date     (H) 2025     (H) 2025    BUN 28 (H) 2025    BUN 28 (H) 2025    BUNCREA 18.0 03/15/2022    CREATSERUM 1.86 (H) 2025    CREATSERUM 1.86 (H) 2025    ANIONGAP 9 2025    ANIONGAP 9 2025    GFRNAA 35 (L) 2021    GFRAA 40 (L) 2021    CA 9.2 2025    CA 9.2 2025    OSMOCALC 312 (H) 2025    OSMOCALC 312 (H) 2025    ALKPHO 76 2025    AST 21 2025    ALT 11 2025    BILT 0.7 2025    TP 6.4 2025    ALB 4.2 2025    ALB 4.2 2025    GLOBULIN 2.2 2025     (H) 2025     (H) 2025    K 4.0 2025    K 4.0 2025     2025     2025    CO2 35.0 (H) 2025    CO2 35.0 (H) 2025     Lab Results   Component Value Date    WBC 7.4 2025    RBC 6.08 (H) 2025    HGB 10.6 (L) 2025    HCT 37.2 2025    .0 2025    MCV 61.2 (L) 2025    MCH 17.4 (L) 2025    MCHC 28.5 (L) 2025    RDW 21.5 2025     NEPRELIM 4.83 04/26/2025    NEUTABS 4.34 04/07/2020    LYMPHABS 2.28 04/07/2020    EOSABS 0.14 04/07/2020    BASABS 0.07 04/07/2020    NEPERCENT 64.9 04/26/2025    LYPERCENT 20.6 04/26/2025    MOPERCENT 8.9 04/26/2025    EOPERCENT 4.7 04/26/2025    BAPERCENT 0.8 04/26/2025    NE 4.83 04/26/2025    LYMABS 1.53 04/26/2025    MOABSO 0.66 04/26/2025    EOABSO 0.35 04/26/2025    BAABSO 0.06 04/26/2025     Lab Results   Component Value Date    MALBP <1.2 03/15/2022    CREUR 68.40 04/26/2025     Lab Results   Component Value Date    COLORUR Colorless (A) 01/20/2025    CLARITY Clear 01/20/2025    SPECGRAVITY 1.005 01/20/2025    GLUUR 1000 (A) 01/20/2025    BILUR Negative 01/20/2025    KETUR Negative 01/20/2025    BLOODURINE Negative 01/20/2025    PHURINE 7.0 01/20/2025    PROUR Negative 01/20/2025    UROBILINOGEN Normal 01/20/2025    NITRITE Negative 01/20/2025    LEUUR Negative 01/20/2025    NMIC Microscopic not indicated 01/20/2025         IMAGING:     All imaging studies personally reviewed.    XR CHEST AP PORTABLE  (CPT=71045)  Result Date: 4/25/2025  CONCLUSION:   Low lung volumes poor inspiration.  Haziness of the right mid-lower lung and left lower lung, increased since the prior, likely reflecting combination of pleural fluid and lung consolidation.  Cardiac enlargement assessment limited.  No pneumothorax. Consider upright PA and lateral examination of the chest, when tolerated.   LOCATION:  [id number]      Dictated by (CST): Aaron Diop MD on 4/25/2025 at 6:48 PM     Finalized by (CST): Aaron Diop MD on 4/25/2025 at 6:48 PM           ASSESSMENT/PLAN:   Nina Donnelly is a a(n) 79 year old female w ho CKD 3b (follows w Dr Scott, bl Cr 1.6-1.9), HFpEF, DM2, HTN, WYATT on CPAP, beta thalassemia and macular degeneration, COPD on home o2 who presents with worsening SOB and swelling/weight gain. Peak weight at home 199lb per pt.     BIBIANA on CKD3b  -- likely CRS vs due to diuresis   -- however may need to tolerate  slightly higher Cr to achieve euvolemia  -- agree w IV bumex 1 BID, titrate and monitor   -- urine na 36. Check UA. If cr worsens check renal US  -- strict UOP, daily morning weights  -- avoid nephrotoxins and renally dose meds     Acute on chronic CHF  -- diuresis. Cards consult. Low sodium diet. FR. Consider echo per cards. Daily wts standing morning     Hypernatremia  -- Na 146. Monitor w diuresis     Alkalosis  -- may be resp too. Monitor. If worsens check abg. CPAP at night     Anemia  -- check iron stores     HTN  -- per cards     D/w pt family bedside and Dr Rucker    Thank you for allowing me to participate in the care of your patient. Please do not hesitate to contact me with concerns or questions.    Rufina Odonnell MD  [facility]    2025  11:25 AM         [1]   Past Medical History:   Congestive heart disease (HCC)    Hyperlipidemia    Mild episode of recurrent major depressive disorder    Neuropathy    Obstructive apnea    [facility] PSG AHI 11    WYATT on CPAP   [2]   Past Surgical History:  Procedure Laterality Date          Cabg      Foot surgery      Hysterectomy      Knee surgery      Tonsillectomy     [3]   Family History  Problem Relation Age of Onset    Psychiatric Mother     Diabetes Mother     Heart Disorder Mother     Breast Cancer Paternal Aunt         dx age unknown    [4]   Allergies  Allergen Reactions    Pioglitazone UNKNOWN     Weight Gain    Capsaicin ITCHING    Vancomycin ITCHING   [5]   Current Facility-Administered Medications:     aspirin DR tab 81 mg, 81 mg, Oral, Daily    atorvastatin (Lipitor) tab 10 mg, 10 mg, Oral, Nightly    cetirizine (ZyrTEC) tab 5 mg, 5 mg, Oral, Daily    cholecalciferol (Vitamin D3) tab 1,000 Units, 1,000 Units, Oral, Daily    donepezil (Aricept) tab 10 mg, 10 mg, Oral, Nightly    DULoxetine (Cymbalta)  cap 60 mg, 60 mg, Oral, Daily    escitalopram (Lexapro) tab 10 mg, 10 mg, Oral, Daily    ferrous sulfate DR tab 325 mg, 325  mg, Oral, Daily with breakfast    hydrALAZINE (Apresoline) tab 100 mg, 100 mg, Oral, TID    insulin degludec (Tresiba) 100 units/mL flextouch 15 Units, 15 Units, Subcutaneous, Daily    ipratropium (Atrovent) 0.03 % nasal solution 2 spray, 2 spray, Nasal, 2 times per day    pregabalin (Lyrica) cap 600 mg, 600 mg, Oral, Nightly    cyanocobalamin (Vitamin B12) tab 1,000 mcg, 1,000 mcg, Oral, Daily    glucose (Dex4) 15 GM/59ML oral liquid 15 g, 15 g, Oral, Q15 Min PRN **OR** glucose (Glutose) 40% oral gel 15 g, 15 g, Oral, Q15 Min PRN **OR** glucose-vitamin C (Dex-4) chewable tab 4 tablet, 4 tablet, Oral, Q15 Min PRN **OR** dextrose 50% injection 50 mL, 50 mL, Intravenous, Q15 Min PRN **OR** glucose (Dex4) 15 GM/59ML oral liquid 30 g, 30 g, Oral, Q15 Min PRN **OR** glucose (Glutose) 40% oral gel 30 g, 30 g, Oral, Q15 Min PRN **OR** glucose-vitamin C (Dex-4) chewable tab 8 tablet, 8 tablet, Oral, Q15 Min PRN    heparin (Porcine) 5000 UNIT/ML injection 5,000 Units, 5,000 Units, Subcutaneous, Q8H CHICO    acetaminophen (Tylenol Extra Strength) tab 500 mg, 500 mg, Oral, Q4H PRN    ondansetron (Zofran) 4 MG/2ML injection 4 mg, 4 mg, Intravenous, Q6H PRN    metoclopramide (Reglan) 5 mg/mL injection 5 mg, 5 mg, Intravenous, Q8H PRN    insulin aspart (NovoLOG) 100 Units/mL FlexPen 1-5 Units, 1-5 Units, Subcutaneous, TID AC and HS    bumetanide (Bumex) 0.25 MG/ML injection 1 mg, 1 mg, Intravenous, Q12H  [6]   No current outpatient medications on file.

## 2025-04-26 NOTE — PLAN OF CARE
NURSING ADMISSION NOTE    Oriented to room. Safety precautions initiated. Bed in low position. Call light in reach. Two person skin check completed with PCT. Admission navigator completed. Admission orders received and initiated.     A&Ox4. 3L NC, CPAP with sleep. Shortness of breath with exertion. . NSR/SB on telemetry. Denies chest pain. Pt declined 0000 dose of IV bumex, she would like to discuss with nephrology prior to taking medication. Continent of bowel and bladder. No complaints of pain. Up SBA. In bed with fall precautions in place. Discussed plan of care with patient. Patient verbalizes understanding.    Plan of care: IV bumex, daily weight, strict intake and output, monitor labs, urine sample.     Problem: CARDIOVASCULAR - ADULT  Goal: Maintains optimal cardiac output and hemodynamic stability  Description: INTERVENTIONS:- Monitor vital signs, rhythm, and trends- Monitor for bleeding, hypotension and signs of decreased cardiac output- Evaluate effectiveness of vasoactive medications to optimize hemodynamic stability- Monitor arterial and/or venous puncture sites for bleeding and/or hematoma- Assess quality of pulses, skin color and temperature- Assess for signs of decreased coronary artery perfusion - ex. Angina- Evaluate fluid balance, assess for edema, trend weights  Outcome: Progressing  Goal: Absence of cardiac arrhythmias or at baseline  Description: INTERVENTIONS:- Continuous cardiac monitoring, monitor vital signs, obtain 12 lead EKG if indicated- Evaluate effectiveness of antiarrhythmic and heart rate control medications as ordered- Initiate emergency measures for life threatening arrhythmias- Monitor electrolytes and administer replacement therapy as ordered  Outcome: Progressing     Problem: Patient/Family Goals  Goal: Patient/Family Long Term Goal  Description: Patient's Long Term Goal: Go home  Interventions:  - IV bumex  - Monitor creatinine  - Consults to see  - See additional Care Plan  goals for specific interventions  Outcome: Progressing  Goal: Patient/Family Short Term Goal  Description: Patient's Short Term Goal: Feel better  Interventions:   - Consults to see  - Bumex  - Monitor labs  - See additional Care Plan goals for specific interventions  Outcome: Progressing

## 2025-04-26 NOTE — H&P
Critical access hospital and Care Hospitalist History and Physical      PCP: Gladys Saravia MD    History of Present Illness: This is the story of Ms. Nina Donnelly who is a 80 y/o F w/ Pmhx of HFpEF (Last ECHO: 65-70%), CKD3 (baseline Cr 1.6-1.9), COPD, WYATT on CPAP, T2DM, HLD who presents to the hospital w/ chief complaints of SOB, LE edema. Symptoms have been going on since Thursday of last week and worsened over the weekend. She was unable to get into to see her Cardiologist so she went to see her Pulmonologist on Tuesday who recommended getting her into Cards office for IV Diuretics which she did receive. She states that in the past week she may have gained close to 10 pounds. Normally weighs 189-190 pounds. She also states that she had to go up on her oxygen usage as well. Complains of severe fatigue and is quite upset about falling asleep mid conversation and repeated hospitalizations. Feels like her heart and kidney doctors are in a constant \"tug of war\"  ED Vitals: wnl  Labs:   Cr 1.99    HgB 10.7    XR w/ haziness in the R mid lower and LLL likely representing pleural fluid    Patient was attempted to be given another dose of IV Bumex however refused, she wanted to speak to a Nephrologist first.    Past Medical History[1]   Past Surgical History[2]     Prior to Admission Medications[3]    Social History     Tobacco Use    Smoking status: Former     Current packs/day: 0.00     Average packs/day: 1 pack/day for 55.0 years (55.0 ttl pk-yrs)     Types: Cigarettes     Start date: 1/13/1960     Quit date: 1/13/2015     Years since quitting: 10.2    Smokeless tobacco: Never   Substance Use Topics    Alcohol use: No        OBJECTIVE:  /41 (BP Location: Left arm)   Pulse 56   Temp 98.4 °F (36.9 °C) (Oral)   Resp 18   Ht 5' 1\" (1.549 m)   Wt 195 lb 15.8 oz (88.9 kg)   SpO2 97%   BMI 37.03 kg/m²   Gen: No acute distress, alert and oriented x3, somnolent  Pulm: Crackles bilaterally  CV: Heart with regular rate and  rhythm  Abd: Abdomen soft, nontender, non-distended  Skin: no rashes or lesions  Extremities: 1-2+ pitting lower extremity edema  Neuro:  no focal deficits      Data Review:    Pertinent Labs and Imaging independently reviewed  Comments:  Na 146  Cr improving  CBC stable    Assessment/Plan:   Outpatient records or previous hospital records reviewed.   [facility] Hospitalist to continue to follow patient while in house    A/P: 80 y/o F w/ Pmhx of HFpEF, CKD3 (baseline Cr 1.6-1.9), COPD, WYATT on CPAP, T2DM, HLD who presents to the hospital w/ chief complaints of SOB, LE edema    Acute on Chronic HFpEF Exacerbation  Last ECHO: 65-70  CXR noted  BNP slightly elevated  Plan:  - Cardiology consulted, recommended continued IV diuresis  - Daily weights, strict I/Os, fluid restriction  - Repeat ECHO ordered  - Holding Jardiance in house    COPD on Chronic 3L  Severe WYATT  Plan:  - Was previously on Breztri, provided no benefit as per last Pulm note  - CPAP nightly  - Duonebs PRN    Mild Renal Insufficiency on CKD3  Plan:  - Continue Diuretics  - Nephrology consulted as per patient request    HTN  - Continue Hydralazine  HLD  - Statin    Dementia  - Donepezil    STELLA  - Ferrous Sulfate    T2DM  - 15u Glargine, LDISS      A total of 42 minutes taken with patient and coordinating care.  Greater than 50% face to face encounter.      Justus Nair D.O.  [facility] Hospitalist              [1]   Past Medical History:   Congestive heart disease (HCC)    Hyperlipidemia    Mild episode of recurrent major depressive disorder    Neuropathy    Obstructive apnea    [facility] PSG AHI 11    WYATT on CPAP   [2]   Past Surgical History:  Procedure Laterality Date          Cabg      Foot surgery      Hysterectomy      Knee surgery      Tonsillectomy     [3]   No current outpatient medications on file.

## 2025-04-26 NOTE — ED PROVIDER NOTES
Patient Seen in: [facility] Emergency Department      History     Chief Complaint   Patient presents with    Swelling Edema    Shortness Of Breath     Stated Complaint: Bilateral ankle swelling, SOB    Subjective:   HPI    79-year-old with a history of HFpEF, class I, COPD on 3 L home oxygen, diabetes, high cholesterol hypertension presents with her daughter for evaluation of shortness of breath.  Some of the history is provided by her daughter.  Patient saw her pulmonologist on Tuesday, was noted to have shortness of breath and DAYNE. Was sent to cardiology, was given IV bumex. Has been taking her usual dose of torsemide since, no adjustment. Today her ankles are just as swollen, and her breathing isn't really better. Gets dyspneic with talking. Not really coughing. No fever. No chest pain, but does note some heaviness in her chest when she tries to take a deep breath.  Notes that she was 197 when she went in for the Bumex, went down to 194 but has not returned to her baseline weight of approximately 190.  Outpatient notes reviewed patient seen by cardiology APRN on  for 7 pound weight gain, fatigue and lethargy with lower extremity edema.  She was given 2 mg IV Bumex x 2 in the clinic.  History of Present Illness               Objective:     Past Medical History:    Congestive heart disease (HCC)    Hyperlipidemia    Mild episode of recurrent major depressive disorder    Neuropathy    Obstructive apnea    DMG PSG AHI 11    WYATT on CPAP              Past Surgical History:   Procedure Laterality Date          Cabg      Foot surgery      Hysterectomy      Knee surgery      Tonsillectomy                  Social History     Socioeconomic History    Marital status:    Tobacco Use    Smoking status: Former     Current packs/day: 0.00     Average packs/day: 1 pack/day for 55.0 years (55.0 ttl pk-yrs)     Types: Cigarettes     Start date: 1960     Quit date: 2015     Years since quitting:  10.2    Smokeless tobacco: Never   Vaping Use    Vaping status: Never Used   Substance and Sexual Activity    Alcohol use: No    Drug use: No     Social Drivers of Health     Food Insecurity: No Food Insecurity (1/19/2025)    Food Insecurity     Food Insecurity: Never true   Transportation Needs: No Transportation Needs (1/19/2025)    Transportation Needs     Lack of Transportation: No   Housing Stability: Low Risk  (1/19/2025)    Housing Stability     Housing Instability: No                                Physical Exam     ED Triage Vitals [04/25/25 1820]   /64   Pulse 55   Resp 22   Temp 98.4 °F (36.9 °C)   Temp src Temporal   SpO2 94 %   O2 Device Nasal cannula       Current Vitals:   Vital Signs  BP: 122/47  Pulse: 55  Resp: 13  Temp: 98.4 °F (36.9 °C)  Temp src: Temporal  MAP (mmHg): 70    Oxygen Therapy  SpO2: 100 %  O2 Device: Nasal cannula  O2 Flow Rate (L/min): 3 L/min        Physical Exam  General: Patient is awake, alert in no acute distress.   HEENT:   Sclera are not icteric.  Conjunctivae within normal limits.  Mucous members are moist.   Cardiovascular: Regular rate and rhythm, normal S1-S2.  Respiratory: Rales at the bases bilaterally, left greater than right.   Extremities: 2+ pitting edema to the calves bilaterally  Skin: warm and dry, no diaphoresis  Physical Exam                ED Course     Labs Reviewed   COMP METABOLIC PANEL (14) - Abnormal; Notable for the following components:       Result Value    Glucose 143 (*)     CO2 36.0 (*)     BUN 33 (*)     Creatinine 1.99 (*)     Calculated Osmolality 310 (*)     eGFR-Cr 25 (*)     All other components within normal limits   PRO BETA NATRIURETIC PEPTIDE - Abnormal; Notable for the following components:    Pro-Beta Natriuretic Peptide 842 (*)     All other components within normal limits   TROPONIN I HIGH SENSITIVITY - Normal   PROTHROMBIN TIME (PT) - Normal   PTT, ACTIVATED - Normal   CBC WITH DIFFERENTIAL WITH PLATELET   RAINBOW DRAW LAVENDER    RAINBOW DRAW LIGHT GREEN   RAINBOW DRAW BLUE     EKG    Rate, intervals and axes as noted on EKG Report.  Rate: 58  Rhythm: Sinus rhythm  Reading: Low voltage QRS, seen on previous.  No ST elevation or depression.  No dysrhythmia.         Chest x-ray: I personally reviewed the radiographs and my individual interpretation shows pulmonary edema.  I also reviewed the official report which showed haziness of the right mid lower lung and left lower lung increased since prior.     Results                         Bumex 2 mg IV ordered        MDM      History is obtained from: Daughter    Previous records reviewed as noted in HPI    Differential includes, but is not limited to, STEMI, CHF exacerbation, A-fib, renal failure    Review of any laboratory testing: CMP with minimal BIBIANA.  Troponin normal.  proBNP elevated compared with previous.  Coags normal as expected.     Review of any radiographic studies: Chest x-ray with pleural fluid    Significant history that contributed to medical decision making : Recent outpatient IV diuretics with lack of improvement and increasing creatinine suggest patient would benefit from hospitalization    Shared decision making with the patient.  Given lack of significant improvement with attempted outpatient treatment patient will be admitted for diuresis and monitoring of her creatinine.    Management of patient was discussed with : Dr. Odonnell, nephrology; Dr. Velazco, cardiology; Dr. Louise, hospitalist and patient/daughter    The administration of these medications were addressed : Bumex                             MDM    Disposition and Plan     Clinical Impression:  No diagnosis found.     Disposition:  There is no disposition on file for this visit.  There is no disposition time on file for this visit.    Follow-up:  No follow-up provider specified.        Medications Prescribed:  Current Discharge Medication List          Supplementary Documentation:

## 2025-04-27 LAB
ALBUMIN SERPL-MCNC: 4 G/DL (ref 3.2–4.8)
ANION GAP SERPL CALC-SCNC: 5 MMOL/L (ref 0–18)
BUN BLD-MCNC: 25 MG/DL (ref 9–23)
CALCIUM BLD-MCNC: 9.4 MG/DL (ref 8.7–10.6)
CHLORIDE SERPL-SCNC: 103 MMOL/L (ref 98–112)
CO2 SERPL-SCNC: 38 MMOL/L (ref 21–32)
CREAT BLD-MCNC: 1.68 MG/DL (ref 0.55–1.02)
DEPRECATED HBV CORE AB SER IA-ACNC: 39 NG/ML (ref 50–306)
EGFRCR SERPLBLD CKD-EPI 2021: 31 ML/MIN/1.73M2 (ref 60–?)
ERYTHROCYTE [DISTWIDTH] IN BLOOD BY AUTOMATED COUNT: 21.1 %
GLUCOSE BLD-MCNC: 113 MG/DL (ref 70–99)
GLUCOSE BLD-MCNC: 118 MG/DL (ref 70–99)
GLUCOSE BLD-MCNC: 124 MG/DL (ref 70–99)
GLUCOSE BLD-MCNC: 204 MG/DL (ref 70–99)
GLUCOSE BLD-MCNC: 253 MG/DL (ref 70–99)
HCT VFR BLD AUTO: 35.5 % (ref 35–48)
HGB BLD-MCNC: 10.3 G/DL (ref 12–16)
IRON SATN MFR SERPL: 14 % (ref 15–50)
IRON SERPL-MCNC: 43 UG/DL (ref 50–170)
MAGNESIUM SERPL-MCNC: 2.1 MG/DL (ref 1.6–2.6)
MCH RBC QN AUTO: 17.3 PG (ref 26–34)
MCHC RBC AUTO-ENTMCNC: 29 G/DL (ref 31–37)
MCV RBC AUTO: 59.7 FL (ref 80–100)
OSMOLALITY SERPL CALC.SUM OF ELEC: 307 MOSM/KG (ref 275–295)
PHOSPHATE SERPL-MCNC: 4.7 MG/DL (ref 2.4–5.1)
PLATELET # BLD AUTO: 173 10(3)UL (ref 150–450)
PLATELETS.RETICULATED NFR BLD AUTO: 3.1 % (ref 0–7)
POTASSIUM SERPL-SCNC: 3.8 MMOL/L (ref 3.5–5.1)
RBC # BLD AUTO: 5.95 X10(6)UL (ref 3.8–5.3)
SODIUM SERPL-SCNC: 146 MMOL/L (ref 136–145)
TOTAL IRON BINDING CAPACITY: 311 UG/DL (ref 250–425)
TRANSFERRIN SERPL-MCNC: 233 MG/DL (ref 250–380)
WBC # BLD AUTO: 7.3 X10(3) UL (ref 4–11)

## 2025-04-27 PROCEDURE — 82962 GLUCOSE BLOOD TEST: CPT

## 2025-04-27 PROCEDURE — 82728 ASSAY OF FERRITIN: CPT | Performed by: INTERNAL MEDICINE

## 2025-04-27 PROCEDURE — 85027 COMPLETE CBC AUTOMATED: CPT | Performed by: INTERNAL MEDICINE

## 2025-04-27 PROCEDURE — 83550 IRON BINDING TEST: CPT | Performed by: INTERNAL MEDICINE

## 2025-04-27 PROCEDURE — 83540 ASSAY OF IRON: CPT | Performed by: INTERNAL MEDICINE

## 2025-04-27 PROCEDURE — 80069 RENAL FUNCTION PANEL: CPT | Performed by: INTERNAL MEDICINE

## 2025-04-27 PROCEDURE — 94760 N-INVAS EAR/PLS OXIMETRY 1: CPT

## 2025-04-27 PROCEDURE — 83735 ASSAY OF MAGNESIUM: CPT | Performed by: INTERNAL MEDICINE

## 2025-04-27 RX ORDER — INSULIN DEGLUDEC 100 U/ML
16 INJECTION, SOLUTION SUBCUTANEOUS DAILY
Status: DISCONTINUED | OUTPATIENT
Start: 2025-04-28 | End: 2025-05-01

## 2025-04-27 RX ORDER — DULOXETIN HYDROCHLORIDE 30 MG/1
30 CAPSULE, DELAYED RELEASE ORAL DAILY
Status: DISCONTINUED | OUTPATIENT
Start: 2025-04-28 | End: 2025-05-01

## 2025-04-27 RX ORDER — POTASSIUM CHLORIDE 1500 MG/1
40 TABLET, EXTENDED RELEASE ORAL ONCE
Status: COMPLETED | OUTPATIENT
Start: 2025-04-27 | End: 2025-04-27

## 2025-04-27 RX ORDER — ESCITALOPRAM OXALATE 10 MG/1
10 TABLET ORAL NIGHTLY
Status: DISCONTINUED | OUTPATIENT
Start: 2025-04-27 | End: 2025-05-01

## 2025-04-27 NOTE — PLAN OF CARE
Assumed care at 0730; Pt A/o x 4, stable VS and no complaints of pain. Saturating well on 4L-5L; Plan of care discussed with patient. Educated on fall risk and use of call light. Belongings and call light within reach. Bed at lowest position and locked.     Problem: CARDIOVASCULAR - ADULT  Goal: Maintains optimal cardiac output and hemodynamic stability  Description: INTERVENTIONS:- Monitor vital signs, rhythm, and trends- Monitor for bleeding, hypotension and signs of decreased cardiac output- Evaluate effectiveness of vasoactive medications to optimize hemodynamic stability- Monitor arterial and/or venous puncture sites for bleeding and/or hematoma- Assess quality of pulses, skin color and temperature- Assess for signs of decreased coronary artery perfusion - ex. Angina- Evaluate fluid balance, assess for edema, trend weights  Outcome: Progressing  Goal: Absence of cardiac arrhythmias or at baseline  Description: INTERVENTIONS:- Continuous cardiac monitoring, monitor vital signs, obtain 12 lead EKG if indicated- Evaluate effectiveness of antiarrhythmic and heart rate control medications as ordered- Initiate emergency measures for life threatening arrhythmias- Monitor electrolytes and administer replacement therapy as ordered  Outcome: Progressing     Problem: Patient/Family Goals  Goal: Patient/Family Long Term Goal  Description: Patient's Long Term Goal: Go home  Interventions:  - IV bumex  - Monitor creatinine  - Consults to see  - See additional Care Plan goals for specific interventions  Outcome: Progressing  Goal: Patient/Family Short Term Goal  Description: Patient's Short Term Goal: Feel better  Interventions:   - Consults to see  - Bumex  - Monitor labs  - See additional Care Plan goals for specific interventions  Outcome: Progressing

## 2025-04-27 NOTE — PROGRESS NOTES
[facility] Cardiology Progress Note        Nina Donnelly Patient Status:  Observation    10/9/1945 MRN [id number]   [facility] 8NE-A Attending Justus Nair, DO   Hosp Day # 0 PCP Gladys Saravia MD     Subjective: a little better    -1.5 L, CR actually a bit better  Medications:  Scheduled Medications[1]    Continuous Infusions:  Medication Infusions[2]      Allergies:  Allergies[3]      Intake/Output:    Intake/Output Summary (Last 24 hours) at 2025 1308  Last data filed at 2025 1152  Gross per 24 hour   Intake 390 ml   Output 2190 ml   Net -1800 ml       Wt Readings from Last 3 Encounters:   25 194 lb 10.7 oz (88.3 kg)   25 184 lb 11.2 oz (83.8 kg)   10/17/24 193 lb 5.5 oz (87.7 kg)           Physical Exam:    Temp:  [97.5 °F (36.4 °C)-98.1 °F (36.7 °C)] 97.9 °F (36.6 °C)  Pulse:  [55-66] 55  Resp:  [11-19] 13  BP: (102-136)/(42-71) 113/42  SpO2:  [93 %-99 %] 99 %    General: No apparent distress  HEENT: No focal deficits.  Neck: supple. JVP normal  Cardiac: Regular rhythm, S1, S2 normal,  rub or gallop.  No murmur.  Lungs: CTA  Abdomen: Soft, non-tender.   Extremities: No edema  Neurologic: no focal deficits  Skin: Warm and dry.       Labs:  HEM:  Recent Labs   Lab 25  0500 25  0518   WBC 8.1 7.4 7.3   HGB 10.7* 10.6* 10.3*   .0 182.0 173.0       Chem:  Recent Labs   Lab 25  0500 25  0518    146*  146* 146*   K 4.3 4.0  4.0 3.8    102  102 103   CO2 36.0* 35.0*  35.0* 38.0*   BUN 33* 28*  28* 25*   CREATSERUM 1.99* 1.86*  1.86* 1.68*   CA 9.5 9.2  9.2 9.4   MG  --  2.3 2.1   PHOS  --  4.8 4.7   * 175*  175* 113*       Recent Labs   Lab 25  0500 25  0518   ALT 12 11  --    AST 22 21  --    ALB 4.3 4.2  4.2 4.0       Recent Labs   Lab 25   INR 1.01       No results for input(s): \"TROP\" in the last 168 hours.      Impression:    1.  HFpEF-acute on chronic  2.COPD on home O2  3.HL  4.DM  5.CRI    Plan:    Improving with current diuresis, follow CR  Discussed with Dr. Belle Velazco MD  4/27/2025  1:08 PM        [1]    [START ON 4/28/2025] insulin degludec  16 Units Subcutaneous Daily    escitalopram  10 mg Oral Nightly    [START ON 4/28/2025] DULoxetine  30 mg Oral Daily    aspirin  81 mg Oral Daily    atorvastatin  10 mg Oral Nightly    cetirizine  5 mg Oral Daily    cholecalciferol  1,000 Units Oral Daily    donepezil  10 mg Oral Nightly    ferrous sulfate  325 mg Oral Daily with breakfast    hydrALAZINE  100 mg Oral TID    ipratropium  2 spray Nasal 2 times per day    pregabalin  600 mg Oral Nightly    cyanocobalamin  1,000 mcg Oral Daily    heparin  5,000 Units Subcutaneous Q8H CHICO    insulin aspart  1-5 Units Subcutaneous TID AC and HS    bumetanide  1 mg Intravenous Q12H   [2] [3]   Allergies  Allergen Reactions    Pioglitazone UNKNOWN     Weight Gain    Capsaicin ITCHING    Vancomycin ITCHING

## 2025-04-27 NOTE — PLAN OF CARE
A&Ox4. 4L NC, CPAP overnight. . Crackles and diminished lung sounds. No shortness of breath. NSR/SB on telemetry. Denies chest pain. Continent of bowel and bladder. Chronic back pain managed with prn pain medication. Up SBA. In bed with fall precautions in place. Discussed plan of care with patient. Patient verbalizes understanding.    Plan: IV bumex, monitor creatinine, monitor on tele.    Problem: CARDIOVASCULAR - ADULT  Goal: Maintains optimal cardiac output and hemodynamic stability  Description: INTERVENTIONS:- Monitor vital signs, rhythm, and trends- Monitor for bleeding, hypotension and signs of decreased cardiac output- Evaluate effectiveness of vasoactive medications to optimize hemodynamic stability- Monitor arterial and/or venous puncture sites for bleeding and/or hematoma- Assess quality of pulses, skin color and temperature- Assess for signs of decreased coronary artery perfusion - ex. Angina- Evaluate fluid balance, assess for edema, trend weights  Outcome: Progressing  Goal: Absence of cardiac arrhythmias or at baseline  Description: INTERVENTIONS:- Continuous cardiac monitoring, monitor vital signs, obtain 12 lead EKG if indicated- Evaluate effectiveness of antiarrhythmic and heart rate control medications as ordered- Initiate emergency measures for life threatening arrhythmias- Monitor electrolytes and administer replacement therapy as ordered  Outcome: Progressing     Problem: Patient/Family Goals  Goal: Patient/Family Long Term Goal  Description: Patient's Long Term Goal: Go home  Interventions:  - IV bumex  - Monitor creatinine  - Consults to see  - See additional Care Plan goals for specific interventions  Outcome: Progressing  Goal: Patient/Family Short Term Goal  Description: Patient's Short Term Goal: Feel better  Interventions:   - Consults to see  - Bumex  - Monitor labs  - See additional Care Plan goals for specific interventions  Outcome: Progressing

## 2025-04-27 NOTE — PROGRESS NOTES
[facility]   part of [facility] Hospitalist Progress Note     Nina Donnelly Patient Status:  Observation    10/9/1945 MRN [id number]   Location [facility] 8NE-A Attending Justus Nair, DO   Hosp Day # 0 PCP Gladys Saravia MD     Assessment & Plan:    A/P: 80 y/o F w/ Pmhx of HFpEF, CKD3 (baseline Cr 1.6-1.9), COPD, WYATT on CPAP, T2DM, HLD who presents to the hospital w/ chief complaints of SOB, LE edema     Acute on Chronic HFpEF Exacerbation  Last ECHO: 65-70  CXR noted  BNP slightly elevated  Repeat ECHO: 60-65%, G2DD  Plan:  - Cardiology consulted, continue IV diuresis  - Daily weights, strict I/Os, fluid restriction  - Holding Jardiance in house     COPD on Chronic 3L  Severe WYATT  Plan:  - Was previously on Breztri, provided no benefit as per last Pulm note  - CPAP nightly  - Duonebs PRN     BIBIANA on CKD3 - improving  Plan:  - Continue Diuretics  - Nephrology consulted as per patient request     HTN  - Continue Hydralazine  HLD  - Statin     Dementia  - Donepezil     STELLA  - Ferrous Sulfate     T2DM  - 16u Glargine, LDISS    DVT Ppx: SQH    Subjective:     Patient seen and examined this morning at bedside. Doing well, having a lot of sciatica pain. More awake today. Informed son and patient of relevant clinical updates. No other overnight events. Lost 1 pound since yesterday    Vital signs:  Temp:  [97.5 °F (36.4 °C)-98.1 °F (36.7 °C)] 98.1 °F (36.7 °C)  Pulse:  [56-66] 56  Resp:  [11-19] 11  BP: (102-136)/(44-71) 118/45  SpO2:  [93 %-99 %] 99 %    Physical Exam:    General: No acute distress.   Respiratory: Clear to auscultation bilaterally. No wheezes.  Cardiovascular: Slightly bradycardic but regular  Abdomen: Soft, nontender, nondistended.  Positive bowel sounds. No rebound or guarding.  Extremities: Improving LE edema.  Neuro:  Grossly non focal, no motor deficits.      Diagnostic Data:    Pertinent Labs and Imaging independently reviewed  Comments:  Na 146  Cr  1.68      Justus Nair D.O.  [facility] Hospitalist     Supplementary Documentation:   DVT Mechanical Prophylaxis:   SCDs,    DVT Pharmacologic Prophylaxis   Medication    heparin (Porcine) 5000 UNIT/ML injection 5,000 Units         DVT Pharmacologic prophylaxis: Aspirin 81 mg      Code Status: Full Code  Jones: No urinary catheter in place  Jones Duration (in days):   Central line:    GREG:

## 2025-04-27 NOTE — PROGRESS NOTES
[facility]   part of [facility]    Nephrology Progress Note    Nina Donnelly Attending:  Justus Nair DO     Cc: hayder ckd    SUBJECTIVE     Wt down 1lb to 194  No n/v/d    PHYSICAL EXAM   Vital signs: /42 (BP Location: Left arm)   Pulse 55   Temp 97.9 °F (36.6 °C) (Oral)   Resp 13   Ht 5' 1\" (1.549 m)   Wt 194 lb 10.7 oz (88.3 kg)   SpO2 99%   BMI 36.78 kg/m²   Temp (24hrs), Av.9 °F (36.6 °C), Min:97.5 °F (36.4 °C), Max:98.1 °F (36.7 °C)       Intake/Output Summary (Last 24 hours) at 2025 1248  Last data filed at 2025 0900  Gross per 24 hour   Intake 390 ml   Output 1890 ml   Net -1500 ml     Wt Readings from Last 3 Encounters:   25 194 lb 10.7 oz (88.3 kg)   25 184 lb 11.2 oz (83.8 kg)   10/17/24 193 lb 5.5 oz (87.7 kg)     General: NAD  HEENT: NCAT, EOMI, MMM  Neck: Supple   Cardiac: Regular rate and rhythm   Lungs: CTAB  Abdomen: Soft, non-tender, nondistended   Extremities: No edema  Neurologic: No asterxis  Skin: Warm and dry, no rashes     MEDS   Current Hospital Medications[1]    LABS     Lab Results   Component Value Date    WBC 7.3 2025    HGB 10.3 2025    HCT 35.5 2025    .0 2025    CREATSERUM 1.68 2025    BUN 25 2025     2025    K 3.8 2025     2025    CO2 38.0 2025     2025    CA 9.4 2025    ALB 4.0 2025    MG 2.1 2025    PHOS 4.7 2025    PGLU 124 2025       IMAGING   All imaging studies personally reviewed.    XR CHEST AP PORTABLE  (CPT=71045)   Final Result   PROCEDURE:  XR CHEST AP PORTABLE  (CPT=71045)       TECHNIQUE:  AP chest radiograph was obtained.       COMPARISON:  [facility] , XR, XR CHEST AP PORTABLE  (CPT=71045), 2025,    7:50 PM.       INDICATIONS:  Bilateral ankle swelling, SOB       PATIENT STATED HISTORY: (As transcribed by Technologist)                                =====   CONCLUSION:   Low lung volumes poor  inspiration.  Haziness of the right    mid-lower lung and left lower lung, increased since the prior, likely    reflecting combination of pleural fluid and lung consolidation.  Cardiac    enlargement assessment limited.  No    pneumothorax. Consider upright PA and lateral examination of the chest,    when tolerated.            LOCATION:  [facility]                       Dictated by (CST): Aaron Diop MD on 4/25/2025 at 6:48 PM        Finalized by (CST): Aaron Diop MD on 4/25/2025 at 6:48 PM               ASSESSMENT & PLAN   79 year old female w ho CKD 3b (follows w Dr Scott, bl Cr 1.6-1.9), HFpEF, DM2, HTN, WYATT on CPAP, beta thalassemia and macular degeneration, COPD on home o2 who presents with worsening SOB and swelling/weight gain. Peak weight at home 199lb per pt.      BIBIANA on CKD3b  -- likely CRS vs due to diuresis   -- however may need to tolerate slightly higher Cr to achieve euvolemia  -- agree w IV bumex 1 BID, titrate and monitor   -- urine na 36. UA w negative blood and negative protein  -- If cr worsens check renal US  -- strict UOP, daily morning weights  -- avoid nephrotoxins and renally dose meds      Acute on chronic CHF  -- diuresis. Cards consult. Low sodium diet. FR. Daily wts standing morning   -- ECHO 4/26/25 EF 60-65%, G2DD, moderately increased RV systolic pressure     Hypernatremia  -- Na 146. Monitor w diuresis      Alkalosis  -- may be resp too. Monitor. If worsens check abg and decrease diuretics. CPAP at night      Anemia  -- iron 43, %sat 14, give IV iron. GI/further workup per primary team      HTN  -- per cards     D/w son and Dr Velazco     Thank you for allowing me to participate in the care of this patient. Please do not hesitate to call with questions or concerns.       Rufina Odonnell MD  [facility] Nephrology         [1]   Current Facility-Administered Medications   Medication Dose Route Frequency    [START ON 4/28/2025] insulin degludec (Tresiba) 100 units/mL  flextouch 16 Units  16 Units Subcutaneous Daily    escitalopram (Lexapro) tab 10 mg  10 mg Oral Nightly    [START ON 4/28/2025] DULoxetine (Cymbalta) DR cap 30 mg  30 mg Oral Daily    aspirin DR tab 81 mg  81 mg Oral Daily    atorvastatin (Lipitor) tab 10 mg  10 mg Oral Nightly    cetirizine (ZyrTEC) tab 5 mg  5 mg Oral Daily    cholecalciferol (Vitamin D3) tab 1,000 Units  1,000 Units Oral Daily    donepezil (Aricept) tab 10 mg  10 mg Oral Nightly    ferrous sulfate DR tab 325 mg  325 mg Oral Daily with breakfast    hydrALAZINE (Apresoline) tab 100 mg  100 mg Oral TID    ipratropium (Atrovent) 0.03 % nasal solution 2 spray  2 spray Nasal 2 times per day    pregabalin (Lyrica) cap 600 mg  600 mg Oral Nightly    cyanocobalamin (Vitamin B12) tab 1,000 mcg  1,000 mcg Oral Daily    glucose (Dex4) 15 GM/59ML oral liquid 15 g  15 g Oral Q15 Min PRN    Or    glucose (Glutose) 40% oral gel 15 g  15 g Oral Q15 Min PRN    Or    glucose-vitamin C (Dex-4) chewable tab 4 tablet  4 tablet Oral Q15 Min PRN    Or    dextrose 50% injection 50 mL  50 mL Intravenous Q15 Min PRN    Or    glucose (Dex4) 15 GM/59ML oral liquid 30 g  30 g Oral Q15 Min PRN    Or    glucose (Glutose) 40% oral gel 30 g  30 g Oral Q15 Min PRN    Or    glucose-vitamin C (Dex-4) chewable tab 8 tablet  8 tablet Oral Q15 Min PRN    heparin (Porcine) 5000 UNIT/ML injection 5,000 Units  5,000 Units Subcutaneous Q8H CHICO    acetaminophen (Tylenol Extra Strength) tab 500 mg  500 mg Oral Q4H PRN    ondansetron (Zofran) 4 MG/2ML injection 4 mg  4 mg Intravenous Q6H PRN    metoclopramide (Reglan) 5 mg/mL injection 5 mg  5 mg Intravenous Q8H PRN    insulin aspart (NovoLOG) 100 Units/mL FlexPen 1-5 Units  1-5 Units Subcutaneous TID AC and HS    bumetanide (Bumex) 0.25 MG/ML injection 1 mg  1 mg Intravenous Q12H

## 2025-04-28 ENCOUNTER — APPOINTMENT (OUTPATIENT)
Dept: MRI IMAGING | Facility: HOSPITAL | Age: 80
End: 2025-04-28
Attending: INTERNAL MEDICINE
Payer: MEDICARE

## 2025-04-28 LAB
ALBUMIN SERPL-MCNC: 4.2 G/DL (ref 3.2–4.8)
ANION GAP SERPL CALC-SCNC: 3 MMOL/L (ref 0–18)
BUN BLD-MCNC: 26 MG/DL (ref 9–23)
CALCIUM BLD-MCNC: 9.2 MG/DL (ref 8.7–10.6)
CHLORIDE SERPL-SCNC: 102 MMOL/L (ref 98–112)
CO2 SERPL-SCNC: 39 MMOL/L (ref 21–32)
CREAT BLD-MCNC: 1.78 MG/DL (ref 0.55–1.02)
EGFRCR SERPLBLD CKD-EPI 2021: 29 ML/MIN/1.73M2 (ref 60–?)
GLUCOSE BLD-MCNC: 125 MG/DL (ref 70–99)
GLUCOSE BLD-MCNC: 127 MG/DL (ref 70–99)
GLUCOSE BLD-MCNC: 133 MG/DL (ref 70–99)
GLUCOSE BLD-MCNC: 140 MG/DL (ref 70–99)
GLUCOSE BLD-MCNC: 187 MG/DL (ref 70–99)
MAGNESIUM SERPL-MCNC: 2.1 MG/DL (ref 1.6–2.6)
OSMOLALITY SERPL CALC.SUM OF ELEC: 305 MOSM/KG (ref 275–295)
PHOSPHATE SERPL-MCNC: 4.6 MG/DL (ref 2.4–5.1)
POTASSIUM SERPL-SCNC: 4.2 MMOL/L (ref 3.5–5.1)
POTASSIUM SERPL-SCNC: 4.2 MMOL/L (ref 3.5–5.1)
SODIUM SERPL-SCNC: 144 MMOL/L (ref 136–145)

## 2025-04-28 PROCEDURE — 80069 RENAL FUNCTION PANEL: CPT | Performed by: INTERNAL MEDICINE

## 2025-04-28 PROCEDURE — 82962 GLUCOSE BLOOD TEST: CPT

## 2025-04-28 PROCEDURE — 94760 N-INVAS EAR/PLS OXIMETRY 1: CPT

## 2025-04-28 PROCEDURE — 84132 ASSAY OF SERUM POTASSIUM: CPT | Performed by: INTERNAL MEDICINE

## 2025-04-28 PROCEDURE — 72148 MRI LUMBAR SPINE W/O DYE: CPT | Performed by: INTERNAL MEDICINE

## 2025-04-28 PROCEDURE — 94799 UNLISTED PULMONARY SVC/PX: CPT

## 2025-04-28 PROCEDURE — 83735 ASSAY OF MAGNESIUM: CPT | Performed by: INTERNAL MEDICINE

## 2025-04-28 RX ORDER — DIAZEPAM 10 MG/1
5 TABLET ORAL ONCE AS NEEDED
Status: COMPLETED | OUTPATIENT
Start: 2025-04-28 | End: 2025-04-28

## 2025-04-28 RX ORDER — PREGABALIN 75 MG/1
300 CAPSULE ORAL NIGHTLY
Status: DISCONTINUED | OUTPATIENT
Start: 2025-04-28 | End: 2025-05-01

## 2025-04-28 NOTE — PHYSICAL THERAPY NOTE
PT order received. Chart reviewed. Pt politely declining therapy at this time as she feels too weak and feels as if she has the \"jerks\". Per RN, received a dose of ativan recently. RN feels it would be better to wait until tomorrow for initial evaluation. Will re-attempt pending medical stability and willingness to participate.

## 2025-04-28 NOTE — PROGRESS NOTES
[facility] Hospitalist Progress Note       SUBJECTIVE:  States she keeps on falling b/c of leg pain and back pain very concerned  No chest pain no SOB noted     OBJECTIVE:  Scheduled Meds: Scheduled Medications[1]  Continuous Infusions: Medication Infusions[2]  PRN Meds: PRN Medications[3]    Vitals  Vitals:    04/28/25 0835   BP: 129/49   Pulse: 52   Resp: 17   Temp: 98 °F (36.7 °C)         Exam   Gen-    no acute distress, alert and oriented x 3   RESP-   Lungs CTA, normal respiratory effort  CV-      Heart RRR, no mgr  Abd-    soft, nondistended, nontender, bowel sounds present  Skin-    no rash  Neuro-  no focal neurologic deficits  Ext-      No edema in extremities   Psych- alert and oriented x 3     Labs:     Recent Labs   Lab 04/25/25 1932 04/26/25 0500 04/27/25  0518   WBC 8.1 7.4 7.3   HGB 10.7* 10.6* 10.3*   MCV 59.8* 61.2* 59.7*   .0 182.0 173.0   INR 1.01  --   --        Recent Labs   Lab 04/25/25 1932 04/26/25  0500 04/27/25  0518 04/28/25  0512    146*  146* 146* 144   K 4.3 4.0  4.0 3.8 4.2  4.2    102  102 103 102   CO2 36.0* 35.0*  35.0* 38.0* 39.0*   BUN 33* 28*  28* 25* 26*   CREATSERUM 1.99* 1.86*  1.86* 1.68* 1.78*   CA 9.5 9.2  9.2 9.4 9.2   MG  --  2.3 2.1 2.1   PHOS  --  4.8 4.7 4.6   * 175*  175* 113* 133*       Recent Labs   Lab 04/25/25 1932 04/26/25  0500 04/27/25  0518 04/28/25  0512   ALT 12 11  --   --    AST 22 21  --   --    ALB 4.3 4.2  4.2 4.0 4.2       Recent Labs   Lab 04/27/25  0506 04/27/25  1143 04/27/25  1628 04/27/25 2125 04/28/25  0506   PGLU 118* 124* 253* 204* 127*       AP:  78 y/o F w/ Pmhx of HFpEF, CKD3 (baseline Cr 1.6-1.9), COPD, WYATT on CPAP, T2DM, HLD who presents to the hospital w/ chief complaints of SOB, LE edema     Acute on Chronic HFpEF Exacerbation  Last ECHO: 65-70  CXR noted  BNP slightly elevated  Repeat ECHO: 60-65%, G2DD  Plan:  - Cardiology consulted, continue IV diuresis  - Daily weights, strict I/Os, fluid  restriction  - Holding Jardiance in house     COPD on Chronic 3L  Severe WYATT  Plan:  - Was previously on Breztri, provided no benefit as per last Pulm note  - CPAP nightly  - Duonebs PRN    Leg pain/back pain   - MRI ordered   - PT/OT     BIBIANA on CKD3 - improving  Plan:  - Continue Diuretics  - Nephrology consulted as per patient request     HTN  - Continue Hydralazine    HLD  - Statin     Dementia  - Donepezil     STELLA  - Ferrous Sulfate     T2DM  - 16u Glargine, LDISS    Donna Frausto MD   [facility] Hospitalist         [1]    insulin degludec  16 Units Subcutaneous Daily    escitalopram  10 mg Oral Nightly    DULoxetine  30 mg Oral Daily    sodium ferric gluconate  125 mg Intravenous Daily    aspirin  81 mg Oral Daily    atorvastatin  10 mg Oral Nightly    cetirizine  5 mg Oral Daily    cholecalciferol  1,000 Units Oral Daily    donepezil  10 mg Oral Nightly    [Held by provider] ferrous sulfate  325 mg Oral Daily with breakfast    hydrALAZINE  100 mg Oral TID    ipratropium  2 spray Nasal 2 times per day    pregabalin  600 mg Oral Nightly    cyanocobalamin  1,000 mcg Oral Daily    heparin  5,000 Units Subcutaneous Q8H CHICO    insulin aspart  1-5 Units Subcutaneous TID AC and HS    bumetanide  1 mg Intravenous Q12H   [2] [3]   glucose **OR** glucose **OR** glucose-vitamin C **OR** dextrose **OR** glucose **OR** glucose **OR** glucose-vitamin C    acetaminophen    ondansetron    metoclopramide

## 2025-04-28 NOTE — PROGRESS NOTES
[facility] Nephrology  Inpatient Follow-up    Nina Donnelly Patient Status:  Inpatient    10/9/1945 MRN [id number]   Location [address] Attending Donna Frausto MD   Hosp Day # 0 PCP Gladys Saravia MD       SUBJECTIVE:     Patient seen and examined at bedside. No acute complaints today.     MEDICATIONS:     Current Hospital Medications[1]    PHYSICAL EXAM:     Vital Signs: /56 (BP Location: Right arm)   Pulse 60   Temp 98.1 °F (36.7 °C) (Oral)   Resp 19   Ht 5' 1\" (1.549 m)   Wt 193 lb 12.6 oz (87.9 kg)   SpO2 100%   BMI 36.62 kg/m²   Temp (24hrs), Av °F (36.7 °C), Min:97.6 °F (36.4 °C), Max:98.4 °F (36.9 °C)       Intake/Output Summary (Last 24 hours) at 2025 1530  Last data filed at 2025 1410  Gross per 24 hour   Intake 1279 ml   Output 1750 ml   Net -471 ml     Wt Readings from Last 3 Encounters:   25 193 lb 12.6 oz (87.9 kg)   25 184 lb 11.2 oz (83.8 kg)   10/17/24 193 lb 5.5 oz (87.7 kg)       General: no acute distress  HEENT: normocephalic, atraumatic  Respiratory: + oxygen  Extremities: + edema bilaterally  Skin: warm, dry  Neuro: awake, alert     LABORATORY DATA:     Lab Results   Component Value Date     (H) 2025    BUN 26 (H) 2025    BUNCREA 18.0 03/15/2022    CREATSERUM 1.78 (H) 2025    ANIONGAP 3 2025    GFRNAA 35 (L) 2021    GFRAA 40 (L) 2021    CA 9.2 2025    OSMOCALC 305 (H) 2025    ALKPHO 76 2025    AST 21 2025    ALT 11 2025    BILT 0.7 2025    TP 6.4 2025    ALB 4.2 2025    GLOBULIN 2.2 2025     2025    K 4.2 2025    K 4.2 2025     2025    CO2 39.0 (H) 2025     Lab Results   Component Value Date    WBC 7.3 2025    RBC 5.95 (H) 2025    HGB 10.3 (L) 2025    HCT 35.5 2025    .0 2025    MCV 59.7 (L) 2025    MCH 17.3 (L) 2025    MCHC 29.0 (L) 2025     RDW 21.1 04/27/2025    NEPRELIM 4.83 04/26/2025    NEUTABS 4.34 04/07/2020    LYMPHABS 2.28 04/07/2020    EOSABS 0.14 04/07/2020    BASABS 0.07 04/07/2020    NEPERCENT 64.9 04/26/2025    LYPERCENT 20.6 04/26/2025    MOPERCENT 8.9 04/26/2025    EOPERCENT 4.7 04/26/2025    BAPERCENT 0.8 04/26/2025    NE 4.83 04/26/2025    LYMABS 1.53 04/26/2025    MOABSO 0.66 04/26/2025    EOABSO 0.35 04/26/2025    BAABSO 0.06 04/26/2025     Lab Results   Component Value Date    MALBP <1.2 03/15/2022    CREUR 68.40 04/26/2025     Lab Results   Component Value Date    COLORUR Colorless (A) 04/26/2025    CLARITY Clear 04/26/2025    SPECGRAVITY 1.009 04/26/2025    GLUUR >1000 (A) 04/26/2025    BILUR Negative 04/26/2025    KETUR Negative 04/26/2025    BLOODURINE Negative 04/26/2025    PHURINE 5.5 04/26/2025    PROUR Negative 04/26/2025    UROBILINOGEN Normal 04/26/2025    NITRITE Negative 04/26/2025    LEUUR Negative 04/26/2025    NMIC Microscopic not indicated 04/26/2025       IMAGING:     Reviewed    ASSESSMENT/PLAN:     79 year old female w ho CKD 3b (follows w Dr Scott, bl Cr 1.6-1.9), HFpEF, DM2, HTN, WYATT on CPAP, beta thalassemia and macular degeneration, COPD on home o2 who presents with worsening SOB and swelling/weight gain.     BIBIANA on CKD3b  -- Likely cardiorenal syndrome   -- Hold jardiance  -- Diuretics per Cardiology - monitor kidney function closely.   -- Monitor intake/output, daily weights  -- Avoid nephrotoxins and renally dose medications for creatinine clearance     Acute on chronic CHF  -- Appreciate Cardiology recommendations     Hypernatremia  -- Monitor closely     Alkalosis  -- If worsening can consider ABG  -- CPAP nightly     Anemia  -- S/p IV iron  -- Transfusion per primary      HTN  -- Hydralazine     We will continue to follow.    Thank you for allowing us to participate in the care of this patient.       DO Alfredo Reddy [facility] - Nephrology           [1]   Current Facility-Administered  Medications   Medication Dose Route Frequency    insulin degludec (Tresiba) 100 units/mL flextouch 16 Units  16 Units Subcutaneous Daily    escitalopram (Lexapro) tab 10 mg  10 mg Oral Nightly    DULoxetine (Cymbalta) DR cap 30 mg  30 mg Oral Daily    sodium ferric gluconate (Ferrlecit) 125 mg in sodium chloride 0.9% 100mL IVPB premix  125 mg Intravenous Daily    aspirin DR tab 81 mg  81 mg Oral Daily    atorvastatin (Lipitor) tab 10 mg  10 mg Oral Nightly    cetirizine (ZyrTEC) tab 5 mg  5 mg Oral Daily    cholecalciferol (Vitamin D3) tab 1,000 Units  1,000 Units Oral Daily    donepezil (Aricept) tab 10 mg  10 mg Oral Nightly    [Held by provider] ferrous sulfate DR tab 325 mg  325 mg Oral Daily with breakfast    hydrALAZINE (Apresoline) tab 100 mg  100 mg Oral TID    ipratropium (Atrovent) 0.03 % nasal solution 2 spray  2 spray Nasal 2 times per day    pregabalin (Lyrica) cap 600 mg  600 mg Oral Nightly    cyanocobalamin (Vitamin B12) tab 1,000 mcg  1,000 mcg Oral Daily    glucose (Dex4) 15 GM/59ML oral liquid 15 g  15 g Oral Q15 Min PRN    Or    glucose (Glutose) 40% oral gel 15 g  15 g Oral Q15 Min PRN    Or    glucose-vitamin C (Dex-4) chewable tab 4 tablet  4 tablet Oral Q15 Min PRN    Or    dextrose 50% injection 50 mL  50 mL Intravenous Q15 Min PRN    Or    glucose (Dex4) 15 GM/59ML oral liquid 30 g  30 g Oral Q15 Min PRN    Or    glucose (Glutose) 40% oral gel 30 g  30 g Oral Q15 Min PRN    Or    glucose-vitamin C (Dex-4) chewable tab 8 tablet  8 tablet Oral Q15 Min PRN    heparin (Porcine) 5000 UNIT/ML injection 5,000 Units  5,000 Units Subcutaneous Q8H CHICO    acetaminophen (Tylenol Extra Strength) tab 500 mg  500 mg Oral Q4H PRN    ondansetron (Zofran) 4 MG/2ML injection 4 mg  4 mg Intravenous Q6H PRN    metoclopramide (Reglan) 5 mg/mL injection 5 mg  5 mg Intravenous Q8H PRN    insulin aspart (NovoLOG) 100 Units/mL FlexPen 1-5 Units  1-5 Units Subcutaneous TID AC and HS    bumetanide (Bumex) 0.25 MG/ML  injection 1 mg  1 mg Intravenous Q12H

## 2025-04-28 NOTE — PLAN OF CARE
A&Ox4. 4L NC, CPAP overnight. . Diminished lung sounds. Shortness of breath with activy. NSR/SB on telemetry. Denies CP. Continent of bowel and bladder. No complaints of pain. Up SBA. In bed with fall precautions in place. Discussed plan of care with patient. Patient verbalizes understanding.    Plan: GERTRUDE betancur, strict I/O.    Problem: CARDIOVASCULAR - ADULT  Goal: Maintains optimal cardiac output and hemodynamic stability  Description: INTERVENTIONS:- Monitor vital signs, rhythm, and trends- Monitor for bleeding, hypotension and signs of decreased cardiac output- Evaluate effectiveness of vasoactive medications to optimize hemodynamic stability- Monitor arterial and/or venous puncture sites for bleeding and/or hematoma- Assess quality of pulses, skin color and temperature- Assess for signs of decreased coronary artery perfusion - ex. Angina- Evaluate fluid balance, assess for edema, trend weights  Outcome: Progressing  Goal: Absence of cardiac arrhythmias or at baseline  Description: INTERVENTIONS:- Continuous cardiac monitoring, monitor vital signs, obtain 12 lead EKG if indicated- Evaluate effectiveness of antiarrhythmic and heart rate control medications as ordered- Initiate emergency measures for life threatening arrhythmias- Monitor electrolytes and administer replacement therapy as ordered  Outcome: Progressing     Problem: Patient/Family Goals  Goal: Patient/Family Long Term Goal  Description: Patient's Long Term Goal: Go home  Interventions:  - IV bumex  - Monitor creatinine  - Consults to see  - See additional Care Plan goals for specific interventions  Outcome: Progressing  Goal: Patient/Family Short Term Goal  Description: Patient's Short Term Goal: Feel better  Interventions:   - Consults to see  - Bumex  - Monitor labs  - See additional Care Plan goals for specific interventions  Outcome: Progressing

## 2025-04-28 NOTE — PLAN OF CARE
Significant Event - Fall Note    Date/Time of Fall: April 28, 2025 at 0500    Fall huddle completed: Yes    Description of patient fall:     Patient fell from: Bed     Activity when fall occurred: Ambulating without assistance or assistive devices     Where did fall occur: Patient room     Was the fall assisted: Found on floor/unassisted to floor    Who witnessed the fall: Staff    Patient narrative of fall: \"I was just trying to get out of bed to go to the bathroom\"    Staff narrative of fall: Patient was sliding out of bed as I walked into the room. She Fell onto her bottom. Pt had been calling prior when patient needed to use the bathroom.     Name of Provider notified of fall: Justus Nair DO    Family notification: Patient refusing     Factors contributing to fall:     Physical: Unsteady gait     Psychological: Alert and Oriented     Environmental: Poor lighting     Medications received in the past 8 hours:   Medication(s) Administered in past 8 Hours from 04/27/2025 2112 to 04/28/2025 0512       Date/Time Order Dose Route Action Action by Comments    04/27/2025 2153 CDT atorvastatin (Lipitor) tab 10 mg 10 mg Oral Given Mary Schultz RN --    04/28/2025 0055 CDT bumetanide (Bumex) 0.25 MG/ML injection 1 mg 1 mg Intravenous Given Mary Schultz RN --    04/27/2025 2142 CDT donepezil (Aricept) tab 10 mg 10 mg Oral Given Mary Schultz RN --    04/27/2025 2153 CDT escitalopram (Lexapro) tab 10 mg 10 mg Oral Given Mary Schultz RN --    04/27/2025 2141 CDT hydrALAZINE (Apresoline) tab 100 mg 100 mg Oral Given Mary Schultz RN --    04/27/2025 2141 CDT insulin aspart (NovoLOG) 100 Units/mL FlexPen 1-5 Units 2 Units Subcutaneous Given Mary Schultz RN --    04/27/2025 2141 CDT pregabalin (Lyrica) cap 600 mg 600 mg Oral Given Mary Schultz, RN --            Was patient identified as high fall risk prior to fall: Yes                               What interventions  were in place prior to fall: Assistive devices (wheelchair, commode, walker, gait belt), Bed in lowest position, Call light within reach, Personal items within reach, Rounding, and Toileting regimen    Interventions post fall: Assistive devices (wheelchair, commode, walker, gait belt), Bed alarm, Bed in lowest position, Call light within reach, Chair alarm, Fall alert wristband, Nonslip footwear, Patient/family involved in fall prevention plan, Rounding, and Signage in place    Additional comments: Vitals stable, blood sugar 127. Assisted back to bed with staff, no injuries noted.

## 2025-04-28 NOTE — PROGRESS NOTES
[facility] Cardiology Progress Note        Nina Donnelly Patient Status:  Observation    10/9/1945 MRN [id number]   [facility] 8NE-A Attending Justus Nair,    Hosp Day # 0 PCP Gladys Saravia MD     Subjective:  The patient denies  chest pain   Better but still has LE edema and worse than baseline SOB    Medications:  Scheduled Medications[1]    Continuous Infusions:  Medication Infusions[2]      Allergies:  Allergies[3]      Objective:        Intake/Output:      Intake/Output Summary (Last 24 hours) at 2025 0754  Last data filed at 2025 0703  Gross per 24 hour   Intake 577 ml   Output 1700 ml   Net -1123 ml     Wt Readings from Last 3 Encounters:   25 194 lb 10.7 oz (88.3 kg)   25 184 lb 11.2 oz (83.8 kg)   10/17/24 193 lb 5.5 oz (87.7 kg)       Physical Exam:        Vitals:    25 0023 25 0315 25 0505 25 0703   BP:   110/53    BP Location:   Left arm    Pulse: 64 66 61 59   Resp: 14 14 12 14   Temp:   97.9 °F (36.6 °C)    TempSrc:   Oral    SpO2: 90% (!) 84% 97% 97%   Weight:       Height:           Temp:  [97.6 °F (36.4 °C)-98.4 °F (36.9 °C)] 97.9 °F (36.6 °C)  Pulse:  [55-69] 59  Resp:  [11-22] 14  BP: (110-140)/(42-61) 110/53  SpO2:  [84 %-99 %] 97 %      Temp: 97.9 °F (36.6 °C)  Pulse: 59  Resp: 14  BP: 110/53  General:  Appears comfortable  HEENT: No focal deficits.  Neck: No JVD, carotids 2+ no bruits.  Cardiac: Regular S1S2.  No S3, S4, rub, click.  No murmur.  Lungs: Clear to auscultation and percussion.  Abdomen: Soft, non-tender.   Extremities: 1+ bilateral  LE edema.  No clubbing or cyanosis.    Neurologic: Alert and oriented, normal affect.  Skin: Warm and dry.           LABS:      HEM:  Recent Labs   Lab 25  0500 25  0518   WBC 8.1 7.4 7.3   HGB 10.7* 10.6* 10.3*   HCT 36.4 37.2 35.5   .0 182.0 173.0       Chem:  Recent Labs   Lab 250 25  0518  04/28/25  0512    146*  146* 146* 144   K 4.3 4.0  4.0 3.8 4.2  4.2    102  102 103 102   CO2 36.0* 35.0*  35.0* 38.0* 39.0*   BUN 33* 28*  28* 25* 26*   CREATSERUM 1.99* 1.86*  1.86* 1.68* 1.78*   CA 9.5 9.2  9.2 9.4 9.2   MG  --  2.3 2.1 2.1   PHOS  --  4.8 4.7 4.6   * 175*  175* 113* 133*       Recent Labs   Lab 04/25/25  1932 04/26/25  0500 04/27/25  0518 04/28/25  0512   ALT 12 11  --   --    AST 22 21  --   --    ALB 4.3 4.2  4.2 4.0 4.2       Recent Labs   Lab 04/25/25  1932   PTT 25.1   INR 1.01           No results found for: \"TROP\", \"CKMB\"      Invalid input(s): \"PBNPML\"                       Diagnostics:   Telemetry:     EKG, 4/28/2025,         Echo:  4/26/25      Conclusions:     1. Left ventricle: The cavity size was normal. Wall thickness was normal.      Systolic function was normal. The estimated ejection fraction was 60-65%.      Features are consistent with a pseudonormal left ventricular filling      pattern, with concomitant abnormal relaxation and increased filling      pressure - grade 2 diastolic dysfunction. Doppler parameters are      consistent with elevated mean left atrial filling pressure.   2. Right ventricle: The cavity size was mildly increased. Systolic function      was normal. Systolic pressure was moderately increased.   3. Left atrium: The atrium was mildly to moderately dilated.   4. Right atrium: The atrium was mildly dilated.   5. Pulmonary arteries: The peak systolic pressure is 44mm Hg.   6. Pericardium, extracardiac: There was no pericardial effusion.   7. Inferior vena cava: The IVC was small-medium and 1.3cm diameter.      Respirophasic diameter changes are in the normal range (> 50%).   *             Impression:         1. HFpEF-acute on chronic    - Net out = 2.2 L    2.COPD on home O2. Patient has mild pulm Hypertension  and mild RVE  3.HL  4.DM  5.CRI    -  Cr at baseline (1.6-1.9)     Plan:     Continue IV  diuresis today. follow  DEJUAN Beach MD  4/28/2025  7:54 AM           [1]    insulin degludec  16 Units Subcutaneous Daily    escitalopram  10 mg Oral Nightly    DULoxetine  30 mg Oral Daily    sodium ferric gluconate  125 mg Intravenous Daily    aspirin  81 mg Oral Daily    atorvastatin  10 mg Oral Nightly    cetirizine  5 mg Oral Daily    cholecalciferol  1,000 Units Oral Daily    donepezil  10 mg Oral Nightly    [Held by provider] ferrous sulfate  325 mg Oral Daily with breakfast    hydrALAZINE  100 mg Oral TID    ipratropium  2 spray Nasal 2 times per day    pregabalin  600 mg Oral Nightly    cyanocobalamin  1,000 mcg Oral Daily    heparin  5,000 Units Subcutaneous Q8H CHICO    insulin aspart  1-5 Units Subcutaneous TID AC and HS    bumetanide  1 mg Intravenous Q12H   [2] [3]   Allergies  Allergen Reactions    Pioglitazone UNKNOWN     Weight Gain    Capsaicin ITCHING    Vancomycin ITCHING

## 2025-04-28 NOTE — PROGRESS NOTES
A&Ox4. 4L NC,. . Diminished lung sounds. Shortness of breath with activy. NSR/SB on telemetry. Denies CP. Continent of bowel and bladder. No complaints of pain. Up SBA. In bed with fall precautions in place. Discussed plan of care with patient. Patient verbalizes understanding.     Plan: diurese as order. DW, strict I/O.     Problem: CARDIOVASCULAR - ADULT  Goal: Maintains optimal cardiac output and hemodynamic stability  Description: INTERVENTIONS:- Monitor vital signs, rhythm, and trends- Monitor for bleeding, hypotension and signs of decreased cardiac output- Evaluate effectiveness of vasoactive medications to optimize hemodynamic stability- Monitor arterial and/or venous puncture sites for bleeding and/or hematoma- Assess quality of pulses, skin color and temperature- Assess for signs of decreased coronary artery perfusion - ex. Angina- Evaluate fluid balance, assess for edema, trend weights  Outcome: Progressing  Goal: Absence of cardiac arrhythmias or at baseline  Description: INTERVENTIONS:- Continuous cardiac monitoring, monitor vital signs, obtain 12 lead EKG if indicated- Evaluate effectiveness of antiarrhythmic and heart rate control medications as ordered- Initiate emergency measures for life threatening arrhythmias- Monitor electrolytes and administer replacement therapy as ordered  Outcome: Progressing     Problem: Patient/Family Goals  Goal: Patient/Family Long Term Goal  Description: Patient's Long Term Goal: Go home  Interventions:  - IV bumex  - Monitor creatinine  - Consults to see  - See additional Care Plan goals for specific interventions  Outcome: Progressing  Goal: Patient/Family Short Term Goal  Description: Patient's Short Term Goal: Feel better  Interventions:   - Consults to see  - Bumex  - Monitor labs  - See additional Care Plan goals for specific interventions  Outcome: Progressing      Patient : Fei Goldman Age: 59 year old Sex: male   MRN: [id number] Encounter Date: 12/7/2022      History     Chief Complaint   Patient presents with   • Testicle Pain     HPI   Fei Goldman is a 59 year old male who presents with left testicle pain.    The patient reports the pain onset a few days ago and was intermittent at first, then today the pain became constant but fluctuates in intensity. He reports it is not tender to the touch and he does not have any pain in the groin. He denies any lumps or bumps and is unsure of any swelling. The patient denies any heavy lifting but notes since his knees were replaced in the summer he has been walking more and last night his right knee was sore so he may have overdone it. Patient denies right testicle pain, difficulty urinating, hematochezia, or constipation. The patient has not taken anything for his pain and he takes baby aspirin.        Allergies   Allergen Reactions   • Eye Drops Other (See Comments)     Cortisone eye drops- Pt couldn't see when he uses these drops       Discharge Medication List as of 12/7/2022  5:39 PM      Prior to Admission Medications    Details   diclofenac (VOLTAREN) 75 MG EC tablet 75 mg.Historical Med      hydrOXYzine (ATARAX) 50 MG tablet Take 1 tablet by mouth daily as needed.Historical Med      QUEtiapine (SEROquel) 100 MG tablet 100 mg.Historical Med      amoxicillin (AMOXIL) 500 MG capsule Take 4 capsules 1 hour prior to dental procedure.Eprescribe, Disp-16 capsule, R-0      amoxicillin-clavulanate (Augmentin) 875-125 MG per tablet Take 1 tablet by mouth in the morning and 1 tablet in the evening.Eprescribe, Disp-20 tablet, R-0      Eszopiclone (Lunesta) 3 MG tablet Take 1 tablet by mouth nightly.Eprescribe, Disp-30 tablet, R-3      traMADol (ULTRAM) 50 MG tablet Indication: Acute PainTake 1 tablet by mouth every 6 hours as needed for Pain.Eprescribe, Disp-20 tablet, R-0      gabapentin (NEURONTIN) 100 MG capsule Take 100 mg  by mouth in the morning and 100 mg at noon and 100 mg in the evening.Historical Med      diphenhydrAMINE (Benadryl Allergy) 25 MG tablet Take 1 tablet by mouth 3 times daily as needed for Itching.Eprescribe, Disp-30 tablet, R-0      lidocaine (XYLOCAINE) 5 % ointment Apply topically every 4 hours as needed for Pain.Disp-35.44 g, R-0, Eprescribe      aspirin (Aspirin 81) 81 MG chewable tablet Chew 1 tablet by mouth in the morning and 1 tablet in the evening.Eprescribe, Disp-60 tablet, R-0      metFORMIN (GLUCOPHAGE) 500 MG tablet Take 500 mg by mouth daily (with breakfast).Historical Med      docusate sodium-sennosides (SENOKOT S) 50-8.6 MG per tablet Take 1 tablet by mouth daily.Eprescribe, Disp-30 tablet, R-0      Ferrous Sulfate (Iron) 325 (65 Fe) MG Tab Take 1 tablet by mouth daily. Do not start before May 26, 2022.Eprescribe, Disp-30 tablet, R-0      Omega-3 Fatty Acids (Fish Oil) 1000 MG capsule Take 1,000 mg by mouth daily.Historical Med      melatonin 3 MG TAKE 2 TABLETS BY MOUTH AT BEDTIME FOR SLEEPHistorical Med      prazosin (MINIPRESS) 2 MG capsule TAKE ONE CAPSULE BY MOUTH AT BEDTIME FOR SLEEP/NIGHTMARES RELATED TO PTSDHistorical Med      busPIRone (BUSPAR) 15 MG tablet Take 15 mg by mouth 2 times daily.Historical Med      OXcarbazepine (TRILEPTAL) 300 MG tablet Take 300 mg by mouth 2 times daily.Historical Med      QUEtiapine Fumarate (SEROQUEL PO) Take 150 mg by mouth 2 times daily.Historical Med      lisinopril-hydroCHLOROthiazide (ZESTORETIC) 20-25 MG per tablet Take 1 tablet by mouth daily.Eprescribe, Disp-90 tablet, R-1      omeprazole (PrilOSEC) 20 MG capsule Take 1 capsule by mouth daily.Eprescribe, Disp-90 capsule, R-1      atorvastatin (LIPITOR) 80 MG tablet Take 1 tablet by mouth daily.Eprescribe, Disp-90 tablet, R-1      ciclopirox (PENLAC) 8 % topical solution Apply externally to the affected area nightlyDisp-6.6 mL, R-1, Eprescribe      magnesium gluconate (MAGONATE) 500 MG tablet Take 1  tablet by mouth daily.Eprescribe, Disp-90 tablet, R-1      sertraline (ZOLOFT) 100 MG tablet TAKE 2 TABLETS BY MOUTH DAILYEprescribe, Disp-60 tablet, R-2      diclofenac (VOLTAREN) 75 MG EC tablet TAKE 1 TABLET BY MOUTH TWICE DAILYEprescribe, Disp-60 tablet, R-0      Misc Natural Products (OSTEO BI-FLEX ADV TRIPLE ST PO) daily. Historical Med      zinc sulfate (ZINCATE) 220 (50 Zn) MG capsule Historical Med      sildenafil (VIAGRA) 100 MG tablet TAKE ONE TABLET BY MOUTH AS NEEDED ON EMPTY STOMACH, 1 HOUR PRIOR TO SEX; NOT TO EXCEED 1 DOSE IN 24 HOURS ** 90 DAY SUPPLY, NO EARLY REFILLS **Historical Med      cholecalciferol (VITAMIN D3) 1000 UNITS tablet Take 2,000 Units by mouth daily. Historical Med      fluticasone (FLONASE) 50 MCG/ACT nasal spray Spray 1 spray in each nostril daily.Historical Med         Modified Medications    Details   HYDROcodone-acetaminophen (NORCO) 5-325 MG per tablet Indication: Acute PainTake 1 tablet by mouth every 8 hours as needed for Pain.Eprescribe, Disp-12 tablet, R-0             Past Medical History:   Diagnosis Date   • Allergic rhinitis    • Asthma     exercise-induced- pt denies- had pulmonary testing which showed no issues   • Essential (primary) hypertension    • Generalized anxiety disorder    • Hypercholesterolemia    • MDD (major depressive disorder)     bipolar   • Obesity    • ENOC (obstructive sleep apnea)     cpap   • Osteoarthritis        Past Surgical History:   Procedure Laterality Date   • ANTERIOR CRUCIATE LIGAMENT REPAIR      Left   • FINGER SURGERY Right 03/03/2022    Irrigation and debridement of open fracture to bone, right thumb    • HAND SURGERY Right 12/15/2017   • JOINT REPLACEMENT Right 07/28/2022    Right Total Knee Arthroplasty   • TOTAL KNEE REPLACEMENT Left 06/02/2022   • UVULECTOMY         Family History   Problem Relation Age of Onset   • Myocardial Infarction Mother    • Myocardial Infarction Father    • Myocardial Infarction Brother        Social  History     Tobacco Use   • Smoking status: Never Smoker   • Smokeless tobacco: Never Used   Vaping Use   • Vaping Use: never used   Substance Use Topics   • Alcohol use: Yes     Alcohol/week: 5.0 - 6.0 standard drinks     Types: 5 - 6 Cans of beer per week   • Drug use: No     Comment: Coffee 3 cups per day in the AM       E-cigarette/Vaping   • E-Cigarette/Vaping Use Never Used      E-Cigarette/Vaping Substances & Devices       Review of Systems   All other systems reviewed and are negative.      Physical Exam     ED Triage Vitals [12/07/22 1619]   ED Triage Vitals Group      Temp 97.8 °F (36.6 °C)      Heart Rate 77      Resp 18      /72      SpO2 96 %      EtCO2 mmHg       Height 6' 2\" (1.88 m)      Weight 262 lb 2 oz (118.9 kg)      Weight Scale Used Standing scale      BMI (Calculated) 33.65      IBW/kg (Calculated) 82.2       Physical Exam  Vitals and nursing note reviewed.   Constitutional:       Appearance: Normal appearance. He is well-developed.   HENT:      Head: Normocephalic and atraumatic.   Eyes:      Extraocular Movements: Extraocular movements intact.      Pupils: Pupils are equal, round, and reactive to light.   Abdominal:      General: Bowel sounds are normal. There is no distension.      Palpations: Abdomen is soft.      Tenderness: There is no abdominal tenderness. There is no guarding or rebound.   Genitourinary:     Comments: Normal appearing genitalia. No masses. Mild anterior tenderness to palpation of left testicle.   Musculoskeletal:         General: No tenderness. Normal range of motion.      Cervical back: Normal range of motion and neck supple.   Lymphadenopathy:      Cervical: No cervical adenopathy.   Skin:     General: Skin is warm and dry.      Capillary Refill: Capillary refill takes less than 2 seconds.   Neurological:      General: No focal deficit present.      Mental Status: He is alert and oriented to person, place, and time.   Psychiatric:         Mood and Affect: Mood  normal.         Behavior: Behavior normal.         ED Course     Procedures    Lab Results     No results found for this visit on 12/07/22.    EKG Results         Radiology Results     Imaging Results          US Testicles and Scrotum W Duplex (Final result)  Result time 12/07/22 17:31:21    Final result                 Impression:    IMPRESSION: (US TESTICLES AND SCROTUM W DUPLEX)  1. No evidence of epididymal orchitis or testicular torsion.  2. No intratesticular mass.  3. Small epididymal head cysts.  4. Moderate-sized loculated appearing slightly complex left-sided  hydrocele.  5. Left varicocele.                     Narrative:    EXAM:US TESTICLES AND SCROTUM W DUPLEX    CLINICAL HISTORY: Left testicular pain and swelling.    TECHNIQUE: Grayscale, color Doppler and duplex ultrasound images are  obtained of the bilateral testicles and scrotum.    COMPARISON: None.    FINDINGS:     Right Testis: Measures  6.1 x 2.7 x 3.7 cm.  Normal homogeneous  echotexture. No focal mass.  Right Epididymis: 6.5 mm epididymal head cyst.    Left Testis: Measures 5.6 x 3.2 x 3.3 cm.  Normal homogeneous echotexture.  Tubular ectasia to the rete testis. No concerning testicular mass.  Left Epididymis: 6 mm cyst epididymal head.    Other: Normal color flow is demonstrated bilaterally without evidence of  epididymoorchitis..    Left varicocele present. Moderate sized loculated appearing hydrocele on  the left which contains low-level internal echogenicities.                                ED Medication Orders (From admission, onward)    Ordered Start     Status Ordering Provider    12/07/22 1631 12/07/22 1632  ketorolac (TORADOL) injection 15 mg  ONCE         Last MAR action: Given TREASURE BOSWELL          ED Course as of 12/07/22 1295   Wed Dec 07, 2022   0888 Ultrasound of testicles was reviewed by radiologist.  There is no evidence of orchitis or testicular toward no evidence of epididymitis.  There is a left-sided complex fluid  filled hydrocele. [RM]   2297 Patient told of all results.  Plan for discharge.  Were tight-fitting underwear.  He may apply cold compresses.  Use scheduled over-the-counter NSAIDs.  Hydrocodone for breakthrough pain.  Referral on to Urology.  Signs and symptoms for emergent re-evaluation discussed. [RM]      ED Course User Index  [RM] Kody Sarabia MD       MDM    Clinical Impression     ED Diagnosis   1. Pain in left testicle  SERVICE TO UROLOGY    HYDROcodone-acetaminophen (NORCO) 5-325 MG per tablet   2. Hydrocele, unspecified hydrocele type  SERVICE TO UROLOGY    HYDROcodone-acetaminophen (NORCO) 5-325 MG per tablet       Disposition        Discharge 12/7/2022  5:36 PM  Fei Goldman discharge to home/self care.                  This chart was documented by Diamond Tafoya, acting as a scribe for Kody Sarabia MD. 12/7/2022, 4:34 PM.      The documentation recorded by the scribe accurately and completely reflects the service(s) I personally performed and the decisions made by me.          Kody Sarabia MD  12/07/22 6549

## 2025-04-29 ENCOUNTER — APPOINTMENT (OUTPATIENT)
Dept: GENERAL RADIOLOGY | Facility: HOSPITAL | Age: 80
End: 2025-04-29
Attending: INTERNAL MEDICINE
Payer: MEDICARE

## 2025-04-29 LAB
ALBUMIN SERPL-MCNC: 4.2 G/DL (ref 3.2–4.8)
ANION GAP SERPL CALC-SCNC: 3 MMOL/L (ref 0–18)
ARTERIAL PATENCY WRIST A: POSITIVE
BASE EXCESS BLDA CALC-SCNC: 11.7 MMOL/L (ref ?–2)
BODY TEMPERATURE: 98.6 F
BUN BLD-MCNC: 23 MG/DL (ref 9–23)
CALCIUM BLD-MCNC: 9.3 MG/DL (ref 8.7–10.6)
CHLORIDE SERPL-SCNC: 100 MMOL/L (ref 98–112)
CO2 SERPL-SCNC: 39 MMOL/L (ref 21–32)
COHGB MFR BLD: 1.7 % SAT (ref 0–3)
CREAT BLD-MCNC: 1.75 MG/DL (ref 0.55–1.02)
EGFRCR SERPLBLD CKD-EPI 2021: 30 ML/MIN/1.73M2 (ref 60–?)
GLUCOSE BLD-MCNC: 126 MG/DL (ref 70–99)
GLUCOSE BLD-MCNC: 127 MG/DL (ref 70–99)
GLUCOSE BLD-MCNC: 149 MG/DL (ref 70–99)
GLUCOSE BLD-MCNC: 225 MG/DL (ref 70–99)
GLUCOSE BLD-MCNC: 376 MG/DL (ref 70–99)
HCO3 BLDA-SCNC: 34 MEQ/L (ref 21–27)
HGB BLD-MCNC: 11 G/DL (ref 12–16)
L/M: 3 L/MIN
MAGNESIUM SERPL-MCNC: 2.1 MG/DL (ref 1.6–2.6)
METHGB MFR BLD: 0.8 % SAT (ref 0.4–1.5)
OSMOLALITY SERPL CALC.SUM OF ELEC: 299 MOSM/KG (ref 275–295)
OXYHGB MFR BLDA: 94.4 % (ref 92–100)
PCO2 BLDA: 68 MM HG (ref 35–45)
PH BLDA: 7.37 [PH] (ref 7.35–7.45)
PHOSPHATE SERPL-MCNC: 4.3 MG/DL (ref 2.4–5.1)
PO2 BLDA: 75 MM HG (ref 80–100)
POTASSIUM SERPL-SCNC: 3.9 MMOL/L (ref 3.5–5.1)
SODIUM SERPL-SCNC: 142 MMOL/L (ref 136–145)

## 2025-04-29 PROCEDURE — 94760 N-INVAS EAR/PLS OXIMETRY 1: CPT

## 2025-04-29 PROCEDURE — 36600 WITHDRAWAL OF ARTERIAL BLOOD: CPT | Performed by: INTERNAL MEDICINE

## 2025-04-29 PROCEDURE — 83050 HGB METHEMOGLOBIN QUAN: CPT | Performed by: INTERNAL MEDICINE

## 2025-04-29 PROCEDURE — 82962 GLUCOSE BLOOD TEST: CPT

## 2025-04-29 PROCEDURE — 71101 X-RAY EXAM UNILAT RIBS/CHEST: CPT | Performed by: INTERNAL MEDICINE

## 2025-04-29 PROCEDURE — 83735 ASSAY OF MAGNESIUM: CPT | Performed by: INTERNAL MEDICINE

## 2025-04-29 PROCEDURE — 82375 ASSAY CARBOXYHB QUANT: CPT | Performed by: INTERNAL MEDICINE

## 2025-04-29 PROCEDURE — 85018 HEMOGLOBIN: CPT | Performed by: INTERNAL MEDICINE

## 2025-04-29 PROCEDURE — 94799 UNLISTED PULMONARY SVC/PX: CPT

## 2025-04-29 PROCEDURE — 80069 RENAL FUNCTION PANEL: CPT | Performed by: INTERNAL MEDICINE

## 2025-04-29 PROCEDURE — 82805 BLOOD GASES W/O2 SATURATION: CPT | Performed by: INTERNAL MEDICINE

## 2025-04-29 RX ORDER — PREDNISONE 20 MG/1
40 TABLET ORAL
Status: DISCONTINUED | OUTPATIENT
Start: 2025-04-29 | End: 2025-05-01

## 2025-04-29 RX ORDER — BUMETANIDE 0.25 MG/ML
1 INJECTION, SOLUTION INTRAMUSCULAR; INTRAVENOUS ONCE
Status: COMPLETED | OUTPATIENT
Start: 2025-04-29 | End: 2025-04-29

## 2025-04-29 RX ORDER — TRAMADOL HYDROCHLORIDE 50 MG/1
50 TABLET ORAL EVERY 12 HOURS PRN
Status: DISCONTINUED | OUTPATIENT
Start: 2025-04-29 | End: 2025-05-01

## 2025-04-29 RX ORDER — ACETAMINOPHEN 325 MG/1
650 TABLET ORAL EVERY 6 HOURS PRN
Status: DISCONTINUED | OUTPATIENT
Start: 2025-04-29 | End: 2025-05-01

## 2025-04-29 RX ORDER — BUMETANIDE 2 MG/1
2 TABLET ORAL DAILY
Status: DISCONTINUED | OUTPATIENT
Start: 2025-04-30 | End: 2025-04-29

## 2025-04-29 RX ORDER — TORSEMIDE 20 MG/1
20 TABLET ORAL DAILY
Status: DISCONTINUED | OUTPATIENT
Start: 2025-04-30 | End: 2025-05-01

## 2025-04-29 RX ORDER — FLUTICASONE PROPIONATE 50 MCG
1 SPRAY, SUSPENSION (ML) NASAL DAILY
Status: DISCONTINUED | OUTPATIENT
Start: 2025-04-29 | End: 2025-04-29

## 2025-04-29 RX ORDER — FLUTICASONE PROPIONATE 50 MCG
1 SPRAY, SUSPENSION (ML) NASAL DAILY
Status: DISCONTINUED | OUTPATIENT
Start: 2025-04-29 | End: 2025-05-01

## 2025-04-29 NOTE — PROGRESS NOTES
[facility] Nephrology  Inpatient Follow-up    Nina Donnelly Patient Status:  Inpatient    10/9/1945 MRN [id number]   Location [address] Attending Donna Frausto MD   Hosp Day # 1 PCP Gladys Saravia MD       SUBJECTIVE:     Patient seen and examined at bedside. No acute complaints today.     MEDICATIONS:     Current Hospital Medications[1]    PHYSICAL EXAM:     Vital Signs: /53 (BP Location: Right arm)   Pulse 57   Temp 98.1 °F (36.7 °C) (Oral)   Resp 11   Ht 5' 1\" (1.549 m)   Wt 192 lb 7.4 oz (87.3 kg)   SpO2 99%   BMI 36.37 kg/m²   Temp (24hrs), Av.1 °F (36.7 °C), Min:97.6 °F (36.4 °C), Max:98.5 °F (36.9 °C)       Intake/Output Summary (Last 24 hours) at 2025 1336  Last data filed at 2025 1256  Gross per 24 hour   Intake 1542 ml   Output 2550 ml   Net -1008 ml     Wt Readings from Last 3 Encounters:   25 192 lb 7.4 oz (87.3 kg)   25 184 lb 11.2 oz (83.8 kg)   10/17/24 193 lb 5.5 oz (87.7 kg)       General: no acute distress  HEENT: normocephalic, atraumatic  Respiratory: + oxygen  Extremities: + edema bilaterally  Skin: warm, dry  Neuro: awake, alert     LABORATORY DATA:     Lab Results   Component Value Date     (H) 2025    BUN 23 2025    BUNCREA 18.0 03/15/2022    CREATSERUM 1.75 (H) 2025    ANIONGAP 3 2025    GFRNAA 35 (L) 2021    GFRAA 40 (L) 2021    CA 9.3 2025    OSMOCALC 299 (H) 2025    ALKPHO 76 2025    AST 21 2025    ALT 11 2025    BILT 0.7 2025    TP 6.4 2025    ALB 4.2 2025    GLOBULIN 2.2 2025     2025    K 3.9 2025     2025    CO2 39.0 (H) 2025     Lab Results   Component Value Date    WBC 7.3 2025    RBC 5.95 (H) 2025    HGB 10.3 (L) 2025    HCT 35.5 2025    .0 2025    MCV 59.7 (L) 2025    MCH 17.3 (L) 2025    MCHC 29.0 (L) 2025    RDW 21.1 2025     NEPRELIM 4.83 04/26/2025    NEUTABS 4.34 04/07/2020    LYMPHABS 2.28 04/07/2020    EOSABS 0.14 04/07/2020    BASABS 0.07 04/07/2020    NEPERCENT 64.9 04/26/2025    LYPERCENT 20.6 04/26/2025    MOPERCENT 8.9 04/26/2025    EOPERCENT 4.7 04/26/2025    BAPERCENT 0.8 04/26/2025    NE 4.83 04/26/2025    LYMABS 1.53 04/26/2025    MOABSO 0.66 04/26/2025    EOABSO 0.35 04/26/2025    BAABSO 0.06 04/26/2025     Lab Results   Component Value Date    MALBP <1.2 03/15/2022    CREUR 68.40 04/26/2025     Lab Results   Component Value Date    COLORUR Colorless (A) 04/26/2025    CLARITY Clear 04/26/2025    SPECGRAVITY 1.009 04/26/2025    GLUUR >1000 (A) 04/26/2025    BILUR Negative 04/26/2025    KETUR Negative 04/26/2025    BLOODURINE Negative 04/26/2025    PHURINE 5.5 04/26/2025    PROUR Negative 04/26/2025    UROBILINOGEN Normal 04/26/2025    NITRITE Negative 04/26/2025    LEUUR Negative 04/26/2025    NMIC Microscopic not indicated 04/26/2025       IMAGING:     Reviewed    ASSESSMENT/PLAN:     79 year old female w ho CKD 3b (follows w Dr Scott, bl Cr 1.6-1.9), HFpEF, DM2, HTN, WYATT on CPAP, beta thalassemia and macular degeneration, COPD on home o2 who presents with worsening SOB and swelling/weight gain.     BIBIANA on CKD3b  -- Likely cardiorenal syndrome   -- Hold jardiance  -- Diuretics per Cardiology - monitor kidney function closely.   -- Monitor intake/output, daily weights  -- Avoid nephrotoxins and renally dose medications for creatinine clearance     Acute on chronic CHF  -- Appreciate Cardiology recommendations     Hypernatremia  -- Monitor closely     Alkalosis  -- If worsening can consider ABG  -- CPAP nightly     Anemia  -- S/p IV iron  -- Transfusion per primary      HTN  -- Hydralazine     We will continue to follow.    Thank you for allowing us to participate in the care of this patient.       DO Alfredo Reddy [facility] - Nephrology           [1]   Current Facility-Administered Medications   Medication Dose  Route Frequency    acetaminophen (Tylenol) tab 650 mg  650 mg Oral Q6H PRN    [START ON 4/30/2025] torsemide (Demadex) tab 20 mg  20 mg Oral Daily    predniSONE (Deltasone) tab 40 mg  40 mg Oral Daily with food    lidocaine-menthol 4-1 % patch 1 patch  1 patch Transdermal Daily    traMADol (Ultram) tab 50 mg  50 mg Oral Q12H PRN    pregabalin (Lyrica) cap 300 mg  300 mg Oral Nightly    insulin degludec (Tresiba) 100 units/mL flextouch 16 Units  16 Units Subcutaneous Daily    escitalopram (Lexapro) tab 10 mg  10 mg Oral Nightly    DULoxetine (Cymbalta) DR cap 30 mg  30 mg Oral Daily    aspirin DR tab 81 mg  81 mg Oral Daily    atorvastatin (Lipitor) tab 10 mg  10 mg Oral Nightly    cetirizine (ZyrTEC) tab 5 mg  5 mg Oral Daily    cholecalciferol (Vitamin D3) tab 1,000 Units  1,000 Units Oral Daily    donepezil (Aricept) tab 10 mg  10 mg Oral Nightly    [Held by provider] ferrous sulfate DR tab 325 mg  325 mg Oral Daily with breakfast    hydrALAZINE (Apresoline) tab 100 mg  100 mg Oral TID    ipratropium (Atrovent) 0.03 % nasal solution 2 spray  2 spray Nasal 2 times per day    cyanocobalamin (Vitamin B12) tab 1,000 mcg  1,000 mcg Oral Daily    glucose (Dex4) 15 GM/59ML oral liquid 15 g  15 g Oral Q15 Min PRN    Or    glucose (Glutose) 40% oral gel 15 g  15 g Oral Q15 Min PRN    Or    glucose-vitamin C (Dex-4) chewable tab 4 tablet  4 tablet Oral Q15 Min PRN    Or    dextrose 50% injection 50 mL  50 mL Intravenous Q15 Min PRN    Or    glucose (Dex4) 15 GM/59ML oral liquid 30 g  30 g Oral Q15 Min PRN    Or    glucose (Glutose) 40% oral gel 30 g  30 g Oral Q15 Min PRN    Or    glucose-vitamin C (Dex-4) chewable tab 8 tablet  8 tablet Oral Q15 Min PRN    heparin (Porcine) 5000 UNIT/ML injection 5,000 Units  5,000 Units Subcutaneous Q8H CHICO    ondansetron (Zofran) 4 MG/2ML injection 4 mg  4 mg Intravenous Q6H PRN    metoclopramide (Reglan) 5 mg/mL injection 5 mg  5 mg Intravenous Q8H PRN    insulin aspart (NovoLOG) 100  Units/mL FlexPen 1-5 Units  1-5 Units Subcutaneous TID AC and HS

## 2025-04-29 NOTE — PLAN OF CARE
Patient alert and oriented x 4. Up  SBA. On 4L via O2 when received, will wean as able. Refused Cpap overnight, c/o of nasal congestion but refusing scheduled nasal spray. NSR/SB on tele. Continent of bowel and bladder. No complaints of pain, shortness of breath or chest pain/discomfort. POC : IV bumex, DW, I&Os, IV iron. Fall precautions in place. Call light within reach.    0025: Pt requested Tylenol for sleep    0200: Weaned to 3L via NC with O2 sats >95%.    Problem: CARDIOVASCULAR - ADULT  Goal: Maintains optimal cardiac output and hemodynamic stability  Description: INTERVENTIONS:- Monitor vital signs, rhythm, and trends- Monitor for bleeding, hypotension and signs of decreased cardiac output- Evaluate effectiveness of vasoactive medications to optimize hemodynamic stability- Monitor arterial and/or venous puncture sites for bleeding and/or hematoma- Assess quality of pulses, skin color and temperature- Assess for signs of decreased coronary artery perfusion - ex. Angina- Evaluate fluid balance, assess for edema, trend weights  Outcome: Progressing  Goal: Absence of cardiac arrhythmias or at baseline  Description: INTERVENTIONS:- Continuous cardiac monitoring, monitor vital signs, obtain 12 lead EKG if indicated- Evaluate effectiveness of antiarrhythmic and heart rate control medications as ordered- Initiate emergency measures for life threatening arrhythmias- Monitor electrolytes and administer replacement therapy as ordered  Outcome: Progressing     Problem: Patient/Family Goals  Goal: Patient/Family Long Term Goal  Description: Patient's Long Term Goal: Go home  Interventions:  - IV bumex  - Monitor creatinine  - Consults to see  - See additional Care Plan goals for specific interventions  Outcome: Progressing  Goal: Patient/Family Short Term Goal  Description: Patient's Short Term Goal: Feel better  Interventions:   - Consults to see  - Bumex  - Monitor labs  - See additional Care Plan goals for specific  interventions  Outcome: Progressing

## 2025-04-29 NOTE — PROGRESS NOTES
[facility] Cardiology Progress Note        Nina Donnelly Patient Status:  Observation    10/9/1945 MRN [id number]   [facility] 8NE-A Attending Justus Nair,    Hosp Day # 1 PCP Gladys Saravia MD     Subjective:  The patient denies  chest pain   No LE edema  She reports left rib pain  Reports dyspnea    Medications:  Scheduled Medications[1]    Continuous Infusions:  Medication Infusions[2]      Allergies:  Allergies[3]      Objective:        Intake/Output:      Intake/Output Summary (Last 24 hours) at 2025 0917  Last data filed at 2025 0811  Gross per 24 hour   Intake 1422 ml   Output 2400 ml   Net -978 ml     Wt Readings from Last 3 Encounters:   25 192 lb 7.4 oz (87.3 kg)   25 184 lb 11.2 oz (83.8 kg)   10/17/24 193 lb 5.5 oz (87.7 kg)       Physical Exam:        Vitals:    25 0200 25 0445 25 0717 25 0811   BP:  135/54  126/44   BP Location:  Right arm  Left arm   Pulse:  66 71 57   Resp:  17 19 18   Temp:  97.6 °F (36.4 °C)  98 °F (36.7 °C)   TempSrc:  Oral  Oral   SpO2: 96% 96% 97% 96%   Weight:  192 lb 7.4 oz (87.3 kg)     Height:           Temp:  [97.6 °F (36.4 °C)-98.5 °F (36.9 °C)] 98 °F (36.7 °C)  Pulse:  [56-71] 57  Resp:  [11-19] 18  BP: (110-147)/(44-56) 126/44  SpO2:  [3 %-100 %] 96 %      Temp: 98 °F (36.7 °C)  Pulse: 57  Resp: 18  BP: 126/44  General:  Appears comfortable  HEENT: No focal deficits.  Neck: No JVD, carotids 2+ no bruits.  Cardiac: Regular S1S2.  No S3, S4, rub, click.  No murmur.  Lungs: Clear to auscultation and percussion.  Abdomen: Soft, non-tender.   Extremities: no  LE edema.  No clubbing or cyanosis.    Neurologic: Alert and oriented, normal affect.  Skin: Warm and dry.           LABS:      HEM:  Recent Labs   Lab 25  19325  0500 25  0518   WBC 8.1 7.4 7.3   HGB 10.7* 10.6* 10.3*   HCT 36.4 37.2 35.5   .0 182.0 173.0       Chem:  Recent Labs   Lab 25  04/26/25  0500 04/27/25  0518 04/28/25  0512 04/29/25  0549    146*  146* 146* 144 142   K 4.3 4.0  4.0 3.8 4.2  4.2 3.9    102  102 103 102 100   CO2 36.0* 35.0*  35.0* 38.0* 39.0* 39.0*   BUN 33* 28*  28* 25* 26* 23   CREATSERUM 1.99* 1.86*  1.86* 1.68* 1.78* 1.75*   CA 9.5 9.2  9.2 9.4 9.2 9.3   MG  --  2.3 2.1 2.1 2.1   PHOS  --  4.8 4.7 4.6 4.3   * 175*  175* 113* 133* 127*       Recent Labs   Lab 04/25/25 1932 04/26/25  0500 04/27/25  0518 04/28/25  0512 04/29/25  0549   ALT 12 11  --   --   --    AST 22 21  --   --   --    ALB 4.3 4.2  4.2 4.0 4.2 4.2       Recent Labs   Lab 04/25/25 1932   PTT 25.1   INR 1.01           No results found for: \"TROP\", \"CKMB\"      Invalid input(s): \"PBNPML\"                       Diagnostics:   Telemetry:     EKG, 4/28/2025,         Echo:  4/26/25      Conclusions:     1. Left ventricle: The cavity size was normal. Wall thickness was normal.      Systolic function was normal. The estimated ejection fraction was 60-65%.      Features are consistent with a pseudonormal left ventricular filling      pattern, with concomitant abnormal relaxation and increased filling      pressure - grade 2 diastolic dysfunction. Doppler parameters are      consistent with elevated mean left atrial filling pressure.   2. Right ventricle: The cavity size was mildly increased. Systolic function      was normal. Systolic pressure was moderately increased.   3. Left atrium: The atrium was mildly to moderately dilated.   4. Right atrium: The atrium was mildly dilated.   5. Pulmonary arteries: The peak systolic pressure is 44mm Hg.   6. Pericardium, extracardiac: There was no pericardial effusion.   7. Inferior vena cava: The IVC was small-medium and 1.3cm diameter.      Respirophasic diameter changes are in the normal range (> 50%).   *             Impression:         1. HFpEF-acute on chronic    - Net out =  3.6 L    2.COPD on home O2. Patient has mild pulm Hypertension   and mild RVE  3.HL  4.DM  5.CRI    -  Cr at baseline (1.6-1.9)     Plan:     Continue IV  diuresis today. Transition to po diuretic in am.  follow CR    Dago Beach MD             [1]    pregabalin  300 mg Oral Nightly    insulin degludec  16 Units Subcutaneous Daily    escitalopram  10 mg Oral Nightly    DULoxetine  30 mg Oral Daily    sodium ferric gluconate  125 mg Intravenous Daily    aspirin  81 mg Oral Daily    atorvastatin  10 mg Oral Nightly    cetirizine  5 mg Oral Daily    cholecalciferol  1,000 Units Oral Daily    donepezil  10 mg Oral Nightly    [Held by provider] ferrous sulfate  325 mg Oral Daily with breakfast    hydrALAZINE  100 mg Oral TID    ipratropium  2 spray Nasal 2 times per day    cyanocobalamin  1,000 mcg Oral Daily    heparin  5,000 Units Subcutaneous Q8H CHICO    insulin aspart  1-5 Units Subcutaneous TID AC and HS    bumetanide  1 mg Intravenous Q12H   [2] [3]   Allergies  Allergen Reactions    Pioglitazone UNKNOWN     Weight Gain    Capsaicin ITCHING    Vancomycin ITCHING

## 2025-04-29 NOTE — PLAN OF CARE
Assumed care of patient at 0730. Patient alert and oriented x4, intermittently lethargic. NSR on telemetry. On 3L nasal cannula. Patient reports pain to L side of back. Lidocaine patch and Tylenol given with relief. Ambulating with walker and standby assist. Call light within reach. Patient and family updated on plan of care.     Problem: CARDIOVASCULAR - ADULT  Goal: Maintains optimal cardiac output and hemodynamic stability  Description: INTERVENTIONS:- Monitor vital signs, rhythm, and trends- Monitor for bleeding, hypotension and signs of decreased cardiac output- Evaluate effectiveness of vasoactive medications to optimize hemodynamic stability- Monitor arterial and/or venous puncture sites for bleeding and/or hematoma- Assess quality of pulses, skin color and temperature- Assess for signs of decreased coronary artery perfusion - ex. Angina- Evaluate fluid balance, assess for edema, trend weights  Outcome: Progressing  Goal: Absence of cardiac arrhythmias or at baseline  Description: INTERVENTIONS:- Continuous cardiac monitoring, monitor vital signs, obtain 12 lead EKG if indicated- Evaluate effectiveness of antiarrhythmic and heart rate control medications as ordered- Initiate emergency measures for life threatening arrhythmias- Monitor electrolytes and administer replacement therapy as ordered  Outcome: Progressing     Problem: Patient/Family Goals  Goal: Patient/Family Long Term Goal  Description: Patient's Long Term Goal: Go home  Interventions:  - IV bumex  - Monitor creatinine  - Consults to see  - See additional Care Plan goals for specific interventions  Outcome: Progressing  Goal: Patient/Family Short Term Goal  Description: Patient's Short Term Goal: Feel better  Interventions:   - Consults to see  - Bumex  - Monitor labs  - See additional Care Plan goals for specific interventions  Outcome: Progressing

## 2025-04-29 NOTE — PHYSICAL THERAPY NOTE
Attempted to follow for physical therapy evaluation.  Pt and family refusing therapy evaluation.  Will re-attempt as pt and family are willing to participate.

## 2025-04-29 NOTE — PROGRESS NOTES
[facility] Hospitalist Progress Note       SUBJECTIVE:  Tells me she is having sciatica pain sharp shooting  MRI with some stenosis noted     Having some rib pain on left side     No SOB noted     Daughter on the phone     OBJECTIVE:  Scheduled Meds: Scheduled Medications[1]  Continuous Infusions: Medication Infusions[2]  PRN Meds: PRN Medications[3]    Vitals  Vitals:    04/29/25 0811   BP: 126/44   Pulse: 57   Resp: 18   Temp: 98 °F (36.7 °C)         Exam   Gen-    no acute distress, alert and oriented x 3   RESP-   Lungs CTA, normal respiratory effort  CV-      Heart RRR, no mgr  Abd-    soft, nondistended, nontender, bowel sounds present  Skin-    no rash  Neuro-  no focal neurologic deficits  Ext-      No edema in extremities   Psych- alert and oriented x 3     Labs:     Recent Labs   Lab 04/25/25 1932 04/26/25 0500 04/27/25 0518   WBC 8.1 7.4 7.3   HGB 10.7* 10.6* 10.3*   MCV 59.8* 61.2* 59.7*   .0 182.0 173.0   INR 1.01  --   --        Recent Labs   Lab 04/25/25 1932 04/26/25 0500 04/27/25 0518 04/28/25  0512 04/29/25  0549    146*  146* 146* 144 142   K 4.3 4.0  4.0 3.8 4.2  4.2 3.9    102  102 103 102 100   CO2 36.0* 35.0*  35.0* 38.0* 39.0* 39.0*   BUN 33* 28*  28* 25* 26* 23   CREATSERUM 1.99* 1.86*  1.86* 1.68* 1.78* 1.75*   CA 9.5 9.2  9.2 9.4 9.2 9.3   MG  --  2.3 2.1 2.1 2.1   PHOS  --  4.8 4.7 4.6 4.3   * 175*  175* 113* 133* 127*       Recent Labs   Lab 04/25/25 1932 04/26/25 0500 04/27/25 0518 04/28/25  0512 04/29/25  0549   ALT 12 11  --   --   --    AST 22 21  --   --   --    ALB 4.3 4.2  4.2 4.0 4.2 4.2       Recent Labs   Lab 04/28/25  0506 04/28/25  1328 04/28/25  1733 04/28/25  2326 04/29/25  0524   PGLU 127* 125* 140* 187* 126*       AP:  80 y/o F w/ Pmhx of HFpEF, CKD3 (baseline Cr 1.6-1.9), COPD, WYATT on CPAP, T2DM, HLD who presents to the hospital w/ chief complaints of SOB, LE edema     Acute on Chronic HFpEF Exacerbation  Last ECHO: 65-70  CXR  noted  BNP slightly elevated  Repeat ECHO: 60-65%, G2DD  Plan:  - Cardiology consulted, continue IV diuresis  - Daily weights, strict I/Os, fluid restriction  - Holding Jardiance in house     COPD on Chronic 3L  Severe WYATT  Plan:  - Was previously on Breztri, provided no benefit as per last Pulm note  - CPAP nightly  - Duonebs PRN     Leg pain/back pain   - MRI ordered - w stenosis, bulging disc, trial of prednisone, tramadol and lidocaine patch  - PT/OT     BIBINAA on CKD3   Plan:  - Continue Diuretics  - Nephrology consulted as per patient request     HTN  - Continue Hydralazine     HLD  - Statin     Dementia  - Donepezil     STELLA  - Ferrous Sulfate     T2DM  - 16u Glargine, LDISS     Dnona Frausto MD   [facility] Hospitalist            [1]    [START ON 4/30/2025] torsemide  20 mg Oral Daily    predniSONE  40 mg Oral Daily with food    lidocaine-menthol  1 patch Transdermal Daily    pregabalin  300 mg Oral Nightly    insulin degludec  16 Units Subcutaneous Daily    escitalopram  10 mg Oral Nightly    DULoxetine  30 mg Oral Daily    aspirin  81 mg Oral Daily    atorvastatin  10 mg Oral Nightly    cetirizine  5 mg Oral Daily    cholecalciferol  1,000 Units Oral Daily    donepezil  10 mg Oral Nightly    [Held by provider] ferrous sulfate  325 mg Oral Daily with breakfast    hydrALAZINE  100 mg Oral TID    ipratropium  2 spray Nasal 2 times per day    cyanocobalamin  1,000 mcg Oral Daily    heparin  5,000 Units Subcutaneous Q8H CHICO    insulin aspart  1-5 Units Subcutaneous TID AC and HS   [2] [3]   acetaminophen    traMADol    glucose **OR** glucose **OR** glucose-vitamin C **OR** dextrose **OR** glucose **OR** glucose **OR** glucose-vitamin C    ondansetron    metoclopramide

## 2025-04-29 NOTE — OCCUPATIONAL THERAPY NOTE
Attempted to see pt for skilled OT services this date. Pt/daughter requesting to HOLD therapy as pt is having pain s/p fall yesterday. Will re-attempt as schedule allows. Sayra Odonnell, 04/29/25, 9:23 AM    Intravenous, Q12H, Lawley Heater., DO, 50 mg at 03/11/21 2535    cefTRIAXone (ROCEPHIN) 2,000 mg in sterile water 20 mL IV syringe, 2,000 mg, Intravenous, Q24H, Lawley Heater., DO, 2,000 mg at 03/10/21 1259    pantoprazole (PROTONIX) injection 40 mg, 40 mg, Intravenous, BID, 40 mg at 03/11/21 2911 **AND** sodium chloride (PF) 0.9 % injection 10 mL, 10 mL, Intravenous, BID, Joshua Alcaraz Lexi., DO, 10 mL at 03/11/21 8977    ipratropium-albuterol (DUONEB) nebulizer solution 1 ampule, 1 ampule, Inhalation, Q4H WA, Lawley Heater., DO, 1 ampule at 03/11/21 1244    sodium chloride (Inhalant) 3 % nebulizer solution 4 mL, 4 mL, Nebulization, PRN, Lawley Heater., DO    lidocaine PF 1 % injection 5 mL, 5 mL, Intradermal, Once, Lawley Heater., DO    sodium chloride flush 0.9 % injection 10 mL, 10 mL, Intravenous, 2 times per day, Lawley Heater., DO, 10 mL at 03/11/21 3646    sodium chloride flush 0.9 % injection 10 mL, 10 mL, Intravenous, PRN, Lawley Heater., DO, 10 mL at 83/43/19 8467    folic acid (FOLVITE) tablet 1 mg, 1 mg, Oral, Daily, Lawley Heater., DO, 1 mg at 03/11/21 8965    thiamine mononitrate tablet 100 mg, 100 mg, Oral, Daily, Lawley Heater., DO, 100 mg at 03/11/21 5999    midodrine (PROAMATINE) tablet 5 mg, 5 mg, Oral, TID WC, Lawley Heater., DO, 5 mg at 03/11/21 1211    bumetanide (BUMEX) injection 0.5 mg, 0.5 mg, Intravenous, BID, Lawley Heater., DO, 0.5 mg at 03/11/21 2949    insulin lispro (HUMALOG) injection vial 0-12 Units, 0-12 Units, Subcutaneous, TID WC, Lawley Heater., DO, 2 Units at 03/11/21 1214    mineral oil-hydrophil petrolat ointment, , Topical, BID PRN, Rachel Heater., DO, Given at 03/10/21 0849    glucose (GLUTOSE) 40 % oral gel 15 g, 15 g, Oral, PRN, Rachel Heater., DO    dextrose 50 % IV solution, 12.5 g, Intravenous, PRN, Joshua Alcaraz Lexi., DO    glucagon (rDNA) injection 1 mg, 1 mg, Intramuscular, PRN, Neida Clifton, DO    dextrose 5 % solution, 100 mL/hr, Intravenous, PRN, Antonia Martinez, DO    levothyroxine (SYNTHROID) tablet 150 mcg, 150 mcg, Oral, Daily, Neida Clifton, DO, 150 mcg at 03/11/21 0524    sodium chloride flush 0.9 % injection 10 mL, 10 mL, Intravenous, 2 times per day, Neida Clifton, DO, 10 mL at 03/11/21 0920    sodium chloride flush 0.9 % injection 10 mL, 10 mL, Intravenous, PRN, Neida Clifton, DO, 10 mL at 03/09/21 1632    [Held by provider] heparin (porcine) injection 5,000 Units, 5,000 Units, Subcutaneous, 3 times per day, Neida Clifton, DO, 5,000 Units at 03/05/21 0600    promethazine (PHENERGAN) tablet 12.5 mg, 12.5 mg, Oral, Q6H PRN **OR** ondansetron (ZOFRAN) injection 4 mg, 4 mg, Intravenous, Q6H PRN, Neida Clifton, DO    polyethylene glycol [facility]) packet 17 g, 17 g, Oral, Daily PRN, Neida Clifton, DO    acetaminophen (TYLENOL) tablet 650 mg, 650 mg, Oral, Q6H PRN, 650 mg at 03/09/21 0115 **OR** acetaminophen (TYLENOL) suppository 650 mg, 650 mg, Rectal, Q6H PRN, Neida Clifton, DO    Assessment:    Active Problems:    Sepsis ([address]  Resolved Problems:    * No resolved hospital problems. *    74-year-old with history of MR and legally blind presented with unresponsiveness was found with worsening thrombocytopenia. Plan: Thrombocytopenia improving  Anemia and thrombocytopenia likely are multifactorial resulting from septic shock from UTI, recent antibiotic use, BEENA, poor nutrition. Maintain platelets >19,455 or 30,000 with active signs of bleeding and hemoglobin >7g/dL-stable today  No evidence of hemolysis  Leukocytosis is reactive to inflammatory process  We will continue to follow along       Electronically signed by RADHA Leggett NP on 3/11/2021 at 12:55 PM     Attending Addendum:      Patient seen and examined personally. All pertinent labs and imaging reviewed.   Case discussed with NP. Agree with the progress note as above which has been updated to reflect my changes. Platelet count has now normalized.     Trupti Workman, [address] for [address]

## 2025-04-29 NOTE — DISCHARGE INSTRUCTIONS
Going Home Instructions  In this section you will find the tools which will guide you through the first few days after you leave the hospital. Continued use of these tools will help you develop the skills necessary to keep your heart failure under control.     Home Care Instructions Following Heart Failure - the most important things to do every day include:   Weigh yourself and review the “Self-Check Plan” sheet every morning.   Call your cardiologist office if you are in the “Pay Attention-Use Caution” (yellow zone) or “Medical Alert-Warning!” (red zone) as outlined in the Self-Check Plan sheet.  Take your medicines as prescribed.  Limit your sodium (salt) intake.  Know when to call your cardiologist, primary doctor, or nurse.  Know when to seek emergency care.      Things for You to Remember:   1. See your doctor or healthcare provider as written on your discharge instructions.  It is important that you attend this appointment to make sure your symptoms are under control.     2. Your recommended sodium intake is 6718-3069 mg daily.    3.  Weigh yourself every day.    4. Some exercise and activity is important to help keep your heart functioning and strong. Unless instructed not to exercise, you may walk at a slow to moderate pace for 10-15 minutes 2-3 days per week to start. Pace your activity to prevent shortness of breath or fatigue. Stop exercising if you develop chest pain, lightheadedness, or significant shortness of breath.       Call Your Cardiologist If:   You gain 2-3 pounds in one day or 5 pounds in one week.  You have more difficulty breathing.  You are getting more tired with normal activity.  You are more short of breath lying down, or awaken at night short of breath.  You have swelling of your feet or legs.  You urinate less often during the day and more often at night.  You have cramps in your legs.  You have blurred vision or see yellowish-green halos around objects of lights.    Go to the  Emergency Room If:   You have pain or tightness in your chest  You are extremely short of breath  You are coughing up pink-frothy mucus  You are traveling and develop symptoms of worsening heart failure      ** Please follow up with your cardiologist or Advanced Practice Provider as written on your discharge instructions. If you are not provided with an appointment, let your nurse know so you can get an appointment**    USE WALKER WHEN WALKING AND OXYGEN AT ALL TIMES  --FOLLOW UP W pulm and ortho spine regarding the back pain and WYATT  --WEAR YOUR CPAP   --Sodium level 146.  Drink more water and FU with Dr CLAUDIA Scott oupatient.      [facility]    [facility]  [address] [place] [id number]  Phone: [phone]  Fax: [phone]

## 2025-04-29 NOTE — PROGRESS NOTES
Heart Failure Nurse  Progress Note    Patient was evaluated by the Heart Failure Nurse  for understanding, verbalization, demonstration, and recall of education related to heart failure, overall adherence to the behaviors necessary to maintain a compensated status, and risk for readmission.     Patient assessment: Patient alert and oriented, does have limited vision due to macular degeneration. Patient lives in Independent Living, eats 2 meals a day in the dining marvin and is on the lower sodium diet option. Patient would like to talk to a dietician about making healthy food options. Patient's son, Moy, was present for the education. Both were present for the education, engaged and asking questions, and patient would like to stay out of the hospital.     Patient is able to verbalize signs/symptoms fluid overload/impending HF exacerbation and who to contact with problems                                          _X__ yes  ___ no      Patient is following a 2000 mg sodium diet                                             _X__ yes  ___ no    If no, barriers to 2000 mg sodium diet:_______________________________________________    Patient informed of 2-Part dietician classes that is free if sign up within 30 days of discharge or $40  __X_ yes  ___ no      Patient is adherent to medication regimen                                              _X__ yes  ___ no    If no, barriers to medication regimen:    Patient has sufficient funds to purchase medication                      _X__ yes  ___ no      Patient has a scale in the home              _X__ yes  ___ no      Patient is adherent to daily weight monitoring                                        __X_ yes   ___ no    If no, barriers to daily weight monitoring:    Symptom Tracker Worksheet reviewed with patient  __X_ yes   ___ no      Patient verbalizes understanding of stoplight/heart failure zones          __X_ yes   ___ no      Patient understands the importance  of 7-day follow-up appointment      _X__ yes  ___ no    Appointment Date:          Patient has adequate transportation to attend follow-up appointments    ___X yes  ___ no  Either patient's children take her to her appointments, or she takes the bus provided by her facility.     If no, was referral to Social Work made  ___yes  ___ no      Family/Friend present during education: Burak Winter    Additional consultations required: Dietician    Lynda Beckett RN (signature)  Extension [phone]

## 2025-04-30 LAB
ALBUMIN SERPL-MCNC: 4.4 G/DL (ref 3.2–4.8)
ANION GAP SERPL CALC-SCNC: 5 MMOL/L (ref 0–18)
BUN BLD-MCNC: 29 MG/DL (ref 9–23)
CALCIUM BLD-MCNC: 9.8 MG/DL (ref 8.7–10.6)
CHLORIDE SERPL-SCNC: 99 MMOL/L (ref 98–112)
CO2 SERPL-SCNC: 36 MMOL/L (ref 21–32)
CREAT BLD-MCNC: 1.82 MG/DL (ref 0.55–1.02)
EGFRCR SERPLBLD CKD-EPI 2021: 28 ML/MIN/1.73M2 (ref 60–?)
GLUCOSE BLD-MCNC: 175 MG/DL (ref 70–99)
GLUCOSE BLD-MCNC: 189 MG/DL (ref 70–99)
GLUCOSE BLD-MCNC: 213 MG/DL (ref 70–99)
GLUCOSE BLD-MCNC: 366 MG/DL (ref 70–99)
GLUCOSE BLD-MCNC: 455 MG/DL (ref 70–99)
GLUCOSE BLD-MCNC: 478 MG/DL (ref 70–99)
GLUCOSE BLD-MCNC: 487 MG/DL (ref 70–99)
GLUCOSE BLD-MCNC: 498 MG/DL (ref 70–99)
GLUCOSE BLD-MCNC: 522 MG/DL (ref 70–99)
MAGNESIUM SERPL-MCNC: 2.3 MG/DL (ref 1.6–2.6)
NT-PROBNP SERPL-MCNC: 375 PG/ML (ref ?–450)
OSMOLALITY SERPL CALC.SUM OF ELEC: 300 MOSM/KG (ref 275–295)
PHOSPHATE SERPL-MCNC: 3.3 MG/DL (ref 2.4–5.1)
POTASSIUM SERPL-SCNC: 4.3 MMOL/L (ref 3.5–5.1)
SODIUM SERPL-SCNC: 140 MMOL/L (ref 136–145)

## 2025-04-30 PROCEDURE — 83735 ASSAY OF MAGNESIUM: CPT | Performed by: INTERNAL MEDICINE

## 2025-04-30 PROCEDURE — 80069 RENAL FUNCTION PANEL: CPT | Performed by: INTERNAL MEDICINE

## 2025-04-30 PROCEDURE — 97165 OT EVAL LOW COMPLEX 30 MIN: CPT

## 2025-04-30 PROCEDURE — 83880 ASSAY OF NATRIURETIC PEPTIDE: CPT | Performed by: INTERNAL MEDICINE

## 2025-04-30 PROCEDURE — 97116 GAIT TRAINING THERAPY: CPT

## 2025-04-30 PROCEDURE — 94799 UNLISTED PULMONARY SVC/PX: CPT

## 2025-04-30 PROCEDURE — 82962 GLUCOSE BLOOD TEST: CPT

## 2025-04-30 PROCEDURE — 97161 PT EVAL LOW COMPLEX 20 MIN: CPT

## 2025-04-30 PROCEDURE — 97535 SELF CARE MNGMENT TRAINING: CPT

## 2025-04-30 RX ORDER — PREDNISONE 20 MG/1
40 TABLET ORAL
Qty: 6 TABLET | Refills: 0 | Status: SHIPPED
Start: 2025-05-02 | End: 2025-04-30

## 2025-04-30 RX ORDER — PREDNISONE 20 MG/1
40 TABLET ORAL
Qty: 6 TABLET | Refills: 0 | Status: SHIPPED
Start: 2025-05-02 | End: 2025-05-01

## 2025-04-30 RX ORDER — TORSEMIDE 20 MG/1
20 TABLET ORAL DAILY
Qty: 90 TABLET | Refills: 3 | Status: SHIPPED | OUTPATIENT
Start: 2025-04-30

## 2025-04-30 RX ORDER — PREGABALIN 300 MG/1
300 CAPSULE ORAL NIGHTLY
Status: SHIPPED | COMMUNITY
Start: 2025-04-30

## 2025-04-30 RX ORDER — INSULIN DEGLUDEC 100 U/ML
16 INJECTION, SOLUTION SUBCUTANEOUS ONCE
Status: COMPLETED | OUTPATIENT
Start: 2025-04-30 | End: 2025-04-30

## 2025-04-30 NOTE — DIETARY NOTE
[facility]   part of [facility]   CLINICAL NUTRITION    Nina Donnelly     Admitting diagnosis:  Acute kidney injury [N17.9]  Acute heart failure with preserved ejection fraction (HFpEF) ([facility]) [I50.31]    Ht: 154.9 cm (5' 1\")  Wt: 87.1 kg (192 lb 1.6 oz).   Body mass index is 36.3 kg/m².  IBW:47.7g    Wt Readings from Last 6 Encounters:   04/30/25 87.1 kg (192 lb 1.6 oz)   01/21/25 83.8 kg (184 lb 11.2 oz)   10/17/24 87.7 kg (193 lb 5.5 oz)   10/11/24 89.4 kg (197 lb 1.5 oz)   04/30/24 88.9 kg (195 lb 15.8 oz)   03/30/22 90.3 kg (199 lb)        Labs/Meds reviewed    Diet:       Procedures    Cardiac diet Cardiac; Calorie Restriction/Carb Controlled: 1800 kcal/60 grams; Fluid Restriction: 1800 ml; Is Patient on Accuchecks? No     Percent Meals Eaten (last 3 days)       Date/Time Percent Meals Eaten (%)    04/27/25 0820 100 %    04/27/25 1915 100 %    04/28/25 0930 100 %    04/28/25 1406 100 %    04/28/25 1410 100 %    04/29/25 0811 100 %    04/29/25 1256 100 %    04/29/25 2010 100 %    04/29/25 2300 0 %    04/30/25 0817 100 %              Pt chart reviewed d/t consult for healthy meal options  [place] lives [place] and read[place] here with SOB. Diet recall completed. Tips provided on ways to reduced Na at dinner time, fluid restriction reviewed and information given on meal delivery Company.   Patient reports good appetite at this time.  Nursing notes reports Percent Meals Eaten (%): 100 % intake for last meal.  Tolerating po diet without diarrhea, emesis, or constipation.   No significant weight changes noted.     Patient is at low nutrition risk at this time.    Please consult if patient status changes or nutrition issues arise.    Tracy Evans RD, LDN  Clinical Nutrition  [phone]

## 2025-04-30 NOTE — PLAN OF CARE
Received pt at shift change. AxOx4. 3L O2 w stats maintaining >90%. Denies pain/SOB. Vitals stable. NSR/SB w rates in 50s-60s on tele.   See flowsheets for additional assessments.   Pt updated on POC. Call light within reach. All needs met at this time.    1720: Blood sugar checked prior to dc - 455. Recheck 487. Hospitalist paged, 13 units given per verbal order. Discharge paused until recheck in 1hr.     1835: Recheck blood sugars: 522, 478, 498 with checks on 2 glucometers. MD notified, insulin orders adjusted. Discharge canceled.       Plan:  -PT/OT eval  -PO Bumex  -DW // I&O  -Discharge planning    Problem: CARDIOVASCULAR - ADULT  Goal: Maintains optimal cardiac output and hemodynamic stability  Description: INTERVENTIONS:- Monitor vital signs, rhythm, and trends- Monitor for bleeding, hypotension and signs of decreased cardiac output- Evaluate effectiveness of vasoactive medications to optimize hemodynamic stability- Monitor arterial and/or venous puncture sites for bleeding and/or hematoma- Assess quality of pulses, skin color and temperature- Assess for signs of decreased coronary artery perfusion - ex. Angina- Evaluate fluid balance, assess for edema, trend weights  Outcome: Progressing  Goal: Absence of cardiac arrhythmias or at baseline  Description: INTERVENTIONS:- Continuous cardiac monitoring, monitor vital signs, obtain 12 lead EKG if indicated- Evaluate effectiveness of antiarrhythmic and heart rate control medications as ordered- Initiate emergency measures for life threatening arrhythmias- Monitor electrolytes and administer replacement therapy as ordered  Outcome: Progressing     Problem: Patient/Family Goals  Goal: Patient/Family Long Term Goal  Description: Patient's Long Term Goal: Go home  Interventions:  - IV bumex  - Monitor creatinine  - Consults to see  - See additional Care Plan goals for specific interventions  Outcome: Progressing  Goal: Patient/Family Short Term Goal  Description:  Patient's Short Term Goal: Feel better  Interventions:   - Consults to see  - Bumex  - Monitor labs  - See additional Care Plan goals for specific interventions  Outcome: Progressing

## 2025-04-30 NOTE — CM/SW NOTE
Pt accepted by [facility] at discharge. They are in network with pt's insurance.   Reserved in Aidin. Will send AVS.     [facility]  [address]  [place] [address]  Phone: [phone]  Fax: [phone]    Solange AGEE MSW, LCSW  Discharge Planner

## 2025-04-30 NOTE — PROGRESS NOTES
[facility] Nephrology  Inpatient Follow-up    Nina Donnelly Patient Status:  Inpatient    10/9/1945 MRN [id number]   Location [facility] 8NE-A Attending Donna Frausto MD   Hosp Day # 2 PCP Gladys Saravia MD       SUBJECTIVE:     Patient seen and examined at bedside. No acute complaints today.     MEDICATIONS:     Current Hospital Medications[1]    PHYSICAL EXAM:     Vital Signs: /51 (BP Location: Right arm)   Pulse 61   Temp 97.8 °F (36.6 °C) (Oral)   Resp 20   Ht 5' 1\" (1.549 m)   Wt 192 lb 1.6 oz (87.1 kg)   SpO2 99%   BMI 36.30 kg/m²   Temp (24hrs), Av.2 °F (36.8 °C), Min:97.8 °F (36.6 °C), Max:98.8 °F (37.1 °C)       Intake/Output Summary (Last 24 hours) at 2025 1128  Last data filed at 2025 0817  Gross per 24 hour   Intake 1060 ml   Output 1650 ml   Net -590 ml     Wt Readings from Last 3 Encounters:   25 192 lb 1.6 oz (87.1 kg)   25 184 lb 11.2 oz (83.8 kg)   10/17/24 193 lb 5.5 oz (87.7 kg)       General: no acute distress  HEENT: normocephalic, atraumatic  Respiratory: + oxygen  Extremities: + edema bilaterally  Skin: warm, dry  Neuro: awake, alert     LABORATORY DATA:     Lab Results   Component Value Date     (H) 2025    BUN 29 (H) 2025    BUNCREA 18.0 03/15/2022    CREATSERUM 1.82 (H) 2025    ANIONGAP 5 2025    GFRNAA 35 (L) 2021    GFRAA 40 (L) 2021    CA 9.8 2025    OSMOCALC 300 (H) 2025    ALKPHO 76 2025    AST 21 2025    ALT 11 2025    BILT 0.7 2025    TP 6.4 2025    ALB 4.4 2025    GLOBULIN 2.2 2025     2025    K 4.3 2025    CL 99 2025    CO2 36.0 (H) 2025     Lab Results   Component Value Date    WBC 7.3 2025    RBC 5.95 (H) 2025    HGB 10.3 (L) 2025    HCT 35.5 2025    .0 2025    MCV 59.7 (L) 2025    MCH 17.3 (L) 2025    MCHC 29.0 (L) 2025    RDW 21.1 2025     NEPRELIM 4.83 04/26/2025    NEUTABS 4.34 04/07/2020    LYMPHABS 2.28 04/07/2020    EOSABS 0.14 04/07/2020    BASABS 0.07 04/07/2020    NEPERCENT 64.9 04/26/2025    LYPERCENT 20.6 04/26/2025    MOPERCENT 8.9 04/26/2025    EOPERCENT 4.7 04/26/2025    BAPERCENT 0.8 04/26/2025    NE 4.83 04/26/2025    LYMABS 1.53 04/26/2025    MOABSO 0.66 04/26/2025    EOABSO 0.35 04/26/2025    BAABSO 0.06 04/26/2025     Lab Results   Component Value Date    MALBP <1.2 03/15/2022    CREUR 68.40 04/26/2025     Lab Results   Component Value Date    COLORUR Colorless (A) 04/26/2025    CLARITY Clear 04/26/2025    SPECGRAVITY 1.009 04/26/2025    GLUUR >1000 (A) 04/26/2025    BILUR Negative 04/26/2025    KETUR Negative 04/26/2025    BLOODURINE Negative 04/26/2025    PHURINE 5.5 04/26/2025    PROUR Negative 04/26/2025    UROBILINOGEN Normal 04/26/2025    NITRITE Negative 04/26/2025    LEUUR Negative 04/26/2025    NMIC Microscopic not indicated 04/26/2025       IMAGING:     Reviewed    ASSESSMENT/PLAN:     79 year old female w ho CKD 3b (follows w Dr Scott, bl Cr 1.6-1.9), HFpEF, DM2, HTN, WYATT on CPAP, beta thalassemia and macular degeneration, COPD on home o2 who presents with worsening SOB and swelling/weight gain.     BIBIANA on CKD3b  -- Likely cardiorenal syndrome   -- Hold jardiance on discharge.  -- Diuretics per Cardiology - monitor kidney function closely.   -- Monitor intake/output, daily weights  -- Avoid nephrotoxins and renally dose medications for creatinine clearance     Acute on chronic CHF  -- Appreciate Cardiology recommendations     Hypernatremia  -- Monitor closely     Alkalosis  -- If worsening can consider ABG  -- CPAP nightly     Anemia  -- S/p IV iron  -- Transfusion per primary      HTN  -- Hydralazine      Okay for discharge from nephrology perspective. Will follow-up outpatient.    Thank you for allowing us to participate in the care of this patient.       Priya Ruff, DO   [facility] - Nephrology           [1]    Current Facility-Administered Medications   Medication Dose Route Frequency    acetaminophen (Tylenol) tab 650 mg  650 mg Oral Q6H PRN    torsemide (Demadex) tab 20 mg  20 mg Oral Daily    predniSONE (Deltasone) tab 40 mg  40 mg Oral Daily with food    lidocaine-menthol 4-1 % patch 1 patch  1 patch Transdermal Daily    traMADol (Ultram) tab 50 mg  50 mg Oral Q12H PRN    fluticasone propionate (Flonase) 50 MCG/ACT nasal suspension 1 spray  1 spray Each Nare Daily    pregabalin (Lyrica) cap 300 mg  300 mg Oral Nightly    insulin degludec (Tresiba) 100 units/mL flextouch 16 Units  16 Units Subcutaneous Daily    escitalopram (Lexapro) tab 10 mg  10 mg Oral Nightly    DULoxetine (Cymbalta) DR cap 30 mg  30 mg Oral Daily    aspirin DR tab 81 mg  81 mg Oral Daily    atorvastatin (Lipitor) tab 10 mg  10 mg Oral Nightly    cetirizine (ZyrTEC) tab 5 mg  5 mg Oral Daily    cholecalciferol (Vitamin D3) tab 1,000 Units  1,000 Units Oral Daily    donepezil (Aricept) tab 10 mg  10 mg Oral Nightly    [Held by provider] ferrous sulfate DR tab 325 mg  325 mg Oral Daily with breakfast    hydrALAZINE (Apresoline) tab 100 mg  100 mg Oral TID    ipratropium (Atrovent) 0.03 % nasal solution 2 spray  2 spray Nasal 2 times per day    cyanocobalamin (Vitamin B12) tab 1,000 mcg  1,000 mcg Oral Daily    glucose (Dex4) 15 GM/59ML oral liquid 15 g  15 g Oral Q15 Min PRN    Or    glucose (Glutose) 40% oral gel 15 g  15 g Oral Q15 Min PRN    Or    glucose-vitamin C (Dex-4) chewable tab 4 tablet  4 tablet Oral Q15 Min PRN    Or    dextrose 50% injection 50 mL  50 mL Intravenous Q15 Min PRN    Or    glucose (Dex4) 15 GM/59ML oral liquid 30 g  30 g Oral Q15 Min PRN    Or    glucose (Glutose) 40% oral gel 30 g  30 g Oral Q15 Min PRN    Or    glucose-vitamin C (Dex-4) chewable tab 8 tablet  8 tablet Oral Q15 Min PRN    heparin (Porcine) 5000 UNIT/ML injection 5,000 Units  5,000 Units Subcutaneous Q8H CHICO    ondansetron (Zofran) 4 MG/2ML injection 4  mg  4 mg Intravenous Q6H PRN    metoclopramide (Reglan) 5 mg/mL injection 5 mg  5 mg Intravenous Q8H PRN    insulin aspart (NovoLOG) 100 Units/mL FlexPen 1-5 Units  1-5 Units Subcutaneous TID AC and HS

## 2025-04-30 NOTE — CM/SW NOTE
YONY received update from Hospitalist that HH would benefit pt at discharge and referred [facility]. SW confirmed this with daughter to utilize [facility]. Daughter agreeable - referral sent in Aidin. Await acceptance. Confirmed discharge today.     Addendum: [facility] not in network with pt's insurance. Referral sent to [facility]. Await acceptance. Will update daughter.     Addendum: [facility] and other HH agencies are OON. YONY sent referral to Ariana Cooper 's agency - [facility] to check acceptance. Await response.     Addendum: Awaiting to see if [facility] can accept pt.     Solange ROMERO, LCSW  Discharge Planner

## 2025-04-30 NOTE — DISCHARGE PLANNING
[facility] Hospitalist Discharge Summary    Patient ID  Nina Donnelly  [id number]  79 year old  10/9/1945  Admit date: 4/25/2025  Discharge date: 4/30/25  Attending: Donna Frausto MD   Primary Care Physician: Gladys Saravia MD   Reason for admission:  (see HPI on HP for further detail)  Discharge condition: stable   Disposition: home    Important follow up:  -PCP, cards, renal and orthospine     Discharge med list     Medication List        START taking these medications      predniSONE 20 MG Tabs  Commonly known as: Deltasone  Take 2 tablets (40 mg total) by mouth daily with food for 3 days.  Start taking on: May 2, 2025            CONTINUE taking these medications      * Accu-Chek SmartView Strp     * True Metrix Blood Glucose Test Strp  Check sugars TID     aspirin 81 MG Tabs     atorvastatin 10 MG Tabs  Commonly known as: Lipitor     cetirizine 10 MG Tabs  Commonly known as: ZyrTEC     cyanocobalamin 1000 MCG Tabs  Commonly known as: Vitamin B12     donepezil 10 MG Tabs  Commonly known as: Aricept     DULoxetine 60 MG Cpep  Commonly known as: Cymbalta     escitalopram 10 MG Tabs  Commonly known as: Lexapro     ferrous sulfate 325 (65 FE) MG Tbec  Take 1 tablet (325 mg total) by mouth daily with breakfast.     hydrALAZINE 100 MG Tabs  Commonly known as: Apresoline     insulin aspart 100 Units/mL Sopn  Commonly known as: NovoLOG     insulin glargine 100 UNIT/ML Sopn     ipratropium 0.03 % Soln  Commonly known as: Atrovent     Magnesium 100 MG Tabs     pregabalin 300 MG Caps  Commonly known as: LYRICA     PRESERVISION AREDS 2 OR     Vitamin D3 25 MCG (1000 UT) Caps           * This list has 2 medication(s) that are the same as other medications prescribed for you. Read the directions carefully, and ask your doctor or other care provider to review them with you.                ASK your doctor about these medications      Jardiance 25 MG Tabs  Generic drug: empagliflozin     torsemide 20 MG Tabs  Commonly known as:  Demadex  Take 1 tablet (20 mg total) by mouth daily as needed.               Where to Get Your Medications        You can get these medications from any pharmacy    Bring a paper prescription for each of these medications  predniSONE 20 MG Tabs         Discharge Diagnoses/Hospital course:  78 y/o F w/ Pmhx of HFpEF, CKD3 (baseline Cr 1.6-1.9), COPD, WYATT on CPAP, T2DM, HLD who presents to the hospital w/ chief complaints of SOB, LE edema     Acute on Chronic HFpEF Exacerbation  Last ECHO: 65-70  CXR noted  BNP slightly elevated  Repeat ECHO: 60-65%, G2DD  Plan:  - Cardiology consulted, continue IV diuresis  - Daily weights, strict I/Os, fluid restriction  - Holding Jardiance in house - restart on dc      COPD on Chronic 3L  Severe WYATT  Plan:  - Was previously on Breztri, provided no benefit as per last Pulm note  - CPAP nightly  - Duonebs PRN     Leg pain/back pain   - MRI ordered - w stenosis, bulging disc, prednisone for now per pt improving pain, advised outpt ortho spine eval d/w pt and pts daughter      BIBIANA on CKD3   Plan:  - Continue Diuretics  - Nephrology consulted as per patient request     HTN  - Continue Hydralazine     HLD  - Statin     Dementia  - Donepezil     STELLA  - Ferrous Sulfate     T2DM  - 16u Glargine, LDISS     Donna Frausto MD   [facility] Hospitalist    Consults:  IP CONSULT TO CARDIOLOGY  IP CONSULT TO NEPHROLOGY  IP CONSULT TO HOSPITALIST  IP CONSULT TO FOOD AND NUTRITION SERVICES  IP CONSULT TO SOCIAL WORK    Radiology:  XR RIBS WITH CHEST (3 VIEWS), LEFT  (CPT=71101)  Result Date: 4/29/2025  PROCEDURE:  XR RIBS WITH CHEST (3 VIEWS), LEFT  (CPT=71101)  TECHNIQUE:  PA Chest and three views of the ribs were obtained  COMPARISON:  None.  INDICATIONS:  eval for fracture  PATIENT STATED HISTORY: (As transcribed by Technologist)     FINDINGS:  LUNGS:  Prominent interstitial markings in the mid-lungs and bases with atelectasis left lung base.  CARDIAC:  Cardiomegaly.  Pulmonary vascularity mildly increased.  MEDIASTINUM:  Normal. PLEURA:  No pneumothorax.  There is blunting left costophrenic angle.. BONES:  No displaced left rib fracture.             CONCLUSION:  Cardiomegaly with mild pulmonary venous congestion.  Slight increased interstitial markings in the mid-lungs and bases.  Atelectasis left lung base with mild blunting left costophrenic angle.   LOCATION:  [id number]     Dictated by (CST): Althea Kirk MD on 4/29/2025 at 3:45 PM     Finalized by (CST): Althea Kirk MD on 4/29/2025 at 3:47 PM       MRI SPINE LUMBAR (CPT=72148)  Result Date: 4/28/2025  PROCEDURE:  MRI SPINE LUMBAR (CPT=72148)  COMPARISON:  None.  INDICATIONS:  eval for weakness in legs  TECHNIQUE:  Multiplanar T1 and T2 weighted images including fat suppression sequences.  Images acquired in sagittal and axial planes.   PATIENT STATED HISTORY: (As transcribed by Technologist)  Ongoing lower back pain that radiates down the legs    FINDINGS:  LUMBAR DISC LEVELS L1-L2:  No significant disc/facet abnormality, spinal stenosis, or foraminal narrowing. L2-L3:  Mild broad-based degenerative disc bulge with mild bilateral facet joint degenerative change.  AP diameter canal is normal 12 mm.  There is no significant central or foraminal stenosis. L3-L4:  There is moderate broad-based degenerative disc bulge.  There is mild bilateral facet joint degenerative change and ligamentum flavum hypertrophy.  There is narrowing of the central canal to 8 mm consistent mild central canal stenosis.  There is mild bilateral subarticular zone and neural foraminal narrowing. L4-L5:  Moderate broad-based degenerative disc bulge with superimposed right posterior paracentral disc protrusion measuring 10 x 5 mm in size.  There is moderate right greater than left facet joint degenerative change with ligamentum flavum hypertrophy.   There is moderate to severe central canal stenosis of 5.5 mm.  There is moderate right greater than left subarticular zone and neural foraminal  narrowing.  L5-S1:  There is mild to moderate broad-based degenerative disc bulge slightly eccentric to the right greater than left lateral aspect of the disc space.  There is mild to moderate bilateral facet joint degenerative change.  AP diameter canal is normal at 10 mm.  There is moderate right greater than left neural foraminal narrowing.  PARASPINAL AREA:  Normal with no visible mass.  BONY STRUCTURES:  No fracture, pars defect, significant scoliosis, or osseous lesion.  CORD/CAUDA EQUINA:  Normal caliber, contour, and signal intensity.             CONCLUSION:   1. L3-4 moderate degenerative disc bulge with facet joint degenerative change and ligamentum flavum hypertrophy causing mild central canal stenosis and mild bilateral subarticular zone and neural foraminal narrowing.  2. L4-5 moderate degenerative disc bulge with superimposed right paracentral disc protrusion causing moderate to severe central canal stenosis and moderate right greater than left subarticular zone and neural foraminal narrowing.  3. Mild to moderate degenerative disc bulge/osteophyte complex extending to the posterior lateral aspect of the disc spaces at L5-S1 causing moderate right greater than left neural foraminal narrowing.   LOCATION:  [facility]   Dictated by (CST): Nikko Gonzáles MD on 2025 at 4:01 PM     Finalized by (CST): Nikko Gonzáles MD on 2025 at 4:05 PM       CARD ECHO 2D DOPPLER (CPT=93306)  Result Date: 2025  Transthoracic Echocardiogram Name:Nina Donnelly Date: 2025 :  10/09/1945 Ht:  (61in)  BP: 115 / 41 MRN:  [id number]    Age:  79years    Wt:  (195lb) HR: 54bpm Loc:  [facility]       Gndr: F          BSA: 1.87m^2 Sonographer: [facility] AE, PE Ordering:    Justus Nair Consulting:  Renetta Interiano ---------------------------------------------------------------------------- History/Indications:   Dyspnea.  Congestive heart failure. ----------------------------------------------------------------------------  Procedure information:  A transthoracic complete 2D study was performed. Additional evaluation included M-mode, complete spectral Doppler, and color Doppler.  Patient status:  Inpatient.  Location:  Bedside.    Comparison was made to the study of 10/15/2024.    This was a routine study. Transthoracic echocardiography for ventricular function evaluation. Image quality was adequate. ECG rhythm:   Sinus bradycardia  Study completion:  There were no complications. ---------------------------------------------------------------------------- Conclusions: 1. Left ventricle: The cavity size was normal. Wall thickness was normal.    Systolic function was normal. The estimated ejection fraction was 60-65%.    Features are consistent with a pseudonormal left ventricular filling    pattern, with concomitant abnormal relaxation and increased filling    pressure - grade 2 diastolic dysfunction. Doppler parameters are    consistent with elevated mean left atrial filling pressure. 2. Right ventricle: The cavity size was mildly increased. Systolic function    was normal. Systolic pressure was moderately increased. 3. Left atrium: The atrium was mildly to moderately dilated. 4. Right atrium: The atrium was mildly dilated. 5. Pulmonary arteries: The peak systolic pressure is 44mm Hg. 6. Pericardium, extracardiac: There was no pericardial effusion. 7. Inferior vena cava: The IVC was small-medium and 1.3cm diameter.    Respirophasic diameter changes are in the normal range (> 50%). * ---------------------------------------------------------------------------- * Findings: Left ventricle:  The cavity size was normal. Wall thickness was normal. Systolic function was normal. The estimated ejection fraction was 60-65%. No diagnostic evidence for diffuse regional wall motion abnormalities. Features are consistent with a pseudonormal left ventricular filling pattern, with concomitant abnormal relaxation and increased filling pressure - grade 2  diastolic dysfunction. Doppler parameters are consistent with elevated mean left atrial filling pressure. Ventricular septum:   Intact ventricular septum. Left atrium:  The atrium was mildly to moderately dilated. Right ventricle:  The cavity size was mildly increased. Systolic function was normal.  Systolic pressure was moderately increased. Right atrium:  The atrium was mildly dilated. Mitral valve:  The annulus was mildly calcified. Leaflet separation was normal.  Doppler:  Transvalvular velocity was within the normal range. There was no evidence for stenosis. There was no significant regurgitation. Aortic valve:   The valve was trileaflet. The leaflets were mildly calcified. Cusp separation was normal.  Doppler:  Transvalvular velocity was within the normal range. There was no evidence for stenosis. There was no significant regurgitation. Tricuspid valve:  The valve is structurally normal. Leaflet separation was normal.  Doppler:  Transvalvular velocity was within the normal range. There was no evidence for stenosis. There was trace regurgitation. Pulmonic valve:    There was no significant valve disease.    Doppler: Transvalvular velocity was within the normal range. There was no evidence for stenosis. There was mild regurgitation. Pericardium:   There was no pericardial effusion. Pleura:  No evidence of pleural fluid accumulation. Aorta: Aortic root: The aortic root was normal-sized. Ascending aorta: The ascending aorta was normal. Systemic veins: Inferior vena cava: The IVC was small-medium and 1.3cm diameter. Respirophasic diameter changes are in the normal range (> 50%). ---------------------------------------------------------------------------- Measurements  Left ventricle         Value        Ref       10/15/2024  IVS thickness, ED,     0.9   cm     0.6 - 0.9 1.1  PLAX  LV ID, ED, PLAX        5.0   cm     3.8 - 5.2 4.8  LV ID, ES, PLAX        3.0   cm     2.2 - 3.5 2.8  LV PW thickness,       0.9   cm      0.6 - 0.9 1.0  ED, PLAX  IVS/LV PW ratio,       0.97         --------- 1.05  ED, PLAX  LV PW/LV ID ratio,     0.18         --------- 0.21  ED, PLAX  LV ejection            65    %      54 - 74   72  fraction  LV e', average         7.6   cm/sec --------- 12.4  LV E/e', average   (H) 16           <=14      8  Aortic root            Value        Ref       10/15/2024  Aortic root ID, ED     3.5   cm     2.6 - 4.0 3.5  Ascending aorta        Value        Ref       10/15/2024  Ascending aorta        3.4   cm     1.9 - 3.5 3.4  ID, A-P, ED  Left atrium            Value        Ref       10/15/2024  LA ID, A-P, ES     (H) 5.2   cm     2.7 - 3.8 ----------  LA volume, ES, 1-p (H) 116   ml     22 - 52   50  A4C  LA/aortic root         1.49         --------- ----------  ratio  Mitral valve           Value        Ref       10/15/2024  Mitral E-wave peak     1.23  m/sec  --------- 1.02  velocity  Mitral A-wave peak     1.11  m/sec  --------- 0.95  velocity  Mitral E/A ratio,      1.1          --------- 1.1  peak  Pulmonary artery       Value        Ref       10/15/2024  PA pressure, S, DP     44    mm Hg  --------- ----------  Systemic veins         Value        Ref       10/15/2024  Estimated CVP          5     mm Hg  --------- 3  Right ventricle        Value        Ref       10/15/2024  TAPSE, 2D              2.80  cm     >=1.70    2.38  RV pressure, S, DP     44    mm Hg  --------- ---------- Legend: (L)  and  (H)  damian values outside specified reference range. ---------------------------------------------------------------------------- Prepared and electronically signed by Kimo Velazco 04/26/2025 16:56     XR CHEST AP PORTABLE  (CPT=71045)  Result Date: 4/25/2025  PROCEDURE:  XR CHEST AP PORTABLE  (CPT=71045)  TECHNIQUE:  AP chest radiograph was obtained.  COMPARISON:  [facility] , XR, XR CHEST AP PORTABLE  (CPT=71045), 1/19/2025, 7:50 PM.  INDICATIONS:  Bilateral ankle swelling, SOB  PATIENT STATED HISTORY: (As transcribed  by Technologist)               CONCLUSION:   Low lung volumes poor inspiration.  Haziness of the right mid-lower lung and left lower lung, increased since the prior, likely reflecting combination of pleural fluid and lung consolidation.  Cardiac enlargement assessment limited.  No pneumothorax. Consider upright PA and lateral examination of the chest, when tolerated.   LOCATION:  [facility]      Dictated by (CST): Aaron Diop MD on 4/25/2025 at 6:48 PM     Finalized by (CST): Aaron Diop MD on 4/25/2025 at 6:48 PM         Operative reports:          Day of discharge exam:  Vitals:    04/30/25 0817   BP: 124/51   Pulse: 61   Resp: 20   Temp: 97.8 °F (36.6 °C)     No acute distress, alert and orient  Lungs clear  Heart regular  Abdomen benign     Patient and/or family had opportunity to ask questions and expressed understanding and agreement with therapeutic plan as outlined      31 minutes spent on discharge    Donna Frausto MD

## 2025-04-30 NOTE — PLAN OF CARE
Patient alert and oriented x 4. Up x1 w/ walker. On 3L via NC (baseline) Cpap with sleep. NSR/SB on tele. Continent of bowel and bladder. No complaints of pain or chest pain/discomfort. POC : po bumex, dw, I&O, 1800 ml FR. Fall precautions in place. Call light within reach.    0600: Pt refused Heparin injection and Atrovent nasal spray this morning.     Problem: CARDIOVASCULAR - ADULT  Goal: Maintains optimal cardiac output and hemodynamic stability  Description: INTERVENTIONS:- Monitor vital signs, rhythm, and trends- Monitor for bleeding, hypotension and signs of decreased cardiac output- Evaluate effectiveness of vasoactive medications to optimize hemodynamic stability- Monitor arterial and/or venous puncture sites for bleeding and/or hematoma- Assess quality of pulses, skin color and temperature- Assess for signs of decreased coronary artery perfusion - ex. Angina- Evaluate fluid balance, assess for edema, trend weights  Outcome: Progressing  Goal: Absence of cardiac arrhythmias or at baseline  Description: INTERVENTIONS:- Continuous cardiac monitoring, monitor vital signs, obtain 12 lead EKG if indicated- Evaluate effectiveness of antiarrhythmic and heart rate control medications as ordered- Initiate emergency measures for life threatening arrhythmias- Monitor electrolytes and administer replacement therapy as ordered  Outcome: Progressing     Problem: Patient/Family Goals  Goal: Patient/Family Long Term Goal  Description: Patient's Long Term Goal: Go home  Interventions:  - IV bumex  - Monitor creatinine  - Consults to see  - See additional Care Plan goals for specific interventions  Outcome: Progressing  Goal: Patient/Family Short Term Goal  Description: Patient's Short Term Goal: Feel better  Interventions:   - Consults to see  - Bumex  - Monitor labs  - See additional Care Plan goals for specific interventions  Outcome: Progressing

## 2025-04-30 NOTE — OCCUPATIONAL THERAPY NOTE
OCCUPATIONAL THERAPY EVALUATION - INPATIENT    Room Number: 8611/8611-A  Evaluation Date: 4/30/2025     Type of Evaluation: Initial  Presenting Problem: BIBIANA, heart failure, LE swelling/pain/weakness, back pain, fall    Physician Order: IP Consult to Occupational Therapy  Reason for Therapy:  ADL/IADL Dysfunction and Discharge Planning    OCCUPATIONAL THERAPY ASSESSMENT   Patient is a 79 year old female admitted on 4/25/2025 with Presenting Problem: BIBIANA, heart failure, LE swelling/pain/weakness, back pain, fall. Co-Morbidities : HTN, HLD, DM, HFpEF, WYATT, CKD, COPD, dementia, CHF  Patient is currently functioning near baseline with toileting, bathing, upper body dressing, lower body dressing, bed mobility, and transfers.  Prior to admission, patient's baseline is mod I with BADL without a device.  Patient met all OT goals at supervision level with recommendation to use RW.  Patient reports no further questions/concerns at this time.     Patient will benefit from discharging home.     Imaging:   XR ribs (-)  MRI of L-spine 4/28:   CONCLUSION:       1. L3-4 moderate degenerative disc bulge with facet joint degenerative change and ligamentum flavum hypertrophy causing mild central canal stenosis and mild bilateral subarticular zone and neural foraminal narrowing.      2. L4-5 moderate degenerative disc bulge with superimposed right paracentral disc protrusion causing moderate to severe central canal stenosis and moderate right greater than left subarticular zone and neural foraminal narrowing.      3. Mild to moderate degenerative disc bulge/osteophyte complex extending to the posterior lateral aspect of the disc spaces at L5-S1 causing moderate right greater than left neural foraminal narrowing.         LOCATION:  [facility]    Recommendations for nursing staff:   Transfers: supervision with RW  Toileting location: Toilet    EVALUATION SESSION:  Patient at start of session: Pt was found sitting EOB with her son at the  bedside.     FUNCTIONAL TRANSFER ASSESSMENT  Sit to Stand: Edge of Bed  Edge of Bed: Supervision  Toilet Transfer: Supervision    BED MOBILITY  Supine to Sit : Supervision    BALANCE ASSESSMENT     FUNCTIONAL ADL ASSESSMENT  LB Dressing Seated: Supervision  Toileting Seated: Supervision  Toileting Standing: Supervision    ACTIVITY TOLERANCE:                          O2 SATURATIONS       COGNITION  Overall Cognitive Status:  WFL - within functional limits    COGNITION ASSESSMENTS     Upper Extremity:   ROM: within functional limits   Strength: is within functional limits     EDUCATION PROVIDED  Patient Education : Role of Occupational Therapy; Plan of Care; Discharge Recommendations; Functional Transfer Techniques; Fall Prevention  Patient's Response to Education: Verbalized Understanding  Family/Caregiver Education : Role of Occupational Therapy; Plan of Care; Discharge Recommendations  Family/Caregiver's Response to Education: Verbalized Understanding    Equipment used: RW, gait belt   Demonstrates functional use    Therapist comments: sitting EOB>LB dressing to don non-skid socks>stand to RW>walk within hallway>walk to bathroom>toileting>stand to RW>walk to sit in chair     Patient End of Session: Up in chair, Needs met, Call light within reach, RN aware of session/findings, All patient questions and concerns addressed, Hospital anti-slip socks, Alarm set, With  staff, Family present    OCCUPATIONAL PROFILE    HOME SITUATION  Type of Home: Independent living facility ([facility])  Home Layout: One level  Lives With: Alone    Toilet and Equipment: Standard height toilet  Shower/Tub and Equipment: Walk-in shower, Shower chair  Other Equipment: None    Occupation/Status: Retired     Drives: No  Patient Regularly Uses: Home O2, Glasses (3L)    Prior Level of Function: Patient reports independent in all BADLs, most IADLs, and functional mobility without device prior to admission. Walks to dining marvin two meals a  day, and down to activities room. Daughter assists with medication management due to patient's macular degeneration. Pt admits to recent falls out of bed.        SUBJECTIVE  \"We have tried to get her to use the walker. She thinks it's for old people.\" Per pt's son     PAIN ASSESSMENT  Ratin  Location: back  Management Techniques: Activity promotion, Repositioning    OBJECTIVE  Precautions: Bed/chair alarm  Fall Risk: Standard fall risk    WEIGHT BEARING RESTRICTION       AM-PAC ‘6-Clicks’ Inpatient Daily Activity Short Form  -   Putting on and taking off regular lower body clothing?: A Little  -   Bathing (including washing, rinsing, drying)?: A Little  -   Toileting, which includes using toilet, bedpan or urinal? : A Little  -   Putting on and taking off regular upper body clothing?: None  -   Taking care of personal grooming such as brushing teeth?: None  -   Eating meals?: None    AM-PAC Score:  Score: 21  Approx Degree of Impairment: 32.79%  Standardized Score (AM-PAC Scale): 44.27    ADDITIONAL TESTS     NEUROLOGICAL FINDINGS      PLAN   Patient has been evaluated and presents with no skilled Occupational Therapy needs at this time.  Patient discharged from Occupational Therapy services.  Please re-order if a new functional limitation presents during this admission.    OT Device Recommendations: None    Patient Evaluation Complexity Level:   Occupational Profile/Medical History LOW - Brief history including review of medical or therapy records    Specific performance deficits impacting engagement in ADL/IADL LOW  1 - 3 performance deficits    Client Assessment/Performance Deficits LOW - No comorbidities nor modifications of tasks    Clinical Decision Making LOW - Analysis of occupational profile, problem-focused assessments, limited treatment options    Overall Complexity LOW     OT Session Time: 30 minutes  Self-Care Home Management: 15 minutes

## 2025-04-30 NOTE — PROGRESS NOTES
[facility] Cardiology Progress Note        Nina Donnelly Patient Status:  Observation    10/9/1945 MRN [id number]   [facility] 8NE-A Attending Justus Nair,    Hosp Day # 2 PCP Gladys Saravia MD     Subjective:  The patient denies  chest pain   No LE edema  She reports improvement in  left rib pain  Breathing is comfortable    Medications:  Scheduled Medications[1]    Continuous Infusions:  Medication Infusions[2]      Allergies:  Allergies[3]      Objective:        Intake/Output:      Intake/Output Summary (Last 24 hours) at 2025 1408  Last data filed at 2025 1300  Gross per 24 hour   Intake 1060 ml   Output 1400 ml   Net -340 ml     Wt Readings from Last 3 Encounters:   25 192 lb 1.6 oz (87.1 kg)   25 184 lb 11.2 oz (83.8 kg)   10/17/24 193 lb 5.5 oz (87.7 kg)       Physical Exam:        Vitals:    25 0111 25 0500 25 0817 25 1209   BP:  152/54 124/51 141/53   BP Location:  Right arm Right arm Right arm   Pulse:  60 61 57   Resp:  20 20 18   Temp:  98.8 °F (37.1 °C) 97.8 °F (36.6 °C) 98.6 °F (37 °C)   TempSrc:  Oral Oral Oral   SpO2: 93% (!) 82% 99% 94%   Weight:  192 lb 1.6 oz (87.1 kg)     Height:           Temp:  [97.8 °F (36.6 °C)-98.8 °F (37.1 °C)] 98.6 °F (37 °C)  Pulse:  [57-76] 57  Resp:  [18-20] 18  BP: (124-152)/(45-54) 141/53  SpO2:  [82 %-99 %] 94 %      Temp: 98.6 °F (37 °C)  Pulse: 57  Resp: 18  BP: 141/53  General:  Appears comfortable  HEENT: No focal deficits.  Neck: No JVD, carotids 2+ no bruits.  Cardiac: Regular S1S2.  No S3, S4, rub, click.  No murmur.  Lungs: Clear to auscultation and percussion.  Abdomen: Soft, non-tender.   Extremities: no  LE edema.  No clubbing or cyanosis.    Neurologic: Alert and oriented, normal affect.  Skin: Warm and dry.           LABS:      HEM:  Recent Labs   Lab 25  1932 25  0500 25  0518   WBC 8.1 7.4 7.3   HGB 10.7* 10.6* 10.3*   HCT 36.4 37.2 35.5   .0  182.0 173.0       Chem:  Recent Labs   Lab 04/26/25  0500 04/27/25  0518 04/28/25  0512 04/29/25  0549 04/30/25  0458   *  146* 146* 144 142 140   K 4.0  4.0 3.8 4.2  4.2 3.9 4.3     102 103 102 100 99   CO2 35.0*  35.0* 38.0* 39.0* 39.0* 36.0*   BUN 28*  28* 25* 26* 23 29*   CREATSERUM 1.86*  1.86* 1.68* 1.78* 1.75* 1.82*   CA 9.2  9.2 9.4 9.2 9.3 9.8   MG 2.3 2.1 2.1 2.1 2.3   PHOS 4.8 4.7 4.6 4.3 3.3   *  175* 113* 133* 127* 175*       Recent Labs   Lab 04/25/25 1932 04/26/25  0500 04/27/25 0518 04/28/25  0512 04/29/25  0549 04/30/25  0458   ALT 12 11  --   --   --   --    AST 22 21  --   --   --   --    ALB 4.3 4.2  4.2 4.0 4.2 4.2 4.4       Recent Labs   Lab 04/25/25 1932   PTT 25.1   INR 1.01           No results found for: \"TROP\", \"CKMB\"      Invalid input(s): \"PBNPML\"                       Diagnostics:   Telemetry:     EKG, 4/28/2025,         Echo:  4/26/25      Conclusions:     1. Left ventricle: The cavity size was normal. Wall thickness was normal.      Systolic function was normal. The estimated ejection fraction was 60-65%.      Features are consistent with a pseudonormal left ventricular filling      pattern, with concomitant abnormal relaxation and increased filling      pressure - grade 2 diastolic dysfunction. Doppler parameters are      consistent with elevated mean left atrial filling pressure.   2. Right ventricle: The cavity size was mildly increased. Systolic function      was normal. Systolic pressure was moderately increased.   3. Left atrium: The atrium was mildly to moderately dilated.   4. Right atrium: The atrium was mildly dilated.   5. Pulmonary arteries: The peak systolic pressure is 44mm Hg.   6. Pericardium, extracardiac: There was no pericardial effusion.   7. Inferior vena cava: The IVC was small-medium and 1.3cm diameter.      Respirophasic diameter changes are in the normal range (> 50%).   *             Impression:         1. HFpEF-acute on  chronic    - Net out =  3.4 L    2.COPD on home O2. Patient has mild pulm Hypertension  and mild RVE  3.HL  4.DM  5.CRI    -  Cr at baseline (1.6-1.9)     Plan:     Back on po torsemide  Stable CV status for dc  ,f/u in CHF clinic 1-2 weeks    Dago Beach MD             [1]    torsemide  20 mg Oral Daily    predniSONE  40 mg Oral Daily with food    lidocaine-menthol  1 patch Transdermal Daily    fluticasone propionate  1 spray Each Nare Daily    pregabalin  300 mg Oral Nightly    insulin degludec  16 Units Subcutaneous Daily    escitalopram  10 mg Oral Nightly    DULoxetine  30 mg Oral Daily    aspirin  81 mg Oral Daily    atorvastatin  10 mg Oral Nightly    cetirizine  5 mg Oral Daily    cholecalciferol  1,000 Units Oral Daily    donepezil  10 mg Oral Nightly    [Held by provider] ferrous sulfate  325 mg Oral Daily with breakfast    hydrALAZINE  100 mg Oral TID    ipratropium  2 spray Nasal 2 times per day    cyanocobalamin  1,000 mcg Oral Daily    heparin  5,000 Units Subcutaneous Q8H CHICO    insulin aspart  1-5 Units Subcutaneous TID AC and HS   [2] [3]   Allergies  Allergen Reactions    Pioglitazone UNKNOWN     Weight Gain    Capsaicin ITCHING    Vancomycin ITCHING

## 2025-04-30 NOTE — PAYOR COMM NOTE
ADMISSION REVIEW     Payor: EVANGELISTA PARKER [facility] [place]  Subscriber #:  [id number]  Authorization Number: [id number]    Admit date: 25  Admit time:  2:26 PM       REVIEW DOCUMENTATION:     ED Provider Notes        ED Provider Notes signed by Anyi Rucker MD at 2025  8:56 PM      Chief Complaint   Patient presents with    Swelling Edema    Shortness Of Breath     Stated Complaint: Bilateral ankle swelling, SOB    Subjective:   HPI    79-year-old with a history of HFpEF, class I, COPD on 3 L home oxygen, diabetes, high cholesterol hypertension presents with her daughter for evaluation of shortness of breath.  Some of the history is provided by her daughter.  Patient saw her pulmonologist on Tuesday, was noted to have shortness of breath and DAYNE. Was sent to cardiology, was given IV bumex. Has been taking her usual dose of torsemide since, no adjustment. Today her ankles are just as swollen, and her breathing isn't really better. Gets dyspneic with talking. Not really coughing. No fever. No chest pain, but does note some heaviness in her chest when she tries to take a deep breath.  Notes that she was 197 when she went in for the Bumex, went down to 194 but has not returned to her baseline weight of approximately 190.  Outpatient notes reviewed patient seen by cardiology APRN on  for 7 pound weight gain, fatigue and lethargy with lower extremity edema.  She was given 2 mg IV Bumex x 2 in the clinic.  History of Present Illness             Past Medical History:    Congestive heart disease (HCC)    Hyperlipidemia    Mild episode of recurrent major depressive disorder    Neuropathy    Obstructive apnea    [facility] PSG AHI 11    WYATT on CPAP       Past Surgical History:   Procedure Laterality Date          Cabg      Foot surgery      Hysterectomy      Knee surgery      Tonsillectomy         ED Triage Vitals [25 1820]   /64   Pulse 55   Resp 22   Temp 98.4 °F (36.9 °C)   Temp src Temporal   SpO2 94 %   O2  Device Nasal cannula       Current Vitals:   Vital Signs  BP: 122/47  Pulse: 55  Resp: 13  Temp: 98.4 °F (36.9 °C)  Temp src: Temporal  MAP (mmHg): 70    Oxygen Therapy  SpO2: 100 %  O2 Device: Nasal cannula  O2 Flow Rate (L/min): 3 L/min        Physical Exam  General: Patient is awake, alert in no acute distress.   HEENT:   Sclera are not icteric.  Conjunctivae within normal limits.  Mucous members are moist.   Cardiovascular: Regular rate and rhythm, normal S1-S2.  Respiratory: Rales at the bases bilaterally, left greater than right.   Extremities: 2+ pitting edema to the calves bilaterally  Skin: warm and dry, no diaphoresis  Physical Exam              Labs Reviewed   COMP METABOLIC PANEL (14) - Abnormal; Notable for the following components:       Result Value    Glucose 143 (*)     CO2 36.0 (*)     BUN 33 (*)     Creatinine 1.99 (*)     Calculated Osmolality 310 (*)     eGFR-Cr 25 (*)     All other components within normal limits   PRO BETA NATRIURETIC PEPTIDE - Abnormal; Notable for the following components:    Pro-Beta Natriuretic Peptide 842 (*)     All other components within normal limits   TROPONIN I HIGH SENSITIVITY - Normal   PROTHROMBIN TIME (PT) - Normal   PTT, ACTIVATED - Normal   CBC WITH DIFFERENTIAL WITH PLATELET   RAINBOW DRAW LAVENDER   RAINBOW DRAW LIGHT GREEN   RAINBOW DRAW BLUE     EKG    Rate, intervals and axes as noted on EKG Report.  Rate: 58  Rhythm: Sinus rhythm  Reading: Low voltage QRS, seen on previous.  No ST elevation or depression.  No dysrhythmia.         Chest x-ray: I personally reviewed the radiographs and my individual interpretation shows pulmonary edema.  I also reviewed the official report which showed haziness of the right mid lower lung and left lower lung increased since prior.      Bumex 2 mg IV ordered       MDM      History is obtained from: Daughter    Previous records reviewed as noted in HPI    Differential includes, but is not limited to, STEMI, CHF exacerbation,  A-fib, renal failure    Review of any laboratory testing: CMP with minimal BIBIANA.  Troponin normal.  proBNP elevated compared with previous.  Coags normal as expected.     Review of any radiographic studies: Chest x-ray with pleural fluid    Significant history that contributed to medical decision making : Recent outpatient IV diuretics with lack of improvement and increasing creatinine suggest patient would benefit from hospitalization    Shared decision making with the patient.  Given lack of significant improvement with attempted outpatient treatment patient will be admitted for diuresis and monitoring of her creatinine.    Management of patient was discussed with : Dr. Odonnell, nephrology; Dr. Velazco, cardiology; Dr. Louise, hospitalist and patient/daughter    The administration of these medications were addressed : Bumex              4/26      CARDIOLOGY:     [facility] Cardiology Consultation       Reason for Consultation:  CHFpEF        History of Present Illness:  Nina Donnelly is a a(n) 79 year old female with known CHF, COPD, diabetes, renal insufficiency comes in with worsening edema and shortness of breath.  She was seen as an outpatient earlier in the week.  Increasing edema and dyspnea and was given IV Bumex.  She continued to feel poorly and came to the ER last night and was admitted for further treatment.. Started on diuretics IV. No I's o's      Physical Exam:        Temp:  [97.7 °F (36.5 °C)-98.4 °F (36.9 °C)] 97.7 °F (36.5 °C)  Pulse:  [54-75] 75  Resp:  [13-22] 22  BP: (109-146)/(47-64) 130/53          Chem:       Recent Labs   Lab 04/25/25  1932 04/26/25  0500    146*  146*   K 4.3 4.0  4.0    102  102   CO2 36.0* 35.0*  35.0*   BUN 33* 28*  28*   CREATSERUM 1.99* 1.86*  1.86*   CA 9.5 9.2  9.2   MG  --  2.3   PHOS  --  4.8   * 175*  175*     Impression:     1. HFpEF-acute on chronic  2.COPD on home O2  3.HL  4.DM           Recommend:  Diuretics, please follow strict I's/O's,  daily weights.     She asked for renal to be involved as she had some BIBIANA with diuresis in the past.          HOSPITALIST:     History and Physical       History of Present Illness: This is the story of Ms. Nina Donnelly who is a 80 y/o F w/ Pmhx of HFpEF (Last ECHO: 65-70%), CKD3 (baseline Cr 1.6-1.9), COPD, WYATT on CPAP, T2DM, HLD who presents to the hospital w/ chief complaints of SOB, LE edema. Symptoms have been going on since Thursday of last week and worsened over the weekend. She was unable to get into to see her Cardiologist so she went to see her Pulmonologist on Tuesday who recommended getting her into Cards office for IV Diuretics which she did receive. She states that in the past week she may have gained close to 10 pounds. Normally weighs 189-190 pounds. She also states that she had to go up on her oxygen usage as well. Complains of severe fatigue and is quite upset about falling asleep mid conversation and repeated hospitalizations. Feels like her heart and kidney doctors are in a constant \"tug of war\"  ED Vitals: wnl  Labs:   Cr 1.99    HgB 10.7    XR w/ haziness in the R mid lower and LLL likely representing pleural fluid     Patient was attempted to be given another dose of IV Bumex however refused, she wanted to speak to a Nephrologist first.  OBJECTIVE:  /41 (BP Location: Left arm)   Pulse 56   Temp 98.4 °F (36.9 °C) (Oral)   Resp 18   Ht 5' 1\" (1.549 m)   Wt 195 lb 15.8 oz (88.9 kg)   SpO2 97%   BMI 37.03 kg/m²   Gen: No acute distress, alert and oriented x3, somnolent  Pulm: Crackles bilaterally  CV: Heart with regular rate and rhythm  Abd: Abdomen soft, nontender, non-distended  Skin: no rashes or lesions  Extremities: 1-2+ pitting lower extremity edema    Data Review:    Pertinent Labs and Imaging independently reviewed  Comments:  Na 146  Cr improving  CBC stable     Assessment/Plan:   Outpatient records or previous hospital records reviewed.   [facility] Hospitalist to continue  to follow patient while in house     A/P: 78 y/o F w/ Pmhx of HFpEF, CKD3 (baseline Cr 1.6-1.9), COPD, WYATT on CPAP, T2DM, HLD who presents to the hospital w/ chief complaints of SOB, LE edema     Acute on Chronic HFpEF Exacerbation  Last ECHO: 65-70  CXR noted  BNP slightly elevated  Plan:  - Cardiology consulted, recommended continued IV diuresis  - Daily weights, strict I/Os, fluid restriction   Repeat ECHO ordered  - Holding Jardiance in house     COPD on Chronic 3L  Severe WYATT  Plan:  - Was previously on Breztri, provided no benefit as per last Pulm note  - CPAP nightly  - Duonebs PRN     Mild Renal Insufficiency on CKD3  Plan:  - Continue Diuretics  - Nephrology consulted as per patient request     HTN  - Continue Hydralazine  HLD  - Statin     Dementia  - Donepezil     STELLA  - Ferrous Sulfate   T2DM  - 15u Glargine, LDISS         NEPHROLOGY:       Report of Consultation       REASON FOR CONSULT:      BIBIANA CKD     HISTORY OF PRESENT ILLNESS:      Nina Donnelly is a a(n) 79 year old female w ho CKD 3b (follows w Dr Scott, bl Cr 1.6-1.9), HFpEF, DM2, HTN, WYATT on CPAP, beta thalassemia and macular degeneration, COPD on home o2 who presents with worsening SOB and swelling/weight gain.       Per Dr Scott notes: Hospitalized 1/2025 with leg cramping, LH, hyperglycemia, iron deficiency and BIBIANA. Felt overdiuresed and given IV fluids and IV iron. Currently off RAASi, spironolactone. Was discharged off torsemide but felt her legs were swelling so she started torsemide 20 mg/d.      This past week increased wt and hypoxia. she saw pulm, sent to cards  Saw cards, given bumex 2mg IV x 2 per notes on 4/22/25.   Now w continued SOB  Normally at 3L NC, but noted may need to be higher  +diarrhea x 1 per day since starting jardiance in jan  No vomiting. No nsaids  Drinks 64oz per day --> above 8oz water + 4oz cranberry + 1 coffee + 40oz water + 16oz water w dinner  Weight went up to 199lbs, good weight upper 180s to lower 190s.       ASSESSMENT/PLAN:   Nina Donnelly is a a(n) 79 year old female w ho CKD 3b (follows w Dr Scott, bl Cr 1.6-1.9), HFpEF, DM2, HTN, WYATT on CPAP, beta thalassemia and macular degeneration, COPD on home o2 who presents with worsening SOB and swelling/weight gain. Peak weight at home 199lb per pt.      BIBIANA on CKD3b  -- likely CRS vs due to diuresis   -- however may need to tolerate slightly higher Cr to achieve euvolemia  -- agree w IV bumex 1 BID, titrate and monitor   -- urine na 36. Check UA. If cr worsens check renal US  -- strict UOP, daily morning weights  -- avoid nephrotoxins and renally dose meds      Acute on chronic CHF  -- diuresis. Cards consult. Low sodium diet. FR. Consider echo per cards. Daily wts standing morning      Hypernatremia  -- Na 146. Monitor w diuresis      Alkalosis  -- may be resp too. Monitor. If worsens check abg. CPAP at night      Anemia  -- check iron stores      HTN  -- per cards      D/w pt family bedside and Dr Rucker             4/27    HOSPITALIST:     Assessment & Plan:[]Collapse by Default  A/P: 80 y/o F w/ Pmhx of HFpEF, CKD3 (baseline Cr 1.6-1.9), COPD, WYATT on CPAP, T2DM, HLD who presents to the hospital w/ chief complaints of SOB, LE edema     Acute on Chronic HFpEF Exacerbation  Last ECHO: 65-70  CXR noted  BNP slightly elevated  Repeat ECHO: 60-65%, G2DD  Plan:  - Cardiology consulted, continue IV diuresis  - Daily weights, strict I/Os, fluid restriction  - Holding Jardiance in house     COPD on Chronic 3L  Severe WYATT  Plan:  - Was previously on Breztri, provided no benefit as per last Pulm note  - CPAP nightly  - Duonebs PRN     BIBIANA on CKD3 - improving  Plan:  - Continue Diuretics  - Nephrology consulted as per patient request     HTN  - Continue Hydralazine  HLD  - Statin     Dementia  - Donepezil     STELLA  - Ferrous Sulfate     T2DM  - 16u Glargine, LDISS     DVT Ppx: SQH     Subjective:  Patient seen and examined this morning at bedside. Doing well, having a lot of  sciatica pain. More awake today. Informed son and patient of relevant clinical updates. No other overnight events. Lost 1 pound since yesterday     Vital signs:  Temp:  [97.5 °F (36.4 °C)-98.1 °F (36.7 °C)] 98.1 °F (36.7 °C)  Pulse:  [56-66] 56  Resp:  [11-19] 11  BP: (102-136)/(44-71) 118/45  SpO2:  [93 %-99 %] 99 %     Physical Exam:    General: No acute distress.   Respiratory: Clear to auscultation bilaterally. No wheezes.  Cardiovascular: Slightly bradycardic but regular  Abdomen: Soft, nontender, nondistended.  Positive bowel sounds. No rebound or guarding.  Extremities: Improving LE edema.          CARDIOLOGY:   Subjective: a little better     -1.5 L, CR actually a bit better    Impression:     1. HFpEF-acute on chronic  2.COPD on home O2  3.HL  4.DM  5.CRI     Plan:     Improving with current diuresis, follow CR  Discussed with Dr. Odonnell        4/28      CARDIOLOGY:     Better but still has LE edema and worse than baseline SOB            Vitals:     04/28/25 0023 04/28/25 0315 04/28/25 0505 04/28/25 0703   BP:     110/53     BP Location:     Left arm     Pulse: 64 66 61 59   Resp: 14 14 12 14   Temp:     97.9 °F (36.6 °C)     TempSrc:     Oral     SpO2: 90% (!) 84% 97% 97%       Temp: 97.9 °F (36.6 °C)  Pulse: 59  Resp: 14  BP: 110/53  General:  Appears comfortable  HEENT: No focal deficits.  Neck: No JVD, carotids 2+ no bruits.  Cardiac: Regular S1S2.  No S3, S4, rub, click.  No murmur.  Lungs: Clear to auscultation and percussion.  Abdomen: Soft, non-tender.   Extremities: 1+ bilateral  LE edema.  No clubbing or cyanosis.              Impression:           1. HFpEF-acute on chronic     -Net out = 2.2 L     2.COPD on home O2. Patient has mild pulm Hypertension  and mild RVE  3.HL  4.DM  5.CRI     -              Cr at baseline (1.6-1.9)     Plan:     Continue IV  diuresis today. follow CR         HOSPITALIST:        SUBJECTIVE:    States she keeps on falling b/c of leg pain and back pain very  concerned    Vitals      Vitals:     04/28/25 0835   BP: 129/49   Pulse: 52   Resp: 17   Temp: 98 °F (36.7 °C)        AP:  78 y/o F w/ Pmhx of HFpEF, CKD3 (baseline Cr 1.6-1.9), COPD, WYATT on CPAP, T2DM, HLD who presents to the hospital w/ chief complaints of SOB, LE edema     Acute on Chronic HFpEF Exacerbation  Last ECHO: 65-70  CXR noted  BNP slightly elevated  Repeat ECHO: 60-65%, G2DD  Plan:  - Cardiology consulted, continue IV diuresis  - Daily weights, strict I/Os, fluid restriction  - Holding Jardiance in house     COPD on Chronic 3L  Severe WYATT  Plan:  - Was previously on Breztri, provided no benefit as per last Pulm note  - CPAP nightly  - Duonebs PRN     Leg pain/back pain   - MRI ordered   - PT/OT     BIBIANA on CKD3 - improving  Plan:  - Continue Diuretics  - Nephrology consulted as per patient request     HTN  - Continue Hydralazine     HLD  - Statin     Dementia  - Donepezil     STELLA  - Ferrous Sulfate     T2DM  - 16u Glargine, LDISS           4/29    CARDIOLOGY:     She reports left rib pain  Reports dyspnea    emp: 98 °F (36.7 °C)  Pulse: 57  Resp: 18  BP: 126/44         Impression:           1. HFpEF-acute on chronic     -Net out =  3.6 L     2.COPD on home O2. Patient has mild pulm Hypertension  and mild RVE  3.HL  4.DM  5.CRI     -              Cr at baseline (1.6-1.9)     Plan:     Continue IV  diuresis today. Transition to po diuretic in am.  follow CR         HOSPITALIST:      SUBJECTIVE:  Tells me she is having sciatica pain sharp shooting  MRI with some stenosis noted      Having some rib pain on left side   AP:  78 y/o F w/ Pmhx of HFpEF, CKD3 (baseline Cr 1.6-1.9), COPD, WYATT on CPAP, T2DM, HLD who presents to the hospital w/ chief complaints of SOB, LE edema     Acute on Chronic HFpEF Exacerbation  Last ECHO: 65-70  CXR noted  BNP slightly elevated  Repeat ECHO: 60-65%, G2DD  Plan:  - Cardiology consulted, continue IV diuresis  - Daily weights, strict I/Os, fluid restriction  - Holding Jardiance  in house     COPD on Chronic 3L  Severe WYATT  Plan:  - Was previously on Breztri, provided no benefit as per last Pulm note  - CPAP nightly  - Duonebs PRN     Leg pain/back pain   - MRI ordered - w stenosis, bulging disc, trial of prednisone, tramadol and lidocaine patch  - PT/OT     BIBIANA on CKD3   Plan:  - Continue Diuretics  - Nephrology consulted as per patient request     HTN  - Continue Hydralazine     HLD  - Statin     Dementia  - Donepezil     STELLA  - Ferrous Sulfate          MEDICATIONS ADMINISTERED IN LAST 1 DAY:  aspirin DR tab 81 mg       Date Action Dose Route User    4/30/2025 0814 Given 81 mg Oral Nadeen Malone RN          atorvastatin (Lipitor) tab 10 mg       Date Action Dose Route User    4/29/2025 2102 Given 10 mg Oral Adeel Marie RN          cetirizine (ZyrTEC) tab 5 mg       Date Action Dose Route User    4/30/2025 0813 Given 5 mg Oral Nadeen Malone RN          donepezil (Aricept) tab 10 mg       Date Action Dose Route User    4/29/2025 2101 Given 10 mg Oral Adeel Marie RN          DULoxetine (Cymbalta) DR cap 30 mg       Date Action Dose Route User    4/30/2025 0813 Given 30 mg Oral Nadeen Malone RN          escitalopram (Lexapro) tab 10 mg       Date Action Dose Route User    4/29/2025 2101 Given 10 mg Oral Adeel Marie RN          heparin (Porcine) 5000 UNIT/ML injection 5,000 Units       Date Action Dose Route User    4/29/2025 2104 Given 5,000 Units Subcutaneous (Left Lower Abdomen) Adeel Marie RN          hydrALAZINE (Apresoline) tab 100 mg       Date Action Dose Route User    4/30/2025 1607 Given 100 mg Oral Nadeen Malone RN    4/30/2025 0814 Given 100 mg Oral Nadeen Malone RN    4/29/2025 2102 Given 100 mg Oral Adeel Marie RN          insulin aspart (NovoLOG) 100 Units/mL FlexPen 1-5 Units       Date Action Dose Route User    4/30/2025 1230 Given 2 Units Subcutaneous (Left Upper Arm) Nadeen Malone RN    4/30/2025 0516 Given 2 Units Subcutaneous (Left Upper Arm) Adeel Marie RN     4/29/2025 2125 Given 5 Units Subcutaneous (Right Upper Arm) Adeel Marie RN    4/29/2025 1819 Given 3 Units Subcutaneous (Right Upper Arm) Lolis Matthews RN          insulin aspart (NovoLOG) 100 Units/mL FlexPen 3 Units       Date Action Dose Route User    4/29/2025 2126 Given 3 Units Subcutaneous (Right Upper Arm) Adeel Marie RN          insulin degludec (Tresiba) 100 units/mL flextouch 16 Units       Date Action Dose Route User    4/30/2025 0814 Given 16 Units Subcutaneous (Left Upper Arm) Nadeen Malone RN          ipratropium (Atrovent) 0.03 % nasal solution 2 spray       Date Action Dose Route User    4/29/2025 2058 Given 2 spray Nasal (Bilateral Nares) Adeel Marie RN          predniSONE (Deltasone) tab 40 mg       Date Action Dose Route User    4/30/2025 0813 Given 40 mg Oral Nadeen Malone RN          pregabalin (Lyrica) cap 300 mg       Date Action Dose Route User    4/29/2025 2102 Given 300 mg Oral Adeel Marie RN          torsemide (Demadex) tab 20 mg       Date Action Dose Route User    4/30/2025 0814 Given 20 mg Oral Nadeen Malone RN          cyanocobalamin (Vitamin B12) tab 1,000 mcg       Date Action Dose Route User    4/30/2025 0814 Given 1,000 mcg Oral Nadeen Malone RN          cholecalciferol (Vitamin D3) tab 1,000 Units       Date Action Dose Route User    4/30/2025 0814 Given 1,000 Units Oral Nadeen Malone RN            Vitals (last day)       Date/Time Temp Pulse Resp BP SpO2 Weight O2 Device O2 Flow Rate (L/min) Who    04/30/25 1623 98.1 °F (36.7 °C) 73 20 143/45 96 % -- Nasal cannula 3 L/min TP    04/30/25 1209 98.6 °F (37 °C) 57 18 141/53 94 % -- Nasal cannula 3 L/min MB    04/30/25 0817 -- 61 20 124/51 99 % -- Nasal cannula 3 L/min SH    04/30/25 0817 97.8 °F (36.6 °C) -- -- -- -- -- -- -- MB    04/30/25 0500 98.8 °F (37.1 °C) 60 20 152/54 82 % 192 lb 1.6 oz (87.1 kg) Nasal cannula 3 L/min AT    04/30/25 0111 -- -- -- -- 93 % -- Nasal cannula 3 L/min LP    04/30/25 0105 -- 63 -- -- 94 %  -- -- -- LP    04/29/25 2313 98.3 °F (36.8 °C) 76 19 135/45 95 % -- -- -- JR    04/29/25 2105 -- 66 -- -- 97 % -- -- -- LP    04/29/25 2011 98.1 °F (36.7 °C) 69 18 129/50 93 % -- Nasal cannula 3 L/min PB    04/29/25 1620 98.1 °F (36.7 °C) 62 18 134/47 93 % -- Nasal cannula 3 L/min JQ    04/29/25 1256 -- -- -- -- 99 % -- -- -- NN    04/29/25 1210 98.1 °F (36.7 °C) 57 11 128/53 95 % -- Nasal cannula 3 L/min JQ    04/29/25 0811 98 °F (36.7 °C) 57 18 126/44 96 % -- Nasal cannula 3 L/min JQ    04/29/25 0717 -- 71 19 -- 97 % -- -- -- JQ    04/29/25 0445 97.6 °F (36.4 °C) 66 17 135/54 96 % 192 lb 7.4 oz (87.3 kg) Nasal cannula 3 L/min GB    04/29/25 0200 -- -- -- -- 96 % -- Nasal cannula 3 L/min JR    04/29/25 0100 -- -- -- -- 95 % -- -- -- JR    04/29/25 0045 -- 64 15 -- 97 % -- -- -- GB            04/28/25 2330 98.1 °F (36.7 °C) 64 11 128/49 97 % -- Nasal cannula 4 L/min GB   04/28/25 1902 98.5 °F (36.9 °C) 56 16 132/53 96 % -- Nasal cannula 4 L/min VA   04/28/25 1633 98.5 °F (36.9 °C) 59 18 110/48 96 % -- Nasal cannula 4 L/min NN       04/28/25 0315 -- 66 14 -- 84 % Abnormal  -- -- -- EJ   04/28/25 0023 -- 64 14 -- 90 % -- -- -- TT   04/27/25 2340 97.6 °F (36.4 °C) 69 16 140/47 96 % -- Nasal cannula 4 L/min EJ   04/27/25 2200 -- 60 22 123/61 93 % -- -- -- JK   04/27/25 1915 98.4 °F (36.9 °C) -- -- -- -- -- Nasal cannula 4 L/min EJ   04/27/25 1915 -- 65 16 118/46 93 % -- -- -- JK   04/27/25 1152 97.9 °F (36.6 °C) 55 13 113/42 -- -- Nasal cannula 4 L/min KZ       04/26/25 2344 98.1 °F (36.7 °C) 58 14 124/44 93 % -- Nasal cannula 4 L/min JK   04/26/25 2155 -- 66 12 -- 94 % -- -- -- ZH   04/26/25 1929 97.9 °F (36.6 °C) 58 11 135/55 97 % -- Nasal cannula 4 L/min ZH   04/26/25 1653 97.6 °F (36.4 °C) 59 13 136/55 98 % -- Nasal cannula 4 L/min BK       04/25/25 2355 -- -- -- -- 97 % -- -- -- BB   04/25/25 2245 -- 58 17 122/50 93 % -- -- -- VA   04/25/25 2242 -- 60 15 134/49 94 % -- -- -- VA   04/25/25 2239 98.3 °F (36.8 °C) 62  14 124/55 96 % 195 lb 8.8 oz (88.7 kg) Nasal cannula 4 L/min VA       04/25/25 2015 -- -- -- -- -- -- Nasal cannula 3 L/min MARIYA   04/25/25 2012 -- 55 13 122/47 100 % -- -- -- MARIYA   04/25/25 1820 98.4 °F (36.9 °C) 55 22 109/64 94 % 194 lb (88 kg) Nasal cannula 3 L/min CS      Latest Reference Range & Units 04/30/25 04:58   Glucose 70 - 99 mg/dL 175 (H)   Sodium 136 - 145 mmol/L 140   Potassium 3.5 - 5.1 mmol/L 4.3   Chloride 98 - 112 mmol/L 99   Carbon Dioxide, Total 21.0 - 32.0 mmol/L 36.0 (H)   BUN 9 - 23 mg/dL 29 (H)   CREATININE 0.55 - 1.02 mg/dL 1.82 (H)   CALCIUM 8.7 - 10.6 mg/dL 9.8   EGFR >=60 mL/min/1.73m2 28 (L)   ANION GAP 0 - 18 mmol/L 5   CALCULATED OSMOLALITY 275 - 295 mOsm/kg 300 (H)   (H): Data is abnormally high  (L): Data is abnormally low   Latest Reference Range & Units 04/29/25 05:49   Chloride 98 - 112 mmol/L 100   Carbon Dioxide, Total 21.0 - 32.0 mmol/L 39.0 (H)   BUN 9 - 23 mg/dL 23   CREATININE 0.55 - 1.02 mg/dL 1.75 (H)   CALCIUM 8.7 - 10.6 mg/dL 9.3   EGFR >=60 mL/min/1.73m2 30 (L)   ANION GAP 0 - 18 mmol/L 3   CALCULATED OSMOLALITY 275 - 295 mOsm/kg 299 (H)   (H): Data is abnormally high   Latest Reference Range & Units 04/28/25 05:12   Chloride 98 - 112 mmol/L 102   Carbon Dioxide, Total 21.0 - 32.0 mmol/L 39.0 (H)   BUN 9 - 23 mg/dL 26 (H)   CREATININE 0.55 - 1.02 mg/dL 1.78 (H)   CALCIUM 8.7 - 10.6 mg/dL 9.2   EGFR >=60 mL/min/1.73m2 29 (L)   ANION GAP 0 - 18 mmol/L 3   CALCULATED OSMOLALITY 275 - 295 mOsm/kg 305 (H)   (H): Data is abnormally high  (L): Data is abnormally low   Latest Reference Range & Units 04/25/25 19:32 04/26/25 05:00 04/27/25 05:18   PHOSPHORUS 2.4 - 5.1 mg/dL  4.8 4.7   Iron, Serum 50 - 170 ug/dL   43 (L)   Transferrin 250 - 380 mg/dL   233 (L)   Iron Bind.Cap.(TIBC) 250 - 425 ug/dL   311   Iron Saturation 15 - 50 %   14 (L)   FERRITIN 50 - 306 ng/mL   39 (L)   Troponin I (High Sensitivity) <=34 ng/L 15     pro-BETA NATRIURETIC PEPTIDE <450 pg/mL 842 (H) 559 (H)     (L): Data is abnormally low  (H): Data is abnormally high  (L): Data is abnormally low   Latest Reference Range & Units 04/25/25 19:32 04/26/25 05:00 04/27/25 05:18 04/28/25 05:12 04/29/25 05:49   Chloride 98 - 112 mmol/L 104 102  102 103 102 100   Carbon Dioxide, Total 21.0 - 32.0 mmol/L 36.0 (H) 35.0 (H)  35.0 (H) 38.0 (H) 39.0 (H) 39.0 (H)   BUN 9 - 23 mg/dL 33 (H) 28 (H)  28 (H) 25 (H) 26 (H) 23   CREATININE 0.55 - 1.02 mg/dL 1.99 (H) 1.86 (H)  1.86 (H) 1.68 (H) 1.78 (H) 1.75 (H)   CALCIUM 8.7 - 10.6 mg/dL 9.5 9.2  9.2 9.4 9.2 9.3   EGFR >=60 mL/min/1.73m2 25 (L) 28 (L)  28 (L) 31 (L) 29 (L) 30 (L)   ANION GAP 0 - 18 mmol/L 5 9  9 5 3 3   CALCULATED OSMOLALITY 275 - 295 mOsm/kg 310 (H) 312 (H)  312 (H) 307 (H) 305 (H) 299 (H)   (H): Data is abnormally high  (L): Data is abnormally low

## 2025-04-30 NOTE — PHYSICAL THERAPY NOTE
PHYSICAL THERAPY EVALUATION - INPATIENT     Room Number: 8611/8611-A  Evaluation Date: 4/30/2025  Type of Evaluation: Initial  Physician Order: PT Eval and Treat    Presenting Problem: acute on chronic HF  Co-Morbidities : HTN, HLD, DM, HFpEF, WYATT, CKD, COPD, dementia, CHF  Reason for Therapy: Mobility Dysfunction and Discharge Planning    PHYSICAL THERAPY ASSESSMENT   Patient is a 79 year old female admitted 4/25/2025 for acute on chronic HF.  Prior to admission, patient's baseline is indep however sounds like should be using a RW.  Patient is currently functioning at baseline with bed mobility, transfers, and gait.  Patient is requiring  use of RW  as a result of the following impairments: impaired dynamic balance.      Patient has met all skilled IPPT goals at this time. Patient will be discharged from Physical Therapy services.  Please re-order if a new functional limitation presents during this admission.      PLAN DURING HOSPITALIZATION  Nursing Mobility Recommendation : 1 Assist  PT Device Recommendation: Rolling walker              PHYSICAL THERAPY MEDICAL/SOCIAL HISTORY  History related to current admission: Patient is a 79 year old female admitted on 4/25/2025: Presented with SOB, LE edema dx acute on chronic HFpEF exacerbation. PT found on floor of room - slid out of bed 4/28. Refused therapy eval x2    HOME SITUATION  Type of Home: Independent living facility ([facility])  Home Layout: One level                     Lives With: Alone    Drives: No   Patient Regularly Uses: Home O2, Glasses (3L)      Prior Level of [place]: hx of sliding out out of bed 2 weeks ago, per son pt is \"against\" using a RW. Thinks her falls are from her sitting on the side of the bed and falling asleep. Pt thinks she rolls out of bed because its a twin and she's use to a queen.     SUBJECTIVE  \" I'm not opposed I just think you rely on it too much\" - a RW    OBJECTIVE  Precautions: Bed/chair alarm  Fall Risk: Standard  fall risk    WEIGHT BEARING RESTRICTION     PAIN ASSESSMENT  Rating: 10  Location: back pain  Management Techniques: Activity promotion, Body mechanics, Repositioning    COGNITION  Overall Cognitive Status:  WFL - within functional limits    RANGE OF MOTION AND STRENGTH ASSESSMENT  Upper extremity ROM and strength are within functional limits     Lower extremity ROM is within functional limits     Lower extremity strength is within functional limits     BALANCE  Static Sitting: Good  Dynamic Sitting: Good  Static Standing: Fair -  Dynamic Standing: Fair -    ADDITIONAL TESTS                                    ACTIVITY TOLERANCE                         O2 WALK  Oxygen Therapy  SPO2% Ambulation on Oxygen: 89  Ambulation oxygen flow (liters per minute): 3    NEUROLOGICAL FINDINGS                        AM-PAC '6-Clicks' INPATIENT SHORT FORM - BASIC MOBILITY  How much difficulty does the patient currently have...  Patient Difficulty: Turning over in bed (including adjusting bedclothes, sheets and blankets)?: None   Patient Difficulty: Sitting down on and standing up from a chair with arms (e.g., wheelchair, bedside commode, etc.): None   Patient Difficulty: Moving from lying on back to sitting on the side of the bed?: None   How much help from another person does the patient currently need...   Help from Another: Moving to and from a bed to a chair (including a wheelchair)?: None   Help from Another: Need to walk in hospital room?: None   Help from Another: Climbing 3-5 steps with a railing?: A Little     AM-PAC Score:  Raw Score: 23   Approx Degree of Impairment: 11.2%   Standardized Score (AM-PAC Scale): 56.93   CMS Modifier (G-Code): CI    FUNCTIONAL ABILITY STATUS  Gait Assessment   Functional Mobility/Gait Assessment  Gait Assistance: Supervision  Distance (ft): 600  Assistive Device: Rolling walker  Pattern: Shuffle    Skilled Therapy Provided     Bed Mobility:  Rolling: NT  Supine to sit: NT   Sit to supine:  NT     Transfer Mobility:  Sit to stand: SBA   Stand to sit: SBA  Gait = SBA    Therapist's Comments: Discussed case with RN prior to session initiation. Pt agreeable to participation in therapy. Gait belt donned for out of bed mobility. Participated in gait training initially without device, cued to perform horizontal head turns with pt demonstrating LOB able to self correct. Agreeable to gait training with RW - able to perform horizontal and vertical head turns w/o LOB. Discussed recommendation fo r use of RW at home.       Exercise/Education Provided:  Gait training    Patient End of Session: Up in chair, Needs met, Call light within reach, RN aware of session/findings, All patient questions and concerns addressed, Hospital anti-slip socks, Alarm set, Family present, With  staff      Patient Evaluation Complexity Level:  History Low - no personal factors and/or co-morbidities   Examination of body systems Low -  addressing 1-2 elements   Clinical Presentation Low- Stable   Clinical Decision Making Low Complexity       PT Session Time: 20 minutes  Gait Training: 10 minutes  Therapeutic Activity:  minutes  Neuromuscular Re-education:  minutes  Therapeutic Exercise:  minutes

## 2025-05-01 LAB
ALBUMIN SERPL-MCNC: 4.6 G/DL (ref 3.2–4.8)
ANION GAP SERPL CALC-SCNC: 6 MMOL/L (ref 0–18)
BUN BLD-MCNC: 31 MG/DL (ref 9–23)
CALCIUM BLD-MCNC: 9.8 MG/DL (ref 8.7–10.6)
CHLORIDE SERPL-SCNC: 101 MMOL/L (ref 98–112)
CO2 SERPL-SCNC: 39 MMOL/L (ref 21–32)
CREAT BLD-MCNC: 1.69 MG/DL (ref 0.55–1.02)
EGFRCR SERPLBLD CKD-EPI 2021: 31 ML/MIN/1.73M2 (ref 60–?)
GLUCOSE BLD-MCNC: 74 MG/DL (ref 70–99)
GLUCOSE BLD-MCNC: 75 MG/DL (ref 70–99)
MAGNESIUM SERPL-MCNC: 2.4 MG/DL (ref 1.6–2.6)
OSMOLALITY SERPL CALC.SUM OF ELEC: 307 MOSM/KG (ref 275–295)
PHOSPHATE SERPL-MCNC: 3.9 MG/DL (ref 2.4–5.1)
POTASSIUM SERPL-SCNC: 4.1 MMOL/L (ref 3.5–5.1)
SODIUM SERPL-SCNC: 146 MMOL/L (ref 136–145)

## 2025-05-01 PROCEDURE — 83735 ASSAY OF MAGNESIUM: CPT | Performed by: INTERNAL MEDICINE

## 2025-05-01 PROCEDURE — 82962 GLUCOSE BLOOD TEST: CPT

## 2025-05-01 PROCEDURE — 80069 RENAL FUNCTION PANEL: CPT | Performed by: INTERNAL MEDICINE

## 2025-05-01 RX ORDER — PREDNISONE 20 MG/1
40 TABLET ORAL
Qty: 4 TABLET | Refills: 0 | Status: SHIPPED
Start: 2025-05-02 | End: 2025-05-04

## 2025-05-01 RX ORDER — PREDNISONE 20 MG/1
40 TABLET ORAL
Qty: 4 TABLET | Refills: 0 | Status: SHIPPED
Start: 2025-05-03 | End: 2025-05-01

## 2025-05-01 NOTE — DISCHARGE SUMMARY
[facility] Hospitalist Discharge Summary    Patient ID  Nina Donnelly  [id number]  79 year old  10/9/1945  Admit date: 4/25/2025  Discharge date: 5/1/25  Attending: Donna Frausto MD   Primary Care Physician: Gladys Saravia MD   Reason for admission:  (see HPI on HP for further detail)  Discharge condition: good  Disposition: home    Important follow up:  -PCP     Discharge med list     Medication List        START taking these medications      predniSONE 20 MG Tabs  Commonly known as: Deltasone  Take 2 tablets (40 mg total) by mouth daily with food for 2 days.  Start taking on: May 2, 2025            CONTINUE taking these medications      * Accu-Chek SmartView Strp     * True Metrix Blood Glucose Test Strp  Check sugars TID     aspirin 81 MG Tabs     atorvastatin 10 MG Tabs  Commonly known as: Lipitor     cetirizine 10 MG Tabs  Commonly known as: ZyrTEC     cyanocobalamin 1000 MCG Tabs  Commonly known as: Vitamin B12     donepezil 10 MG Tabs  Commonly known as: Aricept     DULoxetine 60 MG Cpep  Commonly known as: Cymbalta     escitalopram 10 MG Tabs  Commonly known as: Lexapro     ferrous sulfate 325 (65 FE) MG Tbec  Take 1 tablet (325 mg total) by mouth daily with breakfast.     hydrALAZINE 100 MG Tabs  Commonly known as: Apresoline     insulin aspart 100 Units/mL Sopn  Commonly known as: NovoLOG     insulin glargine 100 UNIT/ML Sopn     ipratropium 0.03 % Soln  Commonly known as: Atrovent     Magnesium 100 MG Tabs     pregabalin 300 MG Caps  Commonly known as: LYRICA     PRESERVISION AREDS 2 OR     torsemide 20 MG Tabs  Commonly known as: Demadex  Take 1 tablet (20 mg total) by mouth daily.     Vitamin D3 25 MCG (1000 UT) Caps           * This list has 2 medication(s) that are the same as other medications prescribed for you. Read the directions carefully, and ask your doctor or other care provider to review them with you.                STOP taking these medications      Jardiance 25 MG Tabs  Generic drug: empagliflozin                Where to Get Your Medications        These medications were sent to [id number] - [place], [place] - [address] [phone], [phone]  [address] [place] [address]      Phone: [phone]   torsemide 20 MG Tabs       You can get these medications from any pharmacy    Bring a paper prescription for each of these medications  predniSONE 20 MG Tabs         Discharge Diagnoses/Hospital course:   78 y/o F w/ Pmhx of HFpEF, CKD3 (baseline Cr 1.6-1.9), COPD, WYATT on CPAP, T2DM, HLD who presents to the hospital w/ chief complaints of SOB, LE edema     Acute on Chronic HFpEF Exacerbation  Last ECHO: 65-70  CXR noted  BNP slightly elevated  Repeat ECHO: 60-65%, G2DD  Plan:  - Cardiology consulted, continue IV diuresis - dc on oral diuretics   - Daily weights, strict I/Os, fluid restriction  - Holding Jardiance in house - per renal hold     COPD on Chronic 3L  Severe WYATT  Plan:  - Was previously on Breztri, provided no benefit as per last Pulm note  - CPAP nightly  - Duonebs PRN     Leg pain/back pain   - MRI ordered - w stenosis, bulging disc, prednisone for now per pt improving pain, advised outpt ortho spine eval d/w pt and pts daughter      BIBIANA on CKD3   Plan:  - Continue Diuretics  - Nephrology consulted as per patient request     HTN  - Continue Hydralazine     HLD  - Statin     Dementia  - Donepezil     STELLA  - Ferrous Sulfate     T2DM  - 16u Glargine, HDISS     Consults:  IP CONSULT TO CARDIOLOGY  IP CONSULT TO NEPHROLOGY  IP CONSULT TO HOSPITALIST  IP CONSULT TO FOOD AND NUTRITION SERVICES  IP CONSULT TO SOCIAL WORK    Radiology:  XR RIBS WITH CHEST (3 VIEWS), LEFT  (CPT=71101)  Result Date: 4/29/2025  PROCEDURE:  XR RIBS WITH CHEST (3 VIEWS), LEFT  (CPT=71101)  TECHNIQUE:  PA Chest and three views of the ribs were obtained  COMPARISON:  None.  INDICATIONS:  eval for fracture  PATIENT STATED HISTORY: (As transcribed by Technologist)     FINDINGS:  LUNGS:  Prominent interstitial markings in the  mid-lungs and bases with atelectasis left lung base.  CARDIAC:  Cardiomegaly.  Pulmonary vascularity mildly increased. MEDIASTINUM:  Normal. PLEURA:  No pneumothorax.  There is blunting left costophrenic angle.. BONES:  No displaced left rib fracture.             CONCLUSION:  Cardiomegaly with mild pulmonary venous congestion.  Slight increased interstitial markings in the mid-lungs and bases.  Atelectasis left lung base with mild blunting left costophrenic angle.   LOCATION:  [id number]     Dictated by (CST): Althea Kirk MD on 4/29/2025 at 3:45 PM     Finalized by (CST): Althea Kirk MD on 4/29/2025 at 3:47 PM       MRI SPINE LUMBAR (CPT=72148)  Result Date: 4/28/2025  PROCEDURE:  MRI SPINE LUMBAR (CPT=72148)  COMPARISON:  None.  INDICATIONS:  eval for weakness in legs  TECHNIQUE:  Multiplanar T1 and T2 weighted images including fat suppression sequences.  Images acquired in sagittal and axial planes.   PATIENT STATED HISTORY: (As transcribed by Technologist)  Ongoing lower back pain that radiates down the legs    FINDINGS:  LUMBAR DISC LEVELS L1-L2:  No significant disc/facet abnormality, spinal stenosis, or foraminal narrowing. L2-L3:  Mild broad-based degenerative disc bulge with mild bilateral facet joint degenerative change.  AP diameter canal is normal 12 mm.  There is no significant central or foraminal stenosis. L3-L4:  There is moderate broad-based degenerative disc bulge.  There is mild bilateral facet joint degenerative change and ligamentum flavum hypertrophy.  There is narrowing of the central canal to 8 mm consistent mild central canal stenosis.  There is mild bilateral subarticular zone and neural foraminal narrowing. L4-L5:  Moderate broad-based degenerative disc bulge with superimposed right posterior paracentral disc protrusion measuring 10 x 5 mm in size.  There is moderate right greater than left facet joint degenerative change with ligamentum flavum hypertrophy.   There is moderate to severe  central canal stenosis of 5.5 mm.  There is moderate right greater than left subarticular zone and neural foraminal narrowing.  L5-S1:  There is mild to moderate broad-based degenerative disc bulge slightly eccentric to the right greater than left lateral aspect of the disc space.  There is mild to moderate bilateral facet joint degenerative change.  AP diameter canal is normal at 10 mm.  There is moderate right greater than left neural foraminal narrowing.  PARASPINAL AREA:  Normal with no visible mass.  BONY STRUCTURES:  No fracture, pars defect, significant scoliosis, or osseous lesion.  CORD/CAUDA EQUINA:  Normal caliber, contour, and signal intensity.             CONCLUSION:   1. L3-4 moderate degenerative disc bulge with facet joint degenerative change and ligamentum flavum hypertrophy causing mild central canal stenosis and mild bilateral subarticular zone and neural foraminal narrowing.  2. L4-5 moderate degenerative disc bulge with superimposed right paracentral disc protrusion causing moderate to severe central canal stenosis and moderate right greater than left subarticular zone and neural foraminal narrowing.  3. Mild to moderate degenerative disc bulge/osteophyte complex extending to the posterior lateral aspect of the disc spaces at L5-S1 causing moderate right greater than left neural foraminal narrowing.   LOCATION:  [facility]   Dictated by (CST): Nikko Gonzáles MD on 2025 at 4:01 PM     Finalized by (CST): Nikko Gonzáles MD on 2025 at 4:05 PM       CARD ECHO 2D DOPPLER (CPT=93306)  Result Date: 2025  Transthoracic Echocardiogram Name:Nina Donnelly Date: 2025 :  10/09/1945 Ht:  (61in)  BP: 115 / 41 MRN:  [id number]    Age:  79years    Wt:  (195lb) HR: 54bpm Loc:  [facility]       Gndr: F          BSA: 1.87m^2 Sonographer: James BURDICK AE, PE Ordering:    Justus Nair Consulting:  Renetta Interiano ---------------------------------------------------------------------------- History/Indications:    Dyspnea.  Congestive heart failure. ---------------------------------------------------------------------------- Procedure information:  A transthoracic complete 2D study was performed. Additional evaluation included M-mode, complete spectral Doppler, and color Doppler.  Patient status:  Inpatient.  Location:  Bedside.    Comparison was made to the study of 10/15/2024.    This was a routine study. Transthoracic echocardiography for ventricular function evaluation. Image quality was adequate. ECG rhythm:   Sinus bradycardia  Study completion:  There were no complications. ---------------------------------------------------------------------------- Conclusions: 1. Left ventricle: The cavity size was normal. Wall thickness was normal.    Systolic function was normal. The estimated ejection fraction was 60-65%.    Features are consistent with a pseudonormal left ventricular filling    pattern, with concomitant abnormal relaxation and increased filling    pressure - grade 2 diastolic dysfunction. Doppler parameters are    consistent with elevated mean left atrial filling pressure. 2. Right ventricle: The cavity size was mildly increased. Systolic function    was normal. Systolic pressure was moderately increased. 3. Left atrium: The atrium was mildly to moderately dilated. 4. Right atrium: The atrium was mildly dilated. 5. Pulmonary arteries: The peak systolic pressure is 44mm Hg. 6. Pericardium, extracardiac: There was no pericardial effusion. 7. Inferior vena cava: The IVC was small-medium and 1.3cm diameter.    Respirophasic diameter changes are in the normal range (> 50%). * ---------------------------------------------------------------------------- * Findings: Left ventricle:  The cavity size was normal. Wall thickness was normal. Systolic function was normal. The estimated ejection fraction was 60-65%. No diagnostic evidence for diffuse regional wall motion abnormalities. Features are consistent with a  pseudonormal left ventricular filling pattern, with concomitant abnormal relaxation and increased filling pressure - grade 2 diastolic dysfunction. Doppler parameters are consistent with elevated mean left atrial filling pressure. Ventricular septum:   Intact ventricular septum. Left atrium:  The atrium was mildly to moderately dilated. Right ventricle:  The cavity size was mildly increased. Systolic function was normal.  Systolic pressure was moderately increased. Right atrium:  The atrium was mildly dilated. Mitral valve:  The annulus was mildly calcified. Leaflet separation was normal.  Doppler:  Transvalvular velocity was within the normal range. There was no evidence for stenosis. There was no significant regurgitation. Aortic valve:   The valve was trileaflet. The leaflets were mildly calcified. Cusp separation was normal.  Doppler:  Transvalvular velocity was within the normal range. There was no evidence for stenosis. There was no significant regurgitation. Tricuspid valve:  The valve is structurally normal. Leaflet separation was normal.  Doppler:  Transvalvular velocity was within the normal range. There was no evidence for stenosis. There was trace regurgitation. Pulmonic valve:    There was no significant valve disease.    Doppler: Transvalvular velocity was within the normal range. There was no evidence for stenosis. There was mild regurgitation. Pericardium:   There was no pericardial effusion. Pleura:  No evidence of pleural fluid accumulation. Aorta: Aortic root: The aortic root was normal-sized. Ascending aorta: The ascending aorta was normal. Systemic veins: Inferior vena cava: The IVC was small-medium and 1.3cm diameter. Respirophasic diameter changes are in the normal range (> 50%). ---------------------------------------------------------------------------- Measurements  Left ventricle         Value        Ref       10/15/2024  IVS thickness, ED,     0.9   cm     0.6 - 0.9 1.1  PLAX  LV ID, ED,  PLAX        5.0   cm     3.8 - 5.2 4.8  LV ID, ES, PLAX        3.0   cm     2.2 - 3.5 2.8  LV PW thickness,       0.9   cm     0.6 - 0.9 1.0  ED, PLAX  IVS/LV PW ratio,       0.97         --------- 1.05  ED, PLAX  LV PW/LV ID ratio,     0.18         --------- 0.21  ED, PLAX  LV ejection            65    %      54 - 74   72  fraction  LV e', average         7.6   cm/sec --------- 12.4  LV E/e', average   (H) 16           <=14      8  Aortic root            Value        Ref       10/15/2024  Aortic root ID, ED     3.5   cm     2.6 - 4.0 3.5  Ascending aorta        Value        Ref       10/15/2024  Ascending aorta        3.4   cm     1.9 - 3.5 3.4  ID, A-P, ED  Left atrium            Value        Ref       10/15/2024  LA ID, A-P, ES     (H) 5.2   cm     2.7 - 3.8 ----------  LA volume, ES, 1-p (H) 116   ml     22 - 52   50  A4C  LA/aortic root         1.49         --------- ----------  ratio  Mitral valve           Value        Ref       10/15/2024  Mitral E-wave peak     1.23  m/sec  --------- 1.02  velocity  Mitral A-wave peak     1.11  m/sec  --------- 0.95  velocity  Mitral E/A ratio,      1.1          --------- 1.1  peak  Pulmonary artery       Value        Ref       10/15/2024  PA pressure, S, DP     44    mm Hg  --------- ----------  Systemic veins         Value        Ref       10/15/2024  Estimated CVP          5     mm Hg  --------- 3  Right ventricle        Value        Ref       10/15/2024  TAPSE, 2D              2.80  cm     >=1.70    2.38  RV pressure, S, DP     44    mm Hg  --------- ---------- Legend: (L)  and  (H)  damian values outside specified reference range. ---------------------------------------------------------------------------- Prepared and electronically signed by Kimo Velazco 04/26/2025 16:56     XR CHEST AP PORTABLE  (CPT=71045)  Result Date: 4/25/2025  PROCEDURE:  XR CHEST AP PORTABLE  (CPT=71045)  TECHNIQUE:  AP chest radiograph was obtained.  COMPARISON:  SOWMYA STOVER, XR CHEST AP  PORTABLE  (CPT=71045), 1/19/2025, 7:50 PM.  INDICATIONS:  Bilateral ankle swelling, SOB  PATIENT STATED HISTORY: (As transcribed by Technologist)               CONCLUSION:   Low lung volumes poor inspiration.  Haziness of the right mid-lower lung and left lower lung, increased since the prior, likely reflecting combination of pleural fluid and lung consolidation.  Cardiac enlargement assessment limited.  No pneumothorax. Consider upright PA and lateral examination of the chest, when tolerated.   LOCATION:  [facility]      Dictated by (CST): Aaron Diop MD on 4/25/2025 at 6:48 PM     Finalized by (CST): Aaron Diop MD on 4/25/2025 at 6:48 PM         Operative reports:          Day of discharge exam:  Vitals:    05/01/25 0819   BP: 131/55   Pulse: 59   Resp: 18   Temp: 97.7 °F (36.5 °C)     No acute distress,    Lungs clear  Heart regular  Abdomen benign     Patient and/or family had opportunity to ask questions and expressed understanding and agreement with therapeutic plan as outlined    31 minutes spent on discharge    Donna Frausto MD

## 2025-05-01 NOTE — PLAN OF CARE
PT A&OX4, on 2L NC  NSR on tele.  Denied CP. Denied SOB.   Bs 366, notify Hosp-->verbal ordered 20 units  Pt last reported BM 4/29  Void in toilet with no isssue  Updated POC to pt, all needs meet at this time.   Call light within reach, bed alarm on for pt safety.     2123: Notified Hospitalist -->20 unit verbal ordered    POC:   DC in am

## 2025-05-01 NOTE — PLAN OF CARE
Patient is aox3 , vss, 3l nc (baseline), NSR, Lungs clear and diminished at bases. Positive bowel sounds, +1 edema to lower etremities. BS stable this am. Na 146. Dr Ansari stated to drink more water and FU with Dr CLAUDIA Scott oupatient. IV removed yesterday. Showering now. Tele removed. Home with  RN and PT.    Problem: Patient/Family Goals  Goal: Patient/Family Long Term Goal  Description: Patient's Long Term Goal: Improved oxygen needs Interventions:- 3L nc- See additional Care Plan goals for specific interventions  Outcome: Completed  Goal: Patient/Family Short Term Goal  Description: Patient's Short Term Goal: Na 146 Interventions: - Increase water intake. And FU with Renal.- See additional Care Plan goals for specific interventions  Outcome: Completed

## 2025-05-01 NOTE — CM/SW NOTE
05/01/25 1000   Discharge disposition   Expected discharge disposition Home or Self   Post Acute Care Provider [facility] Mar[facility] PANIAGUA sent to [facility]  to notify of discharge today.     [address] [place] [address]  Phone: [phone]  Fax: [phone]    Solange ROMERO, LCSW  Discharge Planner

## 2025-05-01 NOTE — PROGRESS NOTES
[facility] Cardiology Progress Note        Nina Donnelly Patient Status:  Observation    10/9/1945 MRN [id number]   [facility] 8NE-A Attending Justus Nair,    Hosp Day # 3 PCP Gladys aSravia MD     Subjective:  The patient denies  chest pain   No LE edema  She reports improvement in  left rib pain  Breathing is comfortable  Kept overnight to address elevated gluc's at 500 range    Medications:  Scheduled Medications[1]    Continuous Infusions:  Medication Infusions[2]      Allergies:  Allergies[3]      Objective:        Intake/Output:      Intake/Output Summary (Last 24 hours) at 2025 0725  Last data filed at 2025 0200  Gross per 24 hour   Intake 1200 ml   Output 1600 ml   Net -400 ml     Wt Readings from Last 3 Encounters:   25 191 lb 2.2 oz (86.7 kg)   25 184 lb 11.2 oz (83.8 kg)   10/17/24 193 lb 5.5 oz (87.7 kg)       Physical Exam:        Vitals:    25 0000 25 0348 25 0350 25 0549   BP: 124/42 139/42     BP Location: Right arm Right arm     Pulse: 73 61 60    Resp: 18 18     Temp: 98.4 °F (36.9 °C) 97.5 °F (36.4 °C)     TempSrc: Oral Oral     SpO2: 99% 95% 95%    Weight:    191 lb 2.2 oz (86.7 kg)   Height:           Temp:  [97.5 °F (36.4 °C)-98.6 °F (37 °C)] 97.5 °F (36.4 °C)  Pulse:  [57-76] 60  Resp:  [18-20] 18  BP: (121-143)/(40-53) 139/42  SpO2:  [94 %-99 %] 95 %      Temp: 97.5 °F (36.4 °C)  Pulse: 60  Resp: 18  BP: 139/42  General:  Appears comfortable  HEENT: No focal deficits.  Neck: No JVD, carotids 2+ no bruits.  Cardiac: Regular S1S2.  No S3, S4, rub, click.  No murmur.  Lungs: Clear to auscultation and percussion.  Abdomen: Soft, non-tender.   Extremities: no  LE edema.  No clubbing or cyanosis.    Neurologic: Alert and oriented, normal affect.  Skin: Warm and dry.           LABS:      HEM:  Recent Labs   Lab 25  1932 25  0500 25  0518   WBC 8.1 7.4 7.3   HGB 10.7* 10.6* 10.3*   HCT 36.4 37.2 35.5    .0 182.0 173.0       Chem:  Recent Labs   Lab 04/27/25  0518 04/28/25  0512 04/29/25  0549 04/30/25  0458 05/01/25  0516   * 144 142 140 146*   K 3.8 4.2  4.2 3.9 4.3 4.1    102 100 99 101   CO2 38.0* 39.0* 39.0* 36.0* 39.0*   BUN 25* 26* 23 29* 31*   CREATSERUM 1.68* 1.78* 1.75* 1.82* 1.69*   CA 9.4 9.2 9.3 9.8 9.8   MG 2.1 2.1 2.1 2.3 2.4   PHOS 4.7 4.6 4.3 3.3 3.9   * 133* 127* 175* 74       Recent Labs   Lab 04/25/25 1932 04/26/25  0500 04/27/25  0518 04/28/25  0512 04/29/25  0549 04/30/25  0458 05/01/25  0516   ALT 12 11  --   --   --   --   --    AST 22 21  --   --   --   --   --    ALB 4.3 4.2  4.2 4.0 4.2 4.2 4.4 4.6       Recent Labs   Lab 04/25/25 1932   PTT 25.1   INR 1.01           No results found for: \"TROP\", \"CKMB\"      Invalid input(s): \"PBNPML\"                       Diagnostics:   Telemetry:     EKG, 4/28/2025,         Echo:  4/26/25      Conclusions:     1. Left ventricle: The cavity size was normal. Wall thickness was normal.      Systolic function was normal. The estimated ejection fraction was 60-65%.      Features are consistent with a pseudonormal left ventricular filling      pattern, with concomitant abnormal relaxation and increased filling      pressure - grade 2 diastolic dysfunction. Doppler parameters are      consistent with elevated mean left atrial filling pressure.   2. Right ventricle: The cavity size was mildly increased. Systolic function      was normal. Systolic pressure was moderately increased.   3. Left atrium: The atrium was mildly to moderately dilated.   4. Right atrium: The atrium was mildly dilated.   5. Pulmonary arteries: The peak systolic pressure is 44mm Hg.   6. Pericardium, extracardiac: There was no pericardial effusion.   7. Inferior vena cava: The IVC was small-medium and 1.3cm diameter.      Respirophasic diameter changes are in the normal range (> 50%).   *             Impression:         1. HFpEF-acute on chronic    - Net out  =  3.8 L    2.COPD on home O2. Patient has mild pulm Hypertension  and mild RVE  3.HL  4.DM. elevated gluc's 2/2 steroids  5.CRI    -  Cr at baseline (1.6-1.9)     Plan:     Back on po torsemide  Stable CV status for dc  ,f/u in CHF clinic 1-2 weeks    Dago Beach MD             [1]    insulin aspart  4-20 Units Subcutaneous TID AC and HS    torsemide  20 mg Oral Daily    predniSONE  40 mg Oral Daily with food    lidocaine-menthol  1 patch Transdermal Daily    fluticasone propionate  1 spray Each Nare Daily    pregabalin  300 mg Oral Nightly    insulin degludec  16 Units Subcutaneous Daily    escitalopram  10 mg Oral Nightly    DULoxetine  30 mg Oral Daily    aspirin  81 mg Oral Daily    atorvastatin  10 mg Oral Nightly    cetirizine  5 mg Oral Daily    cholecalciferol  1,000 Units Oral Daily    donepezil  10 mg Oral Nightly    [Held by provider] ferrous sulfate  325 mg Oral Daily with breakfast    hydrALAZINE  100 mg Oral TID    ipratropium  2 spray Nasal 2 times per day    cyanocobalamin  1,000 mcg Oral Daily    heparin  5,000 Units Subcutaneous Q8H CHICO   [2] [3]   Allergies  Allergen Reactions    Pioglitazone UNKNOWN     Weight Gain    Capsaicin ITCHING    Vancomycin ITCHING

## 2025-05-01 NOTE — PROGRESS NOTES
[facility] Hospitalist Progress Note       SUBJECTIVE:  Pt feels well   Was to dc today but sugars to hgih    OBJECTIVE:  Scheduled Meds: Scheduled Medications[1]  Continuous Infusions: Medication Infusions[2]  PRN Meds: PRN Medications[3]    Vitals  Vitals:    04/30/25 1623   BP: 143/45   Pulse: 73   Resp: 20   Temp: 98.1 °F (36.7 °C)         Exam   Gen-    no acute distress, alert and oriented x 3    RESP-   Lungs CTA , normal respiratory effort  CV-      Heart RRR, no mgr  Abd-    soft, nondistended, nontender, bowel sounds present  Skin-     no rash  Neuro-  no focal neurologic deficits  Ext-      No edema in extremities   Psych- alert and oriented x 3      Labs:     Recent Labs   Lab 04/25/25 1932 04/26/25 0500 04/27/25 0518   WBC 8.1 7.4 7.3   HGB 10.7* 10.6* 10.3*   MCV 59.8* 61.2* 59.7*   .0 182.0 173.0   INR 1.01  --   --        Recent Labs   Lab 04/26/25 0500 04/27/25 0518 04/28/25 0512 04/29/25  0549 04/30/25  0458   *  146* 146* 144 142 140   K 4.0  4.0 3.8 4.2  4.2 3.9 4.3     102 103 102 100 99   CO2 35.0*  35.0* 38.0* 39.0* 39.0* 36.0*   BUN 28*  28* 25* 26* 23 29*   CREATSERUM 1.86*  1.86* 1.68* 1.78* 1.75* 1.82*   CA 9.2  9.2 9.4 9.2 9.3 9.8   MG 2.3 2.1 2.1 2.1 2.3   PHOS 4.8 4.7 4.6 4.3 3.3   *  175* 113* 133* 127* 175*       Recent Labs   Lab 04/25/25 1932 04/26/25 0500 04/27/25 0518 04/28/25  0512 04/29/25  0549 04/30/25  0458   ALT 12 11  --   --   --   --    AST 22 21  --   --   --   --    ALB 4.3 4.2  4.2 4.0 4.2 4.2 4.4       Recent Labs   Lab 04/30/25  1716 04/30/25  1719 04/30/25  1825 04/30/25  1828 04/30/25  1832   PGLU 455* 487* 522* 478* 498*       AP:    78 y/o F w/ Pmhx of HFpEF, CKD3 (baseline Cr 1.6-1.9), COPD, WYATT on CPAP, T2DM, HLD who presents to the hospital w/ chief complaints of SOB, LE edema     Acute on Chronic HFpEF Exacerbation  Last ECHO: 65-70  CXR noted  BNP slightly elevated  Repeat ECHO: 60-65%, G2DD  Plan:  - Cardiology  consulted, continue IV diuresis  - Daily weights, strict I/Os, fluid restriction  - Holding Jardiance in house     COPD on Chronic 3L  Severe WYATT  Plan:  - Was previously on Breztri, provided no benefit as per last Pulm note  - CPAP nightly  - Duonebs PRN     Leg pain/back pain   - MRI ordered - w stenosis, bulging disc, trial of prednisone,helping w symptoms   - PT/OT     BIBIANA on CKD3   Plan:  - Continue Diuretics  - Nephrology consulted as per patient request     HTN  - Continue Hydralazine     HLD  - Statin     Dementia  - Donepezil     STELLA  - Ferrous Sulfate     T2DM  - hyperglyceia 2/2 prednsinoe   - 16u Glargine, LDISS - increase SSI to hiigh dose and give another dose of tresiba tonight      Donna Frausto MD   [facility] Hospitalist           [1]    insulin aspart  4-20 Units Subcutaneous TID AC and HS    torsemide  20 mg Oral Daily    predniSONE  40 mg Oral Daily with food    lidocaine-menthol  1 patch Transdermal Daily    fluticasone propionate  1 spray Each Nare Daily    pregabalin  300 mg Oral Nightly    insulin degludec  16 Units Subcutaneous Daily    escitalopram  10 mg Oral Nightly    DULoxetine  30 mg Oral Daily    aspirin  81 mg Oral Daily    atorvastatin  10 mg Oral Nightly    cetirizine  5 mg Oral Daily    cholecalciferol  1,000 Units Oral Daily    donepezil  10 mg Oral Nightly    [Held by provider] ferrous sulfate  325 mg Oral Daily with breakfast    hydrALAZINE  100 mg Oral TID    ipratropium  2 spray Nasal 2 times per day    cyanocobalamin  1,000 mcg Oral Daily    heparin  5,000 Units Subcutaneous Q8H CHICO   [2] [3]   acetaminophen    traMADol    glucose **OR** glucose **OR** glucose-vitamin C **OR** dextrose **OR** glucose **OR** glucose **OR** glucose-vitamin C    ondansetron    metoclopramide

## 2025-05-01 NOTE — PAYOR COMM NOTE
CONTINUED STAY REVIEW    Payor: [employer] PARKER O  Subscriber #:  [id number]  Authorization Number: [id number]    Admit date: 4/28/25  Admit time:  2:26 PM    REVIEW DOCUMENTATION:        4/30      CARDIOLOGY:     She reports improvement in  left rib pain     Vitals:     04/30/25 0111 04/30/25 0500 04/30/25 0817 04/30/25 1209   BP:   152/54 124/51 141/53   BP Location:   Right arm Right arm Right arm   Pulse:   60 61 57   Resp:   20 20 18   Temp:   98.8 °F (37.1 °C) 97.8 °F (36.6 °C) 98.6 °F (37 °C)   TempSrc:   Oral Oral Oral   SpO2: 93% (!) 82% 99% 94%   Weight:   192 lb 1.6 oz (87.1 kg)          Temp: 98.6 °F (37 °C)  Pulse: 57  Resp: 18  BP: 141/53            Recent Labs   Lab 04/26/25  0500 04/27/25  0518 04/28/25  0512 04/29/25  0549 04/30/25  0458   *  146* 146* 144 142 140   K 4.0  4.0 3.8 4.2  4.2 3.9 4.3     102 103 102 100 99   CO2 35.0*  35.0* 38.0* 39.0* 39.0* 36.0*   BUN 28*  28* 25* 26* 23 29*   CREATSERUM 1.86*  1.86* 1.68* 1.78* 1.75* 1.82*   CA 9.2  9.2 9.4 9.2 9.3 9.8   MG 2.3 2.1 2.1 2.1 2.3   PHOS 4.8 4.7 4.6 4.3 3.3   *  175* 113* 133* 127* 175*           Impression:           1. HFpEF-acute on chronic     -             Net out =  3.4 L     2.COPD on home O2. Patient has mild pulm Hypertension  and mild RVE  3.HL  4.DM  5.CRI     -              Cr at baseline (1.6-1.9)     Plan:     Back on po torsemide           HOSPITALIST:       SUBJECTIVE:  Pt feels well   Was to dc today but sugars to high              Recent Labs   Lab 04/30/25  1716 04/30/25  1719 04/30/25  1825 04/30/25  1828 04/30/25  1832   PGLU 455* 487* 522* 478* 498*         AP:     80 y/o F w/ Pmhx of HFpEF, CKD3 (baseline Cr 1.6-1.9), COPD, WYATT on CPAP, T2DM, HLD who presents to the hospital w/ chief complaints of SOB, LE edema     Acute on Chronic HFpEF Exacerbation  Last ECHO: 65-70  CXR noted  BNP slightly elevated  Repeat ECHO: 60-65%, G2DD  Plan:  - Cardiology consulted, continue IV diuresis  - Daily  weights, strict I/Os, fluid restriction  - Holding Jardiance in house  COPD on Chronic 3L  Severe WYATT  Plan:  - Was previously on Breztri, provided no benefit as per last Pulm note  - CPAP nightly  - Duonebs PRN     Leg pain/back pain   - MRI ordered - w stenosis, bulging disc, trial of prednisone,helping w symptoms   - PT/OT     BIBIANA on CKD3   Plan:  - Continue Diuretics  - Nephrology consulted as per patient request     HTN  - Continue Hydralazine     HLD  - Statin     Dementia  - Donepezil     STELLA  - Ferrous Sulfate     T2DM  - hyperglyceia 2/2 prednsinoe   - 16u Glargine, LDISS - increase SSI to hiigh dose and give another dose of tresiba tonight       MEDICATIONS ADMINISTERED IN LAST 1 DAY:  aspirin DR tab 81 mg       Date Action Dose Route User    Discharged on 5/1/2025 5/1/2025 0928 Given 81 mg Oral Peggy Thompson RN          atorvastatin (Lipitor) tab 10 mg       Date Action Dose Route User    Discharged on 5/1/2025 4/30/2025 2120 Given 10 mg Oral Jose Elias Hou RN          cetirizine (ZyrTEC) tab 5 mg       Date Action Dose Route User    Discharged on 5/1/2025 5/1/2025 0930 Given 5 mg Oral Peggy Thompson RN          donepezil (Aricept) tab 10 mg       Date Action Dose Route User    Discharged on 5/1/2025 4/30/2025 2120 Given 10 mg Oral Jose Elias Hou RN          DULoxetine (Cymbalta) DR cap 30 mg       Date Action Dose Route User    Discharged on 5/1/2025 5/1/2025 0928 Given 30 mg Oral Peggy Thompson RN          escitalopram (Lexapro) tab 10 mg       Date Action Dose Route User    Discharged on 5/1/2025 4/30/2025 2120 Given 10 mg Oral Jose Elias Hou RN          hydrALAZINE (Apresoline) tab 100 mg       Date Action Dose Route User    Discharged on 5/1/2025 5/1/2025 0928 Given 100 mg Oral Peggy Thompson RN    4/30/2025 2120 Given 100 mg Oral Jose Elias Hou RN          insulin aspart (NovoLOG) 100 Units/mL FlexPen 1-5 Units       Date Action Dose Route User    Discharged on 5/1/2025 4/30/2025 1730  Given 13 Units Subcutaneous (Left Upper Arm) Nadeen Malone RN          insulin aspart (NovoLOG) 100 Units/mL FlexPen 4-20 Units       Date Action Dose Route User    Discharged on 5/1/2025 4/30/2025 2224 Given 20 Units Subcutaneous (Right Upper Arm) Jose Elias Hou RN          insulin degludec (Tresiba) 100 units/mL flextouch 16 Units       Date Action Dose Route User    Discharged on 5/1/2025 5/1/2025 0931 Given 16 Units Subcutaneous (Left Upper Arm) Peggy Thompson RN          insulin degludec (Tresiba) 100 units/mL flextouch 16 Units       Date Action Dose Route User    Discharged on 5/1/2025 4/30/2025 1859 Given 16 Units Subcutaneous (Left Upper Arm) Nadeen Malone RN          predniSONE (Deltasone) tab 40 mg       Date Action Dose Route User    Discharged on 5/1/2025 5/1/2025 0928 Given 40 mg Oral Peggy Thompson RN          pregabalin (Lyrica) cap 300 mg       Date Action Dose Route User    Discharged on 5/1/2025 4/30/2025 2119 Given 300 mg Oral Jose Elias Hou RN          torsemide (Demadex) tab 20 mg       Date Action Dose Route User    Discharged on 5/1/2025 5/1/2025 0928 Given 20 mg Oral Peggy Thompson RN          cyanocobalamin (Vitamin B12) tab 1,000 mcg       Date Action Dose Route User    Discharged on 5/1/2025 5/1/2025 0928 Given 1,000 mcg Oral Peggy Thompson RN          cholecalciferol (Vitamin D3) tab 1,000 Units       Date Action Dose Route User    Discharged on 5/1/2025 5/1/2025 0928 Given 1,000 Units Oral Peggy Thompson RN            Vitals (last day) before discharge       Date/Time Temp Pulse Resp BP SpO2 Weight O2 Device O2 Flow Rate (L/min) Who    05/01/25 0819 97.7 °F (36.5 °C) 59 18 131/55 98 % -- Nasal cannula 3 L/min KN    05/01/25 0549 -- -- -- -- -- 191 lb 2.2 oz (86.7 kg) -- -- CARLOS EDUARDO    05/01/25 0350 -- 60 -- -- 95 % -- -- -- JJ    05/01/25 0348 97.5 °F (36.4 °C) 61 18 139/42 95 % -- -- -- J    05/01/25 0000 98.4 °F (36.9 °C) 73 18 124/42 99 % -- -- -- JJ    04/30/25 1900 98.6 °F  (37 °C) 76 19 121/40 96 % -- -- -- JJ    04/30/25 1623 98.1 °F (36.7 °C) 73 20 143/45 96 % -- Nasal cannula 3 L/min TP    04/30/25 1209 98.6 °F (37 °C) 57 18 141/53 94 % -- Nasal cannula 3 L/min MB    04/30/25 0817 -- 61 20 124/51 99 % -- Nasal cannula 3 L/min SH    04/30/25 0817 97.8 °F (36.6 °C) -- -- -- -- -- -- -- MB    04/30/25 0500 98.8 °F (37.1 °C) 60 20 152/54 82 % 192 lb 1.6 oz (87.1 kg) Nasal cannula 3 L/min AT    04/30/25 0111 -- -- -- -- 93 % -- Nasal cannula 3 L/min LP    04/30/25 0105 -- 63 -- -- 94 % -- -- -- LP

## 2025-05-02 NOTE — PAYOR COMM NOTE
DISCHARGE REVIEW    Payor: EVANGELIST[employer] PARK[employer] [place]  Subscriber #:  [id number]  Authorization Number: [id number]    Admit date: 4/28/25  Admit time:   2:26 PM  Discharge Date: 5/1/2025 12:07 PM     Admitting Physician: Renetta Interiano MD  Attending Physician:  No att. providers found  Primary Care Physician: Gladys Saravia MD          Discharge Summary Notes        Discharge Summary signed by Donna Frausto MD at 5/1/2025 12:51 PM       Author: Donna Frausto MD Specialty: HOSPITALIST, Internal Medicine Author Type: Physician    Filed: 5/1/2025 12:51 PM Date of Service: 5/1/2025  9:20 AM Status: Signed    : Donna Frausto MD (Physician)         DM Hospitalist Discharge Summary    Patient ID  Nina Donnelly  [id number]  79 year old  10/9/1945  Admit date: 4/25/2025  Discharge date: 5/1/25  Attending: Donna Frausto MD   Primary Care Physician: Gladys Saravia MD   Reason for admission:  (see HPI on HP for further detail)  Discharge condition: good  Disposition: home    Important follow up:  -PCP     Discharge med list     Medication List        START taking these medications      predniSONE 20 MG Tabs  Commonly known as: Deltasone  Take 2 tablets (40 mg total) by mouth daily with food for 2 days.  Start taking on: May 2, 2025            CONTINUE taking these medications      * Accu-Chek SmartView Strp     * True Metrix Blood Glucose Test Strp  Check sugars TID     aspirin 81 MG Tabs     atorvastatin 10 MG Tabs  Commonly known as: Lipitor     cetirizine 10 MG Tabs  Commonly known as: ZyrTEC     cyanocobalamin 1000 MCG Tabs  Commonly known as: Vitamin B12     donepezil 10 MG Tabs  Commonly known as: Aricept     DULoxetine 60 MG Cpep  Commonly known as: Cymbalta     escitalopram 10 MG Tabs  Commonly known as: Lexapro     ferrous sulfate 325 (65 FE) MG Tbec  Take 1 tablet (325 mg total) by mouth daily with breakfast.     hydrALAZINE 100 MG Tabs  Commonly known as: Apresoline     insulin aspart 100 Units/mL Sopn  Commonly known as:  NovoLOG     insulin glargine 100 UNIT/ML Sopn     ipratropium 0.03 % Soln  Commonly known as: Atrovent     Magnesium 100 MG Tabs     pregabalin 300 MG Caps  Commonly known as: LYRICA     PRESERVISION AREDS 2 OR     torsemide 20 MG Tabs  Commonly known as: Demadex  Take 1 tablet (20 mg total) by mouth daily.     Vitamin D3 25 MCG (1000 UT) Caps           * This list has 2 medication(s) that are the same as other medications prescribed for you. Read the directions carefully, and ask your doctor or other care provider to review them with you.                STOP taking these medications      Jardiance 25 MG Tabs  Generic drug: empagliflozin               Where to Get Your Medications        These medications were sent to [id number] - [place], [place] - [facility] [phone], [phone]  [facility] [place] [address]      Phone: [phone]   torsemide 20 MG Tabs       You can get these medications from any pharmacy    Bring a paper prescription for each of these medications  predniSONE 20 MG Tabs         Discharge Diagnoses/Hospital course:   78 y/o F w/ Pmhx of HFpEF, CKD3 (baseline Cr 1.6-1.9), COPD, WYATT on CPAP, T2DM, HLD who presents to the hospital w/ chief complaints of SOB, LE edema     Acute on Chronic HFpEF Exacerbation  Last ECHO: 65-70  CXR noted  BNP slightly elevated  Repeat ECHO: 60-65%, G2DD  Plan:  - Cardiology consulted, continue IV diuresis - dc on oral diuretics   - Daily weights, strict I/Os, fluid restriction  - Holding Jardiance in house - per renal hold     COPD on Chronic 3L  Severe WYATT  Plan:  - Was previously on Breztri, provided no benefit as per last Pulm note  - CPAP nightly  - Duonebs PRN     Leg pain/back pain   - MRI ordered - w stenosis, bulging disc, prednisone for now per pt improving pain, advised outpt ortho spine eval d/w pt and pts daughter      BIBIANA on CKD3   Plan:  - Continue Diuretics  - Nephrology consulted as per patient request     HTN  - Continue Hydralazine      HLD  - Statin     Dementia  - Donepezil     STELLA  - Ferrous Sulfate     T2DM  - 16u Glargine, HDISS     Consults:  IP CONSULT TO CARDIOLOGY  IP CONSULT TO NEPHROLOGY  IP CONSULT TO HOSPITALIST  IP CONSULT TO FOOD AND NUTRITION SERVICES  IP CONSULT TO SOCIAL WORK    Radiology:  XR RIBS WITH CHEST (3 VIEWS), LEFT  (CPT=71101)  Result Date: 4/29/2025  PROCEDURE:  XR RIBS WITH CHEST (3 VIEWS), LEFT  (CPT=71101)  TECHNIQUE:  PA Chest and three views of the ribs were obtained  COMPARISON:  None.  INDICATIONS:  eval for fracture  PATIENT STATED HISTORY: (As transcribed by Technologist)     FINDINGS:  LUNGS:  Prominent interstitial markings in the mid-lungs and bases with atelectasis left lung base.  CARDIAC:  Cardiomegaly.  Pulmonary vascularity mildly increased. MEDIASTINUM:  Normal. PLEURA:  No pneumothorax.  There is blunting left costophrenic angle.. BONES:  No displaced left rib fracture.             CONCLUSION:  Cardiomegaly with mild pulmonary venous congestion.  Slight increased interstitial markings in the mid-lungs and bases.  Atelectasis left lung base with mild blunting left costophrenic angle.   LOCATION:  [id number]     Dictated by (CST): Althea Kirk MD on 4/29/2025 at 3:45 PM     Finalized by (CST): Althea Kirk MD on 4/29/2025 at 3:47 PM       MRI SPINE LUMBAR (CPT=72148)  Result Date: 4/28/2025  PROCEDURE:  MRI SPINE LUMBAR (CPT=72148)  COMPARISON:  None.  INDICATIONS:  eval for weakness in legs  TECHNIQUE:  Multiplanar T1 and T2 weighted images including fat suppression sequences.  Images acquired in sagittal and axial planes.   PATIENT STATED HISTORY: (As transcribed by Technologist)  Ongoing lower back pain that radiates down the legs    FINDINGS:  LUMBAR DISC LEVELS L1-L2:  No significant disc/facet abnormality, spinal stenosis, or foraminal narrowing. L2-L3:  Mild broad-based degenerative disc bulge with mild bilateral facet joint degenerative change.  AP diameter canal is normal 12 mm.  There is no  significant central or foraminal stenosis. L3-L4:  There is moderate broad-based degenerative disc bulge.  There is mild bilateral facet joint degenerative change and ligamentum flavum hypertrophy.  There is narrowing of the central canal to 8 mm consistent mild central canal stenosis.  There is mild bilateral subarticular zone and neural foraminal narrowing. L4-L5:  Moderate broad-based degenerative disc bulge with superimposed right posterior paracentral disc protrusion measuring 10 x 5 mm in size.  There is moderate right greater than left facet joint degenerative change with ligamentum flavum hypertrophy.   There is moderate to severe central canal stenosis of 5.5 mm.  There is moderate right greater than left subarticular zone and neural foraminal narrowing.  L5-S1:  There is mild to moderate broad-based degenerative disc bulge slightly eccentric to the right greater than left lateral aspect of the disc space.  There is mild to moderate bilateral facet joint degenerative change.  AP diameter canal is normal at 10 mm.  There is moderate right greater than left neural foraminal narrowing.  PARASPINAL AREA:  Normal with no visible mass.  BONY STRUCTURES:  No fracture, pars defect, significant scoliosis, or osseous lesion.  CORD/CAUDA EQUINA:  Normal caliber, contour, and signal intensity.             CONCLUSION:   1. L3-4 moderate degenerative disc bulge with facet joint degenerative change and ligamentum flavum hypertrophy causing mild central canal stenosis and mild bilateral subarticular zone and neural foraminal narrowing.  2. L4-5 moderate degenerative disc bulge with superimposed right paracentral disc protrusion causing moderate to severe central canal stenosis and moderate right greater than left subarticular zone and neural foraminal narrowing.  3. Mild to moderate degenerative disc bulge/osteophyte complex extending to the posterior lateral aspect of the disc spaces at L5-S1 causing moderate right  greater than left neural foraminal narrowing.   LOCATION:  [facility]   Dictated by ([facility]): Nikko Gonzáles MD on 2025 at 4:01 PM     Finalized by ([facility]): Nikko Gonzáles MD on 2025 at 4:05 PM       CARD ECHO 2D DOPPLER (CPT=93306)  Result Date: 2025  Transthoracic Echocardiogram Name:Nina Donnelly Date: 2025 :  10/09/1945 Ht:  (61in)  BP: 115 / 41 MRN:  [id number]    Age:  79years    Wt:  (195lb) HR: 54bpm Loc:  [facility]       Gndr: F          BSA: 1.87m^2 Sonographer: James BURDICK AE, PE Ordering:    Justus Nair Consulting:  Renetta Interiano ---------------------------------------------------------------------------- History/Indications:   Dyspnea.  Congestive heart failure. ---------------------------------------------------------------------------- Procedure information:  A transthoracic complete 2D study was performed. Additional evaluation included M-mode, complete spectral Doppler, and color Doppler.  Patient status:  Inpatient.  Location:  Bedside.    Comparison was made to the study of 10/15/2024.    This was a routine study. Transthoracic echocardiography for ventricular function evaluation. Image quality was adequate. ECG rhythm:   Sinus bradycardia  Study completion:  There were no complications. ---------------------------------------------------------------------------- Conclusions: 1. Left ventricle: The cavity size was normal. Wall thickness was normal.    Systolic function was normal. The estimated ejection fraction was 60-65%.    Features are consistent with a pseudonormal left ventricular filling    pattern, with concomitant abnormal relaxation and increased filling    pressure - grade 2 diastolic dysfunction. Doppler parameters are    consistent with elevated mean left atrial filling pressure. 2. Right ventricle: The cavity size was mildly increased. Systolic function    was normal. Systolic pressure was moderately increased. 3. Left atrium: The atrium was mildly to moderately dilated. 4. Right  atrium: The atrium was mildly dilated. 5. Pulmonary arteries: The peak systolic pressure is 44mm Hg. 6. Pericardium, extracardiac: There was no pericardial effusion. 7. Inferior vena cava: The IVC was small-medium and 1.3cm diameter.    Respirophasic diameter changes are in the normal range (> 50%). * ---------------------------------------------------------------------------- * Findings: Left ventricle:  The cavity size was normal. Wall thickness was normal. Systolic function was normal. The estimated ejection fraction was 60-65%. No diagnostic evidence for diffuse regional wall motion abnormalities. Features are consistent with a pseudonormal left ventricular filling pattern, with concomitant abnormal relaxation and increased filling pressure - grade 2 diastolic dysfunction. Doppler parameters are consistent with elevated mean left atrial filling pressure. Ventricular septum:   Intact ventricular septum. Left atrium:  The atrium was mildly to moderately dilated. Right ventricle:  The cavity size was mildly increased. Systolic function was normal.  Systolic pressure was moderately increased. Right atrium:  The atrium was mildly dilated. Mitral valve:  The annulus was mildly calcified. Leaflet separation was normal.  Doppler:  Transvalvular velocity was within the normal range. There was no evidence for stenosis. There was no significant regurgitation. Aortic valve:   The valve was trileaflet. The leaflets were mildly calcified. Cusp separation was normal.  Doppler:  Transvalvular velocity was within the normal range. There was no evidence for stenosis. There was no significant regurgitation. Tricuspid valve:  The valve is structurally normal. Leaflet separation was normal.  Doppler:  Transvalvular velocity was within the normal range. There was no evidence for stenosis. There was trace regurgitation. Pulmonic valve:    There was no significant valve disease.    Doppler: Transvalvular velocity was within the normal  range. There was no evidence for stenosis. There was mild regurgitation. Pericardium:   There was no pericardial effusion. Pleura:  No evidence of pleural fluid accumulation. Aorta: Aortic root: The aortic root was normal-sized. Ascending aorta: The ascending aorta was normal. Systemic veins: Inferior vena cava: The IVC was small-medium and 1.3cm diameter. Respirophasic diameter changes are in the normal range (> 50%). ---------------------------------------------------------------------------- Measurements  Left ventricle         Value        Ref       10/15/2024  IVS thickness, ED,     0.9   cm     0.6 - 0.9 1.1  PLAX  LV ID, ED, PLAX        5.0   cm     3.8 - 5.2 4.8  LV ID, ES, PLAX        3.0   cm     2.2 - 3.5 2.8  LV PW thickness,       0.9   cm     0.6 - 0.9 1.0  ED, PLAX  IVS/LV PW ratio,       0.97         --------- 1.05  ED, PLAX  LV PW/LV ID ratio,     0.18         --------- 0.21  ED, PLAX  LV ejection            65    %      54 - 74   72  fraction  LV e', average         7.6   cm/sec --------- 12.4  LV E/e', average   (H) 16           <=14      8  Aortic root            Value        Ref       10/15/2024  Aortic root ID, ED     3.5   cm     2.6 - 4.0 3.5  Ascending aorta        Value        Ref       10/15/2024  Ascending aorta        3.4   cm     1.9 - 3.5 3.4  ID, A-P, ED  Left atrium            Value        Ref       10/15/2024  LA ID, A-P, ES     (H) 5.2   cm     2.7 - 3.8 ----------  LA volume, ES, 1-p (H) 116   ml     22 - 52   50  A4C  LA/aortic root         1.49         --------- ----------  ratio  Mitral valve           Value        Ref       10/15/2024  Mitral E-wave peak     1.23  m/sec  --------- 1.02  velocity  Mitral A-wave peak     1.11  m/sec  --------- 0.95  velocity  Mitral E/A ratio,      1.1          --------- 1.1  peak  Pulmonary artery       Value        Ref       10/15/2024  PA pressure, S, DP     44    mm Hg  --------- ----------  Systemic veins         Value        Ref        10/15/2024  Estimated CVP          5     mm Hg  --------- 3  Right ventricle        Value        Ref       10/15/2024  TAPSE, 2D              2.80  cm     >=1.70    2.38  RV pressure, S, DP     44    mm Hg  --------- ---------- Legend: (L)  and  (H)  damian values outside specified reference range. ---------------------------------------------------------------------------- Prepared and electronically signed by Kimo Velazco 04/26/2025 16:56     XR CHEST AP PORTABLE  (CPT=71045)  Result Date: 4/25/2025  PROCEDURE:  XR CHEST AP PORTABLE  (CPT=71045)  TECHNIQUE:  AP chest radiograph was obtained.  COMPARISON:  [facility] , XR, XR CHEST AP PORTABLE  (CPT=71045), 1/19/2025, 7:50 PM.  INDICATIONS:  Bilateral ankle swelling, SOB  PATIENT STATED HISTORY: (As transcribed by Technologist)               CONCLUSION:   Low lung volumes poor inspiration.  Haziness of the right mid-lower lung and left lower lung, increased since the prior, likely reflecting combination of pleural fluid and lung consolidation.  Cardiac enlargement assessment limited.  No pneumothorax. Consider upright PA and lateral examination of the chest, when tolerated.   LOCATION:  [facility]      Dictated by (CST): Aaron Diop MD on 4/25/2025 at 6:48 PM     Finalized by (CST): Aaron Diop MD on 4/25/2025 at 6:48 PM         Operative reports:          Day of discharge exam:  Vitals:    05/01/25 0819   BP: 131/55   Pulse: 59   Resp: 18   Temp: 97.7 °F (36.5 °C)     No acute distress,    Lungs clear  Heart regular  Abdomen benign     Patient and/or family had opportunity to ask questions and expressed understanding and agreement with therapeutic plan as outlined    31 minutes spent on discharge    Donna Frausto MD    Electronically signed by Donna Frausto MD on 5/1/2025 12:51 PM         REVIEWER COMMENTS

## 2025-05-05 VITALS
SYSTOLIC BLOOD PRESSURE: 131 MMHG | HEIGHT: 61 IN | BODY MASS INDEX: 36.06 KG/M2 | DIASTOLIC BLOOD PRESSURE: 55 MMHG | OXYGEN SATURATION: 98 % | RESPIRATION RATE: 18 BRPM | TEMPERATURE: 98 F | HEART RATE: 59 BPM | WEIGHT: 191 LBS

## 2025-05-08 NOTE — PROGRESS NOTES
PHYSICIAN CLARIFICATION    Additional information related to patient's renal status.    BIBIANA ruled out. Clinical findings best described as mild renal insufficiency     This note is part of the patient's medical record.

## 2025-05-27 ENCOUNTER — TELEPHONE (OUTPATIENT)
Dept: CARDIOLOGY UNIT | Facility: HOSPITAL | Age: 80
End: 2025-05-27

## 2025-06-05 ENCOUNTER — LAB ENCOUNTER (OUTPATIENT)
Dept: LAB | Age: 80
End: 2025-06-05
Attending: CLINICAL NURSE SPECIALIST
Payer: MEDICARE

## 2025-06-05 DIAGNOSIS — I50.9 CHF (CONGESTIVE HEART FAILURE) (HCC): Primary | ICD-10-CM

## 2025-06-05 LAB
ANION GAP SERPL CALC-SCNC: 6 MMOL/L (ref 0–18)
BUN BLD-MCNC: 36 MG/DL (ref 9–23)
CALCIUM BLD-MCNC: 9.6 MG/DL (ref 8.7–10.6)
CHLORIDE SERPL-SCNC: 96 MMOL/L (ref 98–112)
CO2 SERPL-SCNC: 37 MMOL/L (ref 21–32)
CREAT BLD-MCNC: 1.84 MG/DL (ref 0.55–1.02)
EGFRCR SERPLBLD CKD-EPI 2021: 28 ML/MIN/1.73M2 (ref 60–?)
FASTING STATUS PATIENT QL REPORTED: NO
GLUCOSE BLD-MCNC: 229 MG/DL (ref 70–99)
OSMOLALITY SERPL CALC.SUM OF ELEC: 304 MOSM/KG (ref 275–295)
POTASSIUM SERPL-SCNC: 4.2 MMOL/L (ref 3.5–5.1)
SODIUM SERPL-SCNC: 139 MMOL/L (ref 136–145)

## 2025-06-05 PROCEDURE — 36415 COLL VENOUS BLD VENIPUNCTURE: CPT

## 2025-06-05 PROCEDURE — 80048 BASIC METABOLIC PNL TOTAL CA: CPT
